# Patient Record
Sex: FEMALE | Race: BLACK OR AFRICAN AMERICAN | Employment: UNEMPLOYED | URBAN - METROPOLITAN AREA
[De-identification: names, ages, dates, MRNs, and addresses within clinical notes are randomized per-mention and may not be internally consistent; named-entity substitution may affect disease eponyms.]

---

## 2017-02-02 ENCOUNTER — HOSPITAL ENCOUNTER (EMERGENCY)
Age: 52
Discharge: HOME | End: 2017-02-02
Attending: EMERGENCY MEDICINE
Payer: SELF-PAY

## 2017-02-02 ENCOUNTER — APPOINTMENT (OUTPATIENT)
Dept: GENERAL RADIOLOGY | Age: 52
End: 2017-02-02
Attending: EMERGENCY MEDICINE
Payer: SELF-PAY

## 2017-02-02 VITALS
WEIGHT: 130 LBS | TEMPERATURE: 99.2 F | HEIGHT: 67 IN | HEART RATE: 108 BPM | OXYGEN SATURATION: 94 % | BODY MASS INDEX: 20.4 KG/M2 | SYSTOLIC BLOOD PRESSURE: 125 MMHG | RESPIRATION RATE: 16 BRPM | DIASTOLIC BLOOD PRESSURE: 82 MMHG

## 2017-02-02 DIAGNOSIS — J98.01 ACUTE BRONCHOSPASM: ICD-10-CM

## 2017-02-02 DIAGNOSIS — J18.9 PNEUMONIA OF LEFT LOWER LOBE DUE TO INFECTIOUS ORGANISM: ICD-10-CM

## 2017-02-02 DIAGNOSIS — R07.9 CHEST PAIN, UNSPECIFIED TYPE: Primary | ICD-10-CM

## 2017-02-02 LAB
ANION GAP BLD CALC-SCNC: 11 MMOL/L (ref 3–18)
ATRIAL RATE: 111 BPM
BASOPHILS # BLD AUTO: 0 K/UL (ref 0–0.06)
BASOPHILS # BLD: 0 % (ref 0–2)
BNP SERPL-MCNC: 310 PG/ML (ref 0–900)
BUN SERPL-MCNC: 10 MG/DL (ref 7–18)
BUN/CREAT SERPL: 12 (ref 12–20)
CALCIUM SERPL-MCNC: 8.9 MG/DL (ref 8.5–10.1)
CALCULATED P AXIS, ECG09: 78 DEGREES
CALCULATED R AXIS, ECG10: 60 DEGREES
CALCULATED T AXIS, ECG11: -20 DEGREES
CHLORIDE SERPL-SCNC: 93 MMOL/L (ref 100–108)
CK MB CFR SERPL CALC: 0.2 % (ref 0–4)
CK MB SERPL-MCNC: 0.6 NG/ML (ref 0.5–3.6)
CK SERPL-CCNC: 302 U/L (ref 26–192)
CO2 SERPL-SCNC: 33 MMOL/L (ref 21–32)
CREAT SERPL-MCNC: 0.85 MG/DL (ref 0.6–1.3)
D DIMER PPP FEU-MCNC: 0.35 UG/ML(FEU)
DIAGNOSIS, 93000: NORMAL
DIFFERENTIAL METHOD BLD: ABNORMAL
EOSINOPHIL # BLD: 0 K/UL (ref 0–0.4)
EOSINOPHIL NFR BLD: 0 % (ref 0–5)
ERYTHROCYTE [DISTWIDTH] IN BLOOD BY AUTOMATED COUNT: 13.1 % (ref 11.6–14.5)
FLUAV AG NPH QL IA: NEGATIVE
FLUBV AG NOSE QL IA: NEGATIVE
GLUCOSE SERPL-MCNC: 126 MG/DL (ref 74–99)
HCT VFR BLD AUTO: 36.8 % (ref 35–45)
HGB BLD-MCNC: 12 G/DL (ref 12–16)
LYMPHOCYTES # BLD AUTO: 4 % (ref 21–52)
LYMPHOCYTES # BLD: 0.4 K/UL (ref 0.9–3.6)
MCH RBC QN AUTO: 30.1 PG (ref 24–34)
MCHC RBC AUTO-ENTMCNC: 32.6 G/DL (ref 31–37)
MCV RBC AUTO: 92.2 FL (ref 74–97)
MONOCYTES # BLD: 0.7 K/UL (ref 0.05–1.2)
MONOCYTES NFR BLD AUTO: 6 % (ref 3–10)
NEUTS SEG # BLD: 10.5 K/UL (ref 1.8–8)
NEUTS SEG NFR BLD AUTO: 90 % (ref 40–73)
P-R INTERVAL, ECG05: 132 MS
PLATELET # BLD AUTO: 167 K/UL (ref 135–420)
PMV BLD AUTO: 9.9 FL (ref 9.2–11.8)
POTASSIUM SERPL-SCNC: 3.5 MMOL/L (ref 3.5–5.5)
Q-T INTERVAL, ECG07: 348 MS
QRS DURATION, ECG06: 68 MS
QTC CALCULATION (BEZET), ECG08: 473 MS
RBC # BLD AUTO: 3.99 M/UL (ref 4.2–5.3)
SODIUM SERPL-SCNC: 137 MMOL/L (ref 136–145)
TROPONIN I SERPL-MCNC: <0.02 NG/ML (ref 0–0.06)
VENTRICULAR RATE, ECG03: 111 BPM
WBC # BLD AUTO: 11.6 K/UL (ref 4.6–13.2)

## 2017-02-02 PROCEDURE — 96374 THER/PROPH/DIAG INJ IV PUSH: CPT

## 2017-02-02 PROCEDURE — 77030013140 HC MSK NEB VYRM -A

## 2017-02-02 PROCEDURE — 93005 ELECTROCARDIOGRAM TRACING: CPT

## 2017-02-02 PROCEDURE — 94640 AIRWAY INHALATION TREATMENT: CPT

## 2017-02-02 PROCEDURE — 74011250636 HC RX REV CODE- 250/636: Performed by: EMERGENCY MEDICINE

## 2017-02-02 PROCEDURE — 74011250637 HC RX REV CODE- 250/637: Performed by: EMERGENCY MEDICINE

## 2017-02-02 PROCEDURE — 87081 CULTURE SCREEN ONLY: CPT | Performed by: EMERGENCY MEDICINE

## 2017-02-02 PROCEDURE — 87804 INFLUENZA ASSAY W/OPTIC: CPT | Performed by: EMERGENCY MEDICINE

## 2017-02-02 PROCEDURE — 83880 ASSAY OF NATRIURETIC PEPTIDE: CPT | Performed by: EMERGENCY MEDICINE

## 2017-02-02 PROCEDURE — 71020 XR CHEST PA LAT: CPT

## 2017-02-02 PROCEDURE — 99285 EMERGENCY DEPT VISIT HI MDM: CPT

## 2017-02-02 PROCEDURE — 74011000250 HC RX REV CODE- 250: Performed by: EMERGENCY MEDICINE

## 2017-02-02 PROCEDURE — 85379 FIBRIN DEGRADATION QUANT: CPT | Performed by: EMERGENCY MEDICINE

## 2017-02-02 PROCEDURE — 82550 ASSAY OF CK (CPK): CPT | Performed by: EMERGENCY MEDICINE

## 2017-02-02 PROCEDURE — 96375 TX/PRO/DX INJ NEW DRUG ADDON: CPT

## 2017-02-02 PROCEDURE — 80048 BASIC METABOLIC PNL TOTAL CA: CPT | Performed by: EMERGENCY MEDICINE

## 2017-02-02 PROCEDURE — 85025 COMPLETE CBC W/AUTO DIFF WBC: CPT | Performed by: EMERGENCY MEDICINE

## 2017-02-02 PROCEDURE — 96361 HYDRATE IV INFUSION ADD-ON: CPT

## 2017-02-02 RX ORDER — SODIUM CHLORIDE 9 MG/ML
1000 INJECTION, SOLUTION INTRAVENOUS ONCE
Status: COMPLETED | OUTPATIENT
Start: 2017-02-02 | End: 2017-02-02

## 2017-02-02 RX ORDER — CODEINE PHOSPHATE AND GUAIFENESIN 10; 100 MG/5ML; MG/5ML
5-10 SOLUTION ORAL
Qty: 125 ML | Refills: 0 | Status: ON HOLD | OUTPATIENT
Start: 2017-02-02 | End: 2017-07-10

## 2017-02-02 RX ORDER — IBUPROFEN 600 MG/1
600 TABLET ORAL
Status: COMPLETED | OUTPATIENT
Start: 2017-02-02 | End: 2017-02-02

## 2017-02-02 RX ORDER — ALBUTEROL SULFATE 90 UG/1
2 AEROSOL, METERED RESPIRATORY (INHALATION)
Status: COMPLETED | OUTPATIENT
Start: 2017-02-02 | End: 2017-02-02

## 2017-02-02 RX ORDER — ALBUTEROL SULFATE 0.83 MG/ML
2.5 SOLUTION RESPIRATORY (INHALATION)
Status: COMPLETED | OUTPATIENT
Start: 2017-02-02 | End: 2017-02-02

## 2017-02-02 RX ORDER — CODEINE PHOSPHATE AND GUAIFENESIN 10; 100 MG/5ML; MG/5ML
10 SOLUTION ORAL
Status: COMPLETED | OUTPATIENT
Start: 2017-02-02 | End: 2017-02-02

## 2017-02-02 RX ORDER — AMOXICILLIN 250 MG/1
500 CAPSULE ORAL
Status: COMPLETED | OUTPATIENT
Start: 2017-02-02 | End: 2017-02-02

## 2017-02-02 RX ORDER — AMOXICILLIN 500 MG/1
500 TABLET, FILM COATED ORAL 3 TIMES DAILY
Qty: 21 TAB | Refills: 0 | Status: SHIPPED | OUTPATIENT
Start: 2017-02-02 | End: 2017-02-09

## 2017-02-02 RX ORDER — ACETAMINOPHEN 325 MG/1
650 TABLET ORAL
Status: COMPLETED | OUTPATIENT
Start: 2017-02-02 | End: 2017-02-02

## 2017-02-02 RX ORDER — IPRATROPIUM BROMIDE AND ALBUTEROL SULFATE 2.5; .5 MG/3ML; MG/3ML
3 SOLUTION RESPIRATORY (INHALATION)
Status: COMPLETED | OUTPATIENT
Start: 2017-02-02 | End: 2017-02-02

## 2017-02-02 RX ORDER — IBUPROFEN 600 MG/1
600 TABLET ORAL
Qty: 18 TAB | Refills: 0 | Status: SHIPPED | OUTPATIENT
Start: 2017-02-02 | End: 2019-10-04

## 2017-02-02 RX ORDER — ONDANSETRON 2 MG/ML
4 INJECTION INTRAMUSCULAR; INTRAVENOUS
Status: COMPLETED | OUTPATIENT
Start: 2017-02-02 | End: 2017-02-02

## 2017-02-02 RX ORDER — PREDNISONE 20 MG/1
60 TABLET ORAL DAILY
Qty: 12 TAB | Refills: 0 | Status: SHIPPED | OUTPATIENT
Start: 2017-02-02 | End: 2017-02-06

## 2017-02-02 RX ADMIN — ALBUTEROL SULFATE 2 PUFF: 90 AEROSOL, METERED RESPIRATORY (INHALATION) at 04:55

## 2017-02-02 RX ADMIN — IPRATROPIUM BROMIDE AND ALBUTEROL SULFATE 3 ML: .5; 3 SOLUTION RESPIRATORY (INHALATION) at 02:04

## 2017-02-02 RX ADMIN — ALBUTEROL SULFATE 2.5 MG: 2.5 SOLUTION RESPIRATORY (INHALATION) at 03:14

## 2017-02-02 RX ADMIN — GUAIFENESIN AND CODEINE PHOSPHATE 10 ML: 100; 10 SOLUTION ORAL at 04:07

## 2017-02-02 RX ADMIN — IBUPROFEN 600 MG: 600 TABLET ORAL at 02:03

## 2017-02-02 RX ADMIN — AMOXICILLIN 500 MG: 250 CAPSULE ORAL at 04:07

## 2017-02-02 RX ADMIN — ONDANSETRON HYDROCHLORIDE 4 MG: 2 SOLUTION INTRAMUSCULAR; INTRAVENOUS at 02:56

## 2017-02-02 RX ADMIN — SODIUM CHLORIDE 1000 ML: 900 INJECTION, SOLUTION INTRAVENOUS at 04:05

## 2017-02-02 RX ADMIN — ACETAMINOPHEN 650 MG: 325 TABLET, FILM COATED ORAL at 04:06

## 2017-02-02 RX ADMIN — METHYLPREDNISOLONE SODIUM SUCCINATE 125 MG: 125 INJECTION, POWDER, FOR SOLUTION INTRAMUSCULAR; INTRAVENOUS at 02:56

## 2017-02-02 NOTE — ED TRIAGE NOTES
Pt arrives alert and oriented c/o Sore throat, chills and body aches  starting approx 24 hours ago. Pt c/o productive cough with green flim.

## 2017-02-02 NOTE — ED PROVIDER NOTES
HPI Comments: 1:27 AM Rochelle Mendoza is a 49 y.o. female who presents to ED with a h/o asthma complaining of sore throat onset yesterday. She also complains complains of a productive cough with yellow sputum, fever,  generalized myalgias, chills. She c/o chest pain when coughing only, no chest dalia otherwise. She denies SOB or NVD. Pt admits to smoking cigarettes. Pt did not receive the flu shot this year. Pt denies taking any Tylenol or Motrin. No other concerns nor complaints at this time. PCP: None      The history is provided by the patient. History reviewed. No pertinent past medical history. History reviewed. No pertinent past surgical history. History reviewed. No pertinent family history. Social History     Social History    Marital status: SINGLE     Spouse name: N/A    Number of children: N/A    Years of education: N/A     Occupational History    Not on file. Social History Main Topics    Smoking status: Current Every Day Smoker     Packs/day: 0.50     Years: 20.00    Smokeless tobacco: Not on file    Alcohol use 1.2 oz/week     2 Cans of beer per week      Comment: pt states she drinks beer daily    Drug use: No    Sexual activity: No     Other Topics Concern    Not on file     Social History Narrative    No narrative on file         ALLERGIES: Review of patient's allergies indicates no known allergies. Review of Systems   Constitutional: Positive for chills and fever. Negative for fatigue and unexpected weight change. HENT: Positive for sore throat. Negative for congestion and rhinorrhea. Respiratory: Positive for cough (productive with yellow sputum). Negative for chest tightness and shortness of breath. Cardiovascular: Negative for leg swelling. Gastrointestinal: Negative for abdominal pain, nausea and vomiting. Genitourinary: Negative for dysuria. Musculoskeletal: Positive for myalgias (generalized). Negative for back pain.    Skin: Negative for rash.   Neurological: Negative for dizziness and weakness. Psychiatric/Behavioral: The patient is not nervous/anxious. All other systems reviewed and are negative. Vitals:    02/02/17 0330 02/02/17 0400 02/02/17 0442 02/02/17 0455   BP: 124/83 136/80 125/82    Pulse: (!) 111 (!) 113 (!) 108 (!) 108   Resp: 15 14 16    Temp:   99.2 °F (37.3 °C)    SpO2: 91% 90% 94%    Weight:       Height:                Physical Exam   Constitutional: She is oriented to person, place, and time. She appears well-developed and well-nourished. No distress. HENT:   Head: Normocephalic and atraumatic. Right Ear: External ear normal.   Left Ear: External ear normal.   Mouth/Throat: Oropharynx is clear and moist.   Nasal congestion    Eyes: Conjunctivae and EOM are normal. Pupils are equal, round, and reactive to light. Neck: Normal range of motion. Neck supple. Cardiovascular: Normal rate and normal heart sounds. No murmur heard. Pulmonary/Chest: Effort normal. She has wheezes (bilateral inspiratory wheeze). She has no rales. Abdominal: Soft. Bowel sounds are normal. There is no tenderness. There is no guarding. Musculoskeletal: Normal range of motion. She exhibits no edema or tenderness. Neurological: She is alert and oriented to person, place, and time. Skin: Skin is warm and dry. Nursing note and vitals reviewed. [facility]  ED Course       Procedures    Vitals:  No data found.         Medications ordered:   Medications   albuterol-ipratropium (DUO-NEB) 2.5 MG-0.5 MG/3 ML (3 mL Nebulization Given 2/2/17 0204)   ibuprofen (MOTRIN) tablet 600 mg (600 mg Oral Given 2/2/17 0203)   methylPREDNISolone (PF) (SOLU-MEDROL) injection 125 mg (125 mg IntraVENous Given 2/2/17 0256)   ondansetron (ZOFRAN) injection 4 mg (4 mg IntraVENous Given 2/2/17 0256)   albuterol (PROVENTIL VENTOLIN) nebulizer solution 2.5 mg (2.5 mg Nebulization Given 2/2/17 0314)   amoxicillin (AMOXIL) capsule 500 mg (500 mg Oral Given 2/2/17 3217)   guaiFENesin-codeine (ROBITUSSIN AC) 100-10 mg/5 mL solution 10 mL (10 mL Oral Given 2/2/17 4757)   0.9% sodium chloride infusion 1,000 mL (0 mL IntraVENous IV Completed 2/2/17 0176)   acetaminophen (TYLENOL) tablet 650 mg (650 mg Oral Given 2/2/17 0256)   albuterol (PROVENTIL HFA, VENTOLIN HFA, PROAIR HFA) inhaler 2 Puff (2 Puffs Inhalation Given 2/2/17 1715)         Lab findings:  No results found for this or any previous visit (from the past 12 hour(s)). X-Ray, CT or other radiology findings or impressions:  XR CHEST PA LAT   Final Result          Progress notes, Consult notes or additional Procedure notes:     3:41 AM pt c/o chest pain w cough only   t wave inv on ekg, no old ekg's. But cp w cough only, + pna seen. No chest pain otherwise. Sx's > one day and neg ck/trop. Nl sats. Afebrile, nl wbc, not c/w bacteremia. Nl sats when pleth good, rechecked at dc and 94%. Pt markedly improved after tx, wants dc trial and stable for dc trial.  Lungs ctab after tx. No emc, stable for dc and close f/u. Disposition:  Diagnosis:   1. Chest pain, unspecified type    2. Acute bronchospasm    3. Pneumonia of left lower lobe due to infectious organism        Disposition: home    Follow-up Information     Follow up With Details Comments [address] Schedule an appointment as soon as possible for a visit in 2 days  [facility] [address]  [address] [place]           Discharge Medication List as of 2/2/2017  4:33 AM      START taking these medications    Details   predniSONE (DELTASONE) 20 mg tablet Take 3 Tabs by mouth daily for 4 days. , Print, Disp-12 Tab, R-0      guaiFENesin-codeine (ROBITUSSIN AC) 100-10 mg/5 mL solution Take 5-10 mL by mouth every six (6) hours as needed for Cough.  Max Daily Amount: 40 mL., Print, Disp-125 mL, R-0      ibuprofen (MOTRIN) 600 mg tablet Take 1 Tab by mouth every six (6) hours as needed for Pain., Print, Disp18 Tab, R-0      amoxicillin 500 mg tab Take 500 mg by mouth three (3) times daily for 7 days. , Print, Disp-21 Tab, R-0           Scribe Attestation  Miriam Lara scribing for and in the presence of Ynes Villela MD (02/02/17/ 1:26 AM)    Physician Attestation  I personally performed the services described in this documentation, reviewed, and edited the documentation which was dictated to the scribe in my presence, and it accurately records my own words and actions.      Ynes Villela MD (02/02/17/ 1:26 AM)    Signed by: Ovi Victoria, 02/02/17, 1:26 AM

## 2017-02-02 NOTE — DISCHARGE INSTRUCTIONS
Return for pain, any shortness of breath, fever not resolving with motrin, vomiting, decreased fluid intake, weakness, numbness, dizziness, or any change or concerns. Stop smoking as we discussed. Pneumonia: Care Instructions  Your Care Instructions    Pneumonia is an infection of the lungs. Most cases are caused by infections from bacteria or viruses. Pneumonia may be mild or very severe. If it is caused by bacteria, you will be treated with antibiotics. It may take a few weeks to a few months to recover fully from pneumonia, depending on how sick you were and whether your overall health is good. Follow-up care is a key part of your treatment and safety. Be sure to make and go to all appointments, and call your doctor if you are having problems. Its also a good idea to know your test results and keep a list of the medicines you take. How can you care for yourself at home? · Take your antibiotics exactly as directed. Do not stop taking the medicine just because you are feeling better. You need to take the full course of antibiotics. · Take your medicines exactly as prescribed. Call your doctor if you think you are having a problem with your medicine. · Get plenty of rest and sleep. You may feel weak and tired for a while, but your energy level will improve with time. · To prevent dehydration, drink plenty of fluids, enough so that your urine is light yellow or clear like water. Choose water and other caffeine-free clear liquids until you feel better. If you have kidney, heart, or liver disease and have to limit fluids, talk with your doctor before you increase the amount of fluids you drink. · Take care of your cough so you can rest. A cough that brings up mucus from your lungs is common with pneumonia. It is one way your body gets rid of the infection. But if coughing keeps you from resting or causes severe fatigue and chest-wall pain, talk to your doctor.  He or she may suggest that you take a medicine to reduce the cough. · Use a vaporizer or humidifier to add moisture to your bedroom. Follow the directions for cleaning the machine. · Do not smoke or allow others to smoke around you. Smoke will make your cough last longer. If you need help quitting, talk to your doctor about stop-smoking programs and medicines. These can increase your chances of quitting for good. · Take an over-the-counter pain medicine, such as acetaminophen (Tylenol), ibuprofen (Advil, Motrin), or naproxen (Aleve). Read and follow all instructions on the label. · Do not take two or more pain medicines at the same time unless the doctor told you to. Many pain medicines have acetaminophen, which is Tylenol. Too much acetaminophen (Tylenol) can be harmful. · If you were given a spirometer to measure how well your lungs are working, use it as instructed. This can help your doctor tell how your recovery is going. · To prevent pneumonia in the future, talk to your doctor about getting a flu vaccine (once a year) and a pneumococcal vaccine (one time only for most people). When should you call for help? Call 911 anytime you think you may need emergency care. For example, call if:  · You have severe trouble breathing. Call your doctor now or seek immediate medical care if:  · You cough up dark brown or bloody mucus (sputum). · You have new or worse trouble breathing. · You are dizzy or lightheaded, or you feel like you may faint. Watch closely for changes in your health, and be sure to contact your doctor if:  · You have a new or higher fever. · You are coughing more deeply or more often. · You are not getting better after 2 days (48 hours). · You do not get better as expected. Where can you learn more? Go to http://bonnie-yasmeen.info/. Enter [id number] in the search box to learn more about \"Pneumonia: Care Instructions. \"  Current as of: May 23, 2016  Content Version: 11.1  © 0062-8018 [employer]. Care instructions adapted under license by [employer] (which disclaims liability or warranty for this information). If you have questions about a medical condition or this instruction, always ask your healthcare professional. [address] any warranty or liability for your use of this information. Chest Pain: Care Instructions  Your Care Instructions  There are many things that can cause chest pain. Some are not serious and will get better on their own in a few days. But some kinds of chest pain need more testing and treatment. Your doctor may have recommended a follow-up visit in the next 8 to 12 hours. If you are not getting better, you may need more tests or treatment. Even though your doctor has released you, you still need to watch for any problems. The doctor carefully checked you, but sometimes problems can develop later. If you have new symptoms or if your symptoms do not get better, get medical care right away. If you have worse or different chest pain or pressure that lasts more than 5 minutes or you passed out (lost consciousness), call 911 or seek other emergency help right away. A medical visit is only one step in your treatment. Even if you feel better, you still need to do what your doctor recommends, such as going to all suggested follow-up appointments and taking medicines exactly as directed. This will help you recover and help prevent future problems. How can you care for yourself at home? · Rest until you feel better. · Take your medicine exactly as prescribed. Call your doctor if you think you are having a problem with your medicine. · Do not drive after taking a prescription pain medicine. When should you call for help? Call 911 if:  · You passed out (lost consciousness). · You have severe difficulty breathing. · You have symptoms of a heart attack.  These may include:  ¨ Chest pain or pressure, or a strange feeling in your chest.  ¨ Sweating. ¨ Shortness of breath. ¨ Nausea or vomiting. ¨ Pain, pressure, or a strange feeling in your back, neck, jaw, or upper belly or in one or both shoulders or arms. ¨ Lightheadedness or sudden weakness. ¨ A fast or irregular heartbeat. After you call 911, the  may tell you to chew 1 adult-strength or 2 to 4 low-dose aspirin. Wait for an ambulance. Do not try to drive yourself. Call your doctor today if:  · You have any trouble breathing. · Your chest pain gets worse. · You are dizzy or lightheaded, or you feel like you may faint. · You are not getting better as expected. · You are having new or different chest pain. Where can you learn more? Go to http://bonnie-yasmeen.info/. Enter A120 in the search box to learn more about \"Chest Pain: Care Instructions. \"  Current as of: May 27, 2016  Content Version: 11.1  © 9873-5118 [employer]. Care instructions adapted under license by [employer] (which disclaims liability or warranty for this information). If you have questions about a medical condition or this instruction, always ask your healthcare professional. [place] [address] any warranty or liability for your use of this information. Wheezing or Bronchoconstriction: Care Instructions  Your Care Instructions  Wheezing is a whistling noise made during breathing. It occurs when the small airways, or bronchial tubes, that lead to your lungs swell or contract (spasm) and become narrow. This narrowing is called bronchoconstriction. When your airways constrict, it is hard for air to pass through and this makes it hard for you to breathe. Wheezing and bronchoconstriction can be caused by many problems, including:  · An infection such as the flu or a cold. · Allergies such as hay fever. · Diseases such as asthma or chronic obstructive pulmonary disease. · Smoking.   Treatment for your wheezing depends on what is causing the problem. Your wheezing may get better without treatment. But you may need to pay attention to things that cause your wheezing and avoid them. Or you may need medicine to help treat the wheezing and to reduce the swelling or to relieve spasms in your lungs. Follow-up care is a key part of your treatment and safety. Be sure to make and go to all appointments, and call your doctor if you are having problems. It is also a good idea to know your test results and keep a list of the medicines you take. How can you care for yourself at home? · Take your medicine exactly as prescribed. Call your doctor if you think you are having a problem with your medicine. You will get more details on the specific medicine your doctor prescribes. · If your doctor prescribed antibiotics, take them as directed. Do not stop taking them just because you feel better. You need to take the full course of antibiotics. · Breathe moist air from a humidifier, hot shower, or sink filled with hot water. This may help ease your symptoms and make it easier for you to breathe. · If you have congestion in your nose and throat, drinking plenty of fluids, especially hot fluids, may help relieve your symptoms. If you have kidney, heart, or liver disease and have to limit fluids, talk with your doctor before you increase the amount of fluids you drink. · If you have mucus in your airways, it may help to breathe deeply and cough. · Do not smoke or allow others to smoke around you. Smoking can make your wheezing worse. If you need help quitting, talk to your doctor about stop-smoking programs and medicines. These can increase your chances of quitting for good. · Avoid things that may cause your wheezing. These may include colds, smoke, air pollution, dust, pollen, pets, cockroaches, stress, and cold air. When should you call for help? Call 911 anytime you think you may need emergency care.  For example, call if:  · You have severe trouble breathing. · You passed out (lost consciousness). Call your doctor now or seek immediate medical care if:  · You cough up yellow, dark brown, or bloody mucus (sputum). · You have new or worse shortness of breath. · Your wheezing is not getting better or it gets worse after you start taking your medicine. Watch closely for changes in your health, and be sure to contact your doctor if:  · You do not get better as expected. Where can you learn more? Go to http://bonnie-yasmeen.info/. Enter 454 [id number] in the search box to learn more about \"Wheezing or Bronchoconstriction: Care Instructions. \"  Current as of: May 23, 2016  Content Version: 11.1  © 3645-9285 [employer]. Care instructions adapted under license by [employer] (which disclaims liability or warranty for this information). If you have questions about a medical condition or this instruction, always ask your healthcare professional. [address] any warranty or liability for your use of this information.

## 2017-02-03 LAB
B-HEM STREP THROAT QL CULT: NEGATIVE
BACTERIA SPEC CULT: NORMAL
SERVICE CMNT-IMP: NORMAL

## 2017-07-07 ENCOUNTER — HOSPITAL ENCOUNTER (INPATIENT)
Age: 52
LOS: 3 days | Discharge: HOME | DRG: 871 | End: 2017-07-10
Attending: EMERGENCY MEDICINE | Admitting: INTERNAL MEDICINE
Payer: SELF-PAY

## 2017-07-07 ENCOUNTER — APPOINTMENT (OUTPATIENT)
Dept: GENERAL RADIOLOGY | Age: 52
DRG: 871 | End: 2017-07-07
Attending: EMERGENCY MEDICINE
Payer: SELF-PAY

## 2017-07-07 DIAGNOSIS — J18.9 CAP (COMMUNITY ACQUIRED PNEUMONIA): Primary | ICD-10-CM

## 2017-07-07 LAB
ALBUMIN SERPL BCP-MCNC: 2.8 G/DL (ref 3.4–5)
ALBUMIN/GLOB SERPL: 0.5 {RATIO} (ref 0.8–1.7)
ALP SERPL-CCNC: 291 U/L (ref 45–117)
ALT SERPL-CCNC: 33 U/L (ref 13–56)
ANION GAP BLD CALC-SCNC: 8 MMOL/L (ref 3–18)
APPEARANCE UR: ABNORMAL
AST SERPL W P-5'-P-CCNC: 39 U/L (ref 15–37)
BACTERIA URNS QL MICRO: ABNORMAL /HPF
BASOPHILS # BLD AUTO: 0.1 K/UL (ref 0–0.06)
BASOPHILS # BLD: 1 % (ref 0–2)
BILIRUB SERPL-MCNC: 0.5 MG/DL (ref 0.2–1)
BILIRUB UR QL: ABNORMAL
BUN SERPL-MCNC: 12 MG/DL (ref 7–18)
BUN/CREAT SERPL: 10 (ref 12–20)
CALCIUM SERPL-MCNC: 9.1 MG/DL (ref 8.5–10.1)
CHLORIDE SERPL-SCNC: 90 MMOL/L (ref 100–108)
CO2 SERPL-SCNC: 33 MMOL/L (ref 21–32)
COLOR UR: ABNORMAL
CREAT SERPL-MCNC: 1.26 MG/DL (ref 0.6–1.3)
DIFFERENTIAL METHOD BLD: ABNORMAL
EOSINOPHIL # BLD: 0 K/UL (ref 0–0.4)
EOSINOPHIL NFR BLD: 0 % (ref 0–5)
EPITH CASTS URNS QL MICRO: ABNORMAL /LPF (ref 0–5)
ERYTHROCYTE [DISTWIDTH] IN BLOOD BY AUTOMATED COUNT: 12.9 % (ref 11.6–14.5)
GLOBULIN SER CALC-MCNC: 5.1 G/DL (ref 2–4)
GLUCOSE SERPL-MCNC: 85 MG/DL (ref 74–99)
GLUCOSE UR STRIP.AUTO-MCNC: NEGATIVE MG/DL
HCT VFR BLD AUTO: 31.2 % (ref 35–45)
HGB BLD-MCNC: 10 G/DL (ref 12–16)
HGB UR QL STRIP: NEGATIVE
KETONES UR QL STRIP.AUTO: ABNORMAL MG/DL
LACTATE BLD-SCNC: 0.8 MMOL/L (ref 0.4–2)
LEUKOCYTE ESTERASE UR QL STRIP.AUTO: ABNORMAL
LYMPHOCYTES # BLD AUTO: 20 % (ref 21–52)
LYMPHOCYTES # BLD: 2.8 K/UL (ref 0.9–3.6)
MCH RBC QN AUTO: 29.9 PG (ref 24–34)
MCHC RBC AUTO-ENTMCNC: 32.1 G/DL (ref 31–37)
MCV RBC AUTO: 93.1 FL (ref 74–97)
MONOCYTES # BLD: 1.7 K/UL (ref 0.05–1.2)
MONOCYTES NFR BLD AUTO: 12 % (ref 3–10)
NEUTS SEG # BLD: 9.2 K/UL (ref 1.8–8)
NEUTS SEG NFR BLD AUTO: 67 % (ref 40–73)
NITRITE UR QL STRIP.AUTO: NEGATIVE
PH UR STRIP: 5 [PH] (ref 5–8)
PLATELET # BLD AUTO: 332 K/UL (ref 135–420)
PLATELET COMMENTS,PCOM: ABNORMAL
PMV BLD AUTO: 9.6 FL (ref 9.2–11.8)
POTASSIUM SERPL-SCNC: 3.3 MMOL/L (ref 3.5–5.5)
PROT SERPL-MCNC: 7.9 G/DL (ref 6.4–8.2)
PROT UR STRIP-MCNC: ABNORMAL MG/DL
RBC # BLD AUTO: 3.35 M/UL (ref 4.2–5.3)
RBC #/AREA URNS HPF: ABNORMAL /HPF (ref 0–5)
RBC MORPH BLD: ABNORMAL
SODIUM SERPL-SCNC: 131 MMOL/L (ref 136–145)
SP GR UR REFRACTOMETRY: 1.02 (ref 1–1.03)
UROBILINOGEN UR QL STRIP.AUTO: 2 EU/DL (ref 0.2–1)
WBC # BLD AUTO: 13.8 K/UL (ref 4.6–13.2)
WBC URNS QL MICRO: ABNORMAL /HPF (ref 0–4)

## 2017-07-07 PROCEDURE — 36415 COLL VENOUS BLD VENIPUNCTURE: CPT | Performed by: EMERGENCY MEDICINE

## 2017-07-07 PROCEDURE — 65270000029 HC RM PRIVATE

## 2017-07-07 PROCEDURE — 83605 ASSAY OF LACTIC ACID: CPT | Performed by: EMERGENCY MEDICINE

## 2017-07-07 PROCEDURE — 71020 XR CHEST PA LAT: CPT

## 2017-07-07 PROCEDURE — 74011000258 HC RX REV CODE- 258: Performed by: EMERGENCY MEDICINE

## 2017-07-07 PROCEDURE — 83605 ASSAY OF LACTIC ACID: CPT

## 2017-07-07 PROCEDURE — 94640 AIRWAY INHALATION TREATMENT: CPT

## 2017-07-07 PROCEDURE — 87040 BLOOD CULTURE FOR BACTERIA: CPT | Performed by: EMERGENCY MEDICINE

## 2017-07-07 PROCEDURE — 74011000250 HC RX REV CODE- 250: Performed by: EMERGENCY MEDICINE

## 2017-07-07 PROCEDURE — 85025 COMPLETE CBC W/AUTO DIFF WBC: CPT | Performed by: EMERGENCY MEDICINE

## 2017-07-07 PROCEDURE — 81001 URINALYSIS AUTO W/SCOPE: CPT | Performed by: EMERGENCY MEDICINE

## 2017-07-07 PROCEDURE — 96365 THER/PROPH/DIAG IV INF INIT: CPT

## 2017-07-07 PROCEDURE — 93005 ELECTROCARDIOGRAM TRACING: CPT

## 2017-07-07 PROCEDURE — 80053 COMPREHEN METABOLIC PANEL: CPT | Performed by: EMERGENCY MEDICINE

## 2017-07-07 PROCEDURE — 77030029684 HC NEB SM VOL KT MONA -A

## 2017-07-07 PROCEDURE — 87070 CULTURE OTHR SPECIMN AEROBIC: CPT | Performed by: EMERGENCY MEDICINE

## 2017-07-07 PROCEDURE — 74011250636 HC RX REV CODE- 250/636: Performed by: EMERGENCY MEDICINE

## 2017-07-07 PROCEDURE — 87077 CULTURE AEROBIC IDENTIFY: CPT | Performed by: EMERGENCY MEDICINE

## 2017-07-07 PROCEDURE — 96375 TX/PRO/DX INJ NEW DRUG ADDON: CPT

## 2017-07-07 PROCEDURE — 99284 EMERGENCY DEPT VISIT MOD MDM: CPT

## 2017-07-07 PROCEDURE — 87186 SC STD MICRODIL/AGAR DIL: CPT | Performed by: EMERGENCY MEDICINE

## 2017-07-07 PROCEDURE — 74011250637 HC RX REV CODE- 250/637: Performed by: EMERGENCY MEDICINE

## 2017-07-07 RX ORDER — IPRATROPIUM BROMIDE AND ALBUTEROL SULFATE 2.5; .5 MG/3ML; MG/3ML
3 SOLUTION RESPIRATORY (INHALATION) ONCE
Status: COMPLETED | OUTPATIENT
Start: 2017-07-07 | End: 2017-07-07

## 2017-07-07 RX ORDER — ACETAMINOPHEN 325 MG/1
650 TABLET ORAL
Status: COMPLETED | OUTPATIENT
Start: 2017-07-07 | End: 2017-07-07

## 2017-07-07 RX ORDER — SODIUM CHLORIDE 0.9 % (FLUSH) 0.9 %
5-10 SYRINGE (ML) INJECTION AS NEEDED
Status: DISCONTINUED | OUTPATIENT
Start: 2017-07-07 | End: 2017-07-10 | Stop reason: HOSPADM

## 2017-07-07 RX ADMIN — METHYLPREDNISOLONE SODIUM SUCCINATE 125 MG: 125 INJECTION, POWDER, FOR SOLUTION INTRAMUSCULAR; INTRAVENOUS at 18:39

## 2017-07-07 RX ADMIN — SODIUM CHLORIDE 500 MG: 900 INJECTION, SOLUTION INTRAVENOUS at 19:50

## 2017-07-07 RX ADMIN — ACETAMINOPHEN 650 MG: 325 TABLET ORAL at 18:15

## 2017-07-07 RX ADMIN — IPRATROPIUM BROMIDE AND ALBUTEROL SULFATE 3 ML: .5; 3 SOLUTION RESPIRATORY (INHALATION) at 18:38

## 2017-07-07 RX ADMIN — SODIUM CHLORIDE 1743 ML: 900 INJECTION, SOLUTION INTRAVENOUS at 18:39

## 2017-07-07 RX ADMIN — CEFTRIAXONE SODIUM 2 G: 2 INJECTION, POWDER, FOR SOLUTION INTRAMUSCULAR; INTRAVENOUS at 18:39

## 2017-07-07 NOTE — IP AVS SNAPSHOT
[address] Patient: Ramon Melgar MRN: [id number] :1965 You are allergic to the following No active allergies Recent Documentation Height Weight Breastfeeding? BMI OB Status Smoking Status 1.626 m 57.2 kg No 21.65 kg/m2 Menopause Current Every Day Smoker Unresulted Labs Order Current Status CULTURE, BLOOD Preliminary result CULTURE, BLOOD Preliminary result CULTURE, BLOOD Preliminary result CULTURE, BLOOD Preliminary result CULTURE, RESPIRATORY/SPUTUM/BRONCH W GRAM STAIN Preliminary result Emergency Contacts Name Discharge Info Relation Home Work Mobile Tomasz Sandra  Parent [1] [phone] About your hospitalization You were admitted on:  2017 You last received care in the:  [facility] [address] You were discharged on:  July 10, 2017 Unit phone number:  [phone] Why you were hospitalized Your primary diagnosis was:  Not on File Your diagnoses also included:  Pneumonia, Cap (Community Acquired Pneumonia), Sirs (Systemic Inflammatory Response Syndrome) (Hcc) Providers Seen During Your Hospitalizations Provider Role Specialty Primary office phone Isabel Monsivais DO Attending Provider Emergency Medicine [phone] Christina Almazan MD Attending Provider Internal Medicine [phone] Your Primary Care Physician (PCP) Primary Care Physician Office Phone Office Fax NONE ** None ** ** None ** Follow-up Information Follow up With Details Comments Contact Info [facility] Quinten Tabares opens at 8:30AM [address] [facility]) [address] [phone] Current Discharge Medication List  
  
START taking these medications Dose & Instructions Dispensing Information Comments Morning Noon Evening Bedtime  
 albuterol 90 mcg/actuation inhaler Commonly known as:  PROAIR HFA Your last dose was: Your next dose is:    
   
   
 Dose:  1-2 Puff Take 1-2 Puffs by inhalation every four (4) hours as needed for Wheezing. Quantity:  2 Inhaler Refills:  2  
     
   
   
   
  
 azithromycin 250 mg tablet Commonly known as:  Keegan Napier Your last dose was: Your next dose is: Take as directed Quantity:  6 Tab Refills:  0  
     
   
   
   
  
 cyanocobalamin 500 mcg Tber Your last dose was: Your next dose is:    
   
   
 Dose:  500 mcg Take 500 mcg by mouth daily. Quantity:  30 Tab Refills:  2  
     
   
   
   
  
 * nicotine 14 mg/24 hr patch Commonly known as:  Annmarie Blanquita Your last dose was: Your next dose is:    
   
   
 Dose:  1 Patch 1 Patch by TransDERmal route every twenty-four (24) hours for 7 days. Week 1 Quantity:  7 Patch Refills:  0  
     
   
   
   
  
 * nicotine 7 mg/24 hr  
Commonly known as:  Annmarie Blanquita Your last dose was: Your next dose is:    
   
   
 Dose:  1 Patch 1 Patch by TransDERmal route every twenty-four (24) hours for 7 days. Week @ Quantity:  7 Patch Refills:  0  
     
   
   
   
  
 predniSONE 20 mg tablet Commonly known as:  Sumaya Alonso Your last dose was: Your next dose is:    
   
   
 Dose:  40 mg Take 2 Tabs by mouth daily (with breakfast) for 5 days. Quantity:  10 Tab Refills:  0  
     
   
   
   
  
 * Notice: This list has 2 medication(s) that are the same as other medications prescribed for you. Read the directions carefully, and ask your doctor or other care provider to review them with you. CONTINUE these medications which have CHANGED Dose & Instructions Dispensing Information Comments Morning Noon Evening Bedtime  
 guaiFENesin-codeine 100-10 mg/5 mL solution Commonly known as:  ROBITUSSIN AC What changed:   
- how much to take - when to take this Your last dose was: Your next dose is:    
   
   
 Dose:  5 mL Take 5 mL by mouth three (3) times daily as needed for Cough. Max Daily Amount: 15 mL. Quantity:  118 mL Refills:  0 CONTINUE these medications which have NOT CHANGED Dose & Instructions Dispensing Information Comments Morning Noon Evening Bedtime  
 ibuprofen 600 mg tablet Commonly known as:  MOTRIN Your last dose was: Your next dose is:    
   
   
 Dose:  600 mg Take 1 Tab by mouth every six (6) hours as needed for Pain. Quantity:  18 Tab Refills:  0 Where to Get Your Medications Information on where to get these meds will be given to you by the nurse or doctor. ! Ask your nurse or doctor about these medications  
  albuterol 90 mcg/actuation inhaler  
 azithromycin 250 mg tablet  
 cyanocobalamin 500 mcg Tber  
 guaiFENesin-codeine 100-10 mg/5 mL solution  
 nicotine 14 mg/24 hr patch  
 nicotine 7 mg/24 hr  
 predniSONE 20 mg tablet Discharge Instructions Pneumonia: Care Instructions Your Care Instructions Pneumonia is an infection of the lungs. Most cases are caused by infections from bacteria or viruses. Pneumonia may be mild or very severe. If it is caused by bacteria, you will be treated with antibiotics. It may take a few weeks to a few months to recover fully from pneumonia, depending on how sick you were and whether your overall health is good. Follow-up care is a key part of your treatment and safety. Be sure to make and go to all appointments, and call your doctor if you are having problems. Its also a good idea to know your test results and keep a list of the medicines you take. How can you care for yourself at home? · Take your antibiotics exactly as directed. Do not stop taking the medicine just because you are feeling better.  You need to take the full course of antibiotics. · Take your medicines exactly as prescribed. Call your doctor if you think you are having a problem with your medicine. · Get plenty of rest and sleep. You may feel weak and tired for a while, but your energy level will improve with time. · To prevent dehydration, drink plenty of fluids, enough so that your urine is light yellow or clear like water. Choose water and other caffeine-free clear liquids until you feel better. If you have kidney, heart, or liver disease and have to limit fluids, talk with your doctor before you increase the amount of fluids you drink. · Take care of your cough so you can rest. A cough that brings up mucus from your lungs is common with pneumonia. It is one way your body gets rid of the infection. But if coughing keeps you from resting or causes severe fatigue and chest-wall pain, talk to your doctor. He or she may suggest that you take a medicine to reduce the cough. · Use a vaporizer or humidifier to add moisture to your bedroom. Follow the directions for cleaning the machine. · Do not smoke or allow others to smoke around you. Smoke will make your cough last longer. If you need help quitting, talk to your doctor about stop-smoking programs and medicines. These can increase your chances of quitting for good. · Take an over-the-counter pain medicine, such as acetaminophen (Tylenol), ibuprofen (Advil, Motrin), or naproxen (Aleve). Read and follow all instructions on the label. · Do not take two or more pain medicines at the same time unless the doctor told you to. Many pain medicines have acetaminophen, which is Tylenol. Too much acetaminophen (Tylenol) can be harmful. · If you were given a spirometer to measure how well your lungs are working, use it as instructed. This can help your doctor tell how your recovery is going.  
· To prevent pneumonia in the future, talk to your doctor about getting a flu vaccine (once a year) and a pneumococcal vaccine (one time only for most people). When should you call for help? Call 911 anytime you think you may need emergency care. For example, call if: 
· You have severe trouble breathing. Call your doctor now or seek immediate medical care if: 
· You cough up dark brown or bloody mucus (sputum). · You have new or worse trouble breathing. · You are dizzy or lightheaded, or you feel like you may faint. Watch closely for changes in your health, and be sure to contact your doctor if: 
· You have a new or higher fever. · You are coughing more deeply or more often. · You are not getting better after 2 days (48 hours). · You do not get better as expected. Where can you learn more? Go to http://bonnie-yasmeen.info/. Enter [id number] in the search box to learn more about \"Pneumonia: Care Instructions. \" Current as of: March 25, 2017 Content Version: 11.3 © 2141-2655 [employer]. Care instructions adapted under license by [employer] (which disclaims liability or warranty for this information). If you have questions about a medical condition or this instruction, always ask your healthcare professional. [address] any warranty or liability for your use of this information. Patient armband removed and given to patient to take home. Patient was informed of the privacy risks if armband lost or stolen Surefire Social Activation Thank you for requesting access to Surefire Social. Please follow the instructions below to securely access and download your online medical record. Surefire Social allows you to send messages to your doctor, view your test results, renew your prescriptions, schedule appointments, and more. How Do I Sign Up? 1. In your internet browser, go to [email or web address] 
2. Click on the First Time User? Click Here link in the Sign In box. You will be redirect to the New Member Sign Up page. 3. Enter your CareKinesis Access Code exactly as it appears below. You will not need to use this code after youve completed the sign-up process. If you do not sign up before the expiration date, you must request a new code. CareKinesis Access Code: [email or web address] Expires: 10/8/2017 10:04 AM (This is the date your CareKinesis access code will ) 4. Enter the last four digits of your Social Security Number (xxxx) and Date of Birth (mm/dd/yyyy) as indicated and click Submit. You will be taken to the next sign-up page. 5. Create a CareKinesis ID. This will be your CareKinesis login ID and cannot be changed, so think of one that is secure and easy to remember. 6. Create a CareKinesis password. You can change your password at any time. 7. Enter your Password Reset Question and Answer. This can be used at a later time if you forget your password. 8. Enter your e-mail address. You will receive e-mail notification when new information is available in [address] 9. Click Sign Up. You can now view and download portions of your medical record. 10. Click the Download Summary menu link to download a portable copy of your medical information. Additional Information If you have questions, please visit the Frequently Asked Questions section of the CareKinesis website at [email or web address] Remember, CareKinesis is NOT to be used for urgent needs. For medical emergencies, dial 911. DISCHARGE SUMMARY from Nurse The following personal items are in your possession at time of discharge: 
 
Dental Appliances: None Visual Aid: None Home Medications: None Jewelry: None Clothing: Shirt, Undergarments, Footwear, Pants Other Valuables: None PATIENT INSTRUCTIONS: 
 
 
F-face looks uneven A-arms unable to move or move unevenly S-speech slurred or non-existent T-time-call 911 as soon as signs and symptoms begin-DO NOT go Back to bed or wait to see if you get better-TIME IS BRAIN. Warning Signs of HEART ATTACK Call 911 if you have these symptoms: 
? Chest discomfort. Most heart attacks involve discomfort in the center of the chest that lasts more than a few minutes, or that goes away and comes back. It can feel like uncomfortable pressure, squeezing, fullness, or pain. ? Discomfort in other areas of the upper body. Symptoms can include pain or discomfort in one or both arms, the back, neck, jaw, or stomach. ? Shortness of breath with or without chest discomfort. ? Other signs may include breaking out in a cold sweat, nausea, or lightheadedness. Don't wait more than five minutes to call [address]! Fast action can save your life. Calling 911 is almost always the fastest way to get lifesaving treatment. Emergency Medical Services staff can begin treatment when they arrive  up to an hour sooner than if someone gets to the hospital by car. The discharge information has been reviewed with the patient. The patient verbalized understanding. Discharge medications reviewed with the patient and appropriate educational materials and side effects teaching were provided. Discharge Instructions Attachments/References SMOKING CESSATION: HEALTH BENEFITS: GENERAL INFO (ENGLISH) Discharge Orders None PeopleAdmin Announcement We are excited to announce that we are making your provider's discharge notes available to you in PeopleAdmin. You will see these notes when they are completed and signed by the physician that discharged you from your recent hospital stay.   If you have any questions or concerns about any information you see in PeopleAdmin, please call the My Fashion Database Information Department where you were seen or reach out to your Primary Care Provider for more information about your plan of care. Introducing [facility]! [facility] introduces Dimmi patient portal. Now you can access parts of your medical record, email your doctor's office, and request medication refills online. 1. In your internet browser, go to [email or web address] 2. Click on the First Time User? Click Here link in the Sign In box. You will see the New Member Sign Up page. 3. Enter your Dimmi Access Code exactly as it appears below. You will not need to use this code after youve completed the sign-up process. If you do not sign up before the expiration date, you must request a new code. · Dimmi Access Code: [email or web address] Expires: 10/8/2017 10:04 AM 
 
4. Enter the last four digits of your Social Security Number (xxxx) and Date of Birth (mm/dd/yyyy) as indicated and click Submit. You will be taken to the next sign-up page. 5. Create a Dimmi ID. This will be your Dimmi login ID and cannot be changed, so think of one that is secure and easy to remember. 6. Create a Dimmi password. You can change your password at any time. 7. Enter your Password Reset Question and Answer. This can be used at a later time if you forget your password. 8. Enter your e-mail address. You will receive e-mail notification when new information is available in [address] 9. Click Sign Up. You can now view and download portions of your medical record. 10. Click the Download Summary menu link to download a portable copy of your medical information. If you have questions, please visit the Frequently Asked Questions section of the Dimmi website. Remember, Dimmi is NOT to be used for urgent needs. For medical emergencies, dial 911. Now available from your iPhone and Android! General Information Please provide this summary of care documentation to your next provider. Patient Signature:  ____________________________________________________________ Date:  ____________________________________________________________  
  
Gwendloyn Finger Provider Signature:  ____________________________________________________________ Date:  ____________________________________________________________ More Information Learning About Benefits From Quitting Smoking How does quitting smoking make you healthier? If you're thinking about quitting smoking, you may have a few reasons to be smoke-free. Your health may be one of them. · When you quit smoking, you lower your risks for cancer, lung disease, heart attack, stroke, blood vessel disease, and blindness from macular degeneration. · When you're smoke-free, you get sick less often, and you heal faster. You are less likely to get colds, flu, bronchitis, and pneumonia. · As a nonsmoker, you may find that your mood is better and you are less stressed. When and how will you feel healthier? Quitting has real health benefits that start from day 1 of being smoke-free. And the longer you stay smoke-free, the healthier you get and the better you feel. The first hours · After just 20 minutes, your blood pressure and heart rate go down. That means there's less stress on your heart and blood vessels. · Within 12 hours, the level of carbon monoxide in your blood drops back to normal. That makes room for more oxygen. With more oxygen in your body, you may notice that you have more energy than when you smoked. After 2 weeks · Your lungs start to work better. · Your risk of heart attack starts to drop. After 1 month · When your lungs are clear, you cough less and breathe deeper, so it's easier to be active. · Your sense of taste and smell return. That means you can enjoy food more than you have since you started smoking. Over the years · After 1 year, your risk of heart disease is half what it would be if you kept smoking. · After 5 years, your risk of stroke starts to shrink. Within a few years after that, it's about the same as if you'd never smoked. · After 10 years, your risk of dying from lung cancer is cut by about half. And your risk for many other types of cancer is lower too. How would quitting help others in your life? When you quit smoking, you improve the health of everyone who now breathes in your smoke. · Their heart, lung, and cancer risks drop, much like yours. · They are sick less. For babies and small children, living smoke-free means they're less likely to have ear infections, pneumonia, and bronchitis. · If you're a woman who is or will be pregnant someday, quitting smoking means a healthier . · Children who are close to you are less likely to become adult smokers. Where can you learn more? Go to http://bonnie-yasmeen.info/. Enter [id number] in the search box to learn more about \"Learning About Benefits From Quitting Smoking. \" Current as of: 2017 Content Version: 11.3 © 6086-7532 [employer]. Care instructions adapted under license by [employer] (which disclaims liability or warranty for this information). If you have questions about a medical condition or this instruction, always ask your healthcare professional. [place] [address] any warranty or liability for your use of this information.

## 2017-07-07 NOTE — ED NOTES
Bedside shift change report given to Calista Germain RN (oncoming nurse) by Andres Lassiter RN (offgoing nurse). Report included the following information SBAR.

## 2017-07-07 NOTE — ED PROVIDER NOTES
HPI Comments: 6:14 PM Gilda Haley is a 49 y.o. Female smoker who presents to the ED c/o a productive cough with a thick yellow sputum that started 4 days ago. She notes associated chest congestion and body aches. She is concerned that she may have pneumonia again. She denies abdominal pain, chest pain, and any further complaints. The history is provided by the patient. History reviewed. No pertinent past medical history. History reviewed. No pertinent surgical history. History reviewed. No pertinent family history. Social History     Social History    Marital status: SINGLE     Spouse name: N/A    Number of children: N/A    Years of education: N/A     Occupational History    Not on file. Social History Main Topics    Smoking status: Current Every Day Smoker     Packs/day: 0.50     Years: 20.00    Smokeless tobacco: Not on file    Alcohol use 1.2 oz/week     2 Cans of beer per week      Comment: pt states she drinks beer daily    Drug use: No    Sexual activity: No     Other Topics Concern    Not on file     Social History Narrative         ALLERGIES: Review of patient's allergies indicates no known allergies. Review of Systems   Constitutional: Negative for chills. HENT: Positive for congestion (chest congestion). Negative for sore throat. Respiratory: Positive for cough. Negative for shortness of breath. Cardiovascular: Negative for chest pain and leg swelling. Gastrointestinal: Negative for abdominal pain and nausea. Genitourinary: Negative for dysuria and hematuria. Musculoskeletal: Positive for myalgias. Negative for back pain. Skin: Negative for rash and wound. Neurological: Negative for syncope, light-headedness and headaches. Psychiatric/Behavioral: Negative for behavioral problems. The patient is not nervous/anxious. All other systems reviewed and are negative.       Vitals:    07/07/17 1753   BP: 111/76   Pulse: (!) 115   Resp: 22   Temp: (!) 103.2 °F (39.6 °C)   SpO2: 98%   Weight: 58.1 kg (128 lb)   Height: 5' 4\" (1.626 m)            Physical Exam   Constitutional: She appears well-developed and well-nourished. Non-toxic appearance. She does not have a sickly appearance. She does not appear ill. No distress. HENT:   Head: Normocephalic and atraumatic. Mouth/Throat: Oropharynx is clear and moist. No oropharyngeal exudate. Eyes: Conjunctivae and EOM are normal. Pupils are equal, round, and reactive to light. No scleral icterus. Neck: Normal range of motion. Neck supple. No hepatojugular reflux and no JVD present. No tracheal deviation present. No thyromegaly present. Cardiovascular: Regular rhythm, S1 normal, S2 normal, normal heart sounds, intact distal pulses and normal pulses. Tachycardia present. Exam reveals no gallop, no S3 and no S4. No murmur heard. Pulses:       Radial pulses are 2+ on the right side, and 2+ on the left side. Dorsalis pedis pulses are 2+ on the right side, and 2+ on the left side. Pulmonary/Chest: Accessory muscle usage present. Tachypnea noted. No respiratory distress. She has no decreased breath sounds. She has wheezes in the right middle field, the right lower field, the left middle field and the left lower field. She has no rhonchi. She has no rales. Abdominal: Soft. Normal appearance and bowel sounds are normal. She exhibits no distension and no mass. There is no hepatosplenomegaly. There is no tenderness. There is no rigidity, no rebound, no guarding, no CVA tenderness, no tenderness at McBurney's point and negative Gutierrez's sign. Musculoskeletal: Normal range of motion. Strength 4/5 throughout    Lymphadenopathy:        Head (right side): No submental, no submandibular, no preauricular and no occipital adenopathy present. Head (left side): No submental, no submandibular, no preauricular and no occipital adenopathy present. She has no cervical adenopathy.         Right: No supraclavicular adenopathy present. Left: No supraclavicular adenopathy present. Neurological: She is alert. She has normal strength and normal reflexes. She is not disoriented. No cranial nerve deficit or sensory deficit. Coordination and gait normal. GCS eye subscore is 4. GCS verbal subscore is 5. GCS motor subscore is 6. Grossly intact    Skin: Skin is warm, dry and intact. No rash noted. She is not diaphoretic. Psychiatric: She has a normal mood and affect. Her speech is normal and behavior is normal. Judgment and thought content normal. Cognition and memory are normal.   Nursing note and vitals reviewed. MDM  Number of Diagnoses or Management Options  CAP (community acquired pneumonia):   Diagnosis management comments: Sepsis   Pneumonia  Bronchitis   Neoplasm       ED Course       Procedures  Vitals:  Patient Vitals for the past 12 hrs:   Temp Pulse Resp BP SpO2   07/07/17 1753 (!) 103.2 °F (39.6 °C) (!) 115 22 111/76 98 %     Pulse ox reviewed and WNL    Labs, Radiology, EKG:  Labs Reviewed   METABOLIC PANEL, COMPREHENSIVE - Abnormal; Notable for the following:        Result Value    Sodium 131 (*)     Potassium 3.3 (*)     Chloride 90 (*)     CO2 33 (*)     BUN/Creatinine ratio 10 (*)     GFR est AA 54 (*)     GFR est non-AA 45 (*)     AST (SGOT) 39 (*)     Alk. phosphatase 291 (*)     Albumin 2.8 (*)     Globulin 5.1 (*)     A-G Ratio 0.5 (*)     All other components within normal limits   CBC WITH AUTOMATED DIFF - Abnormal; Notable for the following:     WBC 13.8 (*)     RBC 3.35 (*)     HGB 10.0 (*)     HCT 31.2 (*)     LYMPHOCYTES 20 (*)     MONOCYTES 12 (*)     ABS. NEUTROPHILS 9.2 (*)     ABS. MONOCYTES 1.7 (*)     ABS.  BASOPHILS 0.1 (*)     All other components within normal limits   CULTURE, BLOOD   CULTURE, BLOOD   CULTURE, RESPIRATORY/SPUTUM/BRONCH W GRAM STAIN   URINALYSIS W/ RFLX MICROSCOPIC        Labs essentially normal with the exception of WBC of 13.8, hemoglobin of 10.0 this is new, sodium of 131, potassium of 3.3. Normal renal function. Platelets normal.. Chest X-Ray showed right upper and middle lobe pneumonia. EKG showed sinus tachycardia with rate of 111 bpm. With no ST elevations or depression and non specific T wave changes. 6:18 PM 7/7/2017    Progress notes, Consult notes or additional Procedure notes:    6:57 PM PORT score of 70    Consult:  Discussed care with Dr. Kylee Sheehan, [facility]. Standard discussion; including history of patients chief complaint, available diagnostic results, and treatment course. He agrees on admission. 7:06 PM    7:11 PM I have reassessed the patient and discussed results and diagnosis. Pt will be admitted. Patient understands and verbalizes agreement with plan. Disposition:  Diagnosis:   1. CAP (community acquired pneumonia)        Disposition: Admit    SCRIBE ATTESTATION STATEMENT  Documented by: Ben Hunt for, and in the presence of,Maninder Herr DO   6:18 PM     Signed by: Ovi Perez, 07/07/17 6:18 PM    PROVIDER ATTESTATION STATEMENT  I personally performed the services described in the documentation, reviewed the documentation, as recorded by the scribe in my presence, and it accurately and completely records my words and actions.   [address]ner[address] DO

## 2017-07-07 NOTE — IP AVS SNAPSHOT
Current Discharge Medication List  
  
START taking these medications Dose & Instructions Dispensing Information Comments Morning Noon Evening Bedtime  
 albuterol 90 mcg/actuation inhaler Commonly known as:  PROAIR HFA Your last dose was: Your next dose is:    
   
   
 Dose:  1-2 Puff Take 1-2 Puffs by inhalation every four (4) hours as needed for Wheezing. Quantity:  2 Inhaler Refills:  2  
     
   
   
   
  
 azithromycin 250 mg tablet Commonly known as:  Emma Naida Your last dose was: Your next dose is: Take as directed Quantity:  6 Tab Refills:  0  
     
   
   
   
  
 cyanocobalamin 500 mcg Tber Your last dose was: Your next dose is:    
   
   
 Dose:  500 mcg Take 500 mcg by mouth daily. Quantity:  30 Tab Refills:  2  
     
   
   
   
  
 * nicotine 14 mg/24 hr patch Commonly known as:  Dearl Docker Your last dose was: Your next dose is:    
   
   
 Dose:  1 Patch 1 Patch by TransDERmal route every twenty-four (24) hours for 7 days. Week 1 Quantity:  7 Patch Refills:  0  
     
   
   
   
  
 * nicotine 7 mg/24 hr  
Commonly known as:  Dearl Docker Your last dose was: Your next dose is:    
   
   
 Dose:  1 Patch 1 Patch by TransDERmal route every twenty-four (24) hours for 7 days. Week @ Quantity:  7 Patch Refills:  0  
     
   
   
   
  
 predniSONE 20 mg tablet Commonly known as:  Dalbert Pierce Your last dose was: Your next dose is:    
   
   
 Dose:  40 mg Take 2 Tabs by mouth daily (with breakfast) for 5 days. Quantity:  10 Tab Refills:  0  
     
   
   
   
  
 * Notice: This list has 2 medication(s) that are the same as other medications prescribed for you. Read the directions carefully, and ask your doctor or other care provider to review them with you. CONTINUE these medications which have CHANGED Dose & Instructions Dispensing Information Comments Morning Noon Evening Bedtime  
 guaiFENesin-codeine 100-10 mg/5 mL solution Commonly known as:  ROBITUSSIN AC What changed:   
- how much to take - when to take this Your last dose was: Your next dose is:    
   
   
 Dose:  5 mL Take 5 mL by mouth three (3) times daily as needed for Cough. Max Daily Amount: 15 mL. Quantity:  118 mL Refills:  0 CONTINUE these medications which have NOT CHANGED Dose & Instructions Dispensing Information Comments Morning Noon Evening Bedtime  
 ibuprofen 600 mg tablet Commonly known as:  MOTRIN Your last dose was: Your next dose is:    
   
   
 Dose:  600 mg Take 1 Tab by mouth every six (6) hours as needed for Pain. Quantity:  18 Tab Refills:  0 Where to Get Your Medications Information on where to get these meds will be given to you by the nurse or doctor. ! Ask your nurse or doctor about these medications  
  albuterol 90 mcg/actuation inhaler  
 azithromycin 250 mg tablet  
 cyanocobalamin 500 mcg Tber  
 guaiFENesin-codeine 100-10 mg/5 mL solution  
 nicotine 14 mg/24 hr patch  
 nicotine 7 mg/24 hr  
 predniSONE 20 mg tablet

## 2017-07-07 NOTE — IP AVS SNAPSHOT
Summary of Care Report The Summary of Care report has been created to help improve care coordination. Users with access to [employer] or [employer] [address] (Web-based application) may access additional patient information including the Discharge Summary. If you are not currently a [address] user and need more information, please call the number listed below in the [place] [address] section and ask to be connected with Medical Records. Facility Information Name Address Phone [address] [facility] [address] [phone] Patient Information Patient Name Sex  Joellen Guillen ([id number]) Female 1965 Discharge Information Admitting Provider Service Area Unit Jean Claude Frank MD / [address] / [phone] Discharge Provider Discharge Date/Time Discharge Disposition Destination (none) 7/10/2017 (Pending) AHR (none) Patient Language Language ENGLISH [13] Hospital Problems as of 7/10/2017  Reviewed: 2017  1:46 AM by Jean Claude Frank MD  
  
  
  
 Class Noted - Resolved Last Modified POA Active Problems Pneumonia  2017 - Present 2017 by Isabel Monsiavis, DO Unknown Entered by Isabel Monsivais,   
  CAP (community acquired pneumonia)  2017 - Present 2017 by Isabel Monsivais, DO Unknown Entered by Isabel Monsivais, DO  
  SIRS (systemic inflammatory response syndrome) ([facility] [address]  2017 - Present 2017 by Jean Claude Frank MD Unknown Entered by Jean Claude Frank MD  
  
Non-Hospital Problems as of 7/10/2017  Reviewed: 2017  1:46 AM by Jean Claude Frank MD  
 None You are allergic to the following No active allergies Current Discharge Medication List  
  
START taking these medications Dose & Instructions Dispensing Information Comments  
 albuterol 90 mcg/actuation inhaler Commonly known as:  PROAIR HFA Dose:  1-2 Puff Take 1-2 Puffs by inhalation every four (4) hours as needed for Wheezing. Quantity:  2 Inhaler Refills:  2  
   
 azithromycin 250 mg tablet Commonly known as:  Joselyn Carlos Take as directed Quantity:  6 Tab Refills:  0  
   
 cyanocobalamin 500 mcg Tber Dose:  500 mcg Take 500 mcg by mouth daily. Quantity:  30 Tab Refills:  2  
   
 * nicotine 14 mg/24 hr patch Commonly known as:  Lady Boyers Dose:  1 Patch 1 Patch by TransDERmal route every twenty-four (24) hours for 7 days. Week 1 Quantity:  7 Patch Refills:  0  
   
 * nicotine 7 mg/24 hr  
Commonly known as:  Lady Boyers Dose:  1 Patch 1 Patch by TransDERmal route every twenty-four (24) hours for 7 days. Week @ Quantity:  7 Patch Refills:  0  
   
 predniSONE 20 mg tablet Commonly known as:  Alice Deonte Dose:  40 mg Take 2 Tabs by mouth daily (with breakfast) for 5 days. Quantity:  10 Tab Refills:  0  
   
 * Notice: This list has 2 medication(s) that are the same as other medications prescribed for you. Read the directions carefully, and ask your doctor or other care provider to review them with you. CONTINUE these medications which have CHANGED Dose & Instructions Dispensing Information Comments  
 guaiFENesin-codeine 100-10 mg/5 mL solution Commonly known as:  ROBITUSSIN AC What changed:   
- how much to take - when to take this Dose:  5 mL Take 5 mL by mouth three (3) times daily as needed for Cough. Max Daily Amount: 15 mL. Quantity:  118 mL Refills:  0 CONTINUE these medications which have NOT CHANGED Dose & Instructions Dispensing Information Comments  
 ibuprofen 600 mg tablet Commonly known as:  MOTRIN Dose:  600 mg Take 1 Tab by mouth every six (6) hours as needed for Pain. Quantity:  18 Tab Refills:  0 Follow-up Information Follow up With Details Comments Contact Info [facility] Taras Charlton opens at 8:30AM [address] ([facility]) [address] [phone] Discharge Instructions Pneumonia: Care Instructions Your Care Instructions Pneumonia is an infection of the lungs. Most cases are caused by infections from bacteria or viruses. Pneumonia may be mild or very severe. If it is caused by bacteria, you will be treated with antibiotics. It may take a few weeks to a few months to recover fully from pneumonia, depending on how sick you were and whether your overall health is good. Follow-up care is a key part of your treatment and safety. Be sure to make and go to all appointments, and call your doctor if you are having problems. Its also a good idea to know your test results and keep a list of the medicines you take. How can you care for yourself at home? · Take your antibiotics exactly as directed. Do not stop taking the medicine just because you are feeling better. You need to take the full course of antibiotics. · Take your medicines exactly as prescribed. Call your doctor if you think you are having a problem with your medicine. · Get plenty of rest and sleep. You may feel weak and tired for a while, but your energy level will improve with time. · To prevent dehydration, drink plenty of fluids, enough so that your urine is light yellow or clear like water. Choose water and other caffeine-free clear liquids until you feel better. If you have kidney, heart, or liver disease and have to limit fluids, talk with your doctor before you increase the amount of fluids you drink. · Take care of your cough so you can rest. A cough that brings up mucus from your lungs is common with pneumonia. It is one way your body gets rid of the infection.  But if coughing keeps you from resting or causes severe fatigue and chest-wall pain, talk to your doctor. He or she may suggest that you take a medicine to reduce the cough. · Use a vaporizer or humidifier to add moisture to your bedroom. Follow the directions for cleaning the machine. · Do not smoke or allow others to smoke around you. Smoke will make your cough last longer. If you need help quitting, talk to your doctor about stop-smoking programs and medicines. These can increase your chances of quitting for good. · Take an over-the-counter pain medicine, such as acetaminophen (Tylenol), ibuprofen (Advil, Motrin), or naproxen (Aleve). Read and follow all instructions on the label. · Do not take two or more pain medicines at the same time unless the doctor told you to. Many pain medicines have acetaminophen, which is Tylenol. Too much acetaminophen (Tylenol) can be harmful. · If you were given a spirometer to measure how well your lungs are working, use it as instructed. This can help your doctor tell how your recovery is going. · To prevent pneumonia in the future, talk to your doctor about getting a flu vaccine (once a year) and a pneumococcal vaccine (one time only for most people). When should you call for help? Call 911 anytime you think you may need emergency care. For example, call if: 
· You have severe trouble breathing. Call your doctor now or seek immediate medical care if: 
· You cough up dark brown or bloody mucus (sputum). · You have new or worse trouble breathing. · You are dizzy or lightheaded, or you feel like you may faint. Watch closely for changes in your health, and be sure to contact your doctor if: 
· You have a new or higher fever. · You are coughing more deeply or more often. · You are not getting better after 2 days (48 hours). · You do not get better as expected. Where can you learn more? Go to http://bonnie-yasmeen.info/. Enter [id number] in the search box to learn more about \"Pneumonia: Care Instructions. \" 
 Current as of: 2017 Content Version: 11.3 © 0014-6008 [employer]. Care instructions adapted under license by [employer] (which disclaims liability or warranty for this information). If you have questions about a medical condition or this instruction, always ask your healthcare professional. [address] any warranty or liability for your use of this information. Patient armband removed and given to patient to take home. Patient was informed of the privacy risks if armband lost or stolen MyChart Activation Thank you for requesting access to [employer]. Please follow the instructions below to securely access and download your online medical record. [employer] allows you to send messages to your doctor, view your test results, renew your prescriptions, schedule appointments, and more. How Do I Sign Up? 1. In your internet browser, go to [email or web address] 
2. Click on the First Time User? Click Here link in the Sign In box. You will be redirect to the New Member Sign Up page. 3. Enter your [employer] Access Code exactly as it appears below. You will not need to use this code after youve completed the sign-up process. If you do not sign up before the expiration date, you must request a new code. [employer] Access Code: [email or web address] Expires: 10/8/2017 10:04 AM (This is the date your [employer] access code will ) 4. Enter the last four digits of your Social Security Number (xxxx) and Date of Birth (mm/dd/yyyy) as indicated and click Submit. You will be taken to the next sign-up page. 5. Create a [employer] ID. This will be your [employer] login ID and cannot be changed, so think of one that is secure and easy to remember. 6. Create a [employer] password. You can change your password at any time. 7. Enter your Password Reset Question and Answer. This can be used at a later time if you forget your password. 8. Enter your e-mail address. You will receive e-mail notification when new information is available in [address] 9. Click Sign Up. You can now view and download portions of your medical record. 10. Click the Download Summary menu link to download a portable copy of your medical information. Additional Information If you have questions, please visit the Frequently Asked Questions section of the [email or web address] website at [email or web address] Remember, [employer] is NOT to be used for urgent needs. For medical emergencies, dial 911. DISCHARGE SUMMARY from Nurse The following personal items are in your possession at time of discharge: 
 
Dental Appliances: None Visual Aid: None Home Medications: None Jewelry: None Clothing: Shirt, Undergarments, Footwear, Pants Other Valuables: None PATIENT INSTRUCTIONS: 
 
 
F-face looks uneven A-arms unable to move or move unevenly S-speech slurred or non-existent T-time-call 911 as soon as signs and symptoms begin-DO NOT go Back to bed or wait to see if you get better-TIME IS BRAIN. Warning Signs of HEART ATTACK Call 911 if you have these symptoms: 
? Chest discomfort. Most heart attacks involve discomfort in the center of the chest that lasts more than a few minutes, or that goes away and comes back. It can feel like uncomfortable pressure, squeezing, fullness, or pain. ? Discomfort in other areas of the upper body. Symptoms can include pain or discomfort in one or both arms, the back, neck, jaw, or stomach. ? Shortness of breath with or without chest discomfort. ? Other signs may include breaking out in a cold sweat, nausea, or lightheadedness. Don't wait more than five minutes to call [address]! Fast action can save your life. Calling 911 is almost always the fastest way to get lifesaving treatment. Emergency Medical Services staff can begin treatment when they arrive  up to an hour sooner than if someone gets to the hospital by car. The discharge information has been reviewed with the patient. The patient verbalized understanding. Discharge medications reviewed with the patient and appropriate educational materials and side effects teaching were provided. Chart Review Routing History No Routing History on File

## 2017-07-08 PROBLEM — R65.10 SIRS (SYSTEMIC INFLAMMATORY RESPONSE SYNDROME) (HCC): Status: ACTIVE | Noted: 2017-07-08

## 2017-07-08 LAB
ATRIAL RATE: 111 BPM
CALCULATED P AXIS, ECG09: 66 DEGREES
CALCULATED R AXIS, ECG10: 66 DEGREES
CALCULATED T AXIS, ECG11: 42 DEGREES
DIAGNOSIS, 93000: NORMAL
LACTATE SERPL-SCNC: 0.5 MMOL/L (ref 0.4–2)
LACTATE SERPL-SCNC: 1.3 MMOL/L (ref 0.4–2)
P-R INTERVAL, ECG05: 126 MS
Q-T INTERVAL, ECG07: 296 MS
QRS DURATION, ECG06: 70 MS
QTC CALCULATION (BEZET), ECG08: 402 MS
VENTRICULAR RATE, ECG03: 111 BPM

## 2017-07-08 PROCEDURE — 74011250636 HC RX REV CODE- 250/636: Performed by: INTERNAL MEDICINE

## 2017-07-08 PROCEDURE — 83605 ASSAY OF LACTIC ACID: CPT | Performed by: INTERNAL MEDICINE

## 2017-07-08 PROCEDURE — 74011250637 HC RX REV CODE- 250/637: Performed by: HOSPITALIST

## 2017-07-08 PROCEDURE — 36415 COLL VENOUS BLD VENIPUNCTURE: CPT | Performed by: INTERNAL MEDICINE

## 2017-07-08 PROCEDURE — 65270000029 HC RM PRIVATE

## 2017-07-08 PROCEDURE — 74011250637 HC RX REV CODE- 250/637: Performed by: INTERNAL MEDICINE

## 2017-07-08 PROCEDURE — 74011000258 HC RX REV CODE- 258: Performed by: EMERGENCY MEDICINE

## 2017-07-08 PROCEDURE — 94640 AIRWAY INHALATION TREATMENT: CPT

## 2017-07-08 PROCEDURE — HZ91ZZZ PHARMACOTHERAPY FOR SUBSTANCE ABUSE TREATMENT, METHADONE MAINTENANCE: ICD-10-PCS | Performed by: INTERNAL MEDICINE

## 2017-07-08 PROCEDURE — 74011000250 HC RX REV CODE- 250: Performed by: INTERNAL MEDICINE

## 2017-07-08 PROCEDURE — 74011250636 HC RX REV CODE- 250/636: Performed by: HOSPITALIST

## 2017-07-08 PROCEDURE — 74011250636 HC RX REV CODE- 250/636: Performed by: EMERGENCY MEDICINE

## 2017-07-08 PROCEDURE — 87040 BLOOD CULTURE FOR BACTERIA: CPT | Performed by: INTERNAL MEDICINE

## 2017-07-08 RX ORDER — SODIUM CHLORIDE 0.9 % (FLUSH) 0.9 %
5-10 SYRINGE (ML) INJECTION AS NEEDED
Status: DISCONTINUED | OUTPATIENT
Start: 2017-07-08 | End: 2017-07-10 | Stop reason: HOSPADM

## 2017-07-08 RX ORDER — IPRATROPIUM BROMIDE AND ALBUTEROL SULFATE 2.5; .5 MG/3ML; MG/3ML
3 SOLUTION RESPIRATORY (INHALATION)
Status: DISCONTINUED | OUTPATIENT
Start: 2017-07-08 | End: 2017-07-10 | Stop reason: HOSPADM

## 2017-07-08 RX ORDER — ONDANSETRON 2 MG/ML
4 INJECTION INTRAMUSCULAR; INTRAVENOUS
Status: DISCONTINUED | OUTPATIENT
Start: 2017-07-08 | End: 2017-07-10 | Stop reason: HOSPADM

## 2017-07-08 RX ORDER — SODIUM CHLORIDE 0.9 % (FLUSH) 0.9 %
5-10 SYRINGE (ML) INJECTION EVERY 8 HOURS
Status: DISCONTINUED | OUTPATIENT
Start: 2017-07-08 | End: 2017-07-10 | Stop reason: HOSPADM

## 2017-07-08 RX ORDER — OXYCODONE AND ACETAMINOPHEN 5; 325 MG/1; MG/1
1 TABLET ORAL ONCE
Status: COMPLETED | OUTPATIENT
Start: 2017-07-08 | End: 2017-07-08

## 2017-07-08 RX ORDER — IBUPROFEN 200 MG
1 TABLET ORAL EVERY 24 HOURS
Status: DISCONTINUED | OUTPATIENT
Start: 2017-07-08 | End: 2017-07-10 | Stop reason: HOSPADM

## 2017-07-08 RX ORDER — POTASSIUM CHLORIDE 20 MEQ/1
40 TABLET, EXTENDED RELEASE ORAL EVERY 4 HOURS
Status: COMPLETED | OUTPATIENT
Start: 2017-07-08 | End: 2017-07-08

## 2017-07-08 RX ORDER — METHADONE HYDROCHLORIDE 10 MG/1
10 TABLET ORAL
Status: DISCONTINUED | OUTPATIENT
Start: 2017-07-08 | End: 2017-07-10 | Stop reason: HOSPADM

## 2017-07-08 RX ORDER — ALBUTEROL SULFATE 0.83 MG/ML
2.5 SOLUTION RESPIRATORY (INHALATION)
Status: DISCONTINUED | OUTPATIENT
Start: 2017-07-08 | End: 2017-07-08

## 2017-07-08 RX ORDER — FAMOTIDINE 20 MG/1
20 TABLET, FILM COATED ORAL 2 TIMES DAILY
Status: DISCONTINUED | OUTPATIENT
Start: 2017-07-08 | End: 2017-07-10 | Stop reason: HOSPADM

## 2017-07-08 RX ORDER — HEPARIN SODIUM 5000 [USP'U]/ML
5000 INJECTION, SOLUTION INTRAVENOUS; SUBCUTANEOUS EVERY 8 HOURS
Status: DISCONTINUED | OUTPATIENT
Start: 2017-07-08 | End: 2017-07-08 | Stop reason: SDUPTHER

## 2017-07-08 RX ORDER — OXYCODONE AND ACETAMINOPHEN 5; 325 MG/1; MG/1
1 TABLET ORAL
Status: DISCONTINUED | OUTPATIENT
Start: 2017-07-08 | End: 2017-07-10 | Stop reason: HOSPADM

## 2017-07-08 RX ORDER — PREDNISONE 20 MG/1
40 TABLET ORAL
Status: DISCONTINUED | OUTPATIENT
Start: 2017-07-09 | End: 2017-07-10 | Stop reason: HOSPADM

## 2017-07-08 RX ORDER — ACETAMINOPHEN 500 MG
500 TABLET ORAL
Status: DISCONTINUED | OUTPATIENT
Start: 2017-07-08 | End: 2017-07-10 | Stop reason: HOSPADM

## 2017-07-08 RX ORDER — HEPARIN SODIUM 5000 [USP'U]/ML
5000 INJECTION, SOLUTION INTRAVENOUS; SUBCUTANEOUS EVERY 8 HOURS
Status: DISCONTINUED | OUTPATIENT
Start: 2017-07-08 | End: 2017-07-10 | Stop reason: HOSPADM

## 2017-07-08 RX ADMIN — Medication 10 ML: at 16:50

## 2017-07-08 RX ADMIN — Medication 10 ML: at 06:22

## 2017-07-08 RX ADMIN — METHYLPREDNISOLONE SODIUM SUCCINATE 40 MG: 40 INJECTION, POWDER, FOR SOLUTION INTRAMUSCULAR; INTRAVENOUS at 02:26

## 2017-07-08 RX ADMIN — METHADONE HYDROCHLORIDE 10 MG: 10 TABLET ORAL at 22:53

## 2017-07-08 RX ADMIN — POTASSIUM CHLORIDE 40 MEQ: 20 TABLET, EXTENDED RELEASE ORAL at 02:26

## 2017-07-08 RX ADMIN — OXYCODONE AND ACETAMINOPHEN 1 TABLET: 5; 325 TABLET ORAL at 02:26

## 2017-07-08 RX ADMIN — FAMOTIDINE 20 MG: 20 TABLET ORAL at 19:37

## 2017-07-08 RX ADMIN — ALBUTEROL SULFATE 2.5 MG: 2.5 SOLUTION RESPIRATORY (INHALATION) at 13:38

## 2017-07-08 RX ADMIN — CEFTRIAXONE SODIUM 2 G: 2 INJECTION, POWDER, FOR SOLUTION INTRAMUSCULAR; INTRAVENOUS at 20:00

## 2017-07-08 RX ADMIN — METHYLPREDNISOLONE SODIUM SUCCINATE 40 MG: 40 INJECTION, POWDER, FOR SOLUTION INTRAMUSCULAR; INTRAVENOUS at 12:00

## 2017-07-08 RX ADMIN — METHYLPREDNISOLONE SODIUM SUCCINATE 40 MG: 40 INJECTION, POWDER, FOR SOLUTION INTRAMUSCULAR; INTRAVENOUS at 18:00

## 2017-07-08 RX ADMIN — SODIUM CHLORIDE 500 MG: 900 INJECTION, SOLUTION INTRAVENOUS at 21:25

## 2017-07-08 RX ADMIN — OXYCODONE AND ACETAMINOPHEN 1 TABLET: 5; 325 TABLET ORAL at 21:37

## 2017-07-08 RX ADMIN — METHADONE HYDROCHLORIDE 10 MG: 10 TABLET ORAL at 16:43

## 2017-07-08 RX ADMIN — METHADONE HYDROCHLORIDE 10 MG: 10 TABLET ORAL at 11:00

## 2017-07-08 RX ADMIN — POTASSIUM CHLORIDE 40 MEQ: 20 TABLET, EXTENDED RELEASE ORAL at 06:17

## 2017-07-08 RX ADMIN — Medication 10 ML: at 21:26

## 2017-07-08 RX ADMIN — OXYCODONE AND ACETAMINOPHEN 1 TABLET: 5; 325 TABLET ORAL at 16:43

## 2017-07-08 RX ADMIN — ALBUTEROL SULFATE 2.5 MG: 2.5 SOLUTION RESPIRATORY (INHALATION) at 09:55

## 2017-07-08 RX ADMIN — METHYLPREDNISOLONE SODIUM SUCCINATE 40 MG: 40 INJECTION, POWDER, FOR SOLUTION INTRAMUSCULAR; INTRAVENOUS at 06:17

## 2017-07-08 RX ADMIN — Medication 10 ML: at 14:00

## 2017-07-08 NOTE — ROUTINE PROCESS
TRANSFER - OUT REPORT:    Verbal report given to Aspen Maria RN on Kevin Peña  being transferred to 4N for routine progression of care       Report consisted of patients Situation, Background, Assessment and   Recommendations(SBAR). Information from the following report(s) SBAR, ED Summary and MAR was reviewed with the receiving nurse. Lines:       Opportunity for questions and clarification was provided.       Patient transported with:   O2 @ 2 liters  Patient-specific medications from Pharmacy

## 2017-07-08 NOTE — PROGRESS NOTES
Hospitalist Progress Note    Patient: Nirmal Husain MRN: [id number]  CSN: [id number]    YOB: 1965  Age: 49 y.o. Sex: female    DOA: 7/7/2017 LOS:  LOS: 1 day          No records in care everywhere. She denies recent healthcare exposure. Denies 2nd hand smoke exposure. Does not have a pulmonologist in the community. Lives alone. Able to cough and expectorate copious yellow / gray sputum. Wheezing and dyspnea improved. Assessment/Plan     1. Bronchitis - Rocephin /Azithromax. Prevnar on d/c  Sputum cx pending. Blood cx (7/7) ngtd. RT eval    2. COPD with acute exacerbation - Solumedrol, change to prednisone. 3. Tobacco abuse - Nicotine patch. Smoking cessation education. 4. Anemia macrocytic  5. 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 Full code. CM consulted as she will need indigent meds at discharge. Additional Notes:      Case discussed with:  [x]Patient  []Family  [x]Nursing  []Case Management  DVT Prophylaxis:  []Lovenox  [x]Hep SQ  []SCDs  []Coumadin   []On Heparin gtt    Vital signs/Intake and Output:  Visit Vitals    /90 (BP 1 Location: Right arm, BP Patient Position: At rest)    Pulse 77    Temp 96.8 °F (36 °C)    Resp 18    Ht 5' 4\" (1.626 m)    Wt 58.1 kg (128 lb)    SpO2 100%    Breastfeeding No    BMI 21.97 kg/m2     Current Shift:     Last three shifts:       Thin. Appears acute on chronically ill  Ncat. Perrl. Poor dentition. RRR  cta b.l  Soft nt nd nabs  No edema.  dp 2+ b.l  No focal deficit  No rash    Medications Reviewed      Labs: Results:       Chemistry Recent Labs      07/07/17   1808   GLU  85   NA  131*   K  3.3*   CL  90*   CO2  33*   BUN  12   CREA  1.26   CA  9.1   AGAP  8   BUCR  10*   AP  291*   TP  7.9   ALB  2.8*   GLOB  5.1*   AGRAT  0.5*      CBC w/Diff Recent Labs      07/07/17   1808   WBC  13.8*   RBC  3.35*   HGB  10.0*   HCT  31.2*   PLT  332   GRANS  67   LYMPH  20*   EOS  0      Cardiac Enzymes No results for input(s): CPK, CKND1, MARGARITA in the last 72 hours. No lab exists for component: CKRMB, TROIP   Coagulation No results for input(s): PTP, INR, APTT in the last 72 hours. No lab exists for component: INREXT    Lipid Panel No results found for: CHOL, CHOLPOCT, CHOLX, CHLST, CHOLV, 865257, HDL, LDL, LDLC, DLDLP, 039312, VLDLC, VLDL, TGLX, TRIGL, TRIGP, TGLPOCT, CHHD, CHHDX   BNP No results for input(s): BNPP in the last 72 hours. Liver Enzymes Recent Labs      07/07/17   1808   TP  7.9   ALB  2.8*   AP  291*   SGOT  39*      Thyroid Studies No results found for: T4, T3U, TSH, TSHEXT     Procedures/imaging: see electronic medical records for all procedures/Xrays and details which were not copied into this note but were reviewed prior to creation of Plan.

## 2017-07-08 NOTE — ED NOTES
O2 at 2l/nc placed on patient for comfort, with increased sats and comfort. Patient resting quietly with eyes shut, respirations even and unlabored.

## 2017-07-08 NOTE — ROUTINE PROCESS
Bedside and Verbal shift change report given to Rema Person RN (oncoming nurse) by Lenard Beck RN (offgoing nurse). Report included the following information SBAR, Kardex, MAR and Recent Results. SITUATION:    Code Status: Full Code   Reason for Admission: Pneumonia   CAP (community acquired pneumonia)   Pneumonia   SIRS (systemic inflammatory response syndrome) ([facility] [address]   Pneumonia    [facility] day: 1   Problem List:       Hospital Problems  Date Reviewed: 7/8/2017          Codes Class Noted POA    SIRS (systemic inflammatory response syndrome) ([facility] [address] ICD-10-CM: R65.10  ICD-9-CM: 995.90  7/8/2017 Unknown        Pneumonia ICD-10-CM: J18.9  ICD-9-CM: 299  7/7/2017 Unknown        CAP (community acquired pneumonia) ICD-10-CM: J18.9  ICD-9-CM: 486  7/7/2017 Unknown              BACKGROUND:    Past Medical History: History reviewed. No pertinent past medical history.       Patient taking anticoagulants no, refused heaprin    ASSESSMENT:    Changes in Assessment Throughout Shift: none     Patient has Central Line: no Reasons if yes: n/a   Patient has Barron Cath: no Reasons if yes: n/a      Last Vitals:     Vitals:    07/07/17 2219 07/08/17 0045 07/08/17 0102 07/08/17 0406   BP: 117/72  109/69 125/90   Pulse: 88 75  67   Resp: 14 16  18   Temp: 98.6 °F (37 °C) 97.6 °F (36.4 °C)  98 °F (36.7 °C)   TempSrc:  Oral  Oral   SpO2: 96% 98%  98%   Weight:       Height:            IV and DRAINS (will only show if present)   Peripheral IV 07/07/17 Left Antecubital-Site Assessment: Clean, dry, & intact     WOUND (if present)   Wound Type:  none   Dressing present Dressing Present : No   Wound Concerns/Notes:  none     PAIN    Pain Assessment    Pain Intensity 1: 0 (07/08/17 0340)    Pain Location 1: Generalized    Pain Intervention(s) 1: Medication (see MAR)    Patient Stated Pain Goal: 0  o Interventions for Pain:  none  o Intervention effective: percocet 5-325 mg po  o Time of last intervention: 0226  o Reassessment Completed: yes      Last 3 Weights:  Last 3 Recorded Weights in this Encounter    07/07/17 1753   Weight: 58.1 kg (128 lb)     Weight change:      INTAKE/OUPUT    Current Shift:      Last three shifts:       LAB RESULTS     Recent Labs      07/07/17   1808   WBC  13.8*   HGB  10.0*   HCT  31.2*   PLT  332        Recent Labs      07/07/17   1808   NA  131*   K  3.3*   GLU  85   BUN  12   CREA  1.26   CA  9.1       RECOMMENDATIONS AND DISCHARGE PLANNING     1. Pending tests/procedures/ Plan of Care or Other Needs: blood culture, antibiotics     2. Discharge plan for patient and Needs/Barriers: home    3. Estimated Discharge Date: 7/12/17 Posted on Whiteboard in [facility]: yes      4. The patient's care plan was reviewed with the oncoming nurse. \"HEALS\" SAFETY CHECK      Fall Risk    Total Score: 1    Safety Measures: Safety Measures: Bed/Chair-Wheels locked, Bed in low position, Call light within reach    A safety check occurred in the patient's room between off going nurse and oncoming nurse listed above. The safety check included the below items  Area Items   H  High Alert Medications - Verify all high alert medication drips (heparin, PCA, etc.)   E  Equipment - Suction is set up for ALL patients (with belia)  - Red plugs utilized for all equipment (IV pumps, etc.)  - WOWs wiped down at end of shift.  - Room stocked with oxygen, suction, and other unit-specific supplies   A  Alarms - Bed alarm is set for fall risk patients  - Ensure chair alarm is in place and activated if patient is up in a chair   L  Lines - Check IV for any infiltration  - Barron bag is empty if patient has a Barron   - Tubing and IV bags are labeled   S  Safety   - Room is clean, patient is clean, and equipment is clean. - Hallways are clear from equipment besides carts.    - Fall bracelet on for fall risk patients  - Ensure room is clear and free of clutter  - Suction is set up for ALL patients (with belia)  - Hallways are clear from equipment besides carts.    - Isolation precautions followed, supplies available outside room, sign posted     Sophia Rodriguez RN

## 2017-07-08 NOTE — PROGRESS NOTES
Respiratory Care Assessment for Bronchial hygiene or Lung Expansion Therapy  Patient  Ishan Thomas     49 y.o.   female     7/8/2017  9:59 AM  Patient Active Problem List   Diagnosis Code    Pneumonia J18.9    CAP (community acquired pneumonia) J18.9    SIRS [facility]systemic inflammatory response syndrome) ([facility] R65.10       ABG:  Date:7/8/2017  No results found for: PH, PHI, PCO2, PCO2I, PO2, PO2I, HCO3, HCO3I, FIO2, FIO2I    Chest X-ray:  Date:7/8/2017    Results from Hospital Encounter encounter on 07/07/17   XR CHEST PA LAT   Narrative Chest 2 views    HISTORY: Cough and congestion, fever, 3-4 days duration    FINDINGS: Frontal and lateral view of the chest compared with 2/2/2017 and  11/14/2011    There is a right lateral upper lobe infiltrate. Remainder the lungs are without  acute consolidation. No effusion or pneumothorax. Degenerative change in the  spine. Impression IMPRESSION:    Right upper lobe infiltrate. Finding most suggestive of pneumonia in the setting  of fever and cough. A chest radiograph after treatment is recommended to ensure  resolution, and help exclude less likely possibility of underlying lung lesion.         Lab Test:  Date:7/8/2017  WBC:   Lab Results   Component Value Date/Time    WBC 13.8 07/07/2017 06:08 PM   HGB: Lab Results   Component Value Date/Time    HGB 10.0 07/07/2017 06:08 PM    PLTS: Lab Results   Component Value Date/Time    PLATELET 678 75/26/0492 06:08 PM       SaO2%/flow:   SpO2 Readings from Last 1 Encounters:   07/08/17 94%       Vital Signs:   Patient Vitals for the past 8 hrs:   Temp Pulse Resp BP SpO2   07/08/17 0957 - - - - 94 %   07/08/17 0830 96.8 °F (36 °C) 77 18 121/90 100 %   07/08/17 0406 98 °F (36.7 °C) 67 18 125/90 98 %         RA/O2 flow/device: Room Air        First Inital Assesment:     [x]Yes []No   Reevaluation/Reassessment:    []Yes []No     CHART REVIEW   Points 0 X 1 X 2 X 3 X 4 X Points   Pulmonary History Smoking History (1) none  Recent Smoking History <1 PPD  Recent Smoking History >1 PPD  Pulmonary Disease or Impairment  Severe Pulmonary Disease  1   Surgical History No Surgery  General Surgery  Lower Abdominal  Thoracic or Upper Abdominal  Thoracic & Pulmonary Disease  0   CXR Clear or not indicated  Chronic changes or CXR Pending  Infiltrate, atelectasis or pleural effusions  Infiltrates in more than 1lobe  Infiltrates +atelectasis  +/or pleural effusions  2     PATIENT ASSESSMENT    0 X 1 X 2 X 3 X 4 X Points   Respiratory Pattern Regular pattern RR 12-20  Increased RR 21-27   Mild Dyspnea at rest, irregular pattern RR 28-32  Moderate Dyspnea at rest, Use of accessory muscles, RR 33-36  Severe Dyspnea, Use of accessory muscles RR >36  0   Mental Status Alert Oriented cooperative  Confused, Follows commands  Lethargic, Does not follow commands  Obtunded  Unresponsive  0   Breath Sounds Clear  Decreased Unilaterally  Decreased Bilaterally  Mild Scattered wheezing or Crackles in bases  Severe Wheezing or rhonchi  3   Cough Strong dry NPC  Strong Productive  Weak NPC  Weak productive or weak with rhonchi  No cough or may require suctioning  1   Level of Activity Ambulatory  Ambulatory with assistance  Temporarily Non-ambulatory  Non-Ambulatory, able to position self  Unable to position self, confined to bed  0   Total Points/Score:   7     Specific Intervention Chart  Bronchial Hygiene/Secretion Clearance:    []EZPAP []Rotation bed with vibration    []CPT with percussor []CPT via vest   []Oscillastiang positive pressure expiratory device      Lung Expansion:    []Incentive Spirometer w/RT visits []Incentive Spirometer w/nursing    []EZPAP      *Suctioning:    []Nasal Tracheal []Tracheal     *suctioning will be ordered and done PRN with an associated frequency such as QID/PRN based on score       Other:    Care Plan   Level # Score Modality Frequency Comment   Level 1 >17      Level 2 14-17      Level 3 10-13      Level 4 1-9 Deep Breathing/Coughing       BRONCHIAL HYGIENE SCORING AND FREQUENCY GUIDELINES   Frequency Indications/Findings Level #   Q4 ATC Copious secretions, SOB, unable to sleep 1   QID & PRN at night Moderate amounts of secretions 2   TID or Q6 wa Small amounts of secretions and poor cough: recent history of secretions 3   BID or Q8 wa Unable to deep breathe and cough effectively 4        Comments:  Pt has a strong productive cough. Continue Respiratory treatments and encouraged deep breathing and coughing.     Respiratory Therapist: Palma Snell

## 2017-07-08 NOTE — ROUTINE PROCESS
TRANSFER - IN REPORT:    Verbal report received from Jing Britton RN (name) on Nirmala Myers  being received from [facility] ED (unit) for routine progression of care      Report consisted of patients Situation, Background, Assessment and   Recommendations(SBAR). Information from the following report(s) SBAR, Kardex and ED Summary was reviewed with the receiving nurse. Opportunity for questions and clarification was provided. Assessment completed upon patients arrival to unit and care assumed.

## 2017-07-08 NOTE — H&P
History & Physical    Patient: Shirley rFye MRN: [id number]  CSN: [id number]    YOB: 1965  Age: 49 y.o. Sex: female      DOA: 7/7/2017    Chief Complaint:   Chief Complaint   Patient presents with    Cough    Chest Congestion    Fever          HPI:     Shirley Frye is a 49 y.o.  female who presents with  Cough body ache and fever progressive over 4 days xray;  pneumoniadenies sick contact transferred from [address] [place] [address] Rd suggest infiltrate can not exclude neoplasm ect. 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 No pertinent past medical history. History reviewed. No pertinent surgical history. History reviewed. No pertinent family history. Social History     Social History    Marital status: SINGLE     Spouse name: N/A    Number of children: N/A    Years of education: N/A     Social History Main Topics    Smoking status: Current Every Day Smoker     Packs/day: 0.50     Years: 20.00    Smokeless tobacco: None    Alcohol use 1.2 oz/week     2 Cans of beer per week      Comment: pt states she drinks beer daily    Drug use: No    Sexual activity: No     Other Topics Concern    None     Social History Narrative       Prior to Admission medications    Medication Sig Start Date End Date Taking? Authorizing Provider   guaiFENesin-codeine (ROBITUSSIN AC) 100-10 mg/5 mL solution Take 5-10 mL by mouth every six (6) hours as needed for Cough. Max Daily Amount: 40 mL. 2/2/17   Em Manzanares MD   ibuprofen (MOTRIN) 600 mg tablet Take 1 Tab by mouth every six (6) hours as needed for Pain. 2/2/17   Em Manzanares MD       No Known Allergies      Review of Systems  GENERAL: Patient alert, awake and oriented times 3, able to communicate full sentences and not in distress.    HEENT: No change in vision, no earache, tinnitus, sore throat or sinus congestion. NECK: No pain or stiffness. PULMONARY:positve  shortness of breath, positve  cough and  wheeze. Cardiovascular: no pnd or orthopnea, no CP  GASTROINTESTINAL: No abdominal pain, nausea, vomiting or diarrhea, melena or bright red blood per rectum. GENITOURINARY: No urinary frequency, urgency, hesitancy or dysuria. MUSCULOSKELETAL: No joint or muscle pain, no back pain, no recent trauma. DERMATOLOGIC: No rash, no itching, no lesions. ENDOCRINE: No polyuria, polydipsia, no heat or cold intolerance. No recent change in weight. HEMATOLOGICAL: No anemia or easy bruising or bleeding. NEUROLOGIC: No headache, seizures, numbness, tingling or weakness. Physical Exam:     Physical Exam:  Visit Vitals    /69 (BP 1 Location: Right arm, BP Patient Position: At rest)    Pulse 75    Temp 97.6 °F (36.4 °C) (Oral)    Resp 16    Ht 5' 4\" (1.626 m)    Wt 58.1 kg (128 lb)    SpO2 98%    Breastfeeding No    BMI 21.97 kg/m2    O2 Flow Rate (L/min): 2 l/min O2 Device: Nasal cannula    Temp (24hrs), Av.9 °F (37.7 °C), Min:97.6 °F (36.4 °C), Max:103.2 °F (39.6 °C)             General:  Alert, cooperative, no distress, appears stated age. Head: Normocephalic, without obvious abnormality, atraumatic. Eyes:  Conjunctivae/corneas clear. PERRL, EOMs intact. Nose: Nares normal. No drainage or sinus tenderness. Neck: Supple, symmetrical, trachea midline, no adenopathy, thyroid: no enlargement, no carotid bruit and no JVD. Lungs:   Clear to auscultation bilaterally. dimiished breath sounds base ant chest occasional wheeze    Heart:  Regular rate and rhythm, S1, S2 normal.     Abdomen: Soft, non-tender. Bowel sounds normal.    Extremities: Extremities normal, atraumatic, no cyanosis or edema. Pulses: 2+ and symmetric all extremities.    Skin:  No rashes or lesions   Neurologic: AAOx3, No focal motor or sensory deficit. Labs Reviewed:    BMP:   Lab Results   Component Value Date/Time     (L) 07/07/2017 06:08 PM    K 3.3 (L) 07/07/2017 06:08 PM    CL 90 (L) 07/07/2017 06:08 PM    CO2 33 (H) 07/07/2017 06:08 PM    AGAP 8 07/07/2017 06:08 PM    GLU 85 07/07/2017 06:08 PM    BUN 12 07/07/2017 06:08 PM    CREA 1.26 07/07/2017 06:08 PM    GFRAA 54 (L) 07/07/2017 06:08 PM    GFRNA 45 (L) 07/07/2017 06:08 PM     CXR and EKG    Procedures/imaging: see electronic medical records for all procedures/Xrays and details which were not copied into this note but were reviewed prior to creation of Plan      Assessment/Plan     Active Problems:    Pneumonia (7/7/2017)      CAP (community acquired pneumonia) (7/7/2017)  Prevnar on d/c  Rocephin /Azithromax  RT eval     COPD  Solumedrol   Tobacco d/o  Nicotine patch     Chronic Methadone hx of Heroin    Renew     ? Neoplasm  Should do repeat xray vs. CT of lung   DVT/GI Prophylaxis: Hep SQ    Discussed with patient no family at  bedside about hospital admission and my plan care, she understood and agree with my plan care.     Nelly Shrestha MD  7/8/2017 1:43 AM

## 2017-07-09 LAB
ALBUMIN SERPL BCP-MCNC: 2.1 G/DL (ref 3.4–5)
ALBUMIN/GLOB SERPL: 0.5 {RATIO} (ref 0.8–1.7)
ALP SERPL-CCNC: 206 U/L (ref 45–117)
ALT SERPL-CCNC: 33 U/L (ref 13–56)
ANION GAP BLD CALC-SCNC: 6 MMOL/L (ref 3–18)
AST SERPL W P-5'-P-CCNC: 38 U/L (ref 15–37)
BASOPHILS # BLD AUTO: 0 K/UL (ref 0–0.06)
BASOPHILS # BLD: 0 % (ref 0–3)
BILIRUB DIRECT SERPL-MCNC: <0.1 MG/DL (ref 0–0.2)
BILIRUB SERPL-MCNC: <0.1 MG/DL (ref 0.2–1)
BUN SERPL-MCNC: 14 MG/DL (ref 7–18)
BUN/CREAT SERPL: 28 (ref 12–20)
CALCIUM SERPL-MCNC: 9.2 MG/DL (ref 8.5–10.1)
CHLORIDE SERPL-SCNC: 101 MMOL/L (ref 100–108)
CO2 SERPL-SCNC: 29 MMOL/L (ref 21–32)
CREAT SERPL-MCNC: 0.5 MG/DL (ref 0.6–1.3)
DIFFERENTIAL METHOD BLD: ABNORMAL
EOSINOPHIL # BLD: 0 K/UL (ref 0–0.4)
EOSINOPHIL NFR BLD: 0 % (ref 0–5)
ERYTHROCYTE [DISTWIDTH] IN BLOOD BY AUTOMATED COUNT: 14.3 % (ref 11.6–14.5)
GLOBULIN SER CALC-MCNC: 4.2 G/DL (ref 2–4)
GLUCOSE BLD STRIP.AUTO-MCNC: 101 MG/DL (ref 70–110)
GLUCOSE BLD STRIP.AUTO-MCNC: 132 MG/DL (ref 70–110)
GLUCOSE SERPL-MCNC: 141 MG/DL (ref 74–99)
HCT VFR BLD AUTO: 31 % (ref 35–45)
HGB BLD-MCNC: 8.9 G/DL (ref 12–16)
LYMPHOCYTES # BLD AUTO: 5 % (ref 20–51)
LYMPHOCYTES # BLD: 0.6 K/UL (ref 0.8–3.5)
MAGNESIUM SERPL-MCNC: 2.2 MG/DL (ref 1.6–2.6)
MCH RBC QN AUTO: 31 PG (ref 24–34)
MCHC RBC AUTO-ENTMCNC: 28.7 G/DL (ref 31–37)
MCV RBC AUTO: 108 FL (ref 74–97)
MONOCYTES # BLD: 0.4 K/UL (ref 0–1)
MONOCYTES NFR BLD AUTO: 3 % (ref 2–9)
NEUTS BAND NFR BLD MANUAL: 10 % (ref 0–5)
NEUTS SEG # BLD: 11.3 K/UL (ref 1.8–8)
NEUTS SEG NFR BLD AUTO: 82 % (ref 42–75)
PLATELET # BLD AUTO: 340 K/UL (ref 135–420)
PLATELET COMMENTS,PCOM: ABNORMAL
PMV BLD AUTO: 12 FL (ref 9.2–11.8)
POTASSIUM SERPL-SCNC: 4.3 MMOL/L (ref 3.5–5.5)
PROT SERPL-MCNC: 6.3 G/DL (ref 6.4–8.2)
RBC # BLD AUTO: 2.87 M/UL (ref 4.2–5.3)
RBC MORPH BLD: ABNORMAL
RBC MORPH BLD: ABNORMAL
SODIUM SERPL-SCNC: 136 MMOL/L (ref 136–145)
WBC # BLD AUTO: 12.3 K/UL (ref 4.6–13.2)

## 2017-07-09 PROCEDURE — 74011250637 HC RX REV CODE- 250/637: Performed by: HOSPITALIST

## 2017-07-09 PROCEDURE — 80076 HEPATIC FUNCTION PANEL: CPT | Performed by: INTERNAL MEDICINE

## 2017-07-09 PROCEDURE — 83735 ASSAY OF MAGNESIUM: CPT | Performed by: INTERNAL MEDICINE

## 2017-07-09 PROCEDURE — 74011250636 HC RX REV CODE- 250/636: Performed by: EMERGENCY MEDICINE

## 2017-07-09 PROCEDURE — 74011636637 HC RX REV CODE- 636/637: Performed by: HOSPITALIST

## 2017-07-09 PROCEDURE — 74011250637 HC RX REV CODE- 250/637: Performed by: INTERNAL MEDICINE

## 2017-07-09 PROCEDURE — 65270000029 HC RM PRIVATE

## 2017-07-09 PROCEDURE — 82962 GLUCOSE BLOOD TEST: CPT

## 2017-07-09 PROCEDURE — 36415 COLL VENOUS BLD VENIPUNCTURE: CPT | Performed by: INTERNAL MEDICINE

## 2017-07-09 PROCEDURE — 85025 COMPLETE CBC W/AUTO DIFF WBC: CPT | Performed by: INTERNAL MEDICINE

## 2017-07-09 PROCEDURE — 80048 BASIC METABOLIC PNL TOTAL CA: CPT | Performed by: INTERNAL MEDICINE

## 2017-07-09 PROCEDURE — 74011000258 HC RX REV CODE- 258: Performed by: EMERGENCY MEDICINE

## 2017-07-09 RX ORDER — INSULIN LISPRO 100 [IU]/ML
INJECTION, SOLUTION INTRAVENOUS; SUBCUTANEOUS
Status: DISCONTINUED | OUTPATIENT
Start: 2017-07-09 | End: 2017-07-10 | Stop reason: HOSPADM

## 2017-07-09 RX ORDER — MAGNESIUM SULFATE 100 %
16 CRYSTALS MISCELLANEOUS AS NEEDED
Status: DISCONTINUED | OUTPATIENT
Start: 2017-07-09 | End: 2017-07-10 | Stop reason: HOSPADM

## 2017-07-09 RX ORDER — DEXTROSE 50 % IN WATER (D50W) INTRAVENOUS SYRINGE
25-50 AS NEEDED
Status: DISCONTINUED | OUTPATIENT
Start: 2017-07-09 | End: 2017-07-10 | Stop reason: HOSPADM

## 2017-07-09 RX ADMIN — OXYCODONE AND ACETAMINOPHEN 1 TABLET: 5; 325 TABLET ORAL at 10:20

## 2017-07-09 RX ADMIN — METHADONE HYDROCHLORIDE 10 MG: 10 TABLET ORAL at 10:20

## 2017-07-09 RX ADMIN — Medication 10 ML: at 05:05

## 2017-07-09 RX ADMIN — METHADONE HYDROCHLORIDE 10 MG: 10 TABLET ORAL at 15:52

## 2017-07-09 RX ADMIN — OXYCODONE AND ACETAMINOPHEN 1 TABLET: 5; 325 TABLET ORAL at 20:36

## 2017-07-09 RX ADMIN — Medication 10 ML: at 15:07

## 2017-07-09 RX ADMIN — OXYCODONE AND ACETAMINOPHEN 1 TABLET: 5; 325 TABLET ORAL at 15:45

## 2017-07-09 RX ADMIN — METHADONE HYDROCHLORIDE 10 MG: 10 TABLET ORAL at 21:20

## 2017-07-09 RX ADMIN — Medication 10 ML: at 21:23

## 2017-07-09 RX ADMIN — FAMOTIDINE 20 MG: 20 TABLET ORAL at 18:35

## 2017-07-09 RX ADMIN — CEFTRIAXONE SODIUM 2 G: 2 INJECTION, POWDER, FOR SOLUTION INTRAMUSCULAR; INTRAVENOUS at 20:24

## 2017-07-09 RX ADMIN — PREDNISONE 40 MG: 20 TABLET ORAL at 10:21

## 2017-07-09 RX ADMIN — Medication 10 ML: at 15:08

## 2017-07-09 RX ADMIN — FAMOTIDINE 20 MG: 20 TABLET ORAL at 10:21

## 2017-07-09 RX ADMIN — SODIUM CHLORIDE 500 MG: 900 INJECTION, SOLUTION INTRAVENOUS at 21:21

## 2017-07-09 NOTE — PROGRESS NOTES
Hospitalist Progress Note    Patient: Faviola Valdes MRN: [id number]  CSN: [id number]    YOB: 1965  Age: 49 y.o. Sex: female    DOA: 7/7/2017 LOS:  LOS: 2 days          new bandemia 10% this am.     Per RT notes, she has strong productive cough,  deep breathing and coughing encouraged. She states sputum color is lightening. She denies wheezing. She has an appointment tomorrow am but agrees to stay overnight. Assessment/Plan     1. Bronchitis - Rocephin /Azithromax. Prevnar on d/c  Sputum cx pending. Blood cx (7/7) ngtd. RT eval    2. COPD with acute exacerbation - Solumedrol, change to prednisone. 3. Tobacco abuse - Nicotine patch. Smoking cessation education. 4. Anemia macrocytic - check b12 / folate  5. 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 uti - cx ordered. 7. Sepsis poa (fever, tachycardia, leukocytosis) - source m/l lungs, urine. 8. Steroid induced hyperglycemia - ADA diet, ssi. 9. Full code. CM consulted as she will need indigent meds at discharge, and pcp. Has children who live locally. Additional Notes:      Case discussed with:  [x]Patient  []Family  [x]Nursing  []Case Management  DVT Prophylaxis:  []Lovenox  [x]Hep SQ  []SCDs  []Coumadin   []On Heparin gtt    Vital signs/Intake and Output:  Visit Vitals    /86 (BP 1 Location: Right arm, BP Patient Position: At rest)    Pulse 80    Temp 96.9 °F (36.1 °C)    Resp 18    Ht 5' 4\" (1.626 m)    Wt 58.1 kg (128 lb)    SpO2 100%    Breastfeeding No    BMI 21.97 kg/m2     Current Shift:     Last three shifts:       Awake alert and oriented x 4. Ncat. Perrl. Poor dentition. RRR  cta b.l  Soft nt nd nabs  No edema.  dp 2+ b.l  No focal deficit  No rash    Medications Reviewed      Labs: Results:       Chemistry Recent Labs      07/09/17   0236  07/07/17   1808   GLU  141*  85   NA  136  131*   K  4.3  3.3*   CL  101  90*   CO2  29  33*   BUN  14  12   CREA  0.50*  1.26   CA  9.2  9.1   AGAP 6  8   BUCR  28*  10*   AP  206*  291*   TP  6.3*  7.9   ALB  2.1*  2.8*   GLOB  4.2*  5.1*   AGRAT  0.5*  0.5*      CBC w/Diff Recent Labs      07/09/17   0236  07/07/17   1808   WBC  12.3  13.8*   RBC  2.87*  3.35*   HGB  8.9*  10.0*   HCT  31.0*  31.2*   PLT  340  332   GRANS  82*  67   LYMPH  5*  20*   EOS  0  0      Cardiac Enzymes No results for input(s): CPK, CKND1, MARGARITA in the last 72 hours. No lab exists for component: CKRMB, TROIP   Coagulation No results for input(s): PTP, INR, APTT in the last 72 hours. No lab exists for component: INREXT, INREXT    Lipid Panel No results found for: CHOL, CHOLPOCT, CHOLX, CHLST, CHOLV, 141516, HDL, LDL, LDLC, DLDLP, 609157, VLDLC, VLDL, TGLX, TRIGL, TRIGP, TGLPOCT, CHHD, CHHDX   BNP No results for input(s): BNPP in the last 72 hours. Liver Enzymes Recent Labs      07/09/17   0236   TP  6.3*   ALB  2.1*   AP  206*   SGOT  38*      Thyroid Studies No results found for: T4, T3U, TSH, TSHEXT, TSHEXT     Procedures/imaging: see electronic medical records for all procedures/Xrays and details which were not copied into this note but were reviewed prior to creation of Plan.

## 2017-07-09 NOTE — ROUTINE PROCESS
Bedside and Verbal shift change report given to Rema Person RN (oncoming nurse) by Shahab Andrews (offgoing nurse). Report included the following information SBAR, Kardex, MAR and Recent Results. SITUATION:    Code Status: Full Code  Reason for Admission: Pneumonia  CAP (community acquired pneumonia)  Pneumonia  SIRS (systemic inflammatory response syndrome) ([facility] [address]   Pneumonia    [facility] day: 2   Problem List:       Hospital Problems  Date Reviewed: 7/8/2017          Codes Class Noted POA    SIRS (systemic inflammatory response syndrome) ([facility] [address] ICD-10-CM: R65.10  ICD-9-CM: 995.90  7/8/2017 Unknown        Pneumonia ICD-10-CM: J18.9  ICD-9-CM: 011  7/7/2017 Unknown        CAP (community acquired pneumonia) ICD-10-CM: J18.9  ICD-9-CM: 486  7/7/2017 Unknown              BACKGROUND:    Past Medical History: History reviewed. No pertinent past medical history.       Patient taking anticoagulants no, refused heaprin    ASSESSMENT:    Changes in Assessment Throughout Shift: none     Patient has Central Line: no Reasons if yes: n/a   Patient has Barron Cath: no Reasons if yes: n/a      Last Vitals:     Vitals:    07/08/17 1339 07/08/17 1608 07/08/17 2137 07/09/17 0426   BP:  125/80 108/75 131/88   Pulse:  73 72 71   Resp:  18 18 19   Temp:  97.4 °F (36.3 °C) 96.3 °F (35.7 °C) 96.7 °F (35.9 °C)   TempSrc:       SpO2: 96% 100% 97% 98%   Weight:       Height:            IV and DRAINS (will only show if present)   Peripheral IV 07/07/17 Left Antecubital-Site Assessment: Clean, dry, & intact     WOUND (if present)   Wound Type:  none   Dressing present Dressing Present : No   Wound Concerns/Notes:  none     PAIN    Pain Assessment    Pain Intensity 1: 0 (07/08/17 2100)    Pain Location 1: Generalized    Pain Intervention(s) 1: Medication (see MAR)    Patient Stated Pain Goal: 0  o Interventions for Pain:  none  o Intervention effective: percocet 5-325 mg po  o Time of last intervention: 0226  o Reassessment Completed: yes      Last 3 Weights:  Last 3 Recorded Weights in this Encounter    07/07/17 1753   Weight: 58.1 kg (128 lb)     Weight change:      INTAKE/OUPUT    Current Shift:      Last three shifts:       LAB RESULTS     Recent Labs      07/09/17   0236  07/07/17   1808   WBC  12.3  13.8*   HGB  8.9*  10.0*   HCT  31.0*  31.2*   PLT  340  332        Recent Labs      07/09/17   0236  07/07/17   1808   NA  136  131*   K  4.3  3.3*   GLU  141*  85   BUN  14  12   CREA  0.50*  1.26   CA  9.2  9.1   MG  2.2   --        RECOMMENDATIONS AND DISCHARGE PLANNING     1. Pending tests/procedures/ Plan of Care or Other Needs: blood culture, antibiotics     2. Discharge plan for patient and Needs/Barriers: home    3. Estimated Discharge Date: 7/12/17 Posted on Whiteboard in [facility]: yes      4. The patient's care plan was reviewed with the oncoming nurse. \"HEALS\" SAFETY CHECK      Fall Risk    Total Score: 1    Safety Measures: Safety Measures: Bed/Chair alarm on, Bed/Chair-Wheels locked, Bed in low position, Call light within reach, Fall prevention (comment)    A safety check occurred in the patient's room between off going nurse and oncoming nurse listed above. The safety check included the below items  Area Items   H  High Alert Medications - Verify all high alert medication drips (heparin, PCA, etc.)   E  Equipment - Suction is set up for ALL patients (with yanker)  - Red plugs utilized for all equipment (IV pumps, etc.)  - WOWs wiped down at end of shift.  - Room stocked with oxygen, suction, and other unit-specific supplies   A  Alarms - Bed alarm is set for fall risk patients  - Ensure chair alarm is in place and activated if patient is up in a chair   L  Lines - Check IV for any infiltration  - Barron bag is empty if patient has a Abrron   - Tubing and IV bags are labeled   S  Safety   - Room is clean, patient is clean, and equipment is clean. - Hallways are clear from equipment besides carts. - Fall bracelet on for fall risk patients  - Ensure room is clear and free of clutter  - Suction is set up for ALL patients (with belia)  - Hallways are clear from equipment besides carts.    - Isolation precautions followed, supplies available outside room, sign posted     Mariel Tipton

## 2017-07-09 NOTE — PROGRESS NOTES
conducted an initial consultation and Spiritual Assessment for Annamaria Khan, who is a 49 y.o.,female. Patients Primary Language is: [place]. According to the patients EMR Gnosticist Affiliation is: No Orthodoxy. The reason the Patient came to the hospital is:   Patient Active Problem List    Diagnosis Date Noted    SIRS (systemic inflammatory response syndrome) ([facility] [address] 07/08/2017    Pneumonia 07/07/2017    CAP (community acquired pneumonia) 07/07/2017        The  provided the following Interventions:  Initiated a relationship of care and support. Listened empathically. Provided chaplaincy education. Provided information about Spiritual Care Services. Offered  assurance of continued prayers on patient's behalf. Chart reviewed. The following outcomes where achieved:  Patient expressed gratitude for 's visit. Assessment:  Patient does not have any Zoroastrianism/cultural needs that will affect patients preferences in health care. There are no spiritual or Zoroastrianism issues which require intervention at this time. Plan:  Chaplains will continue to follow and will provide pastoral care on an as needed/requested basis.  recommends bedside caregivers page  on duty if patient shows signs of acute spiritual or emotional distress.         [address] [facility].   [phone]

## 2017-07-10 ENCOUNTER — HOME HEALTH ADMISSION (OUTPATIENT)
Dept: HOME HEALTH SERVICES | Facility: HOME HEALTH | Age: 52
End: 2017-07-10

## 2017-07-10 VITALS
HEART RATE: 83 BPM | TEMPERATURE: 98.6 F | DIASTOLIC BLOOD PRESSURE: 89 MMHG | OXYGEN SATURATION: 98 % | BODY MASS INDEX: 21.53 KG/M2 | SYSTOLIC BLOOD PRESSURE: 137 MMHG | WEIGHT: 126.1 LBS | RESPIRATION RATE: 18 BRPM | HEIGHT: 64 IN

## 2017-07-10 LAB
ANION GAP BLD CALC-SCNC: 5 MMOL/L (ref 3–18)
BASOPHILS # BLD AUTO: 0 K/UL (ref 0–0.06)
BASOPHILS # BLD: 0 % (ref 0–3)
BUN SERPL-MCNC: 18 MG/DL (ref 7–18)
BUN/CREAT SERPL: 31 (ref 12–20)
CALCIUM SERPL-MCNC: 9.2 MG/DL (ref 8.5–10.1)
CHLORIDE SERPL-SCNC: 101 MMOL/L (ref 100–108)
CO2 SERPL-SCNC: 31 MMOL/L (ref 21–32)
CREAT SERPL-MCNC: 0.59 MG/DL (ref 0.6–1.3)
DIFFERENTIAL METHOD BLD: ABNORMAL
EOSINOPHIL # BLD: 0 K/UL (ref 0–0.4)
EOSINOPHIL NFR BLD: 0 % (ref 0–5)
ERYTHROCYTE [DISTWIDTH] IN BLOOD BY AUTOMATED COUNT: 12.9 % (ref 11.6–14.5)
FOLATE SERPL-MCNC: 12.2 NG/ML (ref 3.1–17.5)
GLUCOSE BLD STRIP.AUTO-MCNC: 67 MG/DL (ref 70–110)
GLUCOSE SERPL-MCNC: 90 MG/DL (ref 74–99)
HCT VFR BLD AUTO: 29 % (ref 35–45)
HGB BLD-MCNC: 9.4 G/DL (ref 12–16)
LYMPHOCYTES # BLD AUTO: 18 % (ref 20–51)
LYMPHOCYTES # BLD: 2 K/UL (ref 0.8–3.5)
MCH RBC QN AUTO: 30.5 PG (ref 24–34)
MCHC RBC AUTO-ENTMCNC: 32.4 G/DL (ref 31–37)
MCV RBC AUTO: 94.2 FL (ref 74–97)
MONOCYTES # BLD: 0.6 K/UL (ref 0–1)
MONOCYTES NFR BLD AUTO: 5 % (ref 2–9)
NEUTS SEG # BLD: 8.6 K/UL (ref 1.8–8)
NEUTS SEG NFR BLD AUTO: 77 % (ref 42–75)
PLATELET # BLD AUTO: 524 K/UL (ref 135–420)
PLATELET COMMENTS,PCOM: ABNORMAL
PMV BLD AUTO: 9.7 FL (ref 9.2–11.8)
POTASSIUM SERPL-SCNC: 3.9 MMOL/L (ref 3.5–5.5)
RBC # BLD AUTO: 3.08 M/UL (ref 4.2–5.3)
RBC MORPH BLD: ABNORMAL
SODIUM SERPL-SCNC: 137 MMOL/L (ref 136–145)
VIT B12 SERPL-MCNC: 301 PG/ML (ref 211–911)
WBC # BLD AUTO: 11.2 K/UL (ref 4.6–13.2)

## 2017-07-10 PROCEDURE — 85025 COMPLETE CBC W/AUTO DIFF WBC: CPT | Performed by: HOSPITALIST

## 2017-07-10 PROCEDURE — 36415 COLL VENOUS BLD VENIPUNCTURE: CPT | Performed by: HOSPITALIST

## 2017-07-10 PROCEDURE — 74011250637 HC RX REV CODE- 250/637: Performed by: INTERNAL MEDICINE

## 2017-07-10 PROCEDURE — 74011636637 HC RX REV CODE- 636/637: Performed by: HOSPITALIST

## 2017-07-10 PROCEDURE — 82607 VITAMIN B-12: CPT | Performed by: HOSPITALIST

## 2017-07-10 PROCEDURE — 74011250637 HC RX REV CODE- 250/637: Performed by: HOSPITALIST

## 2017-07-10 PROCEDURE — 80048 BASIC METABOLIC PNL TOTAL CA: CPT | Performed by: HOSPITALIST

## 2017-07-10 PROCEDURE — 82962 GLUCOSE BLOOD TEST: CPT

## 2017-07-10 RX ORDER — ALBUTEROL SULFATE 90 UG/1
1-2 AEROSOL, METERED RESPIRATORY (INHALATION)
Qty: 2 INHALER | Refills: 2 | Status: SHIPPED | OUTPATIENT
Start: 2017-07-10 | End: 2020-01-25

## 2017-07-10 RX ORDER — IBUPROFEN 200 MG
1 TABLET ORAL EVERY 24 HOURS
Qty: 7 PATCH | Refills: 0 | Status: SHIPPED | OUTPATIENT
Start: 2017-07-10 | End: 2017-07-17

## 2017-07-10 RX ORDER — NICOTINE 7MG/24HR
1 PATCH, TRANSDERMAL 24 HOURS TRANSDERMAL EVERY 24 HOURS
Qty: 7 PATCH | Refills: 0 | Status: SHIPPED | OUTPATIENT
Start: 2017-07-10 | End: 2017-07-17

## 2017-07-10 RX ORDER — AZITHROMYCIN 250 MG/1
TABLET, FILM COATED ORAL
Qty: 6 TAB | Refills: 0 | Status: SHIPPED | OUTPATIENT
Start: 2017-07-10 | End: 2017-07-15

## 2017-07-10 RX ORDER — PREDNISONE 20 MG/1
40 TABLET ORAL
Qty: 10 TAB | Refills: 0 | Status: SHIPPED | OUTPATIENT
Start: 2017-07-10 | End: 2017-07-15

## 2017-07-10 RX ORDER — CODEINE PHOSPHATE AND GUAIFENESIN 10; 100 MG/5ML; MG/5ML
5 SOLUTION ORAL
Qty: 118 ML | Refills: 0 | Status: SHIPPED | OUTPATIENT
Start: 2017-07-10 | End: 2019-10-04

## 2017-07-10 RX ADMIN — Medication 16 G: at 10:42

## 2017-07-10 RX ADMIN — Medication 10 ML: at 05:10

## 2017-07-10 RX ADMIN — METHADONE HYDROCHLORIDE 10 MG: 10 TABLET ORAL at 10:43

## 2017-07-10 RX ADMIN — PREDNISONE 40 MG: 20 TABLET ORAL at 10:43

## 2017-07-10 RX ADMIN — FAMOTIDINE 20 MG: 20 TABLET ORAL at 10:43

## 2017-07-10 NOTE — PROGRESS NOTES
Care Management Interventions  PCP Verified by CM: Yes (pt chose [facility])  Palliative Care Consult (Criteria: CHF and RRAT>21): No  Reason for No Palliative Care Consult: Other (see comment) (NO ORDER)  Mode of Transport at Discharge: Other (see comment) (FAMILY WILL TRANSPORT)  Transition of Care Consult (CM Consult): Discharge Planning  Current Support Network: Relative's Home, Family Lives Nearby  Confirm Follow Up Transport: Self  Plan discussed with Pt/Family/Caregiver: Yes (PT NOT COOPERATIVE, WITH CARE, PT DID AGREE TO [facility] DUE TO [address])  Discharge Location  Discharge Placement: Home with family assistance  Pt's indigent medication faxed to the pharmacy per consult and assistance with discharge. Pt will be [facility] for follow-up. REFERRAL MADE TO HOME HEALTH LIAISON AND REFERRAL [address]  TO HOME. PT PROVIDED BROCHURE. THIS WRITER SPOKE WITH LI IN THE LIAISON OFFICE.

## 2017-07-10 NOTE — HOME CARE
Received [facility] referral, Discharge noted for today, pt's phone number on face sheet not in service, called pt's son Huong Wagner) ,he states pt will be staying with him at [phone]; unable to speak with pt,pt had already discharged; [address] will follow for [facility] for PNA. PASCUAL SANCHEZ.

## 2017-07-10 NOTE — DISCHARGE INSTRUCTIONS
Pneumonia: Care Instructions  Your Care Instructions    Pneumonia is an infection of the lungs. Most cases are caused by infections from bacteria or viruses. Pneumonia may be mild or very severe. If it is caused by bacteria, you will be treated with antibiotics. It may take a few weeks to a few months to recover fully from pneumonia, depending on how sick you were and whether your overall health is good. Follow-up care is a key part of your treatment and safety. Be sure to make and go to all appointments, and call your doctor if you are having problems. Its also a good idea to know your test results and keep a list of the medicines you take. How can you care for yourself at home? · Take your antibiotics exactly as directed. Do not stop taking the medicine just because you are feeling better. You need to take the full course of antibiotics. · Take your medicines exactly as prescribed. Call your doctor if you think you are having a problem with your medicine. · Get plenty of rest and sleep. You may feel weak and tired for a while, but your energy level will improve with time. · To prevent dehydration, drink plenty of fluids, enough so that your urine is light yellow or clear like water. Choose water and other caffeine-free clear liquids until you feel better. If you have kidney, heart, or liver disease and have to limit fluids, talk with your doctor before you increase the amount of fluids you drink. · Take care of your cough so you can rest. A cough that brings up mucus from your lungs is common with pneumonia. It is one way your body gets rid of the infection. But if coughing keeps you from resting or causes severe fatigue and chest-wall pain, talk to your doctor. He or she may suggest that you take a medicine to reduce the cough. · Use a vaporizer or humidifier to add moisture to your bedroom. Follow the directions for cleaning the machine. · Do not smoke or allow others to smoke around you.  Smoke will make your cough last longer. If you need help quitting, talk to your doctor about stop-smoking programs and medicines. These can increase your chances of quitting for good. · Take an over-the-counter pain medicine, such as acetaminophen (Tylenol), ibuprofen (Advil, Motrin), or naproxen (Aleve). Read and follow all instructions on the label. · Do not take two or more pain medicines at the same time unless the doctor told you to. Many pain medicines have acetaminophen, which is Tylenol. Too much acetaminophen (Tylenol) can be harmful. · If you were given a spirometer to measure how well your lungs are working, use it as instructed. This can help your doctor tell how your recovery is going. · To prevent pneumonia in the future, talk to your doctor about getting a flu vaccine (once a year) and a pneumococcal vaccine (one time only for most people). When should you call for help? Call 911 anytime you think you may need emergency care. For example, call if:  · You have severe trouble breathing. Call your doctor now or seek immediate medical care if:  · You cough up dark brown or bloody mucus (sputum). · You have new or worse trouble breathing. · You are dizzy or lightheaded, or you feel like you may faint. Watch closely for changes in your health, and be sure to contact your doctor if:  · You have a new or higher fever. · You are coughing more deeply or more often. · You are not getting better after 2 days (48 hours). · You do not get better as expected. Where can you learn more? Go to http://bonnie-yasmeen.info/. Enter [id number] in the search box to learn more about \"Pneumonia: Care Instructions. \"  Current as of: March 25, 2017  Content Version: 11.3  © 7534-5810 [employer]. Care instructions adapted under license by [employer] (which disclaims liability or warranty for this information).  If you have questions about a medical condition or this instruction, always ask your healthcare professional. [place] [address] any warranty or liability for your use of this information. Patient armband removed and given to patient to take home. Patient was informed of the privacy risks if armband lost or stolen    MyChart Activation    Thank you for requesting access to [employer]. Please follow the instructions below to securely access and download your online medical record. Fundrise allows you to send messages to your doctor, view your test results, renew your prescriptions, schedule appointments, and more. How Do I Sign Up? 1. In your internet browser, go to [email or web address]  2. Click on the First Time User? Click Here link in the Sign In box. You will be redirect to the New Member Sign Up page. 3. Enter your Fundrise Access Code exactly as it appears below. You will not need to use this code after youve completed the sign-up process. If you do not sign up before the expiration date, you must request a new code. Fundrise Access Code: [email or web address]  Expires: 10/8/2017 10:04 AM (This is the date your Fundrise access code will )    4. Enter the last four digits of your Social Security Number (xxxx) and Date of Birth (mm/dd/yyyy) as indicated and click Submit. You will be taken to the next sign-up page. 5. Create a Fundrise ID. This will be your Fundrise login ID and cannot be changed, so think of one that is secure and easy to remember. 6. Create a Fundrise password. You can change your password at any time. 7. Enter your Password Reset Question and Answer. This can be used at a later time if you forget your password. 8. Enter your e-mail address. You will receive e-mail notification when new information is available in [address] 9. Click Sign Up. You can now view and download portions of your medical record. 10. Click the Download Summary menu link to download a portable copy of your medical information.     Additional Information    If you have questions, please visit the Frequently Asked Questions section of the [employer] website at [email or web address] Remember, MyChart is NOT to be used for urgent needs. For medical emergencies, dial 911. DISCHARGE SUMMARY from Nurse    The following personal items are in your possession at time of discharge:    Dental Appliances: None  Visual Aid: None     Home Medications: None  Jewelry: None  Clothing: Shirt, Undergarments, Footwear, Pants  Other Valuables: None             PATIENT INSTRUCTIONS:    After general anesthesia or intravenous sedation, for 24 hours or while taking prescription Narcotics:  · Limit your activities  · Do not drive and operate hazardous machinery  · Do not make important personal or business decisions  · Do  not drink alcoholic beverages  · If you have not urinated within 8 hours after discharge, please contact your surgeon on call. Report the following to your surgeon:  · Excessive pain, swelling, redness or odor of or around the surgical area  · Temperature over 100.5  · Nausea and vomiting lasting longer than 4 hours or if unable to take medications  · Any signs of decreased circulation or nerve impairment to extremity: change in color, persistent  numbness, tingling, coldness or increase pain  · Any questions        What to do at Home:  Recommended activity: Activity as tolerated    If you experience any of the following symptoms Nausea, vomiting, diarrhea, fever greater than 100.5, shortness of breath, increased pain, please follow up with PCP. *  Please give a list of your current medications to your Primary Care Provider. *  Please update this list whenever your medications are discontinued, doses are      changed, or new medications (including over-the-counter products) are added. *  Please carry medication information at all times in case of emergency situations.           These are general instructions for a healthy lifestyle:    No smoking/ No tobacco products/ Avoid exposure to second hand smoke    Surgeon General's Warning:  Quitting smoking now greatly reduces serious risk to your health. Obesity, smoking, and sedentary lifestyle greatly increases your risk for illness    A healthy diet, regular physical exercise & weight monitoring are important for maintaining a healthy lifestyle    You may be retaining fluid if you have a history of heart failure or if you experience any of the following symptoms:  Weight gain of 3 pounds or more overnight or 5 pounds in a week, increased swelling in our hands or feet or shortness of breath while lying flat in bed. Please call your doctor as soon as you notice any of these symptoms; do not wait until your next office visit. Recognize signs and symptoms of STROKE:    F-face looks uneven    A-arms unable to move or move unevenly    S-speech slurred or non-existent    T-time-call 911 as soon as signs and symptoms begin-DO NOT go       Back to bed or wait to see if you get better-TIME IS BRAIN. Warning Signs of HEART ATTACK     Call 911 if you have these symptoms:   Chest discomfort. Most heart attacks involve discomfort in the center of the chest that lasts more than a few minutes, or that goes away and comes back. It can feel like uncomfortable pressure, squeezing, fullness, or pain.  Discomfort in other areas of the upper body. Symptoms can include pain or discomfort in one or both arms, the back, neck, jaw, or stomach.  Shortness of breath with or without chest discomfort.  Other signs may include breaking out in a cold sweat, nausea, or lightheadedness. Don't wait more than five minutes to call 911 - MINUTES MATTER! Fast action can save your life. Calling 911 is almost always the fastest way to get lifesaving treatment. Emergency Medical Services staff can begin treatment when they arrive -- up to an hour sooner than if someone gets to the hospital by car.        The discharge information has been reviewed with the patient. The patient verbalized understanding. Discharge medications reviewed with the patient and appropriate educational materials and side effects teaching were provided.

## 2017-07-10 NOTE — ROUTINE PROCESS
Bedside and Verbal shift change report given to Sky Ross RN (oncoming nurse) by Jorge A Rowley (offgoing nurse). Report included the following information SBAR, Kardex, MAR and Recent Results. SITUATION:    Code Status: Full Code  Reason for Admission: Pneumonia  CAP (community acquired pneumonia)  Pneumonia  SIRS (systemic inflammatory response syndrome) ([facility] [address]   Pneumonia    [facility] day: 3   Problem List:       Hospital Problems  Date Reviewed: 7/8/2017          Codes Class Noted POA    SIRS (systemic inflammatory response syndrome) ([facility]) ICD-10-CM: R65.10  ICD-9-CM: 995.90  7/8/2017 Unknown        Pneumonia ICD-10-CM: J18.9  ICD-9-CM: 470  7/7/2017 Unknown        CAP (community acquired pneumonia) ICD-10-CM: J18.9  ICD-9-CM: 486  7/7/2017 Unknown              BACKGROUND:    Past Medical History: History reviewed. No pertinent past medical history.       Patient taking anticoagulants no, refused heaprin    ASSESSMENT:    Changes in Assessment Throughout Shift: none     Patient has Central Line: no Reasons if yes: n/a   Patient has Barron Cath: no Reasons if yes: n/a      Last Vitals:     Vitals:    07/09/17 1935 07/09/17 2101 07/10/17 0045 07/10/17 0345   BP:  133/88 126/77 (!) 146/97   Pulse:  64 64 65   Resp:  20 16 20   Temp:  98 °F (36.7 °C) 97.6 °F (36.4 °C) 97.3 °F (36.3 °C)   TempSrc:       SpO2:  98% 98% 99%   Weight: 57.2 kg (126 lb 1.6 oz)      Height:            IV and DRAINS (will only show if present)   Peripheral IV 07/07/17 Left Antecubital-Site Assessment: Clean, dry, & intact     WOUND (if present)   Wound Type:  none   Dressing present Dressing Present : No   Wound Concerns/Notes:  none     PAIN    Pain Assessment    Pain Intensity 1: 0 (07/10/17 0400)    Pain Location 1: Back    Pain Intervention(s) 1: Medication (see MAR)    Patient Stated Pain Goal: 0  o Interventions for Pain:  none  o Intervention effective: percocet 5-325 mg po  o Time of last intervention: 0226  o Reassessment Completed: yes      Last 3 Weights:  Last 3 Recorded Weights in this Encounter    07/07/17 1753 07/09/17 1935   Weight: 58.1 kg (128 lb) 57.2 kg (126 lb 1.6 oz)     Weight change:      INTAKE/OUPUT    Current Shift:      Last three shifts:       LAB RESULTS     Recent Labs      07/10/17   0305  07/09/17   0236  07/07/17   1808   WBC  11.2  12.3  13.8*   HGB  9.4*  8.9*  10.0*   HCT  29.0*  31.0*  31.2*   PLT  524*  340  332        Recent Labs      07/10/17   0305  07/09/17   0236  07/07/17   1808   NA  137  136  131*   K  3.9  4.3  3.3*   GLU  90  141*  85   BUN  18  14  12   CREA  0.59*  0.50*  1.26   CA  9.2  9.2  9.1   MG   --   2.2   --        RECOMMENDATIONS AND DISCHARGE PLANNING     1. Pending tests/procedures/ Plan of Care or Other Needs: blood culture, antibiotics     2. Discharge plan for patient and Needs/Barriers: home    3. Estimated Discharge Date: 7/12/17 Posted on Whiteboard in [facility]: yes      4. The patient's care plan was reviewed with the oncoming nurse. \"HEALS\" SAFETY CHECK      Fall Risk    Total Score: 1    Safety Measures: Safety Measures: Bed/Chair alarm on, Bed/Chair-Wheels locked, Bed in low position, Call light within reach, Fall prevention (comment)    A safety check occurred in the patient's room between off going nurse and oncoming nurse listed above.     The safety check included the below items  Area Items   H  High Alert Medications - Verify all high alert medication drips (heparin, PCA, etc.)   E  Equipment - Suction is set up for ALL patients (with yanker)  - Red plugs utilized for all equipment (IV pumps, etc.)  - WOWs wiped down at end of shift.  - Room stocked with oxygen, suction, and other unit-specific supplies   A  Alarms - Bed alarm is set for fall risk patients  - Ensure chair alarm is in place and activated if patient is up in a chair   L  Lines - Check IV for any infiltration  - Barron bag is empty if patient has a Barron   - Tubing and IV bags are labeled   S  Safety   - Room is clean, patient is clean, and equipment is clean. - Hallways are clear from equipment besides carts. - Fall bracelet on for fall risk patients  - Ensure room is clear and free of clutter  - Suction is set up for ALL patients (with pedro pabloker)  - Hallways are clear from equipment besides carts.    - Isolation precautions followed, supplies available outside room, sign posted     Mariel Kolb

## 2017-07-10 NOTE — DISCHARGE SUMMARY
Discharge Summary    Patient: Ishan Thomas MRN: [id number]  CSN: [id number]    YOB: 1965  Age: 49 y.o. Sex: female    DOA: 7/7/2017 LOS:  LOS: 3 days   Discharge Date:      Admission Diagnoses: Pneumonia  CAP (community acquired pneumonia)  Pneumonia  SIRS (systemic inflammatory response syndrome) ([facility]  Pneumonia    Discharge Diagnoses:    Problem List as of 7/10/2017  Date Reviewed: 7/8/2017          Codes Class Noted - Resolved    SIRS (systemic inflammatory response syndrome) ([facility] ICD-10-CM: R65.10  ICD-9-CM: 995.90  7/8/2017 - Present        Pneumonia ICD-10-CM: J18.9  ICD-9-CM: 486  7/7/2017 - Present        CAP (community acquired pneumonia) ICD-10-CM: J18.9  ICD-9-CM: 486  7/7/2017 - Present            Reason for Admission  49 y.o.  female with past heroin use, continues to smoke tobacco who presented to the ED with cough, body aches and fever progressive over 4 days. She reported recent sick contact. Xray revealed infiltrate and she was transferred from [facility] ER for further management. Xray suggested infiltrate, could not exclude neoplasm. She used inhaler and nebulizer at home. She continues to smoke but recently has cut back. ROS notable for weight loss and decreased appetitive. Patient heroin free for about one year on chronic methadone use. Discharge Condition: good    PHYSICAL EXAM at discharge  Visit Vitals    BP (!) 146/97 (BP 1 Location: Right arm, BP Patient Position: At rest;Supine)    Pulse 65    Temp 97.3 °F (36.3 °C)    Resp 20    Ht 5' 4\" (1.626 m)    Wt 57.2 kg (126 lb 1.6 oz)    SpO2 99%    Breastfeeding No    BMI 21.65 kg/m2     Awake alert and oriented x 4. Ncat. Perrl. Poor dentition. RRR  cta b.l  Soft nt nd nabs  No edema. dp 2+ b.l  No focal deficit  No rash    Hospital Course:   1. Bronchitis - Rocephin /Azithromax. Prevnar on d/c  Sputum cx noted below Blood cx (7/7) ngtd.  RT worked with her.    Ramón Ortega. COPD with acute exacerbation - resolving s/p Solumedrol and BD, changed to prednisone. 3. Tobacco abuse - Nicotine patch. Smoking cessation education. 4. Low nl B12 - discharge on supplements. 5. 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 uti - she received rocephin in house. 7. Sepsis poa (fever, tachycardia, leukocytosis) resolving source m/l lungs, urine. 8. Steroid induced hyperglycemia - ADA diet, ssi. 9. dvt prophylaxis was ordered as heparin subcut tid, which she declined. 10. Full code. Has children who live locally. Indigent meds provided. Discharge to home. Patient agrees; all questions answered to the best of my ability.      Consults: none    Significant Diagnostic Studies: labs:   Recent Results (from the past 24 hour(s))   GLUCOSE, POC    Collection Time: 07/09/17  4:31 PM   Result Value Ref Range    Glucose (POC) 101 70 - 110 mg/dL   GLUCOSE, POC    Collection Time: 07/09/17 10:16 PM   Result Value Ref Range    Glucose (POC) 132 (H) 70 - 110 mg/dL   CBC WITH AUTOMATED DIFF    Collection Time: 07/10/17  3:05 AM   Result Value Ref Range    WBC 11.2 4.6 - 13.2 K/uL    RBC 3.08 (L) 4.20 - 5.30 M/uL    HGB 9.4 (L) 12.0 - 16.0 g/dL    HCT 29.0 (L) 35.0 - 45.0 %    MCV 94.2 74.0 - 97.0 FL    MCH 30.5 24.0 - 34.0 PG    MCHC 32.4 31.0 - 37.0 g/dL    RDW 12.9 11.6 - 14.5 %    PLATELET 946 (H) 448 - 420 K/uL    MPV 9.7 9.2 - 11.8 FL    NEUTROPHILS 77 (H) 42 - 75 %    LYMPHOCYTES 18 (L) 20 - 51 %    MONOCYTES 5 2 - 9 %    EOSINOPHILS 0 0 - 5 %    BASOPHILS 0 0 - 3 %    ABS. NEUTROPHILS 8.6 (H) 1.8 - 8.0 K/UL    ABS. LYMPHOCYTES 2.0 0.8 - 3.5 K/UL    ABS. MONOCYTES 0.6 0 - 1.0 K/UL    ABS. EOSINOPHILS 0.0 0.0 - 0.4 K/UL    ABS.  BASOPHILS 0.0 0.0 - 0.06 K/UL    DF MANUAL      PLATELET COMMENTS INCREASED PLATELETS      RBC COMMENTS NORMOCYTIC, NORMOCHROMIC     METABOLIC PANEL, BASIC    Collection Time: 07/10/17  3:05 AM   Result Value Ref Range    Sodium 137 136 - 145 mmol/L    Potassium 3.9 3.5 - 5.5 mmol/L Chloride 101 100 - 108 mmol/L    CO2 31 21 - 32 mmol/L    Anion gap 5 3.0 - 18 mmol/L    Glucose 90 74 - 99 mg/dL    BUN 18 7.0 - 18 MG/DL    Creatinine 0.59 (L) 0.6 - 1.3 MG/DL    BUN/Creatinine ratio 31 (H) 12 - 20      GFR est AA >60 >60 ml/min/1.73m2    GFR est non-AA >60 >60 ml/min/1.73m2    Calcium 9.2 8.5 - 10.1 MG/DL   VITAMIN B12 & FOLATE    Collection Time: 07/10/17  3:05 AM   Result Value Ref Range    Vitamin B12 301 211 - 911 pg/mL    Folate 12.2 3.10 - 17.50 ng/mL   GLUCOSE, POC    Collection Time: 07/10/17  8:25 AM   Result Value Ref Range    Glucose (POC) 67 (L) 70 - 110 mg/dL     All Micro Results     Procedure Component Value Units Date/Time    RESPIRATORY CULTURE [[id number]]  (Abnormal) Collected:  07/07/17 1854    Order Status:  Completed Specimen:  Sputum from Sputum Updated:  07/10/17 0830     Special Requests: NO SPECIAL REQUESTS        GRAM STAIN >25 WBC/lpf         <10 EPI/lpf         MUCUS PRESENT               FEW  GRAM POSITIVE COCCI  IN PAIRS  IN GROUPS        RARE  GRAM POSITIVE RODS         RARE  GRAM NEGATIVE RODS        Culture result: RARE  STAPHYLOCOCCUS AUREUS   (A)       RARE  GRAM NEGATIVE RODS   (A)       MODERATE  NORMAL RESPIRATORY SAMANTHA       CULTURE, BLOOD [[id number]] Collected:  07/07/17 1824    Order Status:  Completed Specimen:  Blood from Blood Updated:  07/10/17 0748     Special Requests: NO SPECIAL REQUESTS        Culture result: NO GROWTH 3 DAYS       CULTURE, BLOOD [[id number]] Collected:  07/07/17 1836    Order Status:  Completed Specimen:  Blood from Blood Updated:  07/10/17 0748     Special Requests: NO SPECIAL REQUESTS        Culture result: NO GROWTH 3 DAYS       CULTURE, BLOOD [[id number]] Collected:  07/08/17 0525    Order Status:  Completed Specimen:  Blood from Blood Updated:  07/10/17 0748     Special Requests: NO SPECIAL REQUESTS        Culture result: NO GROWTH 2 DAYS       CULTURE, BLOOD [[id number]] Collected:  07/08/17 0530    Order Status:  Completed Specimen:  Blood from Blood Updated:  07/10/17 0748     Special Requests: NO SPECIAL REQUESTS        Culture result: NO GROWTH 2 DAYS       CULTURE, URINE [[id number]]     Order Status:  Sent Specimen:  Urine from Clean catch         IMAGING  XR Results (most recent):    Results from Hospital Encounter encounter on 07/07/17   XR CHEST PA LAT   Narrative Chest 2 views    HISTORY: Cough and congestion, fever, 3-4 days duration    FINDINGS: Frontal and lateral view of the chest compared with 2/2/2017 and  11/14/2011    There is a right lateral upper lobe infiltrate. Remainder the lungs are without  acute consolidation. No effusion or pneumothorax. Degenerative change in the  spine. Impression IMPRESSION:    Right upper lobe infiltrate. Finding most suggestive of pneumonia in the setting  of fever and cough. A chest radiograph after treatment is recommended to ensure  resolution, and help exclude less likely possibility of underlying lung lesion. EKG Results     Procedure 720 Value Units Date/Time    EKG, 12 LEAD, INITIAL [[id number]] Collected:  07/07/17 1829    Order Status:  Completed Updated:  07/08/17 1738     Ventricular Rate 111 BPM      Atrial Rate 111 BPM      P-R Interval 126 ms      QRS Duration 70 ms      Q-T Interval 296 ms      QTC Calculation (Bezet) 402 ms      Calculated P Axis 66 degrees      Calculated R Axis 66 degrees      Calculated T Axis 42 degrees      Diagnosis --     Sinus tachycardia  Low voltage QRS  Nonspecific T wave abnormality  Abnormal ECG  When compared with ECG of 02-FEB-2017 02:54,  Nonspecific T wave abnormality has replaced inverted T waves in Anterior   leads  Confirmed by Abigail Robertson MD, ----- ([id number]) on 7/8/2017 5:37:55 PM          Discharge Medications:     Current Discharge Medication List      START taking these medications    Details   nicotine (NICODERM CQ) 14 mg/24 hr patch 1 Patch by TransDERmal route every twenty-four (24) hours for 7 days.  Week 1  Qty: 7 Patch, Refills: 0      predniSONE (DELTASONE) 20 mg tablet Take 2 Tabs by mouth daily (with breakfast) for 5 days. Qty: 10 Tab, Refills: 0      nicotine (NICODERM CQ) 7 mg/24 hr 1 Patch by TransDERmal route every twenty-four (24) hours for 7 days. Week @  Qty: 7 Patch, Refills: 0      azithromycin (ZITHROMAX Z-TING) 250 mg tablet Take as directed  Qty: 6 Tab, Refills: 0      albuterol (PROAIR HFA) 90 mcg/actuation inhaler Take 1-2 Puffs by inhalation every four (4) hours as needed for Wheezing. Qty: 2 Inhaler, Refills: 2      cyanocobalamin 500 mcg TbER Take 500 mcg by mouth daily. Qty: 30 Tab, Refills: 2         CONTINUE these medications which have CHANGED    Details   guaiFENesin-codeine (ROBITUSSIN AC) 100-10 mg/5 mL solution Take 5 mL by mouth three (3) times daily as needed for Cough. Max Daily Amount: 15 mL. Qty: 118 mL, Refills: 0         CONTINUE these medications which have NOT CHANGED    Details   ibuprofen (MOTRIN) 600 mg tablet Take 1 Tab by mouth every six (6) hours as needed for Pain. Qty: 18 Tab, Refills: 0             Activity: as tolerated    Diet: cardiac    Wound Care: none needed. Follow-up: care a jaime 7 - 10 days.     Minutes spent on discharge: greater than 30

## 2017-07-11 LAB
BACTERIA SPEC CULT: ABNORMAL
GRAM STN SPEC: ABNORMAL
SERVICE CMNT-IMP: ABNORMAL

## 2017-07-13 LAB
BACTERIA SPEC CULT: NORMAL
BACTERIA SPEC CULT: NORMAL
SERVICE CMNT-IMP: NORMAL
SERVICE CMNT-IMP: NORMAL

## 2017-07-14 ENCOUNTER — HOME CARE VISIT (OUTPATIENT)
Dept: HOME HEALTH SERVICES | Facility: HOME HEALTH | Age: 52
End: 2017-07-14

## 2017-08-16 ENCOUNTER — HOME CARE VISIT (OUTPATIENT)
Dept: HOME HEALTH SERVICES | Facility: HOME HEALTH | Age: 52
End: 2017-08-16

## 2019-01-09 ENCOUNTER — HOSPITAL ENCOUNTER (OUTPATIENT)
Dept: MAMMOGRAPHY | Age: 54
Discharge: HOME | End: 2019-01-09
Attending: NURSE PRACTITIONER
Payer: MEDICAID

## 2019-01-09 ENCOUNTER — HOSPITAL ENCOUNTER (OUTPATIENT)
Dept: ULTRASOUND IMAGING | Age: 54
Discharge: HOME | End: 2019-01-09
Attending: NURSE PRACTITIONER
Payer: MEDICAID

## 2019-01-09 DIAGNOSIS — R63.4 WEIGHT LOSS: ICD-10-CM

## 2019-01-09 DIAGNOSIS — Z12.31 VISIT FOR SCREENING MAMMOGRAM: ICD-10-CM

## 2019-01-09 DIAGNOSIS — K75.9 INFLAMMATORY DISEASE OF LIVER: ICD-10-CM

## 2019-01-09 PROCEDURE — 76700 US EXAM ABDOM COMPLETE: CPT

## 2019-01-09 PROCEDURE — 77067 SCR MAMMO BI INCL CAD: CPT

## 2019-02-04 ENCOUNTER — OFFICE VISIT (OUTPATIENT)
Dept: OBGYN CLINIC | Age: 54
End: 2019-02-04

## 2019-02-04 VITALS
TEMPERATURE: 99 F | WEIGHT: 121.4 LBS | SYSTOLIC BLOOD PRESSURE: 106 MMHG | BODY MASS INDEX: 20.73 KG/M2 | RESPIRATION RATE: 18 BRPM | OXYGEN SATURATION: 97 % | HEART RATE: 91 BPM | DIASTOLIC BLOOD PRESSURE: 69 MMHG | HEIGHT: 64 IN

## 2019-02-04 DIAGNOSIS — N72 HIGH RISK HUMAN PAPILLOMA VIRUS (HPV) INFECTION OF CERVIX: Primary | ICD-10-CM

## 2019-02-04 DIAGNOSIS — N90.7 VULVAR CYST: ICD-10-CM

## 2019-02-04 DIAGNOSIS — B97.7 HIGH RISK HUMAN PAPILLOMA VIRUS (HPV) INFECTION OF CERVIX: Primary | ICD-10-CM

## 2019-02-04 RX ORDER — CYPROHEPTADINE HYDROCHLORIDE 4 MG/1
TABLET ORAL 2 TIMES DAILY
COMMUNITY
End: 2019-10-04

## 2019-02-04 NOTE — PATIENT INSTRUCTIONS
Human Papillomavirus (HPV): Care Instructions  Your Care Instructions  The human papillomavirus (HPV) is a very common virus. There are many types of HPV. Some types cause the common skin wart. Other types cause genital warts, which can be spread by sexual contact. Some types can increase the risk for cervical and anal cancer. Having one type of HPV does not lead to having another type. Many women who have HPV may not know that they are infected until it is found with a Pap test. Your doctor uses this test to look for abnormal cells on your cervix. If you have had an abnormal Pap test, your doctor may recommend that you have an HPV test.  Like a Pap test, an HPV test is done on a sample of cells collected from the cervix. If the test finds that you have the types of HPV that might lead to cancer, your doctor may suggest more tests. This does not mean that you will develop cancer; it means that you may have an increased risk. Abnormal cell changes caused by HPV often go away on their own. If the changes do not go away, they can be treated. But because HPV can stay inside the body, the abnormal cervical cells sometimes come back. This is why it is important to follow up with your doctor and have regular Pap tests. Follow-up care is a key part of your treatment and safety. Be sure to make and go to all appointments, and call your doctor if you are having problems. It's also a good idea to know your test results and keep a list of the medicines you take. How can you care for yourself at home? · If you are going to have a Pap or HPV test, do not douche or use tampons or vaginal creams in the 24 hours before the test.  · Do not smoke. Smoking increases the risk for cervical problems and abnormal Pap tests. If you need help quitting, talk to your doctor about stop-smoking programs and medicines. These can increase your chances of quitting for good. · Use latex condoms every time you have sex.  Use them from the beginning to the end of sexual contact. · Be sure to tell your sexual partner or partners that you have HPV. Even if you do not have symptoms, you can still pass HPV to others. · Having one sex partner (who does not have STIs and does not have sex with anyone else) is a good way to avoid STIs. When should you call for help? Watch closely for changes in your health, and be sure to contact your doctor if:    · You have vaginal pain during or after sex.     · You have vaginal bleeding when you are not in your menstrual period. Where can you learn more? Go to http://bonnie-yasmeen.info/. Enter F690 in the search box to learn more about \"Human Papillomavirus (HPV): Care Instructions. \"  Current as of: September 11, 2018  Content Version: 11.9  © 5774-4877 [employer]. Care instructions adapted under license by [employer] (which disclaims liability or warranty for this information). If you have questions about a medical condition or this instruction, always ask your healthcare professional. [address] any warranty or liability for your use of this information. Colposcopy: What to Expect at [address] may feel some soreness in your vagina for a day or two if you had a biopsy. Some vaginal bleeding or discharge is normal for up to a week after a biopsy. The discharge may be dark-colored if a solution was put on your cervix. You can use a sanitary pad for the bleeding. It may take a week or two for you to get the test results. This care sheet gives you a general idea about how long it will take for you to recover. But each person recovers at a different pace. Follow the steps below to feel better as quickly as possible. How can you care for yourself at home?   Activity    · You can return to work and most daily activities right after the test.   Exercise    · Do not exercise for 1 day after the test.   Medicines    · Your doctor will tell you if and when you can restart your medicines. He or she will also give you instructions about taking any new medicines.     · If you take blood thinners, such as warfarin (Coumadin), clopidogrel (Plavix), or aspirin, be sure to talk to your doctor. He or she will tell you if and when to start taking those medicines again. Make sure that you understand exactly what your doctor wants you to do.     · Take an over-the-counter pain medicine, such as acetaminophen (Tylenol), ibuprofen (Advil, Motrin), or naproxen (Aleve). Be safe with medicines. Read and follow all instructions on the label. Do not take two or more pain medicines at the same time unless the doctor told you to. Many pain medicines have acetaminophen, which is Tylenol. Too much acetaminophen (Tylenol) can be harmful. Other instructions    · Use a pad if you have some bleeding.     · Do not douche, have sexual intercourse, or use tampons for 1 week if you had a biopsy. This will allow time for your cervix to heal.     · You can take a bath or shower anytime after the test.   Follow-up care is a key part of your treatment and safety. Be sure to make and go to all appointments, and call your doctor if you are having problems. It's also a good idea to know your test results and keep a list of the medicines you take. When should you call for help? Call your doctor now or seek immediate medical care if:    · You have severe vaginal bleeding. This means that you are soaking through your usual pads or tampons each hour for 2 or more hours.     · You have pain that does not get better after you take pain medicine.     · You have signs of infection, such as:  ? Increased pain. ? Bad-smelling vaginal discharge. ? A fever.    Watch closely for any changes in your health, and be sure to contact your doctor if:    · You have questions or concerns. Where can you learn more? Go to http://bonnie-yasmeen.info/.   Enter M523 in the search box to learn more about \"Colposcopy: What to Expect at Home. \"  Current as of: March 27, 2018  Content Version: 11.9  © 3646-6482 [employer]. Care instructions adapted under license by [employer] (which disclaims liability or warranty for this information). If you have questions about a medical condition or this instruction, always ask your healthcare professional. [address] any warranty or liability for your use of this information. Abnormal Pap Test: Care Instructions  Your Care Instructions    A Pap test (also called a Pap smear) is used to look for early changes that may become cancer of the cervix. Your Pap test was abnormal. That may mean that some cells in your cervix have changed. The cell changes are most often caused by human papillomavirus (HPV) infection. An abnormal Pap test does not mean that the abnormal cells will lead to cancer. Changes in cervical cells may go away on their own or may progress slowly. Your doctor may want to repeat the Pap test or have you take other tests to see if you have cell changes. It is very important that you have regular Pap tests after you've had an abnormal Pap test.  Follow-up care is a key part of your treatment and safety. Be sure to make and go to all appointments, and call your doctor if you are having problems. It's also a good idea to know your test results and keep a list of the medicines you take. How can you care for yourself at home? · Do not smoke. Smoking may increase your risk for cervical cell changes. If you need help quitting, talk to your doctor about stop-smoking programs and medicines. These can increase your chances of quitting for good. · Be sure to get follow-up Pap tests or other follow-up tests as recommended by your doctor. When should you call for help? Watch closely for changes in your health, and be sure to contact your doctor if you have any problems.   Where can you learn more? Go to http://bonnie-yasmeen.info/. Enter P925 in the search box to learn more about \"Abnormal Pap Test: Care Instructions. \"  Current as of: March 27, 2018  Content Version: 11.9  © 5563-4760 [employer]. Care instructions adapted under license by [employer] (which disclaims liability or warranty for this information). If you have questions about a medical condition or this instruction, always ask your healthcare professional. [address] any warranty or liability for your use of this information. Colposcopy: Before Your Procedure  What is a colposcopy? Colposcopy lets a doctor look at your vulva, vagina, and cervix. If the doctor sees a possible problem, he or she can take a small sample of tissue. Then another doctor studies the tissue under a microscope. This is called a biopsy. Most women have this procedure after they have abnormal results from a Pap test.  During the test, your doctor puts a lubricated tool into your vagina. This is called a speculum. It gently spreads apart the sides of your vagina. This allows your doctor to see inside your vagina and the cervix. The doctor also uses a magnifying device to help him or her see better. This device does not go inside your vagina. The doctor may put vinegar or iodine on your cervix. This can help the doctor to see any areas that are not normal. Sometimes the doctor also takes photos or videos. When the speculum goes in, it can feel a little uncomfortable. If the doctor does a biopsy, you may feel a pinch and have some cramping. Follow-up care is a key part of your treatment and safety. Be sure to make and go to all appointments, and call your doctor if you are having problems. It's also a good idea to know your test results and keep a list of the medicines you take. What happens before the procedure? Preparing for the procedure  · Tell your doctor if:  ?  You are having your menstrual period. This test usually is not done during your period. This is because blood makes it harder to see your cervix. ? You are or might be pregnant. A blood or urine test may be done to see if you are pregnant. Colposcopy is safe during pregnancy. The chance of miscarriage is very small. But you may have some bleeding from a biopsy. ? You take blood thinners, such as warfarin (Coumadin), clopidogrel (Plavix), or aspirin. · Do not douche, use tampons, have sexual intercourse, or use vaginal medicines for 24 hours before the test.  · Understand exactly what procedure is planned, along with the risks, benefits, and other options. · Tell your doctors ALL the medicines, vitamins, supplements, or herbal remedies you take. Some of these can increase the risk of bleeding. · Your doctor will tell you which medicines to take or stop before your procedure. You may need to stop taking certain medicines a week or more before the procedure. So talk to your doctor as soon as you can. · If you have an advance directive, let your doctor know. It may include a living will and a durable power of  for health care. Bring a copy to the hospital. If you don't have one, you may want to prepare one. It lets your doctor and loved ones know your health care wishes. Doctors advise that everyone prepare these papers before any type of surgery or procedure. Procedures can be stressful. This information will help you understand what you can expect. And it will help you safely prepare for your procedure. What happens on the day of the procedure?    · You may want to take a pain reliever 30 to 60 minutes before the test. This can help reduce any cramping pain from a biopsy. Ibuprofen (Advil or Motrin) is a good choice.     · Take a bath or shower before you come in for your procedure.  Do not apply lotions, perfumes, deodorants, or nail polish.    At the doctor's office   · Bring a picture ID.     · The procedure will take about 15 to 30 minutes. Going home  · You will be given more specific instructions about recovering from your procedure. When should you call your doctor? · You have questions or concerns.     · You don't understand how to prepare for your procedure.     · You become ill before the procedure (such as fever, flu, or a cold).     · You need to reschedule or have changed your mind about having the procedure. Where can you learn more? Go to http://bonnie-yasmeen.info/. Enter D085 in the search box to learn more about \"Colposcopy: Before Your Procedure. \"  Current as of: March 27, 2018  Content Version: 11.9  © 8192-1646 [employer]. Care instructions adapted under license by [employer] (which disclaims liability or warranty for this information). If you have questions about a medical condition or this instruction, always ask your healthcare professional. [address] any warranty or liability for your use of this information.

## 2019-02-04 NOTE — PROGRESS NOTES
Name: Ramy Mendieta MRN: [id number]    YOB: 1965  Age: 47 y.o. Sex: female        Chief Complaint   Patient presents with    Abnormal Pap Smear     HPV positive at [facility]       HPI  53P6 postmenopausal referred by [facility] for NILM pap/ +HRHPV (HPV 16&18 pap). She has no complaints at this time. OB History      Para Term  AB Living    6 6 6     4    SAB TAB Ectopic Molar Multiple Live Births              6        Obstetric Comments    Menopause @43yrs  H/o STI: none  Denies h/o abnormal pap smear           PGyn    Social History     Substance and Sexual Activity   Sexual Activity No         Past Medical History:   Diagnosis Date    Asthma        History reviewed. No pertinent surgical history. No Known Allergies    Current Outpatient Medications on File Prior to Visit   Medication Sig Dispense Refill    cyproheptadine (PERIACTIN) 4 mg tablet Take  by mouth two (2) times a day.  albuterol (PROAIR HFA) 90 mcg/actuation inhaler Take 1-2 Puffs by inhalation every four (4) hours as needed for Wheezing. 2 Inhaler 2    guaiFENesin-codeine (ROBITUSSIN AC) 100-10 mg/5 mL solution Take 5 mL by mouth three (3) times daily as needed for Cough. Max Daily Amount: 15 mL. 118 mL 0    cyanocobalamin 500 mcg TbER Take 500 mcg by mouth daily. 30 Tab 2    ibuprofen (MOTRIN) 600 mg tablet Take 1 Tab by mouth every six (6) hours as needed for Pain. 18 Tab 0     No current facility-administered medications on file prior to visit. Review of Systems   Constitutional: Negative. Eyes: Negative. Respiratory: Negative. Cardiovascular: Negative. Gastrointestinal: Negative. Genitourinary: Negative. Musculoskeletal: Negative. Neurological: Negative. Endo/Heme/Allergies: Negative. Psychiatric/Behavioral: Negative.             Visit Vitals  /69 (BP 1 Location: Right arm, BP Patient Position: Sitting)   Pulse 91   Temp 99 °F (37.2 °C) (Oral)   Resp 18   Ht 5' 4\" (1.626 m)   Wt 121 lb 6.4 oz (55.1 kg)   SpO2 97%   BMI 20.84 kg/m²       GENERAL:  Well developed, well nourished, in no distress  PELVIC EXAM:  LABIA MAJORA: no masses 2-3cm tender fluid-filled cyts or mass in right labia. LABIA MINORA: no masses, tenderness or lesions  CLITORIS: no masses, tenderness or lesions  URETHRA: normal appearing, no masses or tenderness  BLADDER: no fullness or tenderness  VAGINA: pink appearing vagina with physiologic discharge, no lesions   PERINEUM: no masses, tenderness or lesions  CERVIX: No CMT or lesions  UTERUS: small, mobile, nontender  ADNEXA: nontender and no masses      No results found for this or any previous visit (from the past 24 hour(s)). 1. High risk human papilloma virus (HPV) infection of cervix  Discussed her pap results, HPV, and her cancer risk. Discussed need for colposcopy for further evaluation. Discussed procedure itself and what it entails. Discussed R/B/A of the procedure. Answered all of her questions. Pt to take OTC meds 1hr prior to appointment. Will do colpo next visit. Will get pathology results from the [facility]. 2. Vulvar cyst  Vulvar cyst vs.abscess. Does appear to be a bartholin cyst. Per PCP note patient was referred to general surgery for excision and was given abx for it. Which patient finished. Pt states she has had an abscess in the same place many years ago. She never follow up after it was drained. Refused I&D today will try and do next visit during her colpo exam. Answered all of her questions.          F/U 2-3 weeks

## 2019-02-28 ENCOUNTER — TELEPHONE (OUTPATIENT)
Dept: OBGYN CLINIC | Age: 54
End: 2019-02-28

## 2019-02-28 NOTE — TELEPHONE ENCOUNTER
Patient calling in reference to her appointment on Monday please give patient call back she states that it is an emergency,

## 2019-03-01 NOTE — TELEPHONE ENCOUNTER
Return call made to the pt using two identifiers,name and . Noted her request for a return call in reference to her procedure on Monday. Pt stated that she would like to do the Colpo and the IUD removal in the hospital because it is going to hurt and cause her a lot of pain. I verbalized understanding and the pt would like to speak with the provide in reference to th procedure. I verbalized udnerstanding. forwarded to the provider.

## 2019-03-01 NOTE — TELEPHONE ENCOUNTER
Call made to the pt using two identifiers, name and . Pt made aware that Dr. Daniel Velez will discuss the procedures with her at her up coming apt. Pt made aware that the providers usually do the Colpo and IUD removal in the office and not the OR.the pt is asked to take motrin prior to coming in because the procedure could cause cramping. Pt verbalized understanding and stated that she is very nervous. Pt again advised to come in and talk with the provider as scheduled and she verbalized understanding.

## 2019-07-01 ENCOUNTER — NURSE NAVIGATOR (OUTPATIENT)
Dept: OTHER | Age: 54
End: 2019-07-01

## 2019-07-01 NOTE — NURSE NAVIGATOR
Referring Provider: Ellie Kearns MD      Lung Cancer Risk Profile:   Age: 47  Gender: Female  Height: 66\"  Weight: 112#    Smoking History:  Smoking Status: current use  # years smokin  # years quit: 0  Packs/day: 0.75  Pack years: 22.5    Patient discussed smoking cessation with PCP: Yes, per patient report    Patient participated in shared decision making process with PCP: Unknown    Patient is currently experiencing symptoms: No, per patient report    If yes what symptoms:     Co-Morbidities:      Cancer History:      Additional Risk Factors:          Pt does not meet screening criteria because she is 47, established criteria is ages 50-69. Pt notified that she does not meet criteria. Ms. Justo Lima was advised of the self-pay rate of $199. If her provider denies authorization she will decide if she wants to self-pay. Transferred call to scheduling,  made aware that Ms. Justo Lima does not meet screening criteria and may be self-pay. YOVANY GoffN, RN, [address] [address] [facility] Nurse Navigator

## 2019-08-06 ENCOUNTER — HOSPITAL ENCOUNTER (OUTPATIENT)
Dept: MRI IMAGING | Age: 54
Discharge: HOME | End: 2019-08-06
Attending: INTERNAL MEDICINE

## 2019-08-06 DIAGNOSIS — K80.50 BILE DUCT CALCULUS: ICD-10-CM

## 2019-08-06 DIAGNOSIS — K59.00 CONSTIPATION: ICD-10-CM

## 2019-08-06 DIAGNOSIS — R10.11 RUQ PAIN: ICD-10-CM

## 2019-08-06 DIAGNOSIS — Z12.11 COLON CANCER SCREENING: ICD-10-CM

## 2019-08-06 DIAGNOSIS — F17.200 SMOKER: ICD-10-CM

## 2019-10-04 ENCOUNTER — APPOINTMENT (OUTPATIENT)
Dept: CT IMAGING | Age: 54
End: 2019-10-04
Attending: PHYSICIAN ASSISTANT
Payer: MEDICAID

## 2019-10-04 ENCOUNTER — APPOINTMENT (OUTPATIENT)
Age: 54
End: 2019-10-04
Attending: PHYSICIAN ASSISTANT
Payer: MEDICAID

## 2019-10-04 ENCOUNTER — HOSPITAL ENCOUNTER (EMERGENCY)
Age: 54
Discharge: HOME | End: 2019-10-05
Attending: EMERGENCY MEDICINE
Payer: MEDICAID

## 2019-10-04 DIAGNOSIS — N30.00 ACUTE CYSTITIS WITHOUT HEMATURIA: Primary | ICD-10-CM

## 2019-10-04 DIAGNOSIS — F10.930 ALCOHOL WITHDRAWAL SYNDROME WITHOUT COMPLICATION (HCC): ICD-10-CM

## 2019-10-04 DIAGNOSIS — R42 VERTIGO: ICD-10-CM

## 2019-10-04 LAB
ALBUMIN SERPL-MCNC: 3.6 G/DL (ref 3.4–5)
ALBUMIN/GLOB SERPL: 0.8 {RATIO} (ref 0.8–1.7)
ALP SERPL-CCNC: 400 U/L (ref 45–117)
ALT SERPL-CCNC: 66 U/L (ref 13–56)
AMPHET UR QL SCN: NEGATIVE
ANION GAP SERPL CALC-SCNC: 8 MMOL/L (ref 3–18)
APPEARANCE UR: ABNORMAL
AST SERPL-CCNC: 130 U/L (ref 10–38)
BACTERIA URNS QL MICRO: ABNORMAL /HPF
BARBITURATES UR QL SCN: NEGATIVE
BASOPHILS # BLD: 0 K/UL (ref 0–0.1)
BASOPHILS NFR BLD: 0 % (ref 0–2)
BENZODIAZ UR QL: NEGATIVE
BILIRUB SERPL-MCNC: 1 MG/DL (ref 0.2–1)
BILIRUB UR QL: ABNORMAL
BUN SERPL-MCNC: 10 MG/DL (ref 7–18)
BUN/CREAT SERPL: 15 (ref 12–20)
CALCIUM SERPL-MCNC: 9.7 MG/DL (ref 8.5–10.1)
CANNABINOIDS UR QL SCN: NEGATIVE
CHLORIDE SERPL-SCNC: 102 MMOL/L (ref 100–111)
CO2 SERPL-SCNC: 30 MMOL/L (ref 21–32)
COCAINE UR QL SCN: NEGATIVE
COLOR UR: ABNORMAL
CREAT SERPL-MCNC: 0.68 MG/DL (ref 0.6–1.3)
DIFFERENTIAL METHOD BLD: ABNORMAL
EOSINOPHIL # BLD: 0 K/UL (ref 0–0.4)
EOSINOPHIL NFR BLD: 0 % (ref 0–5)
EPITH CASTS URNS QL MICRO: ABNORMAL /LPF (ref 0–5)
ERYTHROCYTE [DISTWIDTH] IN BLOOD BY AUTOMATED COUNT: 14.6 % (ref 11.6–14.5)
ETHANOL SERPL-MCNC: 4 MG/DL (ref 0–3)
GLOBULIN SER CALC-MCNC: 4.5 G/DL (ref 2–4)
GLUCOSE SERPL-MCNC: 117 MG/DL (ref 74–99)
GLUCOSE UR STRIP.AUTO-MCNC: NEGATIVE MG/DL
HCT VFR BLD AUTO: 36.1 % (ref 35–45)
HDSCOM,HDSCOM: ABNORMAL
HGB BLD-MCNC: 11.8 G/DL (ref 12–16)
HGB UR QL STRIP: NEGATIVE
KETONES UR QL STRIP.AUTO: ABNORMAL MG/DL
LEUKOCYTE ESTERASE UR QL STRIP.AUTO: ABNORMAL
LIPASE SERPL-CCNC: 74 U/L (ref 73–393)
LYMPHOCYTES # BLD: 1.2 K/UL (ref 0.9–3.6)
LYMPHOCYTES NFR BLD: 11 % (ref 21–52)
MCH RBC QN AUTO: 32.1 PG (ref 24–34)
MCHC RBC AUTO-ENTMCNC: 32.7 G/DL (ref 31–37)
MCV RBC AUTO: 98.1 FL (ref 74–97)
METHADONE UR QL: POSITIVE
MONOCYTES # BLD: 0.8 K/UL (ref 0.05–1.2)
MONOCYTES NFR BLD: 7 % (ref 3–10)
NEUTS SEG # BLD: 9 K/UL (ref 1.8–8)
NEUTS SEG NFR BLD: 82 % (ref 40–73)
NITRITE UR QL STRIP.AUTO: POSITIVE
OPIATES UR QL: NEGATIVE
PCP UR QL: NEGATIVE
PH UR STRIP: 5.5 [PH] (ref 5–8)
PLATELET # BLD AUTO: 81 K/UL (ref 135–420)
PLATELET COMMENTS,PCOM: ABNORMAL
PMV BLD AUTO: 11.1 FL (ref 9.2–11.8)
POTASSIUM SERPL-SCNC: 3.9 MMOL/L (ref 3.5–5.5)
PROT SERPL-MCNC: 8.1 G/DL (ref 6.4–8.2)
PROT UR STRIP-MCNC: ABNORMAL MG/DL
RBC # BLD AUTO: 3.68 M/UL (ref 4.2–5.3)
RBC #/AREA URNS HPF: ABNORMAL /HPF (ref 0–5)
RBC MORPH BLD: ABNORMAL
SODIUM SERPL-SCNC: 140 MMOL/L (ref 136–145)
SP GR UR REFRACTOMETRY: 1.03 (ref 1–1.03)
UROBILINOGEN UR QL STRIP.AUTO: 1 EU/DL (ref 0.2–1)
WBC # BLD AUTO: 11 K/UL (ref 4.6–13.2)
WBC URNS QL MICRO: ABNORMAL /HPF (ref 0–4)

## 2019-10-04 PROCEDURE — 80053 COMPREHEN METABOLIC PANEL: CPT

## 2019-10-04 PROCEDURE — 96365 THER/PROPH/DIAG IV INF INIT: CPT

## 2019-10-04 PROCEDURE — 81001 URINALYSIS AUTO W/SCOPE: CPT

## 2019-10-04 PROCEDURE — 70496 CT ANGIOGRAPHY HEAD: CPT

## 2019-10-04 PROCEDURE — 85025 COMPLETE CBC W/AUTO DIFF WBC: CPT

## 2019-10-04 PROCEDURE — 83690 ASSAY OF LIPASE: CPT

## 2019-10-04 PROCEDURE — 70450 CT HEAD/BRAIN W/O DYE: CPT

## 2019-10-04 PROCEDURE — 74011000250 HC RX REV CODE- 250: Performed by: PHYSICIAN ASSISTANT

## 2019-10-04 PROCEDURE — 99285 EMERGENCY DEPT VISIT HI MDM: CPT

## 2019-10-04 PROCEDURE — 96366 THER/PROPH/DIAG IV INF ADDON: CPT

## 2019-10-04 PROCEDURE — 80307 DRUG TEST PRSMV CHEM ANLYZR: CPT

## 2019-10-04 PROCEDURE — 74011250636 HC RX REV CODE- 250/636: Performed by: PHYSICIAN ASSISTANT

## 2019-10-04 PROCEDURE — 96375 TX/PRO/DX INJ NEW DRUG ADDON: CPT

## 2019-10-04 PROCEDURE — 96361 HYDRATE IV INFUSION ADD-ON: CPT

## 2019-10-04 PROCEDURE — 74011636320 HC RX REV CODE- 636/320: Performed by: EMERGENCY MEDICINE

## 2019-10-04 RX ORDER — LORAZEPAM 2 MG/ML
1 INJECTION INTRAMUSCULAR
Status: COMPLETED | OUTPATIENT
Start: 2019-10-04 | End: 2019-10-04

## 2019-10-04 RX ORDER — METHADONE HYDROCHLORIDE 10 MG/ML
80 CONCENTRATE ORAL DAILY
COMMUNITY
End: 2020-05-27

## 2019-10-04 RX ORDER — ONDANSETRON 2 MG/ML
4 INJECTION INTRAMUSCULAR; INTRAVENOUS
Status: COMPLETED | OUTPATIENT
Start: 2019-10-04 | End: 2019-10-04

## 2019-10-04 RX ORDER — CEPHALEXIN 500 MG/1
500 CAPSULE ORAL 4 TIMES DAILY
Qty: 20 CAP | Refills: 0 | Status: SHIPPED | OUTPATIENT
Start: 2019-10-04 | End: 2019-10-04

## 2019-10-04 RX ORDER — ONDANSETRON 4 MG/1
4 TABLET, ORALLY DISINTEGRATING ORAL
Qty: 12 TAB | Refills: 0 | Status: SHIPPED | OUTPATIENT
Start: 2019-10-04 | End: 2020-05-27

## 2019-10-04 RX ORDER — CEPHALEXIN 500 MG/1
500 CAPSULE ORAL 4 TIMES DAILY
Qty: 20 CAP | Refills: 0 | Status: SHIPPED | OUTPATIENT
Start: 2019-10-04 | End: 2019-10-09

## 2019-10-04 RX ADMIN — SODIUM CHLORIDE 1000 ML: 900 INJECTION, SOLUTION INTRAVENOUS at 16:53

## 2019-10-04 RX ADMIN — IOPAMIDOL 80 ML: 755 INJECTION, SOLUTION INTRAVENOUS at 22:44

## 2019-10-04 RX ADMIN — ONDANSETRON 4 MG: 2 INJECTION INTRAMUSCULAR; INTRAVENOUS at 16:57

## 2019-10-04 RX ADMIN — FOLIC ACID: 5 INJECTION, SOLUTION INTRAMUSCULAR; INTRAVENOUS; SUBCUTANEOUS at 22:32

## 2019-10-04 RX ADMIN — LORAZEPAM 1 MG: 2 INJECTION INTRAMUSCULAR; INTRAVENOUS at 22:32

## 2019-10-04 NOTE — ED TRIAGE NOTES
Patient states that she typically drinks alcohol daily. Advises last alcohol intake was on Wednesday. She states that she is a recovering Heroin user and has not used heroin in last 3 years.

## 2019-10-04 NOTE — ED PROVIDER NOTES
EMERGENCY DEPARTMENT HISTORY AND PHYSICAL EXAM    4:26 PM      Date: 10/4/2019  Patient Name: Dominick Dale    History of Presenting Illness     Chief Complaint   Patient presents with    Vomiting    Headache    Chills    Abdominal Pain     History Provided By: Patient    Additional History (Context): Dominick Dale is a 49 y.o. female with alcohol and IVDU who presents with complaints of abdominal pain, vomiting, chills, headache x3 days. She states that she has been unable to keep any food or fluids down, she states that her vomit has been yellow in color. She states there are no alleviating factors. She denies any hematemesis. She denies any urinary complaints. She states that she used to consume alcohol daily, however has recently stopped prior to these symptoms starting. The patient also reports a history of IV drug use. PCP: None    Current Outpatient Medications   Medication Sig Dispense Refill    methadone (DOLOPHINE) 10 mg/mL solution Take 80 mg by mouth daily.  ondansetron (ZOFRAN ODT) 4 mg disintegrating tablet Take 1 Tab by mouth every eight (8) hours as needed for Nausea. 12 Tab 0    cephALEXin (KEFLEX) 500 mg capsule Take 1 Cap by mouth four (4) times daily for 5 days. 20 Cap 0    albuterol (PROAIR HFA) 90 mcg/actuation inhaler Take 1-2 Puffs by inhalation every four (4) hours as needed for Wheezing. 2 Inhaler 2       Past History     Past Medical History:  Past Medical History:   Diagnosis Date    Asthma        Past Surgical History:  History reviewed. No pertinent surgical history. Family History:  Family History   Problem Relation Age of Onset    No Known Problems Mother     No Known Problems Father     No Known Problems Maternal Grandmother     No Known Problems Maternal Grandfather     No Known Problems Paternal Grandmother     No Known Problems Paternal Grandfather     Cancer Sister         uterine?  Cancer Daughter         uterine?        Social History:  Social History     Tobacco Use    Smoking status: Current Every Day Smoker     Packs/day: 0.50     Years: 20.00     Pack years: 10.00    Smokeless tobacco: Never Used   Substance Use Topics    Alcohol use: Yes     Alcohol/week: 2.0 standard drinks     Types: 2 Cans of beer per week     Comment: daily use of alcohol.  Drug use: Not Currently       Allergies:  No Known Allergies      Review of Systems       Review of Systems   Constitutional: Positive for chills. Negative for diaphoresis, fatigue and fever. HENT: Positive for sore throat. Negative for congestion, rhinorrhea, sinus pressure and sinus pain. Respiratory: Negative for cough, chest tightness, shortness of breath and wheezing. Cardiovascular: Negative for chest pain. Gastrointestinal: Positive for abdominal pain, nausea and vomiting. Negative for blood in stool, constipation and diarrhea. Genitourinary: Negative for dysuria, frequency, hematuria and urgency. Musculoskeletal: Positive for back pain. Negative for arthralgias, myalgias, neck pain and neck stiffness. Neurological: Positive for headaches. Negative for dizziness, syncope, weakness, light-headedness and numbness. All other systems reviewed and are negative. Physical Exam     Visit Vitals  /89   Pulse 82   Temp 98.5 °F (36.9 °C)   Resp 18   Ht 5' 7\" (1.702 m)   Wt 53.1 kg (117 lb)   SpO2 98%   BMI 18.32 kg/m²         Physical Exam   Constitutional: She is oriented to person, place, and time. Elderly, chronically ill-appearing female. HENT:   Head: Normocephalic and atraumatic. Mouth/Throat: Oropharynx is clear and moist. No oropharyngeal exudate. Poor dentition. Eyes: EOM are normal.   Neck: Neck supple. Cardiovascular: Normal rate and regular rhythm. Exam reveals no gallop and no friction rub. No murmur heard. Pulmonary/Chest: Effort normal and breath sounds normal. She has no wheezes. She has no rales. Abdominal: Soft.  Bowel sounds are normal. She exhibits no distension. There is generalized tenderness. There is no rigidity and no guarding. Neurological: She is alert and oriented to person, place, and time. She has normal strength. No cranial nerve deficit or sensory deficit. Skin: Skin is warm and dry. Diagnostic Study Results     Labs -  Recent Results (from the past 12 hour(s))   CBC WITH AUTOMATED DIFF    Collection Time: 10/04/19  4:51 PM   Result Value Ref Range    WBC 11.0 4.6 - 13.2 K/uL    RBC 3.68 (L) 4.20 - 5.30 M/uL    HGB 11.8 (L) 12.0 - 16.0 g/dL    HCT 36.1 35.0 - 45.0 %    MCV 98.1 (H) 74.0 - 97.0 FL    MCH 32.1 24.0 - 34.0 PG    MCHC 32.7 31.0 - 37.0 g/dL    RDW 14.6 (H) 11.6 - 14.5 %    PLATELET 81 (L) 941 - 420 K/uL    MPV 11.1 9.2 - 11.8 FL    NEUTROPHILS 82 (H) 40 - 73 %    LYMPHOCYTES 11 (L) 21 - 52 %    MONOCYTES 7 3 - 10 %    EOSINOPHILS 0 0 - 5 %    BASOPHILS 0 0 - 2 %    ABS. NEUTROPHILS 9.0 (H) 1.8 - 8.0 K/UL    ABS. LYMPHOCYTES 1.2 0.9 - 3.6 K/UL    ABS. MONOCYTES 0.8 0.05 - 1.2 K/UL    ABS. EOSINOPHILS 0.0 0.0 - 0.4 K/UL    ABS. BASOPHILS 0.0 0.0 - 0.1 K/UL    DF SMEAR SCANNED      PLATELET COMMENTS DECREASED PLATELETS      RBC COMMENTS NORMOCYTIC, NORMOCHROMIC     METABOLIC PANEL, COMPREHENSIVE    Collection Time: 10/04/19  4:51 PM   Result Value Ref Range    Sodium 140 136 - 145 mmol/L    Potassium 3.9 3.5 - 5.5 mmol/L    Chloride 102 100 - 111 mmol/L    CO2 30 21 - 32 mmol/L    Anion gap 8 3.0 - 18 mmol/L    Glucose 117 (H) 74 - 99 mg/dL    BUN 10 7.0 - 18 MG/DL    Creatinine 0.68 0.6 - 1.3 MG/DL    BUN/Creatinine ratio 15 12 - 20      GFR est AA >60 >60 ml/min/1.73m2    GFR est non-AA >60 >60 ml/min/1.73m2    Calcium 9.7 8.5 - 10.1 MG/DL    Bilirubin, total 1.0 0.2 - 1.0 MG/DL    ALT (SGPT) 66 (H) 13 - 56 U/L    AST (SGOT) 130 (H) 10 - 38 U/L    Alk.  phosphatase 400 (H) 45 - 117 U/L    Protein, total 8.1 6.4 - 8.2 g/dL    Albumin 3.6 3.4 - 5.0 g/dL    Globulin 4.5 (H) 2.0 - 4.0 g/dL    A-G Ratio 0.8 0.8 - 1.7 LIPASE    Collection Time: 10/04/19  4:51 PM   Result Value Ref Range    Lipase 74 73 - 393 U/L   ETHYL ALCOHOL    Collection Time: 10/04/19  4:51 PM   Result Value Ref Range    ALCOHOL(ETHYL),SERUM 4 (H) 0 - 3 MG/DL   URINALYSIS W/ RFLX MICROSCOPIC    Collection Time: 10/04/19  5:38 PM   Result Value Ref Range    Color ORANGE      Appearance CLOUDY      Specific gravity 1.029 1.003 - 1.030      pH (UA) 5.5 5.0 - 8.0      Protein TRACE (A) NEG mg/dL    Glucose NEGATIVE  NEG mg/dL    Ketone TRACE (A) NEG mg/dL    Bilirubin SMALL (A) NEG      Blood NEGATIVE  NEG      Urobilinogen 1.0 0.2 - 1.0 EU/dL    Nitrites POSITIVE (A) NEG      Leukocyte Esterase TRACE (A) NEG     DRUG SCREEN, URINE    Collection Time: 10/04/19  5:38 PM   Result Value Ref Range    BENZODIAZEPINES NEGATIVE  NEG      BARBITURATES NEGATIVE  NEG      THC (TH-CANNABINOL) NEGATIVE  NEG      OPIATES NEGATIVE  NEG      PCP(PHENCYCLIDINE) NEGATIVE  NEG      COCAINE NEGATIVE  NEG      AMPHETAMINES NEGATIVE  NEG      METHADONE POSITIVE (A) NEG      HDSCOM (NOTE)    URINE MICROSCOPIC ONLY    Collection Time: 10/04/19  5:38 PM   Result Value Ref Range    WBC 4 to 10 0 - 4 /hpf    RBC 0 to 3 0 - 5 /hpf    Epithelial cells 3+ 0 - 5 /lpf    Bacteria 1+ (A) NEG /hpf       Radiologic Studies -   CT HEAD WO CONT    (Results Pending)         Medical Decision Making   I am the first provider for this patient. I reviewed the vital signs, available nursing notes, past medical history, past surgical history, family history and social history. Vital Signs-Reviewed the patient's vital signs. ED Course: Progress Notes, Reevaluation, and Consults:  4:32 PM met with patient, reviewed history, performed physical exam.  Given patient's chief complaint of abdominal pain and vomiting and history of alcohol intake, will check CBC, CMP, lipase. We will also check alcohol, UDS, and urinalysis. Will give patient 1 L normal saline bolus.     6:03 PM Discussed case with attending Dr Garrel Boas. Will treat UTI with outpatient abx. Advised patient of her elevated liver enzymes and necessity for outpatient follow up with PCP.     6:21 PM Revisited patient, she is still complaining of dizziness after 1L fluid bolus. Discussed case again with attending Dr Garrel Boas. Will obtain MRI to rule out any CVA.     7:04 PM In process of completing MRI screening form, patient informed staff she was extremely claustrophobic even with medication. Discussed this with attending Dr Garrel Boas, will get CT head without contrast.     8:47 PM While awaiting CT head report, patient was visualized ambulating with RN. Significant ataxia noted during ambulation of approximately 10 feet. 9:28 PM Awaiting CT head report. Spoke with Dr Farhat Rendon, hospitalist, regarding possible admission. If CT head is negative for hemorrhage, will obtain CTA head and neck. Provider Notes (Medical Decision Making):     Diagnosis     Clinical Impression:   No diagnosis found. Disposition:     Follow-up Information     Follow up With Specialties Details Why [id number] [facility] In 3 days For follow up regarding ER visit. [address] [facility]  [address] [facility] EMERGENCY DEPT Emergency Medicine  Immediately if symptoms worsen. [address] [id number]  [phone]           Patient's Medications   Start Taking    CEPHALEXIN (KEFLEX) 500 MG CAPSULE    Take 1 Cap by mouth four (4) times daily for 5 days. ONDANSETRON (ZOFRAN ODT) 4 MG DISINTEGRATING TABLET    Take 1 Tab by mouth every eight (8) hours as needed for Nausea. Continue Taking    ALBUTEROL (PROAIR HFA) 90 MCG/ACTUATION INHALER    Take 1-2 Puffs by inhalation every four (4) hours as needed for Wheezing. METHADONE (DOLOPHINE) 10 MG/ML SOLUTION    Take 80 mg by mouth daily.    These Medications have changed    No medications on file   Stop Taking CYANOCOBALAMIN 500 MCG TBER    Take 500 mcg by mouth daily. CYPROHEPTADINE (PERIACTIN) 4 MG TABLET    Take  by mouth two (2) times a day. GUAIFENESIN-CODEINE (ROBITUSSIN AC) 100-10 MG/5 ML SOLUTION    Take 5 mL by mouth three (3) times daily as needed for Cough. Max Daily Amount: 15 mL. IBUPROFEN (MOTRIN) 600 MG TABLET    Take 1 Tab by mouth every six (6) hours as needed for Pain. Itzel Rahman PA-C   6:14 PM    Dictation disclaimer:  Please note that this dictation was completed with [email or web address] the [employer] voice recognition software. Quite often unanticipated grammatical, syntax, homophones, and other interpretive errors are inadvertently transcribed by the computer software. Please disregard these errors. Please excuse any errors that have escaped final proofreading.

## 2019-10-04 NOTE — ED TRIAGE NOTES
Patient c/o vomiting, chills, and abdominal pain x 3 days. C/o frontal headache. States having chills and sweats.

## 2019-10-05 VITALS
DIASTOLIC BLOOD PRESSURE: 85 MMHG | HEIGHT: 67 IN | SYSTOLIC BLOOD PRESSURE: 141 MMHG | HEART RATE: 81 BPM | OXYGEN SATURATION: 95 % | TEMPERATURE: 98.1 F | WEIGHT: 117 LBS | BODY MASS INDEX: 18.36 KG/M2 | RESPIRATION RATE: 15 BRPM

## 2019-10-05 RX ORDER — CHLORDIAZEPOXIDE HYDROCHLORIDE 5 MG/1
5 CAPSULE, GELATIN COATED ORAL
Qty: 3 CAP | Refills: 0 | Status: SHIPPED | OUTPATIENT
Start: 2019-10-05 | End: 2020-05-27

## 2019-10-05 RX ORDER — LORAZEPAM 0.5 MG/1
0.5 TABLET ORAL
Qty: 3 TAB | Refills: 0 | OUTPATIENT
Start: 2019-10-05 | End: 2019-10-05

## 2019-10-05 NOTE — DISCHARGE INSTRUCTIONS
Patient Education        Learning About Alcohol Use Disorder  What is alcohol use disorder? Alcohol use disorder means that a person drinks alcohol even though it causes harm to themselves or others. It can range from mild to severe. The more signs of this disorder you have, the more severe it may be. Moderate to severe alcohol use disorder is sometimes called addiction. People who have it may find it hard to control their use of alcohol. People who have this disorder may argue with others about how much they're drinking. Their job may be affected because of drinking. They may drink when it's dangerous or illegal, such as when they drive. They also may have a strong need, or craving, to drink. They may feel like they must drink just to get by. Their drinking may increase their risk of getting hurt or being in a car crash. Over time, drinking too much alcohol may cause health problems. These may include high blood pressure, liver problems, or problems with digestion. What are the signs? Maybe you've wondered about your alcohol habits, or how to tell if your drinking is becoming a problem. Here are some of the signs of alcohol use disorder. You may have it if you have two or more of the following signs:  · You drink larger amounts of alcohol than you ever meant to. Or you've been drinking for a longer time than you ever meant to. · You can't cut down or control your use. Or you constantly wish you could cut down. · You spend a lot of time getting or drinking alcohol or recovering from its effects. · You have strong cravings for alcohol. · You can no longer do your main jobs at work, at school, or at home. · You keep drinking alcohol, even though your use hurts your relationships. · You have stopped doing important activities because of your alcohol use. · You drink alcohol in situations where doing so is dangerous. · You keep drinking alcohol even though you know it's causing health problems.   · You need more and more alcohol to get the same effect, or you get less effect from the same amount over time. This is called tolerance. · You have uncomfortable symptoms when you stop drinking alcohol or use less. This is called withdrawal.  Alcohol use disorder can range from mild to severe. The more signs you have, the more severe the disorder may be. Moderate to severe alcohol use disorder is sometimes called addiction. You might not realize that your drinking is a problem. You might not drink large amounts when you drink. Or you might go for days or weeks between drinking episodes. But even if you don't drink very often, your drinking could still be harmful and put you at risk. How is alcohol use disorder treated? Getting help is up to you. But you don't have to do it alone. There are many people and kinds of treatments that can help. Treatment for alcohol use disorder can include:  · Group therapy, one or more types of counseling, and alcohol education. · Medicines that help to:  ? Reduce withdrawal symptoms and help you safely stop drinking. ? Reduce cravings for alcohol. · Support groups. These groups include Alcoholics Anonymous and [employer] (Self-Management and Recovery Training). Some people are able to stop or cut back on drinking with help from a counselor. People who have moderate to severe alcohol use disorder may need medical treatment. They may need to stay in a hospital or treatment center. You may have a treatment team to help you. This team may include a psychologist or psychiatrist, counselors, doctors, social workers, nurses, and a . A  helps plan and manage your treatment. Follow-up care is a key part of your treatment and safety. Be sure to make and go to all appointments, and call your doctor if you are having problems. It's also a good idea to know your test results and keep a list of the medicines you take. Where can you learn more?   Go to http://bonnie-yasmeen.info/. Enter [id number] [id number] in the search box to learn more about \"Learning About Alcohol Use Disorder. \"  Current as of: February 5, 2019  Content Version: 12.2  © 3307-9210 [employer]. Care instructions adapted under license by [employer] (which disclaims liability or warranty for this information). If you have questions about a medical condition or this instruction, always ask your healthcare professional. [address] any warranty or liability for your use of this information. Learning About Alcohol Withdrawal  What is alcohol withdrawal?    If you drink alcohol regularly (more than a few drinks on most days) and then suddenly stop or cut down, you may go through some physical and emotional problems while the alcohol clears out of your system. This is called withdrawal. Clearing the alcohol from your body is called detoxification, or detox. What are the symptoms? Symptoms of alcohol withdrawal may start as soon as 4 to 12 hours after you stop drinking. Or they may not start until several days after the last drink. Mild symptoms include:  · Nausea. · Sweating. · Shakiness. · Diarrhea. · Intense worry. · Disturbed sleep. · Headache. More severe symptoms include:  · Vomiting or belly pain. · Being confused, upset, and irritable. · Changed sensations. You might feel things on your body that aren't really there. Or you may see or hear things that aren't there. · Trembling. · Being short of breath or having pain in your chest.  · Having seizures. Symptoms may peak within a few days. Mild symptoms can last for a few weeks. If your symptoms are severe, you'll need to see a doctor. What is the treatment for alcohol withdrawal?  Most people may be able to cut down or stop drinking with only mild withdrawal. They can stay safe by simply resting, drinking lots of fluids, and eating healthy foods.   But people who drink large amounts of alcohol or are at risk for severe withdrawal symptoms should not try to detox at home unless they work closely with a doctor to manage it. A person can die of severe alcohol withdrawal.  Before you stop drinking, talk to your doctor about how you plan to stop. Be completely honest about how much you've been drinking. Your doctor will figure out if you need to detox in a medical center. You may get medicine to treat the symptoms whether you are at home or in a medical center. Medicine that treats seizures can also help. Your doctor will explain what types of medicine might help you. You may start with a high dose and then take smaller amounts over several days. There's also medicine that can help you avoid alcohol while you recover. How can you manage your withdrawal and recovery? Here are a few tips that can help you to not start drinking again. · Make sure there's no alcohol in the house. This includes drinks as well as liquid medicines, rubbing alcohol, and certain flavorings like vanilla extract. · Try not to hang out with people you used to drink with. · Don't go it alone. Spend time with people who support the changes you are making in your life. This includes asking for advice and help from people who have stopped drinking. You might also try mutual support groups such as Alcoholics Anonymous. · Drink lots of fluids. · Eat snacks such as fruit, cheese and crackers, and pretzels. High-carbohydrate foods may help reduce the craving for alcohol. What happens after withdrawal?  It can be hard to stop drinking. But after you clear the alcohol from your system, you can start the next, healthier part of your life. After detox, you will focus on staying alcohol-free. You can learn skills that you can use to stay abstinent (or sober) as you recover. Finding new ways to deal with life's challenges, without drinking, takes time and effort. Recovery is a long-term process.  It's not something you can achieve in a few weeks. Most people get some type of therapy, such as group counseling. You also may need medicine to help you stay sober. Treatment doesn't focus on alcohol use alone. It may address other parts of your life, like your relationships, work, medical problems, and home life. Treatment, support, patience, and commitment will help you make the changes you need to live a cisneros life without alcohol. You may find, over time, that the process gets easier, life becomes more joyous, and your connections to others becomes more rewarding. Where can you find help? Behavioral Health Treatment Services . This service from the [facility] and [employer]  can help you find local alcohol treatment services. Search online at [employer]. [employer] (LGTmedical)a.gov or call 0-669-798-CBXZ ([phone]), or [employer] 9-127.507.3507. Where can you learn more? Go to http://bonnieEverySignalyasmeen.info/. Enter A011 in the search box to learn more about \"Learning About Alcohol Withdrawal.\"  Current as of: February 5, 2019  Content Version: 12.2  © 2046-2194 [employer]. Care instructions adapted under license by [employer] (which disclaims liability or warranty for this information). If you have questions about a medical condition or this instruction, always ask your healthcare professional. [place] [address] any warranty or liability for your use of this information. Patient Education        Urinary Tract Infection in Women: Care Instructions  Your Care Instructions    A urinary tract infection, or UTI, is a general term for an infection anywhere between the kidneys and the urethra (where urine comes out). Most UTIs are bladder infections. They often cause pain or burning when you urinate. UTIs are caused by bacteria and can be cured with antibiotics. Be sure to complete your treatment so that the infection goes away.   Follow-up care is a key part of your treatment and safety. Be sure to make and go to all appointments, and call your doctor if you are having problems. It's also a good idea to know your test results and keep a list of the medicines you take. How can you care for yourself at home? · Take your antibiotics as directed. Do not stop taking them just because you feel better. You need to take the full course of antibiotics. · Drink extra water and other fluids for the next day or two. This may help wash out the bacteria that are causing the infection. (If you have kidney, heart, or liver disease and have to limit fluids, talk with your doctor before you increase your fluid intake.)  · Avoid drinks that are carbonated or have caffeine. They can irritate the bladder. · Urinate often. Try to empty your bladder each time. · To relieve pain, take a hot bath or lay a heating pad set on low over your lower belly or genital area. Never go to sleep with a heating pad in place. To prevent UTIs  · Drink plenty of water each day. This helps you urinate often, which clears bacteria from your system. (If you have kidney, heart, or liver disease and have to limit fluids, talk with your doctor before you increase your fluid intake.)  · Urinate when you need to. · Urinate right after you have sex. · Change sanitary pads often. · Avoid douches, bubble baths, feminine hygiene sprays, and other feminine hygiene products that have deodorants. · After going to the bathroom, wipe from front to back. When should you call for help? Call your doctor now or seek immediate medical care if:    · Symptoms such as fever, chills, nausea, or vomiting get worse or appear for the first time.     · You have new pain in your back just below your rib cage.  This is called flank pain.     · There is new blood or pus in your urine.     · You have any problems with your antibiotic medicine.    Watch closely for changes in your health, and be sure to contact your doctor if:    · You are not getting better after taking an antibiotic for 2 days.     · Your symptoms go away but then come back. Where can you learn more? Go to http://bonnie-yasmeen.info/. Enter H004 in the search box to learn more about \"Urinary Tract Infection in Women: Care Instructions. \"  Current as of: December 19, 2018  Content Version: 12.2  © 4442-0057 [employer] ([employer]). Care instructions adapted under license by [employer] (which disclaims liability or warranty for this information). If you have questions about a medical condition or this instruction, always ask your healthcare professional. [place] [address] any warranty or liability for your use of this information.

## 2019-10-05 NOTE — ED NOTES
I have reviewed discharge instructions with the patient and caregiver. The patient and caregiver verbalized understanding. Patient armband removed and shredded  Current Discharge Medication List      START taking these medications    Details   chlordiazePOXIDE (LIBRIUM) 5 mg capsule Take 1 Cap by mouth three (3) times daily as needed for Anxiety. Max Daily Amount: 15 mg.  Qty: 3 Cap, Refills: 0    Associated Diagnoses: Acute cystitis without hematuria      ondansetron (ZOFRAN ODT) 4 mg disintegrating tablet Take 1 Tab by mouth every eight (8) hours as needed for Nausea. Qty: 12 Tab, Refills: 0      cephALEXin (KEFLEX) 500 mg capsule Take 1 Cap by mouth four (4) times daily for 5 days.   Qty: 20 Cap, Refills: 0

## 2019-10-05 NOTE — ED NOTES
Pt able to ambulate without assistance, feeling much better after the MVT IV bag. MD Merle Husain made aware.

## 2019-10-05 NOTE — ED NOTES
EMERGENCY DEPARTMENT HISTORY AND PHYSICAL EXAM    4:26 PM      Date: 10/4/2019  Patient Name: Tatiana Raya    History of Presenting Illness     Chief Complaint   Patient presents with    Vomiting    Headache    Chills    Abdominal Pain     History Provided By: Patient    Additional History (Context): Tatiana Raya is a 49 y.o. female with alcohol and IVDU who presents with complaints of abdominal pain, vomiting, chills, headache x3 days. She states that she has been unable to keep any food or fluids down, she states that her vomit has been yellow in color. She states there are no alleviating factors. She denies any hematemesis. She denies any urinary complaints. She states that she used to consume alcohol daily, however has recently stopped prior to these symptoms starting. The patient also reports a history of IV drug use. PCP: None    Current Outpatient Medications   Medication Sig Dispense Refill    methadone (DOLOPHINE) 10 mg/mL solution Take 80 mg by mouth daily.  ondansetron (ZOFRAN ODT) 4 mg disintegrating tablet Take 1 Tab by mouth every eight (8) hours as needed for Nausea. 12 Tab 0    cephALEXin (KEFLEX) 500 mg capsule Take 1 Cap by mouth four (4) times daily for 5 days. 20 Cap 0    albuterol (PROAIR HFA) 90 mcg/actuation inhaler Take 1-2 Puffs by inhalation every four (4) hours as needed for Wheezing. 2 Inhaler 2       Past History     Past Medical History:  Past Medical History:   Diagnosis Date    Asthma        Past Surgical History:  History reviewed. No pertinent surgical history. Family History:  Family History   Problem Relation Age of Onset    No Known Problems Mother     No Known Problems Father     No Known Problems Maternal Grandmother     No Known Problems Maternal Grandfather     No Known Problems Paternal Grandmother     No Known Problems Paternal Grandfather     Cancer Sister         uterine?  Cancer Daughter         uterine?        Social History:  Social History     Tobacco Use    Smoking status: Current Every Day Smoker     Packs/day: 0.50     Years: 20.00     Pack years: 10.00    Smokeless tobacco: Never Used   Substance Use Topics    Alcohol use: Yes     Alcohol/week: 2.0 standard drinks     Types: 2 Cans of beer per week     Comment: daily use of alcohol.  Drug use: Not Currently       Allergies:  No Known Allergies      Review of Systems       Review of Systems   Constitutional: Positive for chills. Negative for diaphoresis, fatigue and fever. HENT: Positive for sore throat. Negative for congestion, rhinorrhea, sinus pressure and sinus pain. Respiratory: Negative for cough, chest tightness, shortness of breath and wheezing. Cardiovascular: Negative for chest pain. Gastrointestinal: Positive for abdominal pain, nausea and vomiting. Negative for blood in stool, constipation and diarrhea. Genitourinary: Negative for dysuria, frequency, hematuria and urgency. Musculoskeletal: Positive for back pain. Negative for arthralgias, myalgias, neck pain and neck stiffness. Neurological: Positive for headaches. Negative for dizziness, syncope, weakness, light-headedness and numbness. All other systems reviewed and are negative. Physical Exam     Visit Vitals  /89   Pulse 82   Temp 98.5 °F (36.9 °C)   Resp 18   Ht 5' 7\" (1.702 m)   Wt 53.1 kg (117 lb)   SpO2 98%   BMI 18.32 kg/m²         Physical Exam   Constitutional: She is oriented to person, place, and time. Elderly, chronically ill-appearing female. HENT:   Head: Normocephalic and atraumatic. Mouth/Throat: Oropharynx is clear and moist. No oropharyngeal exudate. Poor dentition. Eyes: EOM are normal.   Neck: Neck supple. Cardiovascular: Normal rate and regular rhythm. Exam reveals no gallop and no friction rub. No murmur heard. Pulmonary/Chest: Effort normal and breath sounds normal. She has no wheezes. She has no rales. Abdominal: Soft.  Bowel sounds are normal. She exhibits no distension. There is generalized tenderness. There is no rigidity and no guarding. Neurological: She is alert and oriented to person, place, and time. She has normal strength. No cranial nerve deficit or sensory deficit. Skin: Skin is warm and dry. Diagnostic Study Results     Labs -  Recent Results (from the past 12 hour(s))   CBC WITH AUTOMATED DIFF    Collection Time: 10/04/19  4:51 PM   Result Value Ref Range    WBC 11.0 4.6 - 13.2 K/uL    RBC 3.68 (L) 4.20 - 5.30 M/uL    HGB 11.8 (L) 12.0 - 16.0 g/dL    HCT 36.1 35.0 - 45.0 %    MCV 98.1 (H) 74.0 - 97.0 FL    MCH 32.1 24.0 - 34.0 PG    MCHC 32.7 31.0 - 37.0 g/dL    RDW 14.6 (H) 11.6 - 14.5 %    PLATELET 81 (L) 931 - 420 K/uL    MPV 11.1 9.2 - 11.8 FL    NEUTROPHILS 82 (H) 40 - 73 %    LYMPHOCYTES 11 (L) 21 - 52 %    MONOCYTES 7 3 - 10 %    EOSINOPHILS 0 0 - 5 %    BASOPHILS 0 0 - 2 %    ABS. NEUTROPHILS 9.0 (H) 1.8 - 8.0 K/UL    ABS. LYMPHOCYTES 1.2 0.9 - 3.6 K/UL    ABS. MONOCYTES 0.8 0.05 - 1.2 K/UL    ABS. EOSINOPHILS 0.0 0.0 - 0.4 K/UL    ABS. BASOPHILS 0.0 0.0 - 0.1 K/UL    DF SMEAR SCANNED      PLATELET COMMENTS DECREASED PLATELETS      RBC COMMENTS NORMOCYTIC, NORMOCHROMIC     METABOLIC PANEL, COMPREHENSIVE    Collection Time: 10/04/19  4:51 PM   Result Value Ref Range    Sodium 140 136 - 145 mmol/L    Potassium 3.9 3.5 - 5.5 mmol/L    Chloride 102 100 - 111 mmol/L    CO2 30 21 - 32 mmol/L    Anion gap 8 3.0 - 18 mmol/L    Glucose 117 (H) 74 - 99 mg/dL    BUN 10 7.0 - 18 MG/DL    Creatinine 0.68 0.6 - 1.3 MG/DL    BUN/Creatinine ratio 15 12 - 20      GFR est AA >60 >60 ml/min/1.73m2    GFR est non-AA >60 >60 ml/min/1.73m2    Calcium 9.7 8.5 - 10.1 MG/DL    Bilirubin, total 1.0 0.2 - 1.0 MG/DL    ALT (SGPT) 66 (H) 13 - 56 U/L    AST (SGOT) 130 (H) 10 - 38 U/L    Alk.  phosphatase 400 (H) 45 - 117 U/L    Protein, total 8.1 6.4 - 8.2 g/dL    Albumin 3.6 3.4 - 5.0 g/dL    Globulin 4.5 (H) 2.0 - 4.0 g/dL    A-G Ratio 0.8 0.8 - 1.7 LIPASE    Collection Time: 10/04/19  4:51 PM   Result Value Ref Range    Lipase 74 73 - 393 U/L   ETHYL ALCOHOL    Collection Time: 10/04/19  4:51 PM   Result Value Ref Range    ALCOHOL(ETHYL),SERUM 4 (H) 0 - 3 MG/DL   URINALYSIS W/ RFLX MICROSCOPIC    Collection Time: 10/04/19  5:38 PM   Result Value Ref Range    Color ORANGE      Appearance CLOUDY      Specific gravity 1.029 1.003 - 1.030      pH (UA) 5.5 5.0 - 8.0      Protein TRACE (A) NEG mg/dL    Glucose NEGATIVE  NEG mg/dL    Ketone TRACE (A) NEG mg/dL    Bilirubin SMALL (A) NEG      Blood NEGATIVE  NEG      Urobilinogen 1.0 0.2 - 1.0 EU/dL    Nitrites POSITIVE (A) NEG      Leukocyte Esterase TRACE (A) NEG     DRUG SCREEN, URINE    Collection Time: 10/04/19  5:38 PM   Result Value Ref Range    BENZODIAZEPINES NEGATIVE  NEG      BARBITURATES NEGATIVE  NEG      THC (TH-CANNABINOL) NEGATIVE  NEG      OPIATES NEGATIVE  NEG      PCP(PHENCYCLIDINE) NEGATIVE  NEG      COCAINE NEGATIVE  NEG      AMPHETAMINES NEGATIVE  NEG      METHADONE POSITIVE (A) NEG      HDSCOM (NOTE)    URINE MICROSCOPIC ONLY    Collection Time: 10/04/19  5:38 PM   Result Value Ref Range    WBC 4 to 10 0 - 4 /hpf    RBC 0 to 3 0 - 5 /hpf    Epithelial cells 3+ 0 - 5 /lpf    Bacteria 1+ (A) NEG /hpf       Radiologic Studies -   CT HEAD WO CONT    (Results Pending)         Medical Decision Making   I am the first provider for this patient. I reviewed the vital signs, available nursing notes, past medical history, past surgical history, family history and social history. Vital Signs-Reviewed the patient's vital signs. ED Course: Progress Notes, Reevaluation, and Consults:  4:32 PM met with patient, reviewed history, performed physical exam.  Given patient's chief complaint of abdominal pain and vomiting and history of alcohol intake, will check CBC, CMP, lipase. We will also check alcohol, UDS, and urinalysis. Will give patient 1 L normal saline bolus.     6:03 PM Discussed case with attending Dr Dayday Burgess. Will treat UTI with outpatient abx. Advised patient of her elevated liver enzymes and necessity for outpatient follow up with PCP.     6:21 PM Revisited patient, she is still complaining of dizziness after 1L fluid bolus. Discussed case again with attending Dr Dayday Burgess. Will obtain MRI to rule out any CVA.     7:04 PM In process of completing MRI screening form, patient informed staff she was extremely claustrophobic even with medication. Discussed this with attending Dr Dayday Burgess, will get CT head without contrast.     8:47 PM While awaiting CT head report, patient was visualized ambulating with RN. Significant ataxia noted during ambulation of approximately 10 feet. 9:28 PM Awaiting CT head report. Spoke with Dr Jesus Araiza, hospitalist, regarding possible admission. If CT head is negative for hemorrhage, will obtain CTA head and neck. 9:49 PM CT head read by radiology with no acute intracranial findings. 10:18 PM 10:19 PM : Pt care transferred to Dr. Naeem Crespo  ,ED provider. History of patient complaint(s), available diagnostic reports and current treatment plan has been discussed thoroughly. Bedside rounding on patient occured : yes . Intended disposition of patient : TBD  Pending diagnostics reports and/or labs (please list):   CTA head and neck. Patient turned over to Dr Naeem Crespo on 10/4/19 at 10:18PM. Disposition pending further workup.      Nicole Oviedo PA-C

## 2020-01-25 ENCOUNTER — APPOINTMENT (OUTPATIENT)
Dept: GENERAL RADIOLOGY | Age: 55
End: 2020-01-25
Attending: EMERGENCY MEDICINE
Payer: MEDICAID

## 2020-01-25 ENCOUNTER — HOSPITAL ENCOUNTER (EMERGENCY)
Age: 55
Discharge: HOME | End: 2020-01-25
Attending: EMERGENCY MEDICINE
Payer: MEDICAID

## 2020-01-25 VITALS
RESPIRATION RATE: 24 BRPM | WEIGHT: 127 LBS | OXYGEN SATURATION: 98 % | HEIGHT: 67 IN | TEMPERATURE: 98.8 F | BODY MASS INDEX: 19.93 KG/M2 | DIASTOLIC BLOOD PRESSURE: 93 MMHG | HEART RATE: 119 BPM | SYSTOLIC BLOOD PRESSURE: 146 MMHG

## 2020-01-25 DIAGNOSIS — J18.9 COMMUNITY ACQUIRED PNEUMONIA OF RIGHT MIDDLE LOBE OF LUNG: Primary | ICD-10-CM

## 2020-01-25 LAB
ANION GAP SERPL CALC-SCNC: 8 MMOL/L (ref 3–18)
BASOPHILS # BLD: 0 K/UL (ref 0–0.06)
BASOPHILS NFR BLD: 0 % (ref 0–3)
BUN SERPL-MCNC: 7 MG/DL (ref 7–18)
BUN/CREAT SERPL: 13 (ref 12–20)
CALCIUM SERPL-MCNC: 9.3 MG/DL (ref 8.5–10.1)
CHLORIDE SERPL-SCNC: 100 MMOL/L (ref 100–111)
CO2 SERPL-SCNC: 27 MMOL/L (ref 21–32)
CREAT SERPL-MCNC: 0.56 MG/DL (ref 0.6–1.3)
DIFFERENTIAL METHOD BLD: ABNORMAL
EOSINOPHIL # BLD: 0 K/UL (ref 0–0.4)
EOSINOPHIL NFR BLD: 0 % (ref 0–5)
ERYTHROCYTE [DISTWIDTH] IN BLOOD BY AUTOMATED COUNT: 13.8 % (ref 11.6–14.5)
GLUCOSE SERPL-MCNC: 93 MG/DL (ref 74–99)
HCT VFR BLD AUTO: 33.3 % (ref 35–45)
HGB BLD-MCNC: 10.8 G/DL (ref 12–16)
LACTATE BLD-SCNC: 1.67 MMOL/L (ref 0.4–2)
LYMPHOCYTES # BLD: 1.4 K/UL (ref 0.8–3.5)
LYMPHOCYTES NFR BLD: 6 % (ref 20–51)
MCH RBC QN AUTO: 31.4 PG (ref 24–34)
MCHC RBC AUTO-ENTMCNC: 32.4 G/DL (ref 31–37)
MCV RBC AUTO: 96.8 FL (ref 74–97)
MONOCYTES # BLD: 0.7 K/UL (ref 0–1)
MONOCYTES NFR BLD: 3 % (ref 2–9)
NEUTS BAND NFR BLD MANUAL: 23 % (ref 0–5)
NEUTS SEG # BLD: 21.5 K/UL (ref 1.8–8)
NEUTS SEG NFR BLD: 68 % (ref 42–75)
PLATELET # BLD AUTO: 201 K/UL (ref 135–420)
PLATELET COMMENTS,PCOM: ABNORMAL
PMV BLD AUTO: 10 FL (ref 9.2–11.8)
POTASSIUM SERPL-SCNC: 4 MMOL/L (ref 3.5–5.5)
RBC # BLD AUTO: 3.44 M/UL (ref 4.2–5.3)
RBC MORPH BLD: ABNORMAL
SODIUM SERPL-SCNC: 135 MMOL/L (ref 136–145)
WBC # BLD AUTO: 23.6 K/UL (ref 4.6–13.2)
WBC MORPH BLD: ABNORMAL

## 2020-01-25 PROCEDURE — 71046 X-RAY EXAM CHEST 2 VIEWS: CPT

## 2020-01-25 PROCEDURE — 99283 EMERGENCY DEPT VISIT LOW MDM: CPT

## 2020-01-25 PROCEDURE — 74011250637 HC RX REV CODE- 250/637: Performed by: EMERGENCY MEDICINE

## 2020-01-25 PROCEDURE — 96365 THER/PROPH/DIAG IV INF INIT: CPT

## 2020-01-25 PROCEDURE — 96361 HYDRATE IV INFUSION ADD-ON: CPT

## 2020-01-25 PROCEDURE — 83605 ASSAY OF LACTIC ACID: CPT

## 2020-01-25 PROCEDURE — 80048 BASIC METABOLIC PNL TOTAL CA: CPT

## 2020-01-25 PROCEDURE — 74011250636 HC RX REV CODE- 250/636: Performed by: EMERGENCY MEDICINE

## 2020-01-25 PROCEDURE — 99284 EMERGENCY DEPT VISIT MOD MDM: CPT

## 2020-01-25 PROCEDURE — 85025 COMPLETE CBC W/AUTO DIFF WBC: CPT

## 2020-01-25 PROCEDURE — 74011250637 HC RX REV CODE- 250/637: Performed by: PHYSICIAN ASSISTANT

## 2020-01-25 PROCEDURE — 96366 THER/PROPH/DIAG IV INF ADDON: CPT

## 2020-01-25 RX ORDER — LEVOFLOXACIN 5 MG/ML
500 INJECTION, SOLUTION INTRAVENOUS
Status: DISCONTINUED | OUTPATIENT
Start: 2020-01-25 | End: 2020-01-25

## 2020-01-25 RX ORDER — LEVOFLOXACIN 500 MG/1
500 TABLET, FILM COATED ORAL DAILY
Qty: 10 TAB | Refills: 0 | Status: SHIPPED | OUTPATIENT
Start: 2020-01-25 | End: 2020-05-27

## 2020-01-25 RX ORDER — ALBUTEROL SULFATE 90 UG/1
2 AEROSOL, METERED RESPIRATORY (INHALATION)
Qty: 1 INHALER | Refills: 1 | Status: SHIPPED | OUTPATIENT
Start: 2020-01-25 | End: 2020-05-27

## 2020-01-25 RX ORDER — ACETAMINOPHEN 325 MG/1
975 TABLET ORAL
Status: COMPLETED | OUTPATIENT
Start: 2020-01-25 | End: 2020-01-25

## 2020-01-25 RX ORDER — ACETAMINOPHEN 500 MG
500 TABLET ORAL
Status: DISCONTINUED | OUTPATIENT
Start: 2020-01-25 | End: 2020-01-25

## 2020-01-25 RX ORDER — ACETAMINOPHEN 500 MG
1000 TABLET ORAL
Status: COMPLETED | OUTPATIENT
Start: 2020-01-25 | End: 2020-01-25

## 2020-01-25 RX ORDER — LEVOFLOXACIN 5 MG/ML
750 INJECTION, SOLUTION INTRAVENOUS ONCE
Status: COMPLETED | OUTPATIENT
Start: 2020-01-25 | End: 2020-01-25

## 2020-01-25 RX ADMIN — SODIUM CHLORIDE 1000 ML: 900 INJECTION, SOLUTION INTRAVENOUS at 13:56

## 2020-01-25 RX ADMIN — LEVOFLOXACIN 750 MG: 5 INJECTION, SOLUTION INTRAVENOUS at 15:00

## 2020-01-25 RX ADMIN — ACETAMINOPHEN 1000 MG: 500 TABLET ORAL at 12:37

## 2020-01-25 RX ADMIN — SODIUM CHLORIDE 1000 ML: 900 INJECTION, SOLUTION INTRAVENOUS at 15:00

## 2020-01-25 RX ADMIN — ACETAMINOPHEN 975 MG: 325 TABLET ORAL at 16:52

## 2020-01-25 NOTE — ED NOTES
Current Discharge Medication List      START taking these medications    Details   levoFLOXacin (LEVAQUIN) 500 mg tablet Take 1 Tab by mouth daily. Take as directed for treatment of pneumonia  Qty: 10 Tab, Refills: 0      albuterol (PROVENTIL HFA, VENTOLIN HFA, PROAIR HFA) 90 mcg/actuation inhaler Take 2 Puffs by inhalation every four (4) hours as needed for Wheezing. Qty: 1 Inhaler, Refills: 1           Patient armband removed and shredded. Prescriptions given and reviewed with patient.

## 2020-01-25 NOTE — ED PROVIDER NOTES
HPI patient complains of 7 to 10-day history of chest congestion and cough. She does cough is gotten worse over last couple days and she complains of some sharp stabbing pains in the right side of her chest anytime she takes a deep breath or coughs. She says her cough is also not become productive. Patient is here today with a fever but states that she was unaware she had a fever. Patient denies any nausea vomiting. She has not been taking any medications for this. No further complaints given at this time. Past Medical History:   Diagnosis Date    Alcohol abuse     Asthma        History reviewed. No pertinent surgical history. Family History:   Problem Relation Age of Onset    No Known Problems Mother     No Known Problems Father     No Known Problems Maternal Grandmother     No Known Problems Maternal Grandfather     No Known Problems Paternal Grandmother     No Known Problems Paternal Grandfather     Cancer Sister         uterine?  Cancer Daughter         uterine? Social History     Socioeconomic History    Marital status: SINGLE     Spouse name: Not on file    Number of children: Not on file    Years of education: Not on file    Highest education level: Not on file   Occupational History    Not on file   Social Needs    Financial resource strain: Not on file    Food insecurity:     Worry: Not on file     Inability: Not on file    Transportation needs:     Medical: Not on file     Non-medical: Not on file   Tobacco Use    Smoking status: Current Every Day Smoker     Packs/day: 0.50     Years: 20.00     Pack years: 10.00    Smokeless tobacco: Never Used   Substance and Sexual Activity    Alcohol use: Yes     Alcohol/week: 2.0 standard drinks     Types: 2 Cans of beer per week     Comment: daily use of alcohol.       Drug use: Not Currently    Sexual activity: Never   Lifestyle    Physical activity:     Days per week: Not on file     Minutes per session: Not on file    Stress: Not on file   Relationships    Social connections:     Talks on phone: Not on file     Gets together: Not on file     Attends Voodoo service: Not on file     Active member of club or organization: Not on file     Attends meetings of clubs or organizations: Not on file     Relationship status: Not on file    Intimate partner violence:     Fear of current or ex partner: Not on file     Emotionally abused: Not on file     Physically abused: Not on file     Forced sexual activity: Not on file   Other Topics Concern    Not on file   Social History Narrative    Not on file         ALLERGIES: Patient has no known allergies. Review of Systems   Constitutional: Positive for fatigue and fever. HENT: Negative. Eyes: Negative. Respiratory: Positive for cough and shortness of breath. Cardiovascular: Negative. Gastrointestinal: Negative. Genitourinary: Negative. Musculoskeletal: Negative. Neurological: Negative. Psychiatric/Behavioral: Negative. Vitals:    01/25/20 1220   BP: (!) 146/93   Pulse: (!) 119   Resp: 24   Temp: (!) 103 °F (39.4 °C)   SpO2: 98%   Weight: 57.6 kg (127 lb)   Height: 5' 7\" (1.702 m)            Physical Exam  Vitals signs and nursing note reviewed. Constitutional:       Appearance: She is well-developed. HENT:      Head: Normocephalic and atraumatic. Eyes:      Conjunctiva/sclera: Conjunctivae normal.      Pupils: Pupils are equal, round, and reactive to light. Neck:      Musculoskeletal: Normal range of motion and neck supple. Cardiovascular:      Rate and Rhythm: Tachycardia present. Pulmonary:      Effort: Pulmonary effort is normal.      Breath sounds: Normal breath sounds. Abdominal:      Palpations: Abdomen is soft. Musculoskeletal: Normal range of motion. Skin:     General: Skin is warm and dry. Neurological:      Mental Status: She is alert and oriented to person, place, and time.           MDM       Procedures      Patient has a right middle lobe community-acquired pneumonia. I reviewed this as well as her lab results with both the patient and her daughter. The patient does not want to be admitted to the hospital at this time. Therefore, our current treatment plan is IV hydration fever medications and IV antibiotics here in the emergency department after this, she will be discharged home with a prescription for antibiotics and instructions for close observation over the next 24 hours. If the patient does not continue to feel better or symptoms change or worsen then she agrees to return to emergency room immediately for further evaluation. The patient has received 2 L of IV fluids p.o. antipyretics and IV antibiotics. She states she is feeling \"much better\" and wishes to go home. Patient's daughter is here and will take her home and observe her over the next 24 hours. They both agree to return to the emergency room if symptoms begin to worsen or if she is not continuing to improve over the next 24 hours.   Marguerite Delatorre MD 4:57 PM

## 2020-01-25 NOTE — DISCHARGE INSTRUCTIONS
Patient Education        Pneumonia: Care Instructions  Your Care Instructions    Pneumonia is an infection of the lungs. Most cases are caused by infections from bacteria or viruses. Pneumonia may be mild or very severe. If it is caused by bacteria, you will be treated with antibiotics. It may take a few weeks to a few months to recover fully from pneumonia, depending on how sick you were and whether your overall health is good. Follow-up care is a key part of your treatment and safety. Be sure to make and go to all appointments, and call your doctor if you are having problems. It's also a good idea to know your test results and keep a list of the medicines you take. How can you care for yourself at home? · Take your antibiotics exactly as directed. Do not stop taking the medicine just because you are feeling better. You need to take the full course of antibiotics. · Take your medicines exactly as prescribed. Call your doctor if you think you are having a problem with your medicine. · Get plenty of rest and sleep. You may feel weak and tired for a while, but your energy level will improve with time. · To prevent dehydration, drink plenty of fluids, enough so that your urine is light yellow or clear like water. Choose water and other caffeine-free clear liquids until you feel better. If you have kidney, heart, or liver disease and have to limit fluids, talk with your doctor before you increase the amount of fluids you drink. · Take care of your cough so you can rest. A cough that brings up mucus from your lungs is common with pneumonia. It is one way your body gets rid of the infection. But if coughing keeps you from resting or causes severe fatigue and chest-wall pain, talk to your doctor. He or she may suggest that you take a medicine to reduce the cough. · Use a vaporizer or humidifier to add moisture to your bedroom. Follow the directions for cleaning the machine.   · Do not smoke or allow others to smoke around you. Smoke will make your cough last longer. If you need help quitting, talk to your doctor about stop-smoking programs and medicines. These can increase your chances of quitting for good. · Take an over-the-counter pain medicine, such as acetaminophen (Tylenol), ibuprofen (Advil, Motrin), or naproxen (Aleve). Read and follow all instructions on the label. · Do not take two or more pain medicines at the same time unless the doctor told you to. Many pain medicines have acetaminophen, which is Tylenol. Too much acetaminophen (Tylenol) can be harmful. · If you were given a spirometer to measure how well your lungs are working, use it as instructed. This can help your doctor tell how your recovery is going. · To prevent pneumonia in the future, talk to your doctor about getting a flu vaccine (once a year) and a pneumococcal vaccine (one time only for most people). When should you call for help? Call 911 anytime you think you may need emergency care. For example, call if:    · You have severe trouble breathing.    Call your doctor now or seek immediate medical care if:    · You cough up dark brown or bloody mucus (sputum).     · You have new or worse trouble breathing.     · You are dizzy or lightheaded, or you feel like you may faint.    Watch closely for changes in your health, and be sure to contact your doctor if:    · You have a new or higher fever.     · You are coughing more deeply or more often.     · You are not getting better after 2 days (48 hours).     · You do not get better as expected. Where can you learn more? Go to http://bonnie-yasmeen.info/. Enter [id number] in the search box to learn more about \"Pneumonia: Care Instructions. \"  Current as of: June 9, 2019  Content Version: 12.2  © 0652-1352 [employer]. Care instructions adapted under license by [employer] (which disclaims liability or warranty for this information).  If you have questions about a medical condition or this instruction, always ask your healthcare professional. [place] [address] any warranty or liability for your use of this information.

## 2020-01-25 NOTE — ED TRIAGE NOTES
Pt states she has been sick for a week. C/o body aches, fever, cough. Hx ETOH abuse. Last drank yesterday. Has not had anything for fever.  Hyperventilation on arrival

## 2020-05-19 ENCOUNTER — HOSPITAL ENCOUNTER (OUTPATIENT)
Dept: ULTRASOUND IMAGING | Age: 55
Discharge: HOME | End: 2020-05-19
Attending: PHYSICIAN ASSISTANT
Payer: MEDICAID

## 2020-05-19 DIAGNOSIS — R60.0 EDEMA, LOWER EXTREMITY: ICD-10-CM

## 2020-05-19 DIAGNOSIS — Z78.9 HEAVY DRINKER: ICD-10-CM

## 2020-05-19 DIAGNOSIS — D75.89 MACROCYTOSIS: ICD-10-CM

## 2020-05-19 DIAGNOSIS — R79.89 LIVER FUNCTION TEST ABNORMALITY: ICD-10-CM

## 2020-05-19 DIAGNOSIS — D64.9 ANEMIA: ICD-10-CM

## 2020-05-19 PROCEDURE — 76705 ECHO EXAM OF ABDOMEN: CPT

## 2020-05-27 ENCOUNTER — HOSPITAL ENCOUNTER (EMERGENCY)
Age: 55
Discharge: HOME | End: 2020-05-27
Attending: EMERGENCY MEDICINE
Payer: MEDICAID

## 2020-05-27 VITALS
BODY MASS INDEX: 19.46 KG/M2 | HEART RATE: 98 BPM | RESPIRATION RATE: 18 BRPM | TEMPERATURE: 98.9 F | WEIGHT: 124 LBS | HEIGHT: 67 IN | DIASTOLIC BLOOD PRESSURE: 61 MMHG | OXYGEN SATURATION: 98 % | SYSTOLIC BLOOD PRESSURE: 103 MMHG

## 2020-05-27 DIAGNOSIS — R60.0 BILATERAL LOWER EXTREMITY EDEMA: Primary | ICD-10-CM

## 2020-05-27 DIAGNOSIS — E87.1 HYPONATREMIA: ICD-10-CM

## 2020-05-27 LAB
ALBUMIN SERPL-MCNC: 3.9 G/DL (ref 3.4–5)
ALBUMIN/GLOB SERPL: 0.8 {RATIO} (ref 0.8–1.7)
ALP SERPL-CCNC: 307 U/L (ref 45–117)
ALT SERPL-CCNC: 55 U/L (ref 13–56)
ANION GAP SERPL CALC-SCNC: 8 MMOL/L (ref 3–18)
APPEARANCE UR: CLEAR
AST SERPL-CCNC: 145 U/L (ref 10–38)
BASOPHILS # BLD: 0.1 K/UL (ref 0–0.1)
BASOPHILS NFR BLD: 1 % (ref 0–2)
BILIRUB SERPL-MCNC: 0.8 MG/DL (ref 0.2–1)
BILIRUB UR QL: NEGATIVE
BNP SERPL-MCNC: 43 PG/ML (ref 0–900)
BUN SERPL-MCNC: 15 MG/DL (ref 7–18)
BUN/CREAT SERPL: 17 (ref 12–20)
CALCIUM SERPL-MCNC: 9.1 MG/DL (ref 8.5–10.1)
CHLORIDE SERPL-SCNC: 94 MMOL/L (ref 100–111)
CO2 SERPL-SCNC: 28 MMOL/L (ref 21–32)
COLOR UR: YELLOW
CREAT SERPL-MCNC: 0.89 MG/DL (ref 0.6–1.3)
D DIMER PPP FEU-MCNC: <0.27 UG/ML(FEU)
DIFFERENTIAL METHOD BLD: ABNORMAL
EOSINOPHIL # BLD: 0 K/UL (ref 0–0.4)
EOSINOPHIL NFR BLD: 0 % (ref 0–5)
ERYTHROCYTE [DISTWIDTH] IN BLOOD BY AUTOMATED COUNT: 12.3 % (ref 11.6–14.5)
GLOBULIN SER CALC-MCNC: 4.7 G/DL (ref 2–4)
GLUCOSE SERPL-MCNC: 84 MG/DL (ref 74–99)
GLUCOSE UR STRIP.AUTO-MCNC: NEGATIVE MG/DL
HCT VFR BLD AUTO: 36.5 % (ref 35–45)
HGB BLD-MCNC: 11.9 G/DL (ref 12–16)
HGB UR QL STRIP: NEGATIVE
KETONES UR QL STRIP.AUTO: NEGATIVE MG/DL
LEUKOCYTE ESTERASE UR QL STRIP.AUTO: NEGATIVE
LYMPHOCYTES # BLD: 4.2 K/UL (ref 0.9–3.6)
LYMPHOCYTES NFR BLD: 39 % (ref 21–52)
MCH RBC QN AUTO: 32.8 PG (ref 24–34)
MCHC RBC AUTO-ENTMCNC: 32.6 G/DL (ref 31–37)
MCV RBC AUTO: 100.6 FL (ref 74–97)
MONOCYTES # BLD: 0.7 K/UL (ref 0.05–1.2)
MONOCYTES NFR BLD: 7 % (ref 3–10)
NEUTS SEG # BLD: 5.8 K/UL (ref 1.8–8)
NEUTS SEG NFR BLD: 53 % (ref 40–73)
NITRITE UR QL STRIP.AUTO: NEGATIVE
PH UR STRIP: 5.5 [PH] (ref 5–8)
PLATELET # BLD AUTO: 163 K/UL (ref 135–420)
PMV BLD AUTO: 9.9 FL (ref 9.2–11.8)
POTASSIUM SERPL-SCNC: 4.1 MMOL/L (ref 3.5–5.5)
PROT SERPL-MCNC: 8.6 G/DL (ref 6.4–8.2)
PROT UR STRIP-MCNC: NEGATIVE MG/DL
RBC # BLD AUTO: 3.63 M/UL (ref 4.2–5.3)
SODIUM SERPL-SCNC: 130 MMOL/L (ref 136–145)
SP GR UR REFRACTOMETRY: 1.01 (ref 1–1.03)
UROBILINOGEN UR QL STRIP.AUTO: 1 EU/DL (ref 0.2–1)
WBC # BLD AUTO: 10.8 K/UL (ref 4.6–13.2)

## 2020-05-27 PROCEDURE — 85379 FIBRIN DEGRADATION QUANT: CPT

## 2020-05-27 PROCEDURE — 81003 URINALYSIS AUTO W/O SCOPE: CPT

## 2020-05-27 PROCEDURE — 74011250636 HC RX REV CODE- 250/636: Performed by: PHYSICIAN ASSISTANT

## 2020-05-27 PROCEDURE — 83880 ASSAY OF NATRIURETIC PEPTIDE: CPT

## 2020-05-27 PROCEDURE — 85025 COMPLETE CBC W/AUTO DIFF WBC: CPT

## 2020-05-27 PROCEDURE — 80053 COMPREHEN METABOLIC PANEL: CPT

## 2020-05-27 PROCEDURE — 99283 EMERGENCY DEPT VISIT LOW MDM: CPT

## 2020-05-27 PROCEDURE — 96360 HYDRATION IV INFUSION INIT: CPT

## 2020-05-27 RX ORDER — CEPHALEXIN 500 MG/1
500 CAPSULE ORAL 4 TIMES DAILY
Qty: 40 CAP | Refills: 0 | Status: SHIPPED | OUTPATIENT
Start: 2020-05-27 | End: 2020-06-06

## 2020-05-27 RX ORDER — BUMETANIDE 1 MG/1
1 TABLET ORAL 2 TIMES DAILY
COMMUNITY
End: 2021-11-16 | Stop reason: SDUPTHER

## 2020-05-27 RX ORDER — CYPROHEPTADINE HYDROCHLORIDE 4 MG/1
4 TABLET ORAL 2 TIMES DAILY
COMMUNITY
End: 2021-11-16 | Stop reason: SDUPTHER

## 2020-05-27 RX ORDER — LANOLIN ALCOHOL/MO/W.PET/CERES
65 CREAM (GRAM) TOPICAL
Status: ON HOLD | COMMUNITY
End: 2022-06-01

## 2020-05-27 RX ORDER — OMEPRAZOLE 20 MG/1
20 CAPSULE, DELAYED RELEASE ORAL DAILY
Status: ON HOLD | COMMUNITY
End: 2022-06-01

## 2020-05-27 RX ORDER — SPIRONOLACTONE 25 MG/1
25 TABLET ORAL DAILY
COMMUNITY
End: 2021-11-05

## 2020-05-27 RX ADMIN — SODIUM CHLORIDE 1000 ML: 900 INJECTION, SOLUTION INTRAVENOUS at 18:43

## 2020-05-27 NOTE — DISCHARGE INSTRUCTIONS
Patient Education        Leg and Ankle Edema: Care Instructions  Your Care Instructions  Swelling in the legs, ankles, and feet is called edema. It is common after you sit or stand for a while. Long plane flights or car rides often cause swelling in the legs and feet. You may also have swelling if you have to stand for long periods of time at your job. Problems with the veins in the legs (varicose veins) and changes in hormones can also cause swelling. Sometimes the swelling in the ankles and feet is caused by a more serious problem, such as heart failure, infection, blood clots, or liver or kidney disease. Follow-up care is a key part of your treatment and safety. Be sure to make and go to all appointments, and call your doctor if you are having problems. It's also a good idea to know your test results and keep a list of the medicines you take. How can you care for yourself at home? · If your doctor gave you medicine, take it as prescribed. Call your doctor if you think you are having a problem with your medicine. · Whenever you are resting, raise your legs up. Try to keep the swollen area higher than the level of your heart. · Take breaks from standing or sitting in one position. ? Walk around to increase the blood flow in your lower legs. ? Move your feet and ankles often while you stand, or tighten and relax your leg muscles. · Wear support stockings. Put them on in the morning, before swelling gets worse. · Eat a balanced diet. Lose weight if you need to. · Limit the amount of salt (sodium) in your diet. Salt holds fluid in the body and may increase swelling. When should you call for help? [id number] anytime you think you may need emergency care. For example, call if:  · You have symptoms of a blood clot in your lung (called a pulmonary embolism). These may include:  ? Sudden chest pain. ? Trouble breathing. ? Coughing up blood.   Call your doctor now or seek immediate medical care if:  · You have signs of a blood clot, such as:  ? Pain in your calf, back of the knee, thigh, or groin. ? Redness and swelling in your leg or groin. · You have symptoms of infection, such as:  ? Increased pain, swelling, warmth, or redness. ? Red streaks or pus. ? A fever. Watch closely for changes in your health, and be sure to contact your doctor if:  · Your swelling is getting worse. · You have new or worsening pain in your legs. · You do not get better as expected. Where can you learn more? Go to http://bonnie-yasmeen.info/  Enter F581 in the search box to learn more about \"Leg and Ankle Edema: Care Instructions. \"  Current as of: June 26, 2019               Content Version: 12.5  © 9592-8242 [employer]. Care instructions adapted under license by [employer] (which disclaims liability or warranty for this information). If you have questions about a medical condition or this instruction, always ask your healthcare professional. [place] [address] any warranty or liability for your use of this information.

## 2020-05-27 NOTE — ED TRIAGE NOTES
Patient presents with c/o bilateral foot and leg swelling for period greater than 1+ weeks. She states that her doctors have been doing tests but are unable to tell her what is wrong with her.

## 2020-05-27 NOTE — ED PROVIDER NOTES
[address] EMERGENCY DEPT      Date: 5/27/2020  Patient Name: Sam Brasher    History of Presenting Illness     Chief Complaint   Patient presents with    Peripheral Edema    Foot Swelling    Leg Swelling       49 y.o. female with a past medical history of heavy alcohol abuse, anemia, and liver function test abnormality presents the ED complaining of bilateral lower leg swelling onset about 1 week ago. Patient states that she was seen by her doctor on 5/22, she was prescribed spironolactone as well as Bumex. Patient states she is taking this medication with mild improvement of her symptoms. Says that her kids were worried today about her swelling. She states she is compliant with the medication. She denies any numbness, weakness, shortness of breath, injury, fever, chills, other symptoms. Patient denies any other associated signs or symptoms. Patient denies any other complaints. Nursing notes regarding the HPI and triage nursing notes were reviewed. Prior medical records were reviewed. Current Outpatient Medications   Medication Sig Dispense Refill    ferrous sulfate (Iron) 325 mg (65 mg iron) tablet Take 65 mg by mouth Daily (before breakfast).  spironolactone (ALDACTONE) 25 mg tablet Take 25 mg by mouth daily.  omeprazole (PRILOSEC) 20 mg capsule Take 20 mg by mouth daily.  cyproheptadine (PERIACTIN) 4 mg tablet Take 4 mg by mouth two (2) times a day.  bumetanide (BUMEX) 1 mg tablet Take 1 mg by mouth two (2) times a day.  cephALEXin (Keflex) 500 mg capsule Take 1 Cap by mouth four (4) times daily for 10 days.  40 Cap 0       Past History     Past Medical History:  Past Medical History:   Diagnosis Date    Alcohol abuse     Alcohol withdrawal syndrome (HCC)     Anemia     Asthma     Bile duct calculus     CAP (community acquired pneumonia)     Constipation     GERD (gastroesophageal reflux disease)     Iron deficiency     Liver function test abnormality     Macrocytosis     Vertigo        Past Surgical History:  History reviewed. No pertinent surgical history. Family History:  Family History   Problem Relation Age of Onset    No Known Problems Mother     No Known Problems Father     No Known Problems Maternal Grandmother     No Known Problems Maternal Grandfather     No Known Problems Paternal Grandmother     No Known Problems Paternal Grandfather     Cancer Sister         uterine?  Cancer Daughter         uterine? Social History:  Social History     Tobacco Use    Smoking status: Current Every Day Smoker     Packs/day: 0.50     Years: 20.00     Pack years: 10.00    Smokeless tobacco: Never Used   Substance Use Topics    Alcohol use: Yes     Alcohol/week: 2.0 standard drinks     Types: 2 Cans of beer per week     Comment: daily use of alcohol.  Drug use: Not Currently       Allergies:  No Known Allergies    Patient's primary care provider (as noted in EPIC): Nery Villareal MD    Review of Systems   Constitutional:  Denies malaise, fever, chills. Cardiac:  Denies chest pain or palpitations. Respiratory:  Denies cough, shortness of breath. GI/ABD:  Denies injury, pain, distention, nausea, vomiting, diarrhea. Extremity/MS: + bilateral lower extremity swelling. Neuro:  Denies neurologic symptoms/deficits/paresthesias. Skin: Denies injury, rash, itching or skin changes. All other systems negative as reviewed. Visit Vitals  /61   Pulse 98   Temp 98.9 °F (37.2 °C)   Resp 18   Ht 5' 7\" (1.702 m)   Wt 56.2 kg (124 lb)   SpO2 98%   BMI 19.42 kg/m²       PHYSICAL EXAM:    CONSTITUTIONAL:  Alert, in no apparent distress;  well developed;  well nourished. HEAD:  Normocephalic, atraumatic. EYES:  EOMI. Non-icteric sclera. Normal conjunctiva. ENTM:  Nose:  no rhinorrhea.   Throat:  no erythema or exudate, mucous membranes moist.  NECK:  Supple  RESPIRATORY:  Chest clear, equal breath sounds, good air movement. Without wheezes, rhonchi or rales. CARDIOVASCULAR:  Regular rate and rhythm. No murmurs, rubs, or gallops. LOWER EXT: 3+ pitting edema to pretibial region bilaterally. Distal pulses intact. NEURO:  Moves all four extremities, and grossly normal motor exam.  SKIN:  No rashes;  Normal for age. PSYCH:  Alert and normal affect. DIFFERENTIAL DIAGNOSES/ MEDICAL DECISION MAKING:  Lower leg swelling etiologies include deep venous thrombosis, acute arterial occlusion, dependent edema, phlebitis, cellulitis, swelling from venous insufficiency, chronic lower extremity edema as part of congestive heart failure, nephrotic syndrome, arthritis, other etiologies versus a combination of the above. Recent Results (from the past 12 hour(s))   CBC WITH AUTOMATED DIFF    Collection Time: 05/27/20  6:05 PM   Result Value Ref Range    WBC 10.8 4.6 - 13.2 K/uL    RBC 3.63 (L) 4.20 - 5.30 M/uL    HGB 11.9 (L) 12.0 - 16.0 g/dL    HCT 36.5 35.0 - 45.0 %    .6 (H) 74.0 - 97.0 FL    MCH 32.8 24.0 - 34.0 PG    MCHC 32.6 31.0 - 37.0 g/dL    RDW 12.3 11.6 - 14.5 %    PLATELET 145 190 - 964 K/uL    MPV 9.9 9.2 - 11.8 FL    NEUTROPHILS 53 40 - 73 %    LYMPHOCYTES 39 21 - 52 %    MONOCYTES 7 3 - 10 %    EOSINOPHILS 0 0 - 5 %    BASOPHILS 1 0 - 2 %    ABS. NEUTROPHILS 5.8 1.8 - 8.0 K/UL    ABS. LYMPHOCYTES 4.2 (H) 0.9 - 3.6 K/UL    ABS. MONOCYTES 0.7 0.05 - 1.2 K/UL    ABS. EOSINOPHILS 0.0 0.0 - 0.4 K/UL    ABS.  BASOPHILS 0.1 0.0 - 0.1 K/UL    DF AUTOMATED     METABOLIC PANEL, COMPREHENSIVE    Collection Time: 05/27/20  6:05 PM   Result Value Ref Range    Sodium 130 (L) 136 - 145 mmol/L    Potassium 4.1 3.5 - 5.5 mmol/L    Chloride 94 (L) 100 - 111 mmol/L    CO2 28 21 - 32 mmol/L    Anion gap 8 3.0 - 18 mmol/L    Glucose 84 74 - 99 mg/dL    BUN 15 7.0 - 18 MG/DL    Creatinine 0.89 0.6 - 1.3 MG/DL    BUN/Creatinine ratio 17 12 - 20      GFR est AA >60 >60 ml/min/1.73m2    GFR est non-AA >60 >60 ml/min/1.73m2    Calcium 9.1 8.5 - 10.1 MG/DL    Bilirubin, total 0.8 0.2 - 1.0 MG/DL    ALT (SGPT) 55 13 - 56 U/L    AST (SGOT) 145 (H) 10 - 38 U/L    Alk. phosphatase 307 (H) 45 - 117 U/L    Protein, total 8.6 (H) 6.4 - 8.2 g/dL    Albumin 3.9 3.4 - 5.0 g/dL    Globulin 4.7 (H) 2.0 - 4.0 g/dL    A-G Ratio 0.8 0.8 - 1.7     NT-PRO BNP    Collection Time: 05/27/20  6:05 PM   Result Value Ref Range    NT pro-BNP 43 0 - 900 PG/ML   D DIMER    Collection Time: 05/27/20  6:05 PM   Result Value Ref Range    D DIMER <0.27 <0.46 ug/ml(FEU)   URINALYSIS W/ RFLX MICROSCOPIC    Collection Time: 05/27/20  6:05 PM   Result Value Ref Range    Color YELLOW      Appearance CLEAR      Specific gravity 1.007 1.005 - 1.030      pH (UA) 5.5 5.0 - 8.0      Protein Negative NEG mg/dL    Glucose Negative NEG mg/dL    Ketone Negative NEG mg/dL    Bilirubin Negative NEG      Blood Negative NEG      Urobilinogen 1.0 0.2 - 1.0 EU/dL    Nitrites Negative NEG      Leukocyte Esterase Negative NEG        IMPRESSION AND MEDICAL DECISION MAKING:  Based upon the patients presentation with noted HPI and PE, along with the work up done in the emergency department, I believe that the patient is having bilateral leg swelling likely from hepatic dysfunction. Patient is currently on Bumex and spironolactone. She just started these medications 4 days ago, will have her continue with this. D-dimer within normal limits, doubt DVT. Patient does have some slight erythema and edema noted to her lower extremities, will cover with Keflex to cover for any possible cellulitis. Patient does have a sodium of 130 and Cl of 94, given IV normal saline here to improve this. Patient will follow-up with her doctor in the next  few days, return for any worsening. Dr. Uzma Crisostomo has seen and evaluated the patient as well and agrees with the assessment and plan. Diagnosis:   1.  Bilateral lower extremity edema      Disposition: Discharge    Follow-up Information     Follow up With Specialties Details Why Contact Info    Papo Newton MD Family Practice In 3 days  [address] [facility] [address]  [phone] [address] [facility] EMERGENCY DEPT Emergency Medicine  If symptoms worsen [place] [id number] [phone]          Patient's Medications   Start Taking    CEPHALEXIN (KEFLEX) 500 MG CAPSULE    Take 1 Cap by mouth four (4) times daily for 10 days. Continue Taking    BUMETANIDE (BUMEX) 1 MG TABLET    Take 1 mg by mouth two (2) times a day. CYPROHEPTADINE (PERIACTIN) 4 MG TABLET    Take 4 mg by mouth two (2) times a day. FERROUS SULFATE (IRON) 325 MG (65 MG IRON) TABLET    Take 65 mg by mouth Daily (before breakfast). OMEPRAZOLE (PRILOSEC) 20 MG CAPSULE    Take 20 mg by mouth daily. SPIRONOLACTONE (ALDACTONE) 25 MG TABLET    Take 25 mg by mouth daily. These Medications have changed    No medications on file   Stop Taking    ALBUTEROL (PROVENTIL HFA, VENTOLIN HFA, PROAIR HFA) 90 MCG/ACTUATION INHALER    Take 2 Puffs by inhalation every four (4) hours as needed for Wheezing. CHLORDIAZEPOXIDE (LIBRIUM) 5 MG CAPSULE    Take 1 Cap by mouth three (3) times daily as needed for Anxiety. Max Daily Amount: 15 mg. LEVOFLOXACIN (LEVAQUIN) 500 MG TABLET    Take 1 Tab by mouth daily. Take as directed for treatment of pneumonia    METHADONE (DOLOPHINE) 10 MG/ML SOLUTION    Take 80 mg by mouth daily. ONDANSETRON (ZOFRAN ODT) 4 MG DISINTEGRATING TABLET    Take 1 Tab by mouth every eight (8) hours as needed for Nausea.      RISSA Guzmán

## 2020-05-27 NOTE — ED NOTES
Verbal shift change report given to Edin Frederick RN (oncoming nurse) by Keena Osborn RN (offgoing nurse). Report included the following information SBAR, ED Summary, Procedure Summary, MAR and Recent Results.

## 2020-08-10 ENCOUNTER — TELEPHONE (OUTPATIENT)
Dept: ONCOLOGY | Age: 55
End: 2020-08-10

## 2020-08-10 NOTE — TELEPHONE ENCOUNTER
Called pt to try & schedule appt, pt stated she is out of town & wont be back until the beginning of Sept, pt [facility].  Referral scanned into system

## 2020-11-21 ENCOUNTER — APPOINTMENT (OUTPATIENT)
Dept: CT IMAGING | Age: 55
End: 2020-11-21
Attending: STUDENT IN AN ORGANIZED HEALTH CARE EDUCATION/TRAINING PROGRAM
Payer: MEDICAID

## 2020-11-21 ENCOUNTER — HOSPITAL ENCOUNTER (EMERGENCY)
Age: 55
Discharge: HOME | End: 2020-11-21
Attending: EMERGENCY MEDICINE
Payer: MEDICAID

## 2020-11-21 VITALS
WEIGHT: 117 LBS | RESPIRATION RATE: 14 BRPM | SYSTOLIC BLOOD PRESSURE: 117 MMHG | TEMPERATURE: 97.9 F | HEART RATE: 95 BPM | DIASTOLIC BLOOD PRESSURE: 77 MMHG | BODY MASS INDEX: 18.36 KG/M2 | OXYGEN SATURATION: 97 % | HEIGHT: 67 IN

## 2020-11-21 DIAGNOSIS — N30.01 ACUTE CYSTITIS WITH HEMATURIA: Primary | ICD-10-CM

## 2020-11-21 LAB
ALBUMIN SERPL-MCNC: 2.9 G/DL (ref 3.4–5)
ALBUMIN/GLOB SERPL: 0.6 {RATIO} (ref 0.8–1.7)
ALP SERPL-CCNC: 412 U/L (ref 45–117)
ALT SERPL-CCNC: 22 U/L (ref 13–56)
ANION GAP SERPL CALC-SCNC: 7 MMOL/L (ref 3–18)
APPEARANCE UR: ABNORMAL
APTT PPP: 39.8 SEC (ref 23–36.4)
AST SERPL-CCNC: 47 U/L (ref 10–38)
BACTERIA URNS QL MICRO: ABNORMAL /HPF
BASOPHILS # BLD: 0 K/UL (ref 0–0.1)
BASOPHILS NFR BLD: 0 % (ref 0–2)
BILIRUB DIRECT SERPL-MCNC: 0.5 MG/DL (ref 0–0.2)
BILIRUB SERPL-MCNC: 0.9 MG/DL (ref 0.2–1)
BILIRUB UR QL: NEGATIVE
BUN SERPL-MCNC: 28 MG/DL (ref 7–18)
BUN/CREAT SERPL: 12 (ref 12–20)
CALCIUM SERPL-MCNC: 9.2 MG/DL (ref 8.5–10.1)
CHLORIDE SERPL-SCNC: 100 MMOL/L (ref 100–111)
CO2 SERPL-SCNC: 30 MMOL/L (ref 21–32)
COLOR UR: ABNORMAL
CREAT SERPL-MCNC: 2.36 MG/DL (ref 0.6–1.3)
DIFFERENTIAL METHOD BLD: ABNORMAL
EOSINOPHIL # BLD: 0 K/UL (ref 0–0.4)
EOSINOPHIL NFR BLD: 0 % (ref 0–5)
EPITH CASTS URNS QL MICRO: ABNORMAL /LPF (ref 0–5)
ERYTHROCYTE [DISTWIDTH] IN BLOOD BY AUTOMATED COUNT: 14.6 % (ref 11.6–14.5)
GLOBULIN SER CALC-MCNC: 5.1 G/DL (ref 2–4)
GLUCOSE SERPL-MCNC: 98 MG/DL (ref 74–99)
GLUCOSE UR STRIP.AUTO-MCNC: NEGATIVE MG/DL
HCT VFR BLD AUTO: 32.7 % (ref 35–45)
HGB BLD-MCNC: 10.7 G/DL (ref 12–16)
HGB UR QL STRIP: ABNORMAL
INR PPP: 1.1 (ref 0.8–1.2)
KETONES UR QL STRIP.AUTO: NEGATIVE MG/DL
LEUKOCYTE ESTERASE UR QL STRIP.AUTO: ABNORMAL
LIPASE SERPL-CCNC: 74 U/L (ref 73–393)
LYMPHOCYTES # BLD: 2.1 K/UL (ref 0.9–3.6)
LYMPHOCYTES NFR BLD: 22 % (ref 21–52)
MCH RBC QN AUTO: 32 PG (ref 24–34)
MCHC RBC AUTO-ENTMCNC: 32.7 G/DL (ref 31–37)
MCV RBC AUTO: 97.9 FL (ref 74–97)
MONOCYTES # BLD: 1.1 K/UL (ref 0.05–1.2)
MONOCYTES NFR BLD: 12 % (ref 3–10)
NEUTS SEG # BLD: 6.3 K/UL (ref 1.8–8)
NEUTS SEG NFR BLD: 66 % (ref 40–73)
NITRITE UR QL STRIP.AUTO: POSITIVE
PH UR STRIP: 5 [PH] (ref 5–8)
PLATELET # BLD AUTO: 266 K/UL (ref 135–420)
PMV BLD AUTO: 10.2 FL (ref 9.2–11.8)
POTASSIUM SERPL-SCNC: 4.1 MMOL/L (ref 3.5–5.5)
PROT SERPL-MCNC: 8 G/DL (ref 6.4–8.2)
PROT UR STRIP-MCNC: 100 MG/DL
PROTHROMBIN TIME: 14.2 SEC (ref 11.5–15.2)
RBC # BLD AUTO: 3.34 M/UL (ref 4.2–5.3)
RBC #/AREA URNS HPF: ABNORMAL /HPF (ref 0–5)
SODIUM SERPL-SCNC: 137 MMOL/L (ref 136–145)
SP GR UR REFRACTOMETRY: 1.01 (ref 1–1.03)
UROBILINOGEN UR QL STRIP.AUTO: 1 EU/DL (ref 0.2–1)
WBC # BLD AUTO: 9.6 K/UL (ref 4.6–13.2)
WBC URNS QL MICRO: ABNORMAL /HPF (ref 0–4)

## 2020-11-21 PROCEDURE — 93005 ELECTROCARDIOGRAM TRACING: CPT

## 2020-11-21 PROCEDURE — 96361 HYDRATE IV INFUSION ADD-ON: CPT

## 2020-11-21 PROCEDURE — 80076 HEPATIC FUNCTION PANEL: CPT

## 2020-11-21 PROCEDURE — 83930 ASSAY OF BLOOD OSMOLALITY: CPT

## 2020-11-21 PROCEDURE — 74011000258 HC RX REV CODE- 258: Performed by: STUDENT IN AN ORGANIZED HEALTH CARE EDUCATION/TRAINING PROGRAM

## 2020-11-21 PROCEDURE — 85730 THROMBOPLASTIN TIME PARTIAL: CPT

## 2020-11-21 PROCEDURE — 99285 EMERGENCY DEPT VISIT HI MDM: CPT

## 2020-11-21 PROCEDURE — 80048 BASIC METABOLIC PNL TOTAL CA: CPT

## 2020-11-21 PROCEDURE — 96374 THER/PROPH/DIAG INJ IV PUSH: CPT

## 2020-11-21 PROCEDURE — 83690 ASSAY OF LIPASE: CPT

## 2020-11-21 PROCEDURE — 74011250636 HC RX REV CODE- 250/636: Performed by: STUDENT IN AN ORGANIZED HEALTH CARE EDUCATION/TRAINING PROGRAM

## 2020-11-21 PROCEDURE — 85025 COMPLETE CBC W/AUTO DIFF WBC: CPT

## 2020-11-21 PROCEDURE — 85610 PROTHROMBIN TIME: CPT

## 2020-11-21 PROCEDURE — 81001 URINALYSIS AUTO W/SCOPE: CPT

## 2020-11-21 PROCEDURE — 74176 CT ABD & PELVIS W/O CONTRAST: CPT

## 2020-11-21 PROCEDURE — 74011000250 HC RX REV CODE- 250: Performed by: STUDENT IN AN ORGANIZED HEALTH CARE EDUCATION/TRAINING PROGRAM

## 2020-11-21 PROCEDURE — 96360 HYDRATION IV INFUSION INIT: CPT

## 2020-11-21 PROCEDURE — 96375 TX/PRO/DX INJ NEW DRUG ADDON: CPT

## 2020-11-21 RX ORDER — ONDANSETRON 2 MG/ML
4 INJECTION INTRAMUSCULAR; INTRAVENOUS
Status: COMPLETED | OUTPATIENT
Start: 2020-11-21 | End: 2020-11-21

## 2020-11-21 RX ORDER — ONDANSETRON 4 MG/1
4 TABLET, ORALLY DISINTEGRATING ORAL
Qty: 15 TAB | Refills: 0 | Status: SHIPPED | OUTPATIENT
Start: 2020-11-21 | End: 2020-11-26

## 2020-11-21 RX ORDER — DOXYCYCLINE HYCLATE 100 MG
100 TABLET ORAL 2 TIMES DAILY
Qty: 14 TAB | Refills: 0 | Status: SHIPPED | OUTPATIENT
Start: 2020-11-21 | End: 2020-11-28

## 2020-11-21 RX ORDER — SODIUM CHLORIDE 9 MG/ML
1000 INJECTION, SOLUTION INTRAVENOUS CONTINUOUS
Status: DISCONTINUED | OUTPATIENT
Start: 2020-11-21 | End: 2020-11-22 | Stop reason: HOSPADM

## 2020-11-21 RX ADMIN — THIAMINE HYDROCHLORIDE 100 MG: 100 INJECTION, SOLUTION INTRAMUSCULAR; INTRAVENOUS at 16:33

## 2020-11-21 RX ADMIN — SODIUM CHLORIDE 1000 ML: 900 INJECTION, SOLUTION INTRAVENOUS at 14:25

## 2020-11-21 RX ADMIN — ONDANSETRON 4 MG: 2 INJECTION INTRAMUSCULAR; INTRAVENOUS at 20:57

## 2020-11-21 RX ADMIN — CEFTRIAXONE SODIUM 1 G: 1 INJECTION, POWDER, FOR SOLUTION INTRAMUSCULAR; INTRAVENOUS at 19:41

## 2020-11-21 NOTE — ED NOTES
Pt presents to the ED via EMS c/o constant RLQ abdominal pain that radiates to the right lower back beginning 3 days ago. Pt denies n/v/d, fevers, dysuria or blood in stool and urine.

## 2020-11-21 NOTE — ED PROVIDER NOTES
EMERGENCY DEPARTMENT HISTORY AND PHYSICAL EXAM    1:56 PM      Date: 11/21/2020  Patient Name: Ciaran Tejada    History of Presenting Illness     No chief complaint on file. History Provided By: Patient  Location/Duration/Severity/Modifying factors   HPI     52-year-old female with history of alcohol abuse, EtOH withdrawals, biliary stones, and reported asthma presenting with 1 weeks worth of nausea, vomiting, inability to keep down food (has been able to tolerate liquid), and infrequent bowel movements. Has been followed by an outside provider. Recent lab work suggested elevated alk phos, ZIGGY, and anion gap. She was told to report to the ED for further work-up. She endorses right upper quadrant and right lower quadrant abdominal pain. Pain seems to be constant, but has been worsened with oral intake. She reports having lost 8 pounds over the past week due to poor intake. She reports bowel movements but with much less volume. Reports malodorous urine, dysuria, and gross hematuria. She denies fevers, new headaches, chest pain, shortness of breath, palpitations, wheezing, dysphagia, odynophagia, rashes. PCP: Shimon Youssef MD    Current Facility-Administered Medications   Medication Dose Route Frequency Provider Last Rate Last Dose    0.9% sodium chloride infusion 1,000 mL  1,000 mL IntraVENous CONTINUOUS Dee Eisenberg MD   Stopped at 11/21/20 1924     Current Outpatient Medications   Medication Sig Dispense Refill    doxycycline (VIBRA-TABS) 100 mg tablet Take 1 Tab by mouth two (2) times a day for 7 days. 14 Tab 0    ondansetron (Zofran ODT) 4 mg disintegrating tablet 1 Tab by SubLINGual route every eight (8) hours as needed for Nausea or Vomiting for up to 5 days. 15 Tab 0    ferrous sulfate (Iron) 325 mg (65 mg iron) tablet Take 65 mg by mouth Daily (before breakfast).  spironolactone (ALDACTONE) 25 mg tablet Take 25 mg by mouth daily.       omeprazole (PRILOSEC) 20 mg capsule Take 20 mg by mouth daily.  cyproheptadine (PERIACTIN) 4 mg tablet Take 4 mg by mouth two (2) times a day.  bumetanide (BUMEX) 1 mg tablet Take 1 mg by mouth two (2) times a day. Past History     Past Medical History:  Past Medical History:   Diagnosis Date    Alcohol abuse     Alcohol withdrawal syndrome (HCC)     Anemia     Asthma     Bile duct calculus     CAP (community acquired pneumonia)     Constipation     GERD (gastroesophageal reflux disease)     Iron deficiency     Liver function test abnormality     Macrocytosis     Vertigo        Past Surgical History:  No past surgical history on file. Family History:  Family History   Problem Relation Age of Onset    No Known Problems Mother     No Known Problems Father     No Known Problems Maternal Grandmother     No Known Problems Maternal Grandfather     No Known Problems Paternal Grandmother     No Known Problems Paternal Grandfather     Cancer Sister         uterine?  Cancer Daughter         uterine? Social History:  Social History     Tobacco Use    Smoking status: Current Every Day Smoker     Packs/day: 0.50     Years: 20.00     Pack years: 10.00    Smokeless tobacco: Never Used   Substance Use Topics    Alcohol use: Yes     Alcohol/week: 2.0 standard drinks     Types: 2 Cans of beer per week     Comment: daily use of alcohol.  Drug use: Not Currently       Allergies:  No Known Allergies      Review of Systems       Review of Systems   Constitutional: Negative for chills and diaphoresis. HENT: Negative for rhinorrhea, sinus pressure and sore throat. Eyes:        Reports that her vision seems more blurry today   Respiratory: Negative for cough, chest tightness, shortness of breath and wheezing. Cardiovascular: Negative for chest pain, palpitations and leg swelling. Gastrointestinal: Positive for abdominal pain, constipation, nausea and vomiting. Negative for blood in stool and diarrhea. Endocrine: Negative for polyuria. Genitourinary: Positive for dysuria, flank pain and hematuria. Negative for pelvic pain and vaginal bleeding. Skin: Negative. Neurological: Negative for light-headedness and headaches. Physical Exam     Visit Vitals  /77   Pulse 95   Temp 97.9 °F (36.6 °C)   Resp 14   Ht 5' 7\" (1.702 m)   Wt 53.1 kg (117 lb)   SpO2 97%   BMI 18.32 kg/m²         Physical Exam  Vitals signs and nursing note reviewed. Constitutional:       General: She is not in acute distress. Appearance: She is ill-appearing. She is not toxic-appearing. Comments: Ambulates while slightly bending over secondary to abdominal pain   Eyes:      General: No scleral icterus. Extraocular Movements: Extraocular movements intact. Pupils: Pupils are equal, round, and reactive to light. Cardiovascular:      Rate and Rhythm: Regular rhythm. Tachycardia present. Pulses: Normal pulses. Heart sounds: Normal heart sounds. Pulmonary:      Effort: Pulmonary effort is normal.      Breath sounds: Normal breath sounds. Abdominal:      General: Abdomen is flat. Bowel sounds are normal.      Palpations: There is hepatomegaly. There is no splenomegaly or pulsatile mass. Tenderness: There is abdominal tenderness in the right upper quadrant, right lower quadrant and epigastric area. There is right CVA tenderness and left CVA tenderness. Positive signs include Gutierrez's sign. Hernia: No hernia is present. Musculoskeletal:         General: No swelling. Skin:     General: Skin is warm and dry. Neurological:      Mental Status: She is alert.            Diagnostic Study Results     Labs -  Recent Results (from the past 12 hour(s))   HEPATIC FUNCTION PANEL    Collection Time: 11/21/20  1:49 PM   Result Value Ref Range    Protein, total 8.0 6.4 - 8.2 g/dL    Albumin 2.9 (L) 3.4 - 5.0 g/dL    Globulin 5.1 (H) 2.0 - 4.0 g/dL    A-G Ratio 0.6 (L) 0.8 - 1.7      Bilirubin, total 0.9 0.2 - 1.0 MG/DL    Bilirubin, direct 0.5 (H) 0.0 - 0.2 MG/DL    Alk. phosphatase 412 (H) 45 - 117 U/L    AST (SGOT) 47 (H) 10 - 38 U/L    ALT (SGPT) 22 13 - 56 U/L   METABOLIC PANEL, BASIC    Collection Time: 11/21/20  1:49 PM   Result Value Ref Range    Sodium 137 136 - 145 mmol/L    Potassium 4.1 3.5 - 5.5 mmol/L    Chloride 100 100 - 111 mmol/L    CO2 30 21 - 32 mmol/L    Anion gap 7 3.0 - 18 mmol/L    Glucose 98 74 - 99 mg/dL    BUN 28 (H) 7.0 - 18 MG/DL    Creatinine 2.36 (H) 0.6 - 1.3 MG/DL    BUN/Creatinine ratio 12 12 - 20      GFR est AA 26 (L) >60 ml/min/1.73m2    GFR est non-AA 21 (L) >60 ml/min/1.73m2    Calcium 9.2 8.5 - 10.1 MG/DL   CBC WITH AUTOMATED DIFF    Collection Time: 11/21/20  1:49 PM   Result Value Ref Range    WBC 9.6 4.6 - 13.2 K/uL    RBC 3.34 (L) 4.20 - 5.30 M/uL    HGB 10.7 (L) 12.0 - 16.0 g/dL    HCT 32.7 (L) 35.0 - 45.0 %    MCV 97.9 (H) 74.0 - 97.0 FL    MCH 32.0 24.0 - 34.0 PG    MCHC 32.7 31.0 - 37.0 g/dL    RDW 14.6 (H) 11.6 - 14.5 %    PLATELET 246 957 - 591 K/uL    MPV 10.2 9.2 - 11.8 FL    NEUTROPHILS 66 40 - 73 %    LYMPHOCYTES 22 21 - 52 %    MONOCYTES 12 (H) 3 - 10 %    EOSINOPHILS 0 0 - 5 %    BASOPHILS 0 0 - 2 %    ABS. NEUTROPHILS 6.3 1.8 - 8.0 K/UL    ABS. LYMPHOCYTES 2.1 0.9 - 3.6 K/UL    ABS. MONOCYTES 1.1 0.05 - 1.2 K/UL    ABS. EOSINOPHILS 0.0 0.0 - 0.4 K/UL    ABS.  BASOPHILS 0.0 0.0 - 0.1 K/UL    DF AUTOMATED     LIPASE    Collection Time: 11/21/20  1:49 PM   Result Value Ref Range    Lipase 74 73 - 393 U/L   PROTHROMBIN TIME + INR    Collection Time: 11/21/20  1:49 PM   Result Value Ref Range    Prothrombin time 14.2 11.5 - 15.2 sec    INR 1.1 0.8 - 1.2     PTT    Collection Time: 11/21/20  1:49 PM   Result Value Ref Range    aPTT 39.8 (H) 23.0 - 36.4 SEC   EKG, 12 LEAD, INITIAL    Collection Time: 11/21/20  2:39 PM   Result Value Ref Range    Ventricular Rate 97 BPM    Atrial Rate 97 BPM    P-R Interval 132 ms    QRS Duration 74 ms    Q-T Interval 438 ms    QTC Calculation (Bezet) 556 ms    Calculated P Axis 75 degrees    Calculated R Axis 57 degrees    Calculated T Axis 69 degrees    Diagnosis       Normal sinus rhythm  Nonspecific T wave abnormality  Prolonged QT  Abnormal ECG  When compared with ECG of 07-JUL-2017 18:29,  No significant change was found     URINALYSIS W/ RFLX MICROSCOPIC    Collection Time: 11/21/20  5:00 PM   Result Value Ref Range    Color RONALD      Appearance CLOUDY      Specific gravity 1.011 1.003 - 1.030      pH (UA) 5.0 5.0 - 8.0      Protein 100 (A) NEG mg/dL    Glucose Negative NEG mg/dL    Ketone Negative NEG mg/dL    Bilirubin Negative NEG      Blood LARGE (A) NEG      Urobilinogen 1.0 0.2 - 1.0 EU/dL    Nitrites Positive (A) NEG      Leukocyte Esterase LARGE (A) NEG     URINE MICROSCOPIC ONLY    Collection Time: 11/21/20  5:00 PM   Result Value Ref Range    WBC TOO NUMEROUS TO COUNT 0 - 4 /hpf    RBC 50 to 100 0 - 5 /hpf    Epithelial cells 4+ 0 - 5 /lpf    Bacteria 4+ (A) NEG /hpf       Radiologic Studies -   CT ABD PELV WO CONT   Final Result   IMPRESSION:       1. No noncontrast CT findings to explain right upper quadrant pain.   -The common bile duct does not appear distended and there are no calcified   gallstones. 2. Atrophic pancreas. Some hazy infiltration of the mesentery and dorsal to the   mid body of the pancreas is nonspecific. Early pancreatitis is possible,   clinical correlation is needed. 3. 1.5 cm low-attenuation lesion in the upper pole of the right kidney. Grossly   corresponds with a well-defined right renal cysts seen and described on   ultrasound 5/2020. Most likely benign cyst, but could be confirmed with renal   ultrasound.   -Kidneys appear otherwise normal. No hydronephrosis or stone disease. Medical Decision Making   I am the first provider for this patient.     I reviewed the vital signs, available nursing notes, past medical history, past surgical history, family history and social history. Vital Signs-Reviewed the patient's vital signs. EKG: EKG reviewed by myself and Dr. Gurinder Abel with no signs of ischemic changes. Records Reviewed: Nursing Notes and Old Medical Records (Time of Review: 1:56 PM)    ED Course: Progress Notes, Reevaluation, and Consults:         Provider Notes (Medical Decision Making):   MDM     78-year-old female with week history of right upper quadrant and right lower quadrant abdominal pain. Differential from review of systems and physical exam include ureterolithiasis, cholecystitis, choledocholithiasis, hepatitis, pancreatitis, viral gastroenteritis, urinary tract infection, pyelonephritis, appendicitis. Tachycardic but normotensive and afebrile. Labs demonstrate ZIGGY. Given patient's poor oral intake over the past week suspect element of dehydration. Will move forward with IV fluid hydration 1 L along with thiamine given her history of ethanol abuse. Labs also demonstrate elevated alk phos. Bilirubin is within normal limits. LFTs are essentially within normal limits. Lipase unremarkable. No leukocytosis. Awaiting urinalysis and CT abdomen pelvis. On reassessment patient continues to maintain good blood pressure 144/90, nontachycardic, good oxygen saturation on room air, breathing comfortably. Appears uncomfortable in bed but otherwise no acute worsening pain. CT scan largely unremarkable. No clear etiology on imaging. Urinalysis shows large amounts of nitrite, leukocyte esterase, white blood cells and red blood cells. Given physical exam findings of CVA tenderness, I suspect potential pyelonephritis. Plan is to provide 1 g ceftriaxone while in the ED and discharge with an antibiotic regimen and outpatient pharmacy. Will also provide Zofran to go home with as patient has had poor oral intake secondary to nausea. Patient reassessed before discharge. Vital signs are within normal limits. No worsening abdominal pain. Patient provided return precautions and instruction for follow-up with her primary care provider. Instructed to complete her antibiotic regimen. Patient understanding and agreeable to current plan. Procedures    Critical Care Time: ---      Diagnosis     Clinical Impression:   1. Acute cystitis with hematuria        Disposition: discharged    Follow-up Information     Follow up With Specialties Details Why Contact Info    [facility] EMERGENCY DEPT Emergency Medicine Go to If symptoms worsen Kam Unger MD Family Medicine In 1 week follow-up for ED visit and symptom resolution [address] [id number]  [phone]             Discharge Medication List as of 11/21/2020  8:47 PM      START taking these medications    Details   doxycycline (VIBRA-TABS) 100 mg tablet Take 1 Tab by mouth two (2) times a day for 7 days. , Normal, Disp-14 Tab,R-0      ondansetron (Zofran ODT) 4 mg disintegrating tablet 1 Tab by SubLINGual route every eight (8) hours as needed for Nausea or Vomiting for up to 5 days. , Normal, Disp-15 Tab,R-0         CONTINUE these medications which have NOT CHANGED    Details   ferrous sulfate (Iron) 325 mg (65 mg iron) tablet Take 65 mg by mouth Daily (before breakfast). , Historical Med      spironolactone (ALDACTONE) 25 mg tablet Take 25 mg by mouth daily. , Historical Med      omeprazole (PRILOSEC) 20 mg capsule Take 20 mg by mouth daily. , Historical Med      cyproheptadine (PERIACTIN) 4 mg tablet Take 4 mg by mouth two (2) times a day., Historical Med      bumetanide (BUMEX) 1 mg tablet Take 1 mg by mouth two (2) times a day., Historical Med           Disclaimer: Sections of this note are dictated using utilizing voice recognition software. Minor typographical errors may be present. If questions arise, please do not hesitate to contact me or call our department. I personally saw and examined the patient.   I have reviewed and agree with the resident's findings, including all diagnostic interpretations, and plans as written. I was present during the key portions of separately billed procedures.     [address], DO

## 2020-11-22 LAB
ATRIAL RATE: 97 BPM
CALCULATED P AXIS, ECG09: 75 DEGREES
CALCULATED R AXIS, ECG10: 57 DEGREES
CALCULATED T AXIS, ECG11: 69 DEGREES
DIAGNOSIS, 93000: NORMAL
OSMOLALITY SERPL: 298 MOSM/KG H2O (ref 275–295)
P-R INTERVAL, ECG05: 132 MS
Q-T INTERVAL, ECG07: 438 MS
QRS DURATION, ECG06: 74 MS
QTC CALCULATION (BEZET), ECG08: 556 MS
VENTRICULAR RATE, ECG03: 97 BPM

## 2020-11-22 NOTE — DISCHARGE INSTRUCTIONS
Patient Education     Return to the ED for further evaluation if you develop nausea, vomiting, fevers, night sweats, worsening abdominal pain. To the ED if your symptoms continue to worsen despite your treatment regimen. Urinary Tract Infection in Women: Care Instructions  Your Care Instructions     A urinary tract infection, or UTI, is a general term for an infection anywhere between the kidneys and the urethra (where urine comes out). Most UTIs are bladder infections. They often cause pain or burning when you urinate. UTIs are caused by bacteria and can be cured with antibiotics. Be sure to complete your treatment so that the infection goes away. Follow-up care is a key part of your treatment and safety. Be sure to make and go to all appointments, and call your doctor if you are having problems. It's also a good idea to know your test results and keep a list of the medicines you take. How can you care for yourself at home? · Take your antibiotics as directed. Do not stop taking them just because you feel better. You need to take the full course of antibiotics. · Drink extra water and other fluids for the next day or two. This may help wash out the bacteria that are causing the infection. (If you have kidney, heart, or liver disease and have to limit fluids, talk with your doctor before you increase your fluid intake.)  · Avoid drinks that are carbonated or have caffeine. They can irritate the bladder. · Urinate often. Try to empty your bladder each time. · To relieve pain, take a hot bath or lay a heating pad set on low over your lower belly or genital area. Never go to sleep with a heating pad in place. To prevent UTIs  · Drink plenty of water each day. This helps you urinate often, which clears bacteria from your system. (If you have kidney, heart, or liver disease and have to limit fluids, talk with your doctor before you increase your fluid intake.)  · Urinate when you need to.   · Urinate right after you have sex. · Change sanitary pads often. · Avoid douches, bubble baths, feminine hygiene sprays, and other feminine hygiene products that have deodorants. · After going to the bathroom, wipe from front to back. When should you call for help? Call your doctor now or seek immediate medical care if:    · Symptoms such as fever, chills, nausea, or vomiting get worse or appear for the first time.     · You have new pain in your back just below your rib cage. This is called flank pain.     · There is new blood or pus in your urine.     · You have any problems with your antibiotic medicine. Watch closely for changes in your health, and be sure to contact your doctor if:    · You are not getting better after taking an antibiotic for 2 days.     · Your symptoms go away but then come back. Where can you learn more? Go to http://www.gray.com/  Enter Q570 in the search box to learn more about \"Urinary Tract Infection in Women: Care Instructions. \"  Current as of: June 29, 2020               Content Version: 12.6  © 6496-6443 [employer]. Care instructions adapted under license by [employer] (which disclaims liability or warranty for this information). If you have questions about a medical condition or this instruction, always ask your healthcare professional. [address] any warranty or liability for your use of this information.

## 2020-11-22 NOTE — ED NOTES
I have reviewed discharge instructions with the patient. The patient verbalized understanding. Patient armband removed and given to patient to take home. Patient was informed of the privacy risks if armband lost or stolen. Pt left in stable ambulatory condition.

## 2020-11-22 NOTE — ED NOTES
Bedside report received from Cherry Neri [facility]. Pt is A&O x4 she states mild pain of 4/10. Pt is resting comfortably in bed with call bell in reach. Pt states no new pain. Additional comfort measures provided.

## 2020-11-30 ENCOUNTER — HOSPITAL ENCOUNTER (EMERGENCY)
Age: 55
Discharge: HOME | End: 2020-11-30
Attending: EMERGENCY MEDICINE
Payer: MEDICAID

## 2020-11-30 VITALS
RESPIRATION RATE: 16 BRPM | OXYGEN SATURATION: 94 % | TEMPERATURE: 97.3 F | SYSTOLIC BLOOD PRESSURE: 114 MMHG | BODY MASS INDEX: 16.64 KG/M2 | DIASTOLIC BLOOD PRESSURE: 69 MMHG | HEART RATE: 53 BPM | HEIGHT: 67 IN | WEIGHT: 106 LBS

## 2020-11-30 DIAGNOSIS — N17.9 AKI (ACUTE KIDNEY INJURY) (HCC): Primary | ICD-10-CM

## 2020-11-30 DIAGNOSIS — E83.42 HYPOMAGNESEMIA: ICD-10-CM

## 2020-11-30 LAB
ALBUMIN SERPL-MCNC: 2.6 G/DL (ref 3.4–5)
ALBUMIN/GLOB SERPL: 0.6 {RATIO} (ref 0.8–1.7)
ALP SERPL-CCNC: 251 U/L (ref 45–117)
ALT SERPL-CCNC: 20 U/L (ref 13–56)
ANION GAP SERPL CALC-SCNC: 5 MMOL/L (ref 3–18)
APPEARANCE UR: CLEAR
AST SERPL-CCNC: 43 U/L (ref 10–38)
ATRIAL RATE: 65 BPM
BASOPHILS # BLD: 0 K/UL (ref 0–0.1)
BASOPHILS NFR BLD: 1 % (ref 0–2)
BILIRUB SERPL-MCNC: 0.4 MG/DL (ref 0.2–1)
BILIRUB UR QL: NEGATIVE
BUN SERPL-MCNC: 39 MG/DL (ref 7–18)
BUN/CREAT SERPL: 23 (ref 12–20)
CALCIUM SERPL-MCNC: 9 MG/DL (ref 8.5–10.1)
CALCULATED P AXIS, ECG09: 71 DEGREES
CALCULATED R AXIS, ECG10: 65 DEGREES
CALCULATED T AXIS, ECG11: 65 DEGREES
CHLORIDE SERPL-SCNC: 98 MMOL/L (ref 100–111)
CK MB CFR SERPL CALC: 3 % (ref 0–4)
CK MB SERPL-MCNC: 3 NG/ML (ref 5–25)
CK SERPL-CCNC: 100 U/L (ref 26–192)
CO2 SERPL-SCNC: 29 MMOL/L (ref 21–32)
COLOR UR: YELLOW
CREAT SERPL-MCNC: 1.72 MG/DL (ref 0.6–1.3)
DIAGNOSIS, 93000: NORMAL
DIFFERENTIAL METHOD BLD: ABNORMAL
EOSINOPHIL # BLD: 0.1 K/UL (ref 0–0.4)
EOSINOPHIL NFR BLD: 1 % (ref 0–5)
ERYTHROCYTE [DISTWIDTH] IN BLOOD BY AUTOMATED COUNT: 14 % (ref 11.6–14.5)
GLOBULIN SER CALC-MCNC: 4.4 G/DL (ref 2–4)
GLUCOSE SERPL-MCNC: 94 MG/DL (ref 74–99)
GLUCOSE UR STRIP.AUTO-MCNC: NEGATIVE MG/DL
HCT VFR BLD AUTO: 28.9 % (ref 35–45)
HGB BLD-MCNC: 9.8 G/DL (ref 12–16)
HGB UR QL STRIP: NEGATIVE
KETONES UR QL STRIP.AUTO: NEGATIVE MG/DL
LEUKOCYTE ESTERASE UR QL STRIP.AUTO: NEGATIVE
LYMPHOCYTES # BLD: 1.8 K/UL (ref 0.9–3.6)
LYMPHOCYTES NFR BLD: 29 % (ref 21–52)
MAGNESIUM SERPL-MCNC: 1.8 MG/DL (ref 1.6–2.6)
MCH RBC QN AUTO: 31.8 PG (ref 24–34)
MCHC RBC AUTO-ENTMCNC: 33.9 G/DL (ref 31–37)
MCV RBC AUTO: 93.8 FL (ref 74–97)
MONOCYTES # BLD: 0.6 K/UL (ref 0.05–1.2)
MONOCYTES NFR BLD: 10 % (ref 3–10)
NEUTS SEG # BLD: 3.8 K/UL (ref 1.8–8)
NEUTS SEG NFR BLD: 59 % (ref 40–73)
NITRITE UR QL STRIP.AUTO: NEGATIVE
P-R INTERVAL, ECG05: 174 MS
PH UR STRIP: 5.5 [PH] (ref 5–8)
PLATELET # BLD AUTO: 203 K/UL (ref 135–420)
PMV BLD AUTO: 9.7 FL (ref 9.2–11.8)
POTASSIUM SERPL-SCNC: 3.8 MMOL/L (ref 3.5–5.5)
PROT SERPL-MCNC: 7 G/DL (ref 6.4–8.2)
PROT UR STRIP-MCNC: NEGATIVE MG/DL
Q-T INTERVAL, ECG07: 516 MS
QRS DURATION, ECG06: 72 MS
QTC CALCULATION (BEZET), ECG08: 536 MS
RBC # BLD AUTO: 3.08 M/UL (ref 4.2–5.3)
SODIUM SERPL-SCNC: 132 MMOL/L (ref 136–145)
SP GR UR REFRACTOMETRY: 1.01 (ref 1–1.03)
TROPONIN I SERPL-MCNC: <0.02 NG/ML (ref 0–0.04)
TSH SERPL DL<=0.05 MIU/L-ACNC: 2.78 UIU/ML (ref 0.36–3.74)
UROBILINOGEN UR QL STRIP.AUTO: 1 EU/DL (ref 0.2–1)
VENTRICULAR RATE, ECG03: 65 BPM
WBC # BLD AUTO: 6.3 K/UL (ref 4.6–13.2)

## 2020-11-30 PROCEDURE — 85025 COMPLETE CBC W/AUTO DIFF WBC: CPT

## 2020-11-30 PROCEDURE — 74011250636 HC RX REV CODE- 250/636: Performed by: EMERGENCY MEDICINE

## 2020-11-30 PROCEDURE — 99285 EMERGENCY DEPT VISIT HI MDM: CPT

## 2020-11-30 PROCEDURE — 96365 THER/PROPH/DIAG IV INF INIT: CPT

## 2020-11-30 PROCEDURE — 84443 ASSAY THYROID STIM HORMONE: CPT

## 2020-11-30 PROCEDURE — 81003 URINALYSIS AUTO W/O SCOPE: CPT

## 2020-11-30 PROCEDURE — 93005 ELECTROCARDIOGRAM TRACING: CPT

## 2020-11-30 PROCEDURE — 82550 ASSAY OF CK (CPK): CPT

## 2020-11-30 PROCEDURE — 80053 COMPREHEN METABOLIC PANEL: CPT

## 2020-11-30 PROCEDURE — 96366 THER/PROPH/DIAG IV INF ADDON: CPT

## 2020-11-30 PROCEDURE — 83735 ASSAY OF MAGNESIUM: CPT

## 2020-11-30 PROCEDURE — 96361 HYDRATE IV INFUSION ADD-ON: CPT

## 2020-11-30 RX ORDER — MAGNESIUM SULFATE HEPTAHYDRATE 40 MG/ML
2 INJECTION, SOLUTION INTRAVENOUS ONCE
Status: COMPLETED | OUTPATIENT
Start: 2020-11-30 | End: 2020-11-30

## 2020-11-30 RX ORDER — LANOLIN ALCOHOL/MO/W.PET/CERES
400 CREAM (GRAM) TOPICAL DAILY
Qty: 30 TAB | Refills: 0 | Status: SHIPPED | OUTPATIENT
Start: 2020-11-30 | End: 2020-12-30

## 2020-11-30 RX ADMIN — SODIUM CHLORIDE 1000 ML: 900 INJECTION, SOLUTION INTRAVENOUS at 16:00

## 2020-11-30 RX ADMIN — MAGNESIUM SULFATE HEPTAHYDRATE 2 G: 40 INJECTION, SOLUTION INTRAVENOUS at 18:05

## 2020-11-30 NOTE — ED PROVIDER NOTES
EMERGENCY DEPARTMENT HISTORY AND PHYSICAL EXAM  This was created with voice recognition software and transcription errors may be present. 3:31 PM  Date: 11/30/2020  Patient Name: Jef Wolfe    History of Presenting Illness     Chief Complaint:    History Provided By:     HPI: Jef Wolfe is a 48 y.o. female past medical history of alcohol abuse alcohol withdrawal anemia asthma pain pneumonia constipation reflux iron deficiency who presents for abnormal renal function. Patient had labs drawn by her PCP and was sent in the emergency department for further evaluation. She also complains of low back pain she states the low back pain is going on for months with no real aggravating alleviating factors no other associated symptoms    PCP: Tim Be MD      Past History     Past Medical History:  Past Medical History:   Diagnosis Date    Alcohol abuse     Alcohol withdrawal syndrome ([address]     Anemia     Asthma     Bile duct calculus     CAP (community acquired pneumonia)     Constipation     GERD (gastroesophageal reflux disease)     Iron deficiency     Liver function test abnormality     Macrocytosis     Vertigo        Past Surgical History:  No past surgical history on file. Family History:  Family History   Problem Relation Age of Onset    No Known Problems Mother     No Known Problems Father     No Known Problems Maternal Grandmother     No Known Problems Maternal Grandfather     No Known Problems Paternal Grandmother     No Known Problems Paternal Grandfather     Cancer Sister         uterine?  Cancer Daughter         uterine? Social History:  Social History     Tobacco Use    Smoking status: Current Every Day Smoker     Packs/day: 0.50     Years: 20.00     Pack years: 10.00    Smokeless tobacco: Never Used   Substance Use Topics    Alcohol use: Yes     Alcohol/week: 2.0 standard drinks     Types: 2 Cans of beer per week     Comment: daily use of alcohol.       Drug use: Not Currently       Allergies:  No Known Allergies    Review of Systems     Review of Systems   All other systems reviewed and are negative. 10 point review of systems otherwise negative unless noted in HPI. Physical Exam       Physical Exam  Constitutional:       Appearance: She is well-developed. HENT:      Head: Normocephalic and atraumatic. Eyes:      Pupils: Pupils are equal, round, and reactive to light. Neck:      Musculoskeletal: Normal range of motion and neck supple. Cardiovascular:      Rate and Rhythm: Normal rate and regular rhythm. Heart sounds: Normal heart sounds. No murmur. No friction rub. Pulmonary:      Effort: Pulmonary effort is normal. No respiratory distress. Breath sounds: Normal breath sounds. No wheezing. Abdominal:      General: There is no distension. Palpations: Abdomen is soft. Tenderness: There is no abdominal tenderness. There is no guarding or rebound. Musculoskeletal: Normal range of motion. Skin:     General: Skin is warm and dry. Neurological:      Mental Status: She is alert and oriented to person, place, and time. Psychiatric:         Behavior: Behavior normal.         Thought Content: Thought content normal.         Diagnostic Study Results     Vital Signs   Visit Vitals  BP (!) 96/57 (BP 1 Location: Left arm, BP Patient Position: At rest)   Pulse (!) 53   Temp 97.3 °F (36.3 °C)   Resp 16   Ht 5' 7\" (1.702 m)   Wt 48.1 kg (106 lb)   SpO2 98%   BMI 16.60 kg/m²      EKG: KG shows normal sinus at 65 normal axis normal intervals there is no ST elevation or depression and no hypertrophy QTC of 536 prior was in the mid 500s on the 21st normal in 2017  Labs: CBC is unremarkable mild hyponatremia at 132 ZIGGY at 1.72 which is improving from 2.3 mild anemia 9.8 today was 10.7  Imaging:     CT from 11/21     IMPRESSION:      1.  No noncontrast CT findings to explain right upper quadrant pain.  -The common bile duct does not appear distended and there are no calcified  gallstones.     2. Atrophic pancreas. Some hazy infiltration of the mesentery and dorsal to the  mid body of the pancreas is nonspecific. Early pancreatitis is possible,  clinical correlation is needed.     3. 1.5 cm low-attenuation lesion in the upper pole of the right kidney. Grossly  corresponds with a well-defined right renal cysts seen and described on  ultrasound 5/2020. Most likely benign cyst, but could be confirmed with renal  ultrasound.  -Kidneys appear otherwise normal. No hydronephrosis or stone disease. Medical Decision Making     ED Course: Progress Notes, Reevaluation, and Consults:      Provider Notes (Medical Decision Making): Patient was recently seen for UTI with acute kidney injury creatinine went up to 2.3 from basically normal.  She returns today and is improving today +1.7. Fatemeh case with Dr. Rafi Thomas from [facility] She was concerned about the patient's kidney function and dehydration we will give a liter of fluids here she states her back pain is unchanged from prior she did have a CT for this earlier she is noted to be mildly hypotensive reportedly he did drink alcohol this morning. Will check CK troponin EKG if normal likely discharge her back pain is unchanged she denies any history of IV drugs I think very low suspicion of epidural abscess       Diagnosis     Clinical Impression: No diagnosis found. Disposition:    Patient's Medications   Start Taking    No medications on file   Continue Taking    BUMETANIDE (BUMEX) 1 MG TABLET    Take 1 mg by mouth two (2) times a day. CYPROHEPTADINE (PERIACTIN) 4 MG TABLET    Take 4 mg by mouth two (2) times a day. FERROUS SULFATE (IRON) 325 MG (65 MG IRON) TABLET    Take 65 mg by mouth Daily (before breakfast). OMEPRAZOLE (PRILOSEC) 20 MG CAPSULE    Take 20 mg by mouth daily. SPIRONOLACTONE (ALDACTONE) 25 MG TABLET    Take 25 mg by mouth daily.    These Medications have changed No medications on file   Stop Taking    No medications on file

## 2020-11-30 NOTE — ED TRIAGE NOTES
Patient arrived through triage with c/o abnormal lab values from PCP. Patient states there is something wrong with her kidneys and she needs them checked. Patient also c/o lower back pain.

## 2020-12-01 NOTE — ED NOTES
5 PM Assumed care by Dr. Dulce Maria Phelan pending hydration, magnesium and repeat EKG. patient was  Hypomagnesemic with a QTC of more than 500. Magnesium repleted EKG repeated  Repeat EKG , heart rate 66 normal sinus rhythm, no ST changes patient will be discharged with a prescription of magnesium, advised to follow-up with your PMD for repeat kidney function test and electrolytes.

## 2020-12-01 NOTE — ED NOTES
Bedside and verbal shift report given by Marli Maier RN (off going nurse) to Flako Shannon RN (oncoming nurse). Report included the following information SBAR and ED Summary.     Patient is in bed resting, with eyes closed and no signs of distress noted

## 2020-12-02 LAB
ATRIAL RATE: 66 BPM
CALCULATED P AXIS, ECG09: 65 DEGREES
CALCULATED R AXIS, ECG10: 51 DEGREES
CALCULATED T AXIS, ECG11: 58 DEGREES
DIAGNOSIS, 93000: NORMAL
P-R INTERVAL, ECG05: 162 MS
Q-T INTERVAL, ECG07: 404 MS
QRS DURATION, ECG06: 74 MS
QTC CALCULATION (BEZET), ECG08: 423 MS
VENTRICULAR RATE, ECG03: 66 BPM

## 2021-01-20 ENCOUNTER — TRANSCRIBE ORDER (OUTPATIENT)
Dept: SCHEDULING | Age: 56
End: 2021-01-20

## 2021-01-20 DIAGNOSIS — R93.2 ABNORMAL FINDINGS ON IMAGING OF BILIARY TRACT: ICD-10-CM

## 2021-01-20 DIAGNOSIS — Z78.9 HEAVY DRINKER: ICD-10-CM

## 2021-01-20 DIAGNOSIS — R63.0 LOSS OF APPETITE: ICD-10-CM

## 2021-01-20 DIAGNOSIS — R68.81 EARLY SATIETY: ICD-10-CM

## 2021-01-20 DIAGNOSIS — D64.9 ANEMIA: Primary | ICD-10-CM

## 2021-01-20 DIAGNOSIS — R94.5 NONSPECIFIC ABNORMAL RESULTS OF LIVER FUNCTION STUDY: ICD-10-CM

## 2021-01-20 DIAGNOSIS — R63.4 LOSS OF WEIGHT: ICD-10-CM

## 2021-01-20 DIAGNOSIS — F17.200 SMOKER: ICD-10-CM

## 2021-02-15 ENCOUNTER — TRANSCRIBE ORDER (OUTPATIENT)
Dept: SCHEDULING | Age: 56
End: 2021-02-15

## 2021-02-15 DIAGNOSIS — Z12.31 VISIT FOR SCREENING MAMMOGRAM: Primary | ICD-10-CM

## 2021-02-19 ENCOUNTER — HOSPITAL ENCOUNTER (OUTPATIENT)
Dept: LAB | Age: 56
Discharge: HOME | End: 2021-02-19
Payer: MEDICAID

## 2021-02-19 PROCEDURE — U0003 INFECTIOUS AGENT DETECTION BY NUCLEIC ACID (DNA OR RNA); SEVERE ACUTE RESPIRATORY SYNDROME CORONAVIRUS 2 (SARS-COV-2) (CORONAVIRUS DISEASE [COVID-19]), AMPLIFIED PROBE TECHNIQUE, MAKING USE OF HIGH THROUGHPUT TECHNOLOGIES AS DESCRIBED BY CMS-2020-01-R: HCPCS

## 2021-02-19 RX ORDER — FLUTICASONE PROPIONATE AND SALMETEROL 250; 50 UG/1; UG/1
POWDER RESPIRATORY (INHALATION)
COMMUNITY
Start: 2020-11-23

## 2021-02-19 RX ORDER — LIDOCAINE 50 MG/G
PATCH TOPICAL
COMMUNITY
Start: 2020-12-01 | End: 2021-05-30

## 2021-02-19 RX ORDER — TIZANIDINE 4 MG/1
TABLET ORAL
COMMUNITY
Start: 2020-11-23 | End: 2021-11-05

## 2021-02-20 LAB — SARS-COV-2, COV2NT: NOT DETECTED

## 2021-02-24 ENCOUNTER — ANESTHESIA EVENT (OUTPATIENT)
Dept: ENDOSCOPY | Age: 56
End: 2021-02-24
Payer: MEDICAID

## 2021-02-25 ENCOUNTER — ANESTHESIA (OUTPATIENT)
Dept: ENDOSCOPY | Age: 56
End: 2021-02-25
Payer: MEDICAID

## 2021-02-25 ENCOUNTER — HOSPITAL ENCOUNTER (OUTPATIENT)
Age: 56
Setting detail: OUTPATIENT SURGERY
Discharge: HOME | End: 2021-02-25
Attending: INTERNAL MEDICINE | Admitting: INTERNAL MEDICINE
Payer: MEDICAID

## 2021-02-25 VITALS
RESPIRATION RATE: 16 BRPM | DIASTOLIC BLOOD PRESSURE: 92 MMHG | HEIGHT: 67 IN | WEIGHT: 97.38 LBS | SYSTOLIC BLOOD PRESSURE: 135 MMHG | TEMPERATURE: 97.6 F | HEART RATE: 82 BPM | BODY MASS INDEX: 15.28 KG/M2 | OXYGEN SATURATION: 99 %

## 2021-02-25 LAB
AMPHET UR QL SCN: NEGATIVE
BARBITURATES UR QL SCN: NEGATIVE
BENZODIAZ UR QL: NEGATIVE
CANNABINOIDS UR QL SCN: NEGATIVE
COCAINE UR QL SCN: NEGATIVE
HDSCOM,HDSCOM: ABNORMAL
METHADONE UR QL: POSITIVE
OPIATES UR QL: NEGATIVE
PCP UR QL: NEGATIVE

## 2021-02-25 PROCEDURE — 77030013992 HC SNR POLYP ENDOSC BSC -B: Performed by: INTERNAL MEDICINE

## 2021-02-25 PROCEDURE — 2709999900 HC NON-CHARGEABLE SUPPLY: Performed by: INTERNAL MEDICINE

## 2021-02-25 PROCEDURE — 77030019988 HC FCPS ENDOSC DISP BSC -B: Performed by: INTERNAL MEDICINE

## 2021-02-25 PROCEDURE — 00813 ANES UPR LWR GI NDSC PX: CPT | Performed by: NURSE ANESTHETIST, CERTIFIED REGISTERED

## 2021-02-25 PROCEDURE — 74011000250 HC RX REV CODE- 250: Performed by: NURSE ANESTHETIST, CERTIFIED REGISTERED

## 2021-02-25 PROCEDURE — 76040000007: Performed by: INTERNAL MEDICINE

## 2021-02-25 PROCEDURE — 74011250636 HC RX REV CODE- 250/636: Performed by: NURSE ANESTHETIST, CERTIFIED REGISTERED

## 2021-02-25 PROCEDURE — 77030008565 HC TBNG SUC IRR ERBE -B: Performed by: INTERNAL MEDICINE

## 2021-02-25 PROCEDURE — 00813 ANES UPR LWR GI NDSC PX: CPT | Performed by: ANESTHESIOLOGY

## 2021-02-25 PROCEDURE — 88342 IMHCHEM/IMCYTCHM 1ST ANTB: CPT

## 2021-02-25 PROCEDURE — 77030021593 HC FCPS BIOP ENDOSC BSC -A: Performed by: INTERNAL MEDICINE

## 2021-02-25 PROCEDURE — 76060000032 HC ANESTHESIA 0.5 TO 1 HR: Performed by: INTERNAL MEDICINE

## 2021-02-25 PROCEDURE — 80307 DRUG TEST PRSMV CHEM ANLYZR: CPT

## 2021-02-25 PROCEDURE — 88305 TISSUE EXAM BY PATHOLOGIST: CPT

## 2021-02-25 RX ORDER — PROPOFOL 10 MG/ML
INJECTION, EMULSION INTRAVENOUS AS NEEDED
Status: DISCONTINUED | OUTPATIENT
Start: 2021-02-25 | End: 2021-02-25 | Stop reason: HOSPADM

## 2021-02-25 RX ORDER — SODIUM CHLORIDE 0.9 % (FLUSH) 0.9 %
5-40 SYRINGE (ML) INJECTION AS NEEDED
Status: DISCONTINUED | OUTPATIENT
Start: 2021-02-25 | End: 2021-02-25 | Stop reason: HOSPADM

## 2021-02-25 RX ORDER — SODIUM CHLORIDE 0.9 % (FLUSH) 0.9 %
5-40 SYRINGE (ML) INJECTION EVERY 8 HOURS
Status: DISCONTINUED | OUTPATIENT
Start: 2021-02-25 | End: 2021-02-25 | Stop reason: HOSPADM

## 2021-02-25 RX ORDER — LIDOCAINE HYDROCHLORIDE 20 MG/ML
INJECTION, SOLUTION EPIDURAL; INFILTRATION; INTRACAUDAL; PERINEURAL AS NEEDED
Status: DISCONTINUED | OUTPATIENT
Start: 2021-02-25 | End: 2021-02-25 | Stop reason: HOSPADM

## 2021-02-25 RX ORDER — FAMOTIDINE 20 MG/1
20 TABLET, FILM COATED ORAL ONCE
Status: DISCONTINUED | OUTPATIENT
Start: 2021-02-25 | End: 2021-02-25

## 2021-02-25 RX ORDER — SODIUM CHLORIDE, SODIUM LACTATE, POTASSIUM CHLORIDE, CALCIUM CHLORIDE 600; 310; 30; 20 MG/100ML; MG/100ML; MG/100ML; MG/100ML
50 INJECTION, SOLUTION INTRAVENOUS CONTINUOUS
Status: DISCONTINUED | OUTPATIENT
Start: 2021-02-26 | End: 2021-02-25 | Stop reason: HOSPADM

## 2021-02-25 RX ADMIN — LIDOCAINE HYDROCHLORIDE 40 MG: 20 INJECTION, SOLUTION EPIDURAL; INFILTRATION; INTRACAUDAL; PERINEURAL at 13:10

## 2021-02-25 RX ADMIN — PROPOFOL 20 MG: 10 INJECTION, EMULSION INTRAVENOUS at 13:14

## 2021-02-25 RX ADMIN — SODIUM CHLORIDE, SODIUM LACTATE, POTASSIUM CHLORIDE, AND CALCIUM CHLORIDE 50 ML/HR: 600; 310; 30; 20 INJECTION, SOLUTION INTRAVENOUS at 10:04

## 2021-02-25 RX ADMIN — PROPOFOL 20 MG: 10 INJECTION, EMULSION INTRAVENOUS at 13:25

## 2021-02-25 RX ADMIN — PROPOFOL 20 MG: 10 INJECTION, EMULSION INTRAVENOUS at 13:31

## 2021-02-25 RX ADMIN — PROPOFOL 20 MG: 10 INJECTION, EMULSION INTRAVENOUS at 13:19

## 2021-02-25 RX ADMIN — PROPOFOL 20 MG: 10 INJECTION, EMULSION INTRAVENOUS at 13:16

## 2021-02-25 RX ADMIN — PROPOFOL 20 MG: 10 INJECTION, EMULSION INTRAVENOUS at 13:33

## 2021-02-25 RX ADMIN — PROPOFOL 20 MG: 10 INJECTION, EMULSION INTRAVENOUS at 13:27

## 2021-02-25 RX ADMIN — PROPOFOL 20 MG: 10 INJECTION, EMULSION INTRAVENOUS at 13:13

## 2021-02-25 RX ADMIN — PROPOFOL 20 MG: 10 INJECTION, EMULSION INTRAVENOUS at 13:23

## 2021-02-25 RX ADMIN — PROPOFOL 10 MG: 10 INJECTION, EMULSION INTRAVENOUS at 13:36

## 2021-02-25 RX ADMIN — PROPOFOL 20 MG: 10 INJECTION, EMULSION INTRAVENOUS at 13:29

## 2021-02-25 RX ADMIN — PROPOFOL 30 MG: 10 INJECTION, EMULSION INTRAVENOUS at 13:12

## 2021-02-25 RX ADMIN — PROPOFOL 20 MG: 10 INJECTION, EMULSION INTRAVENOUS at 13:18

## 2021-02-25 RX ADMIN — PROPOFOL 50 MG: 10 INJECTION, EMULSION INTRAVENOUS at 13:10

## 2021-02-25 RX ADMIN — PROPOFOL 20 MG: 10 INJECTION, EMULSION INTRAVENOUS at 13:35

## 2021-02-25 RX ADMIN — PROPOFOL 50 MG: 10 INJECTION, EMULSION INTRAVENOUS at 13:11

## 2021-02-25 RX ADMIN — PROPOFOL 20 MG: 10 INJECTION, EMULSION INTRAVENOUS at 13:21

## 2021-02-25 NOTE — ANESTHESIA PREPROCEDURE EVALUATION
Relevant Problems   No relevant active problems       Anesthetic History   No history of anesthetic complications            Review of Systems / Medical History  Patient summary reviewed and pertinent labs reviewed    Pulmonary            Asthma : well controlled       Neuro/Psych   Within defined limits           Cardiovascular                  Exercise tolerance: >4 METS     GI/Hepatic/Renal     GERD: poorly controlled      Liver disease     Endo/Other        Anemia     Other Findings              Physical Exam    Airway  Mallampati: III  TM Distance: 4 - 6 cm  Neck ROM: decreased range of motion   Mouth opening: Diminished (comment)     Cardiovascular    Rhythm: regular  Rate: normal         Dental    Dentition: Poor dentition     Pulmonary  Breath sounds clear to auscultation               Abdominal  GI exam deferred       Other Findings            Anesthetic Plan    ASA: 3  Anesthesia type: MAC            Anesthetic plan and risks discussed with: Patient

## 2021-02-25 NOTE — ANESTHESIA POSTPROCEDURE EVALUATION
Procedure(s):  UPPER ENDOSCOPY with bxs  COLONOSCOPY with polypectomies. MAC    Anesthesia Post Evaluation      Multimodal analgesia: multimodal analgesia used between 6 hours prior to anesthesia start to PACU discharge  Patient location during evaluation: bedside  Patient participation: complete - patient participated  Level of consciousness: awake  Pain management: adequate  Airway patency: patent  Anesthetic complications: no  Cardiovascular status: stable  Respiratory status: acceptable  Hydration status: acceptable  Post anesthesia nausea and vomiting:  controlled      INITIAL Post-op Vital signs:   Vitals Value Taken Time   /81 02/25/21 1412   Temp     Pulse 74 02/25/21 1414   Resp 13 02/25/21 1414   SpO2 98 % 02/25/21 1414   Vitals shown include unvalidated device data.

## 2021-02-25 NOTE — H&P
[email or web address]  [phone]      History and Physical    Patient: Emili De Dios MRN: [id number]  SSN: [id number]    YOB: 1965  Age: 48 y.o. Sex: female      Subjective:      Emili De Dios is a 48 y.o. female who presents with progressive anemia, weight loss, early satiety and loss of appetite. Recently positive stool FIT test.     Past Medical History:   Diagnosis Date    Alcohol abuse     Alcohol withdrawal syndrome (HCC)     Anemia     Asthma     Bile duct calculus     CAP (community acquired pneumonia)     Constipation     GERD (gastroesophageal reflux disease)     Iron deficiency     Liver function test abnormality     Macrocytosis     Vertigo      History reviewed. No pertinent surgical history. Family History   Problem Relation Age of Onset    No Known Problems Mother     No Known Problems Father     No Known Problems Maternal Grandmother     No Known Problems Maternal Grandfather     No Known Problems Paternal Grandmother     No Known Problems Paternal Grandfather     Cancer Sister         uterine?  Cancer Daughter         uterine? Social History     Tobacco Use    Smoking status: Current Every Day Smoker     Packs/day: 0.50     Years: 20.00     Pack years: 10.00    Smokeless tobacco: Never Used   Substance Use Topics    Alcohol use: Yes     Alcohol/week: 2.0 standard drinks     Types: 2 Cans of beer per week     Comment: daily use of alcohol. Prior to Admission medications    Medication Sig Start Date End Date Taking?  Authorizing Provider   lidocaine (Lidoderm) 5 % 1 patch remove after 12 hours 12/1/20 5/30/21 Yes Provider, Historical   tiZANidine (ZANAFLEX) 4 mg tablet TAKE 1 TABLET BY MOUTH UP TO TWICE DAILY AS NEEDED FOR SEVERE SPASMS 11/23/20  Yes Provider, Historical   fluticasone propion-salmeteroL (ADVAIR/WIXELA) 250-50 mcg/dose diskus inhaler INHALE 1 PUFF BY MOUTH TWICE DAILY 11/23/20  Yes Provider, Historical   ferrous sulfate (Iron) 325 mg (65 mg iron) tablet Take 65 mg by mouth Daily (before breakfast). Kim Cortes MD   spironolactone (ALDACTONE) 25 mg tablet Take 25 mg by mouth daily. Kim Cortes MD   omeprazole (PRILOSEC) 20 mg capsule Take 20 mg by mouth daily. Kim Cortes MD   cyproheptadine (PERIACTIN) 4 mg tablet Take 4 mg by mouth two (2) times a day. Kim Cortes MD   bumetanide (BUMEX) 1 mg tablet Take 1 mg by mouth two (2) times a day. Kim Cortes MD        No Known Allergies    Review of Systems:  A comprehensive review of systems was negative except for that written in the History of Present Illness. Objective:     Vitals:    02/19/21 1445   Weight: 48.1 kg (106 lb)   Height: 5' 7\" (1.702 m)        Physical Exam:  GENERAL: alert, cooperative, no distress, appears stated age  LUNG: clear to auscultation bilaterally  HEART: regular rate and rhythm, S1, S2 normal, no murmur, click, rub or gallop  ABDOMEN: soft, non-tender. Bowel sounds normal. No masses,  no organomegaly  NEUROLOGIC: alert & oriented x 3    Assessment:     1. Anemia  2. Early satiety  3. Weight loss  4. Positive FIT test    Plan:     1. EGD/colonoscopy    Signed By: Gomez Plata MD     February 25, 2021      Gomez Plata MD  [facility] [address] [phone]  [email or web address]

## 2021-02-25 NOTE — PROCEDURES
[email or web address]  [phone]        Brief Procedure Note    Branden Estrada  1965  [id number]    Date of Procedure: 2/25/2021    Preoperative diagnosis: Anemia:  285.9 - D64.9  Heavy drinker:  305.00 - F10.10  Imaging of biliary tract abnormal:  793.3 - R93.2  Liver function tests abnormal:  794.8 - R94.5  Smoker:  305.1 - F17.200  Unintentional weight loss:  783.21 - R63.4  Occult blood in stools:  792.1 - F19.5  Early satiety:  780.94 - R68.81  Loss of appetite:  783.0 - R63.0    Postoperative diagnosis: gastritis- biopsied; diverticulosis, cecal avm (non-bleeing), retcal polyps x 2, internal hemorrhoids    Description of Findings: same    Sedation/Anesthesia: Monitored Anesthesia Care; See Anesthesia Note    Procedure: Procedure(s):  UPPER ENDOSCOPY with bxs  COLONOSCOPY with polypectomies    :  Dr. Cristiana Stinson MD    Assistant(s): Endoscopy Technician-1: Fermín Mahoney  Endoscopy RN-1: Abena Bardales RN    EBL:None    Specimens:   ID Type Source Tests Collected by Time Destination   1 : Antrum & Body bxs Preservative Gastric  Babs Lemos MD 2/25/2021 1313 Pathology   2 : rectal polyps Preservative Rectum  Babs Lemos MD 2/25/2021 1333 Pathology       Findings: See printed and scanned procedure note    Complications: None    Tissue Implant Device: None    Dr. Cristiana Stinson MD  2/25/2021  1:44 PM    Cristiana Stinson MD  [facility] [address] - [phone]  [facility] - [phone]  [email or web address]

## 2021-02-25 NOTE — DISCHARGE INSTRUCTIONS
Patient Education        Upper GI Endoscopy: What to Expect at [address] had an upper GI endoscopy. Your doctor used a thin, lighted tube that bends to look at the inside of your esophagus, your stomach, and the first part of the small intestine, called the duodenum. After you have an endoscopy, you will stay at the hospital or clinic for 1 to 2 hours. This will allow the medicine to wear off. You will be able to go home after your doctor or nurse checks to make sure that you're not having any problems. You may have to stay overnight if you had treatment during the test. You may have a sore throat for a day or two after the test.  This care sheet gives you a general idea about what to expect after the test.  How can you care for yourself at home? Activity   · Rest as much as you need to after you go home. · You should be able to go back to your usual activities the day after the test.  Diet   · Follow your doctor's directions for eating after the test.  · Drink plenty of fluids (unless your doctor has told you not to). Medications   · If you have a sore throat the day after the test, use an over-the-counter spray to numb your throat. Follow-up care is a key part of your treatment and safety. Be sure to make and go to all appointments, and call your doctor if you are having problems. It's also a good idea to know your test results and keep a list of the medicines you take. When should you call for help? Call 911 anytime you think you may need emergency care. For example, call if:    · You passed out (lost consciousness).     · You have trouble breathing.     · You pass maroon or bloody stools.    Call your doctor now or seek immediate medical care if:    · You have pain that does not get better after your take pain medicine.     · You have new or worse belly pain.     · You have blood in your stools.     · You are sick to your stomach and cannot keep fluids down.     · You have a fever.     · You cannot pass stools or gas. Watch closely for changes in your health, and be sure to contact your doctor if:    · Your throat still hurts after a day or two.     · You do not get better as expected. Where can you learn more? Go to http://www.[employer].com/  Enter J454 in the search box to learn more about \"Upper GI Endoscopy: What to Expect at Home. \"  Current as of: April 15, 2020               Content Version: 12.6  © 2006-2020 [employer]. Care instructions adapted under license by [employer] (which disclaims liability or warranty for this information). If you have questions about a medical condition or this instruction, always ask your healthcare professional. [address] any warranty or liability for your use of this information. Patient Education        Gastritis: Care Instructions  Your Care Instructions     Gastritis is a sore and upset stomach. It happens when something irritates the stomach lining. Many things can cause it. These include an infection such as the flu or something you ate or drank. Medicines or a sore on the lining of the stomach (ulcer) also can cause it. Your belly may bloat and ache. You may belch, vomit, and feel sick to your stomach. You should be able to relieve the problem by taking medicine. And it may help to change your diet. If gastritis lasts, your doctor may prescribe medicine. Follow-up care is a key part of your treatment and safety. Be sure to make and go to all appointments, and call your doctor if you are having problems. It's also a good idea to know your test results and keep a list of the medicines you take. How can you care for yourself at home? · If your doctor prescribed antibiotics, take them as directed. Do not stop taking them just because you feel better. You need to take the full course of antibiotics. · Be safe with medicines.  If your doctor prescribed medicine to decrease stomach acid, take it as directed. Call your doctor if you think you are having a problem with your medicine. · Do not take any other medicine, including over-the-counter pain relievers, without talking to your doctor first.  · If your doctor recommends over-the-counter medicine to reduce stomach acid, such as Pepcid AC (famotidine), Prilosec (omeprazole), or Tagamet HB (cimetidine) follow the directions on the label. · Drink plenty of fluids (enough so that your urine is light yellow or clear like water) to prevent dehydration. Choose water and other caffeine-free clear liquids. If you have kidney, heart, or liver disease and have to limit fluids, talk with your doctor before you increase the amount of fluids you drink. · Limit how much alcohol you drink. · Avoid coffee, tea, cola drinks, chocolate, and other foods with caffeine. They increase stomach acid. When should you call for help? Call 911 anytime you think you may need emergency care. For example, call if:    · You vomit blood or what looks like coffee grounds.     · You pass maroon or very bloody stools. Call your doctor now or seek immediate medical care if:    · You start breathing fast and have not produced urine in the last 8 hours.     · You cannot keep fluids down. Watch closely for changes in your health, and be sure to contact your doctor if:    · You do not get better as expected. Where can you learn more? Go to http://www.[employer].com/  Enter Z536 in the search box to learn more about \"Gastritis: Care Instructions. \"  Current as of: April 15, 2020               Content Version: 12.6  © 7527-4511 [employer]. Care instructions adapted under license by "[employer] (which disclaims liability or warranty for this information).  If you have questions about a medical condition or this instruction, always ask your healthcare professional. Reymundo Pruitt disclaims any warranty or liability for your use of this information. Patient Education        Colonoscopy: What to Expect at [address] [place] [address]  After a colonoscopy, you'll stay at the clinic for 1 to 2 hours until the medicines wear off. Then you can go home. But you'll need to arrange for a ride. Your doctor will tell you when you can eat and do your other usual activities. Your doctor will talk to you about when you'll need your next colonoscopy. Your doctor can help you decide how often you need to be checked. This will depend on the results of your test and your risk for colorectal cancer. After the test, you may be bloated or have gas pains. You may need to pass gas. If a biopsy was done or a polyp was removed, you may have streaks of blood in your stool (feces) for a few days. Problems such as heavy rectal bleeding may not occur until several weeks after the test. This isn't common. But it can happen after polyps are removed. This care sheet gives you a general idea about how long it will take for you to recover. But each person recovers at a different pace. Follow the steps below to get better as quickly as possible. How can you care for yourself at home? Activity    · Rest when you feel tired.     · You can do your normal activities when it feels okay to do so. Diet    · Follow your doctor's directions for eating.     · Unless your doctor has told you not to, drink plenty of fluids. This helps to replace the fluids that were lost during the colon prep.     · Do not drink alcohol. Medicines    · Your doctor will tell you if and when you can restart your medicines. He or she will also give you instructions about taking any new medicines.     · If you take aspirin or some other blood thinner, ask your doctor if and when to start taking it again.  Make sure that you understand exactly what your doctor wants you to do.     · If polyps were removed or a biopsy was done during the test, your doctor may tell you not to take aspirin or other anti-inflammatory medicines for a few days. These include ibuprofen (Advil, Motrin) and naproxen (Aleve). Other instructions    · For your safety, do not drive or operate machinery until the medicine wears off and you can think clearly. Your doctor may tell you not to drive or operate machinery until the day after your test.     · Do not sign legal documents or make major decisions until the medicine wears off and you can think clearly. The anesthesia can make it hard for you to fully understand what you are agreeing to. Follow-up care is a key part of your treatment and safety. Be sure to make and go to all appointments, and call your doctor if you are having problems. It's also a good idea to know your test results and keep a list of the medicines you take. When should you call for help? Call 911 anytime you think you may need emergency care. For example, call if:    · You passed out (lost consciousness).     · You pass maroon or bloody stools.     · You have trouble breathing. Call your doctor now or seek immediate medical care if:    · You have pain that does not get better after you take pain medicine.     · You are sick to your stomach or cannot drink fluids.     · You have new or worse belly pain.     · You have blood in your stools.     · You have a fever.     · You cannot pass stools or gas. Watch closely for changes in your health, and be sure to contact your doctor if you have any problems. Where can you learn more? Go to http://www.gray.com/  Enter E264 in the search box to learn more about \"Colonoscopy: What to Expect at Home. \"  Current as of: April 29, 2020               Content Version: 12.6  © 8023-9427 [employer]. Care instructions adapted under license by [employer] (which disclaims liability or warranty for this information).  If you have questions about a medical condition or this instruction, always ask your healthcare professional. [place] [address] any warranty or liability for your use of this information. Patient Education        Colon Polyps: Care Instructions  Your Care Instructions     Colon polyps are growths in the colon or the rectum. The cause of most colon polyps is not known, and most people who get them do not have any problems. But a certain kind can turn into cancer. For this reason, regular testing for colon polyps is important for people as they get older. It is also important for anyone who has an increased risk for colon cancer. Polyps are usually found through routine colon cancer screening tests. Although most colon polyps are not cancerous, they are usually removed and then tested for cancer. Screening for colon cancer saves lives because the cancer can usually be cured if it is caught early. If you have a polyp that is the type that can turn into cancer, you may need more tests to examine your entire colon. The doctor will remove any other polyps that he or she finds, and you will be tested more often. Follow-up care is a key part of your treatment and safety. Be sure to make and go to all appointments, and call your doctor if you are having problems. It's also a good idea to know your test results and keep a list of the medicines you take. How can you care for yourself at home? Regular exams to look for colon polyps are the best way to prevent polyps from turning into colon cancer. These can include stool tests, sigmoidoscopy, colonoscopy, and CT colonography. Talk with your doctor about a testing schedule that is right for you. To prevent polyps  There is no home treatment that can prevent colon polyps. But these steps may help lower your risk for cancer. · Stay active. Being active can help you get to and stay at a healthy weight. Try to exercise on most days of the week. Walking is a good choice. · Eat well.  Choose a variety of vegetables, fruits, legumes (such as peas and beans), fish, poultry, and whole grains. · Do not smoke. If you need help quitting, talk to your doctor about stop-smoking programs and medicines. These can increase your chances of quitting for good. · If you drink alcohol, limit how much you drink. Limit alcohol to 2 drinks a day for men and 1 drink a day for women. When should you call for help? Call your doctor now or seek immediate medical care if:    · You have severe belly pain.     · Your stools are maroon or very bloody. Watch closely for changes in your health, and be sure to contact your doctor if:    · You have a fever.     · You have nausea or vomiting.     · You have a change in bowel habits (new constipation or diarrhea).     · Your symptoms get worse or are not improving as expected. Where can you learn more? Go to http://www.turcios.com/  Enter C571 in the search box to learn more about \"Colon Polyps: Care Instructions. \"  Current as of: April 29, 2020               Content Version: 12.6  © 8945-8911 [employer]. Care instructions adapted under license by [employer] (which disclaims liability or warranty for this information). If you have questions about a medical condition or this instruction, always ask your healthcare professional. [address] any warranty or liability for your use of this information. Patient Education        Learning About Diverticulosis and Diverticulitis  What are diverticulosis and diverticulitis? In diverticulosis and diverticulitis, pouches called diverticula form in the wall of the large intestine, or colon. · In diverticulosis, the pouches do not cause any pain or other symptoms. · In diverticulitis, the pouches get inflamed or infected and cause symptoms. Doctors aren't sure what causes these pouches in the colon. But they think that a low-fiber diet may play a role.  Without fiber to add bulk to the stool, the colon has to work harder than normal to push the stool forward. The pressure from this may cause pouches to form in weak spots along the colon. Some people with diverticulosis get diverticulitis. But experts don't know why this happens. What are the symptoms? · In diverticulosis, most people don't have symptoms. But pouches sometimes bleed. · In diverticulitis, symptoms may last from a few hours to a week or more. They include:  ? Belly pain. This is usually in the lower left side. It is sometimes worse when you move. This is the most common symptom. ? Fever and chills. ? Bloating and gas. ? Diarrhea or constipation. ? Nausea and sometimes vomiting.  ? Not feeling like eating. How can you prevent diverticulitis? You may be able to lower your chance of getting diverticulitis. You can do this by taking steps to prevent constipation. · Eat fruits, vegetables, beans, and whole grains every day. These foods are high in fiber. · Drink plenty of fluids. (Drink enough so that your urine is light yellow or clear like water.) If you have kidney, heart, or liver disease and have to limit fluids, talk with your doctor before you increase the amount of fluids you drink. · Get at least 30 minutes of exercise on most days of the week. Walking is a good choice. You also may want to do other activities, such as running, swimming, cycling, or playing tennis or team sports. · Take a fiber supplement, such as Citrucel or Metamucil, every day if needed. Read and follow all instructions on the label. · Schedule time each day for a bowel movement. Having a daily routine may help. Take your time and do not strain when having a bowel movement. Some people avoid nuts, seeds, berries, and popcorn. They believe that these foods might get trapped in the diverticula and cause pain. But there is no proof that these foods cause diverticulitis or make it worse. How are these problems treated?   · The best way to treat diverticulosis is to avoid constipation. · Treatment for diverticulitis includes antibiotics. It often includes a change in your diet. You may need only liquids at first. Your doctor may suggest pain medicines for pain or belly cramps. In some cases, surgery may be needed. Follow-up care is a key part of your treatment and safety. Be sure to make and go to all appointments, and call your doctor if you are having problems. It's also a good idea to know your test results and keep a list of the medicines you take. Where can you learn more? Go to http://www.gray.com/  Enter J678 in the search box to learn more about \"Learning About Diverticulosis and Diverticulitis. \"  Current as of: April 15, 2020               Content Version: 12.6  © 3652-1697 [employer]. Care instructions adapted under license by [employer] (which disclaims liability or warranty for this information). If you have questions about a medical condition or this instruction, always ask your healthcare professional. [place] [address] any warranty or liability for your use of this information. Patient Education        Hemorrhoids: Care Instructions  Your Care Instructions     Hemorrhoids are enlarged veins that develop in the anal canal. Bleeding during bowel movements, itching, swelling, and rectal pain are the most common symptoms. They can be uncomfortable at times, but hemorrhoids rarely are a serious problem. You can treat most hemorrhoids with simple changes to your diet and bowel habits. These changes include eating more fiber and not straining to pass stools. Most hemorrhoids do not need surgery or other treatment unless they are very large and painful or bleed a lot. Follow-up care is a key part of your treatment and safety. Be sure to make and go to all appointments, and call your doctor if you are having problems.  It's also a good idea to know your test results and keep a list of the medicines you take. How can you care for yourself at home? · Sit in a few inches of warm water (sitz bath) 3 times a day and after bowel movements. The warm water helps with pain and itching. · Put ice on your anal area several times a day for 10 minutes at a time. Put a thin cloth between the ice and your skin. Follow this by placing a warm, wet towel on the area for another 10 to 20 minutes. · Take pain medicines exactly as directed. ? If the doctor gave you a prescription medicine for pain, take it as prescribed. ? If you are not taking a prescription pain medicine, ask your doctor if you can take an over-the-counter medicine. · Keep the anal area clean, but be gentle. Use water and a fragrance-free soap, such as [place], or use baby wipes or medicated pads, such as Tucks. · Wear cotton underwear and loose clothing to decrease moisture in the anal area. · Eat more fiber. Include foods such as whole-grain breads and cereals, raw vegetables, raw and dried fruits, and beans. · Drink plenty of fluids, enough so that your urine is light yellow or clear like water. If you have kidney, heart, or liver disease and have to limit fluids, talk with your doctor before you increase the amount of fluids you drink. · Use a stool softener that contains bran or psyllium. You can save money by buying bran or psyllium (available in bulk at most health food stores) and sprinkling it on foods or stirring it into fruit juice. Or you can use a product such as Metamucil or Hydrocil. · Practice healthy bowel habits. ? Go to the bathroom as soon as you have the urge. ? Avoid straining to pass stools. Relax and give yourself time to let things happen naturally. ? Do not hold your breath while passing stools. ? Do not read while sitting on the toilet. Get off the toilet as soon as you have finished. · Take your medicines exactly as prescribed. Call your doctor if you think you are having a problem with your medicine.   When should you call for help? Call 911 anytime you think you may need emergency care. For example, call if:    · You pass maroon or very bloody stools. Call your doctor now or seek immediate medical care if:    · You have increased pain.     · You have increased bleeding. Watch closely for changes in your health, and be sure to contact your doctor if:    · Your symptoms have not improved after 3 or 4 days. Where can you learn more? Go to http://www.turcios.com/  Enter F228 in the search box to learn more about \"Hemorrhoids: Care Instructions. \"  Current as of: April 15, 2020               Content Version: 12.6  © 0111-1020 [employer]. Care instructions adapted under license by [employer] (which disclaims liability or warranty for this information). If you have questions about a medical condition or this instruction, always ask your healthcare professional. [address] any warranty or liability for your use of this information.

## 2021-05-11 ENCOUNTER — HOSPITAL ENCOUNTER (OUTPATIENT)
Dept: MAMMOGRAPHY | Age: 56
Discharge: HOME | End: 2021-05-11
Attending: FAMILY MEDICINE
Payer: MEDICAID

## 2021-05-11 DIAGNOSIS — Z12.31 VISIT FOR SCREENING MAMMOGRAM: ICD-10-CM

## 2021-05-11 PROCEDURE — 77063 BREAST TOMOSYNTHESIS BI: CPT

## 2021-05-11 PROCEDURE — 77067 SCR MAMMO BI INCL CAD: CPT

## 2021-11-02 ENCOUNTER — APPOINTMENT (OUTPATIENT)
Dept: GENERAL RADIOLOGY | Age: 56
DRG: 280 | End: 2021-11-02
Attending: STUDENT IN AN ORGANIZED HEALTH CARE EDUCATION/TRAINING PROGRAM
Payer: MEDICAID

## 2021-11-02 ENCOUNTER — APPOINTMENT (OUTPATIENT)
Dept: ULTRASOUND IMAGING | Age: 56
DRG: 280 | End: 2021-11-02
Attending: STUDENT IN AN ORGANIZED HEALTH CARE EDUCATION/TRAINING PROGRAM
Payer: MEDICAID

## 2021-11-02 ENCOUNTER — APPOINTMENT (OUTPATIENT)
Dept: MRI IMAGING | Age: 56
DRG: 280 | End: 2021-11-02
Attending: STUDENT IN AN ORGANIZED HEALTH CARE EDUCATION/TRAINING PROGRAM
Payer: MEDICAID

## 2021-11-02 ENCOUNTER — HOSPITAL ENCOUNTER (INPATIENT)
Age: 56
LOS: 3 days | Discharge: HOME | DRG: 280 | End: 2021-11-05
Attending: STUDENT IN AN ORGANIZED HEALTH CARE EDUCATION/TRAINING PROGRAM | Admitting: HOSPITALIST
Payer: MEDICAID

## 2021-11-02 DIAGNOSIS — E87.5 ACUTE HYPERKALEMIA: ICD-10-CM

## 2021-11-02 DIAGNOSIS — R79.89 ELEVATED LFTS: ICD-10-CM

## 2021-11-02 DIAGNOSIS — F10.929 ALCOHOLIC INTOXICATION WITH COMPLICATION (HCC): ICD-10-CM

## 2021-11-02 DIAGNOSIS — R17 JAUNDICE: Primary | ICD-10-CM

## 2021-11-02 DIAGNOSIS — K81.0 ACUTE CHOLECYSTITIS: ICD-10-CM

## 2021-11-02 DIAGNOSIS — F10.10 ALCOHOL ABUSE: ICD-10-CM

## 2021-11-02 PROBLEM — E87.1 HYPONATREMIA: Status: ACTIVE | Noted: 2021-11-02

## 2021-11-02 LAB
ALBUMIN SERPL-MCNC: 2.4 G/DL (ref 3.4–5)
ALBUMIN/GLOB SERPL: 0.4 {RATIO} (ref 0.8–1.7)
ALP SERPL-CCNC: 428 U/L (ref 45–117)
ALT SERPL-CCNC: 96 U/L (ref 13–56)
ANION GAP SERPL CALC-SCNC: 4 MMOL/L (ref 3–18)
APAP SERPL-MCNC: <2 UG/ML (ref 10–30)
APPEARANCE UR: ABNORMAL
AST SERPL-CCNC: 305 U/L (ref 10–38)
ATRIAL RATE: 82 BPM
BASOPHILS # BLD: 0 K/UL (ref 0–0.1)
BASOPHILS NFR BLD: 0 % (ref 0–2)
BILIRUB DIRECT SERPL-MCNC: 6.3 MG/DL (ref 0–0.2)
BILIRUB SERPL-MCNC: 13 MG/DL (ref 0.2–1)
BILIRUB UR QL: ABNORMAL
BUN SERPL-MCNC: 6 MG/DL (ref 7–18)
BUN/CREAT SERPL: 6 (ref 12–20)
CALCIUM SERPL-MCNC: 9 MG/DL (ref 8.5–10.1)
CALCULATED P AXIS, ECG09: 68 DEGREES
CALCULATED R AXIS, ECG10: 55 DEGREES
CALCULATED T AXIS, ECG11: 51 DEGREES
CHLORIDE SERPL-SCNC: 95 MMOL/L (ref 100–111)
CO2 SERPL-SCNC: 27 MMOL/L (ref 21–32)
COLOR UR: ABNORMAL
CREAT SERPL-MCNC: 1.09 MG/DL (ref 0.6–1.3)
DIAGNOSIS, 93000: NORMAL
DIFFERENTIAL METHOD BLD: ABNORMAL
EOSINOPHIL # BLD: 0 K/UL (ref 0–0.4)
EOSINOPHIL NFR BLD: 0 % (ref 0–5)
ERYTHROCYTE [DISTWIDTH] IN BLOOD BY AUTOMATED COUNT: 18.8 % (ref 11.6–14.5)
ETHANOL SERPL-MCNC: 247 MG/DL (ref 0–3)
GLOBULIN SER CALC-MCNC: 5.5 G/DL (ref 2–4)
GLUCOSE SERPL-MCNC: 101 MG/DL (ref 74–99)
GLUCOSE UR STRIP.AUTO-MCNC: NEGATIVE MG/DL
HCT VFR BLD AUTO: 28.7 % (ref 35–45)
HGB BLD-MCNC: 9.8 G/DL (ref 12–16)
HGB UR QL STRIP: NEGATIVE
INR PPP: 1.3 (ref 0.8–1.2)
KETONES UR QL STRIP.AUTO: NEGATIVE MG/DL
LACTATE BLD-SCNC: 2.21 MMOL/L (ref 0.4–2)
LACTATE BLD-SCNC: 3.12 MMOL/L (ref 0.4–2)
LEUKOCYTE ESTERASE UR QL STRIP.AUTO: NEGATIVE
LIPASE SERPL-CCNC: 142 U/L (ref 73–393)
LYMPHOCYTES # BLD: 1.7 K/UL (ref 0.9–3.6)
LYMPHOCYTES NFR BLD: 15 % (ref 21–52)
MCH RBC QN AUTO: 33.4 PG (ref 24–34)
MCHC RBC AUTO-ENTMCNC: 34.1 G/DL (ref 31–37)
MCV RBC AUTO: 98 FL (ref 78–100)
MONOCYTES # BLD: 1.4 K/UL (ref 0.05–1.2)
MONOCYTES NFR BLD: 13 % (ref 3–10)
NEUTS SEG # BLD: 7.7 K/UL (ref 1.8–8)
NEUTS SEG NFR BLD: 70 % (ref 40–73)
NITRITE UR QL STRIP.AUTO: NEGATIVE
P-R INTERVAL, ECG05: 156 MS
PH UR STRIP: 6.5 [PH] (ref 5–8)
PLATELET # BLD AUTO: 96 K/UL (ref 135–420)
PMV BLD AUTO: 12.2 FL (ref 9.2–11.8)
POTASSIUM SERPL-SCNC: 6.2 MMOL/L (ref 3.5–5.5)
PROT SERPL-MCNC: 7.9 G/DL (ref 6.4–8.2)
PROT UR STRIP-MCNC: NEGATIVE MG/DL
PROTHROMBIN TIME: 16.1 SEC (ref 11.5–15.2)
Q-T INTERVAL, ECG07: 366 MS
QRS DURATION, ECG06: 76 MS
QTC CALCULATION (BEZET), ECG08: 427 MS
RBC # BLD AUTO: 2.93 M/UL (ref 4.2–5.3)
SALICYLATES SERPL-MCNC: <1.7 MG/DL (ref 2.8–20)
SODIUM SERPL-SCNC: 126 MMOL/L (ref 136–145)
SP GR UR REFRACTOMETRY: 1.01 (ref 1–1.03)
UROBILINOGEN UR QL STRIP.AUTO: 2 EU/DL (ref 0.2–1)
VENTRICULAR RATE, ECG03: 82 BPM
WBC # BLD AUTO: 10.9 K/UL (ref 4.6–13.2)

## 2021-11-02 PROCEDURE — 80048 BASIC METABOLIC PNL TOTAL CA: CPT

## 2021-11-02 PROCEDURE — 80143 DRUG ASSAY ACETAMINOPHEN: CPT

## 2021-11-02 PROCEDURE — 99284 EMERGENCY DEPT VISIT MOD MDM: CPT

## 2021-11-02 PROCEDURE — 80179 DRUG ASSAY SALICYLATE: CPT

## 2021-11-02 PROCEDURE — 83605 ASSAY OF LACTIC ACID: CPT

## 2021-11-02 PROCEDURE — 74183 MRI ABD W/O CNTR FLWD CNTR: CPT

## 2021-11-02 PROCEDURE — APPSS45 APP SPLIT SHARED TIME 31-45 MINUTES: Performed by: PHYSICIAN ASSISTANT

## 2021-11-02 PROCEDURE — 81003 URINALYSIS AUTO W/O SCOPE: CPT

## 2021-11-02 PROCEDURE — 76705 ECHO EXAM OF ABDOMEN: CPT

## 2021-11-02 PROCEDURE — 74011250636 HC RX REV CODE- 250/636: Performed by: PHYSICIAN ASSISTANT

## 2021-11-02 PROCEDURE — 65270000029 HC RM PRIVATE

## 2021-11-02 PROCEDURE — 80076 HEPATIC FUNCTION PANEL: CPT

## 2021-11-02 PROCEDURE — 71046 X-RAY EXAM CHEST 2 VIEWS: CPT

## 2021-11-02 PROCEDURE — 93005 ELECTROCARDIOGRAM TRACING: CPT

## 2021-11-02 PROCEDURE — 74011250636 HC RX REV CODE- 250/636: Performed by: INTERNAL MEDICINE

## 2021-11-02 PROCEDURE — 82077 ASSAY SPEC XCP UR&BREATH IA: CPT

## 2021-11-02 PROCEDURE — 83690 ASSAY OF LIPASE: CPT

## 2021-11-02 PROCEDURE — 85025 COMPLETE CBC W/AUTO DIFF WBC: CPT

## 2021-11-02 PROCEDURE — A9577 INJ MULTIHANCE: HCPCS | Performed by: INTERNAL MEDICINE

## 2021-11-02 PROCEDURE — 99222 1ST HOSP IP/OBS MODERATE 55: CPT | Performed by: INTERNAL MEDICINE

## 2021-11-02 PROCEDURE — 85610 PROTHROMBIN TIME: CPT

## 2021-11-02 PROCEDURE — 74011250636 HC RX REV CODE- 250/636: Performed by: STUDENT IN AN ORGANIZED HEALTH CARE EDUCATION/TRAINING PROGRAM

## 2021-11-02 RX ORDER — SODIUM CHLORIDE 9 MG/ML
75 INJECTION, SOLUTION INTRAVENOUS CONTINUOUS
Status: DISCONTINUED | OUTPATIENT
Start: 2021-11-03 | End: 2021-11-02

## 2021-11-02 RX ORDER — BUMETANIDE 1 MG/1
1 TABLET ORAL 2 TIMES DAILY
Status: DISCONTINUED | OUTPATIENT
Start: 2021-11-02 | End: 2021-11-05 | Stop reason: HOSPADM

## 2021-11-02 RX ORDER — SODIUM CHLORIDE 9 MG/ML
75 INJECTION, SOLUTION INTRAVENOUS CONTINUOUS
Status: DISCONTINUED | OUTPATIENT
Start: 2021-11-02 | End: 2021-11-04

## 2021-11-02 RX ORDER — IPRATROPIUM BROMIDE AND ALBUTEROL SULFATE 2.5; .5 MG/3ML; MG/3ML
3 SOLUTION RESPIRATORY (INHALATION)
Status: DISCONTINUED | OUTPATIENT
Start: 2021-11-02 | End: 2021-11-05 | Stop reason: HOSPADM

## 2021-11-02 RX ORDER — LORAZEPAM 1 MG/1
1 TABLET ORAL
Status: DISCONTINUED | OUTPATIENT
Start: 2021-11-02 | End: 2021-11-05 | Stop reason: HOSPADM

## 2021-11-02 RX ORDER — POLYETHYLENE GLYCOL 3350 17 G/17G
17 POWDER, FOR SOLUTION ORAL DAILY PRN
Status: DISCONTINUED | OUTPATIENT
Start: 2021-11-02 | End: 2021-11-05 | Stop reason: HOSPADM

## 2021-11-02 RX ORDER — HEPARIN SODIUM 5000 [USP'U]/ML
5000 INJECTION, SOLUTION INTRAVENOUS; SUBCUTANEOUS EVERY 8 HOURS
Status: DISCONTINUED | OUTPATIENT
Start: 2021-11-02 | End: 2021-11-05 | Stop reason: HOSPADM

## 2021-11-02 RX ORDER — LORAZEPAM 2 MG/ML
2 INJECTION INTRAMUSCULAR
Status: DISCONTINUED | OUTPATIENT
Start: 2021-11-02 | End: 2021-11-05 | Stop reason: HOSPADM

## 2021-11-02 RX ORDER — LORAZEPAM 1 MG/1
2 TABLET ORAL
Status: DISCONTINUED | OUTPATIENT
Start: 2021-11-02 | End: 2021-11-05 | Stop reason: HOSPADM

## 2021-11-02 RX ORDER — ONDANSETRON 4 MG/1
4 TABLET, ORALLY DISINTEGRATING ORAL
Status: DISCONTINUED | OUTPATIENT
Start: 2021-11-02 | End: 2021-11-05 | Stop reason: HOSPADM

## 2021-11-02 RX ORDER — SODIUM CHLORIDE 0.9 % (FLUSH) 0.9 %
5-40 SYRINGE (ML) INJECTION EVERY 8 HOURS
Status: DISCONTINUED | OUTPATIENT
Start: 2021-11-02 | End: 2021-11-05 | Stop reason: SDUPTHER

## 2021-11-02 RX ORDER — SODIUM CHLORIDE 0.9 % (FLUSH) 0.9 %
5-40 SYRINGE (ML) INJECTION AS NEEDED
Status: DISCONTINUED | OUTPATIENT
Start: 2021-11-02 | End: 2021-11-05 | Stop reason: HOSPADM

## 2021-11-02 RX ORDER — LORAZEPAM 2 MG/ML
1 INJECTION INTRAMUSCULAR
Status: DISCONTINUED | OUTPATIENT
Start: 2021-11-02 | End: 2021-11-05 | Stop reason: HOSPADM

## 2021-11-02 RX ORDER — ARFORMOTEROL TARTRATE 15 UG/2ML
15 SOLUTION RESPIRATORY (INHALATION)
Status: DISCONTINUED | OUTPATIENT
Start: 2021-11-02 | End: 2021-11-05 | Stop reason: HOSPADM

## 2021-11-02 RX ORDER — SODIUM CHLORIDE 0.9 % (FLUSH) 0.9 %
5-40 SYRINGE (ML) INJECTION EVERY 8 HOURS
Status: DISCONTINUED | OUTPATIENT
Start: 2021-11-02 | End: 2021-11-05 | Stop reason: HOSPADM

## 2021-11-02 RX ORDER — ONDANSETRON 2 MG/ML
4 INJECTION INTRAMUSCULAR; INTRAVENOUS
Status: DISCONTINUED | OUTPATIENT
Start: 2021-11-02 | End: 2021-11-05 | Stop reason: HOSPADM

## 2021-11-02 RX ORDER — SODIUM CHLORIDE 0.9 % (FLUSH) 0.9 %
5-40 SYRINGE (ML) INJECTION AS NEEDED
Status: DISCONTINUED | OUTPATIENT
Start: 2021-11-02 | End: 2021-11-05 | Stop reason: SDUPTHER

## 2021-11-02 RX ORDER — BUDESONIDE 0.5 MG/2ML
500 INHALANT ORAL
Status: DISCONTINUED | OUTPATIENT
Start: 2021-11-02 | End: 2021-11-05 | Stop reason: HOSPADM

## 2021-11-02 RX ORDER — LORAZEPAM 2 MG/ML
3 INJECTION INTRAMUSCULAR
Status: DISCONTINUED | OUTPATIENT
Start: 2021-11-02 | End: 2021-11-05 | Stop reason: HOSPADM

## 2021-11-02 RX ORDER — SODIUM POLYSTYRENE SULFONATE 15 G/60ML
15 SUSPENSION ORAL; RECTAL
Status: ACTIVE | OUTPATIENT
Start: 2021-11-02 | End: 2021-11-03

## 2021-11-02 RX ADMIN — LORAZEPAM 2 MG: 2 INJECTION INTRAMUSCULAR; INTRAVENOUS at 21:05

## 2021-11-02 RX ADMIN — GADOBENATE DIMEGLUMINE 10 ML: 529 INJECTION, SOLUTION INTRAVENOUS at 23:07

## 2021-11-02 RX ADMIN — Medication 10 ML: at 22:00

## 2021-11-02 RX ADMIN — SODIUM CHLORIDE 1000 ML: 900 INJECTION, SOLUTION INTRAVENOUS at 19:16

## 2021-11-02 RX ADMIN — SODIUM CHLORIDE 1000 ML: 900 INJECTION, SOLUTION INTRAVENOUS at 17:24

## 2021-11-02 NOTE — ED NOTES
Dr. Francisca Ash notified that patient's potassium is 6.2 per lab. Also stated by lab that result may be elevated due to jaundice.

## 2021-11-02 NOTE — ED PROVIDER NOTES
Patient is a 79-year-old female with a history of daily alcohol use, asthma, and gastroesophageal reflux disease. Patient presents today with primary complaint of increased swelling in her bilateral lower extremities as well as increasing yellowness in her eyes has been present for the past 3 weeks. Patient reports no associated fever/chills, history of hepatitis, abdominal pain, diarrhea, headache, chest pain, or nausea/vomiting. Patient does report increased alcohol intake since onset of her symptoms become more concerned about the symptoms which increased her stress which caused her to drink more alcohol than usual.           Past Medical History:   Diagnosis Date    Alcohol abuse     Alcohol withdrawal syndrome (HCC)     Anemia     Asthma     Bile duct calculus     CAP (community acquired pneumonia)     Constipation     GERD (gastroesophageal reflux disease)     Iron deficiency     Liver function test abnormality     Macrocytosis     Vertigo        Past Surgical History:   Procedure Laterality Date    COLONOSCOPY N/A 2/25/2021    COLONOSCOPY with polypectomies performed by Franco Jose MD at [facility]         Family History:   Problem Relation Age of Onset    No Known Problems Mother     No Known Problems Father     No Known Problems Maternal Grandmother     No Known Problems Maternal Grandfather     No Known Problems Paternal Grandmother     No Known Problems Paternal Grandfather     Cancer Sister         uterine?  Cancer Daughter         uterine?        Social History     Socioeconomic History    Marital status: SINGLE     Spouse name: Not on file    Number of children: Not on file    Years of education: Not on file    Highest education level: Not on file   Occupational History    Not on file   Tobacco Use    Smoking status: Current Every Day Smoker     Packs/day: 0.50     Years: 20.00     Pack years: 10.00    Smokeless tobacco: Never Used   Substance and Sexual Activity    Alcohol use: Yes     Alcohol/week: 3.0 standard drinks     Types: 3 Glasses of wine per week    Drug use: Not Currently    Sexual activity: Never   Other Topics Concern    Not on file   Social History Narrative    Not on file     Social Determinants of Health     Financial Resource Strain:     Difficulty of Paying Living Expenses:    Food Insecurity:     Worried About Running Out of Food in the Last Year:     [address] in the Last Year:    Transportation Needs:     Lack of Transportation (Medical):  Lack of Transportation (Non-Medical):    Physical Activity:     Days of Exercise per Week:     Minutes of Exercise per Session:    Stress:     Feeling of Stress :    Social Connections:     Frequency of Communication with Friends and Family:     Frequency of Social Gatherings with Friends and Family:     Attends Hinduism Services:     Active Member of Clubs or Organizations:     Attends Club or Organization Meetings:     Marital Status:    Intimate Partner Violence:     Fear of Current or Ex-Partner:     Emotionally Abused:     Physically Abused:     Sexually Abused: ALLERGIES: Patient has no known allergies. Review of Systems   Constitutional: Negative for chills and fever. HENT: Negative for rhinorrhea and sore throat. Eyes: Negative for discharge and redness. Respiratory: Negative for cough and shortness of breath. Cardiovascular: Negative for chest pain and leg swelling. Gastrointestinal: Negative for abdominal pain, diarrhea, nausea and vomiting. Genitourinary: Negative for difficulty urinating and dysuria. Musculoskeletal: Negative for back pain and neck pain. Skin: Negative for rash and wound. Neurological: Negative for syncope, light-headedness and headaches.        Vitals:    11/02/21 1444   BP: 115/88   Pulse: 95   Resp: 18   Temp: 97.1 °F (36.2 °C)   SpO2: 96%            Physical Exam  Constitutional:       General: She is not in acute distress. Appearance: She is not ill-appearing, toxic-appearing or diaphoretic. HENT:      Head: Normocephalic and atraumatic. Right Ear: External ear normal.      Left Ear: External ear normal.      Nose: No congestion or rhinorrhea. Mouth/Throat:      Mouth: Mucous membranes are moist.      Pharynx: No oropharyngeal exudate or posterior oropharyngeal erythema. Eyes:      General: Scleral icterus present. Right eye: No discharge. Left eye: No discharge. Pupils: Pupils are equal, round, and reactive to light. Neck:      Vascular: No carotid bruit. Cardiovascular:      Rate and Rhythm: Normal rate and regular rhythm. Heart sounds: No murmur heard. No friction rub. No gallop. Pulmonary:      Effort: Pulmonary effort is normal. No respiratory distress. Breath sounds: No stridor. No wheezing, rhonchi or rales. Abdominal:      General: Abdomen is flat. There is no distension. Tenderness: There is abdominal tenderness. There is no right CVA tenderness, left CVA tenderness, guarding or rebound. Comments: Mild right upper quadrant tenderness with associated hepatomegaly   Musculoskeletal:         General: No swelling, tenderness, deformity or signs of injury. Cervical back: No rigidity or tenderness. Right lower leg: Edema present. Left lower leg: Edema present. Comments: +1 pitting edema from just above the ankle down to the foot   Lymphadenopathy:      Cervical: No cervical adenopathy. Skin:     General: Skin is warm. Capillary Refill: Capillary refill takes less than 2 seconds. Coloration: Skin is not jaundiced or pale. Findings: No bruising, erythema, lesion or rash. Neurological:      General: No focal deficit present. Mental Status: She is alert and oriented to person, place, and time. Sensory: No sensory deficit. Motor: No weakness.    Psychiatric:         Mood and Affect: Mood normal. [facility]  ED Course as of Nov 02 1909   Tue Nov 02, 2021   1607 No evidence of peaked T waves, widened QRS, prolonged MA or any other evidence of hyperkalemia noted on EKG.   Will defer calcium at this time   EKG, 12 LEAD, INITIAL [JK]   1906 This was discussed with gastroenterologist, Dr. Yasmeen Mederos, who recommended MRCP, admission to medicine team.  Case was discussed with hospitalist team who will be admitting patient to their service    [JK]      ED Course User Index  [JK] Minerva Haney MD       Procedures

## 2021-11-02 NOTE — Clinical Note
Status[de-identified] INPATIENT [101] Type of Bed: Telemetry [19] Cardiac Monitoring Required?: Yes Inpatient Hospitalization Certified Necessary for the Following Reasons: 3. Patient receiving treatment that can only be provided in an inpatient setting (further clarification in H&P documentation) Admitting Diagnosis: Jaundice [[id number]] Admitting Physician: Yajaira Dockery [[id number]] Attending Physician: Yajaira Dockery [[id number]] Estimated Length of Stay: 5-7 Midnights Discharge Plan[de-identified] Home with Office Follow-up

## 2021-11-02 NOTE — H&P
History and Physical          Subjective     HPI: Neal Azevedo is a 55 y.o. female with a PMHx of alcohol abuse, GERD, and asthma who presented to the ED with c/o BLE edema and scleral icterus for the past 3 weeks associated with decreased appetite. She denies abd pain, but does state she vomited yesterday. She also states she has been drinking more alcohol than usual due to being concerned about her symptoms. In the ED, labs are significant for elevated LFTs, Na 126, K 6.2, INR 1.3. Abdominal US with dilated CBD and hepatomegaly. GI was consulted and recommended MRCP. Patient will be admitted for further evaluation and treatment. PMHx:  Past Medical History:   Diagnosis Date    Alcohol abuse     Alcohol withdrawal syndrome (HCC)     Anemia     Asthma     Bile duct calculus     CAP (community acquired pneumonia)     Constipation     GERD (gastroesophageal reflux disease)     Iron deficiency     Liver function test abnormality     Macrocytosis     Vertigo        PSurgHx:  Past Surgical History:   Procedure Laterality Date    COLONOSCOPY N/A 2/25/2021    COLONOSCOPY with polypectomies performed by Kayla Christensen MD at [facility]       SocialHx:  Social History     Socioeconomic History    Marital status: SINGLE     Spouse name: Not on file    Number of children: Not on file    Years of education: Not on file    Highest education level: Not on file   Occupational History    Not on file   Tobacco Use    Smoking status: Current Every Day Smoker     Packs/day: 0.50     Years: 20.00     Pack years: 10.00    Smokeless tobacco: Never Used   Substance and Sexual Activity    Alcohol use:  Yes     Alcohol/week: 3.0 standard drinks     Types: 3 Glasses of wine per week    Drug use: Not Currently    Sexual activity: Never   Other Topics Concern    Not on file   Social History Narrative    Not on file     Social Determinants of Health     Financial Resource Strain:     Difficulty of Paying Living Expenses:    Food Insecurity:     Worried About [address] [facility] in the Last Year:     [address] in the Last Year:    Transportation Needs:     Lack of Transportation (Medical):  Lack of Transportation (Non-Medical):    Physical Activity:     Days of Exercise per Week:     Minutes of Exercise per Session:    Stress:     Feeling of Stress :    Social Connections:     Frequency of Communication with Friends and Family:     Frequency of Social Gatherings with Friends and Family:     Attends Yarsanism Services:     Active Member of Clubs or Organizations:     Attends Club or Organization Meetings:     Marital Status:    Intimate Partner Violence:     Fear of Current or Ex-Partner:     Emotionally Abused:     Physically Abused:     Sexually Abused:        FamilyHx:  Family History   Problem Relation Age of Onset    No Known Problems Mother     No Known Problems Father     No Known Problems Maternal Grandmother     No Known Problems Maternal Grandfather     No Known Problems Paternal Grandmother     No Known Problems Paternal Grandfather     Cancer Sister         uterine?  Cancer Daughter         uterine? Home Medications:  Prior to Admission Medications   Prescriptions Last Dose Informant Patient Reported? Taking? bumetanide (BUMEX) 1 mg tablet   Yes No   Sig: Take 1 mg by mouth two (2) times a day. cyproheptadine (PERIACTIN) 4 mg tablet   Yes No   Sig: Take 4 mg by mouth two (2) times a day. ferrous sulfate (Iron) 325 mg (65 mg iron) tablet   Yes No   Sig: Take 65 mg by mouth Daily (before breakfast). fluticasone propion-salmeteroL (ADVAIR/WIXELA) 250-50 mcg/dose diskus inhaler   Yes No   Sig: INHALE 1 PUFF BY MOUTH TWICE DAILY   omeprazole (PRILOSEC) 20 mg capsule   Yes No   Sig: Take 20 mg by mouth daily. spironolactone (ALDACTONE) 25 mg tablet   Yes No   Sig: Take 25 mg by mouth daily.    tiZANidine (ZANAFLEX) 4 mg tablet   Yes No   Sig: TAKE 1 TABLET BY MOUTH UP TO TWICE DAILY AS NEEDED FOR SEVERE SPASMS      Facility-Administered Medications: None       Allergies:  No Known Allergies     Review of Systems:  CONST: no weight loss, no falls, no fever or chills, +decreased appetite   HEENT: No change in vision, no earache, no tinnitus, no sore throat or sinus congestion. +scleral icterus  NECK: No pain or stiffness. PULM: No shortness of breath, no cough or wheeze. CV: no pnd or orthopnea, no CP, no palpitations, +BLE edema  GI: No abdominal pain, no nausea, no vomiting or diarrhea, no melena or bright red blood per rectum. : No urinary frequency, no urgency, no hesitancy or dysuria. MSK: No joint or muscle pain, no back pain, no recent trauma. INTEG: No rash, no itching, no lesions. ENDO No polyuria, no polydipsia, no heat or cold intolerance. HEME: No anemia or easy bruising or bleeding. NEURO No headache, no dizziness, no seizures, no numbness, no tingling or weakness.    PSYCH: Anxiety and depression      Objective     Physical Exam:  Visit Vitals  /88   Pulse 95   Temp 97.1 °F (36.2 °C)   Resp 18   SpO2 96%       General: NAD, appears stated age, alert  Skin: warm, dry, no rashes  Eyes: PERRL, scleral icterus bilaterally  HENT: normocephalic/atraumatic, moist mucus membranes  Respiratory: CTA with no signs of respiratory distress  Cardiovascular: RRR, no m/r/g  GI: soft, RUQ TTP, normal bowel sounds  Extremities: no cyanosis, 2+ pitting edema BLE  Neuro: moves all extremities, no focal deficits, normal speech  Psych: appropriate mood and affect, no visual or auditory hallucinations    Laboratory Studies:  Recent Results (from the past 24 hour(s))   EKG, 12 LEAD, INITIAL    Collection Time: 11/02/21  2:58 PM   Result Value Ref Range    Ventricular Rate 82 BPM    Atrial Rate 82 BPM    P-R Interval 156 ms    QRS Duration 76 ms    Q-T Interval 366 ms    QTC Calculation (Bezet) 427 ms    Calculated P Axis 68 degrees    Calculated R Axis 55 degrees    Calculated T Axis 51 degrees    Diagnosis       Normal sinus rhythm  Nonspecific T wave abnormality  Abnormal ECG  When compared with ECG of 30-NOV-2020 19:30,  No significant change was found  Confirmed by Erica Guzman MD, Angelia Islas ([id number]) on 11/2/2021 3:40:16 PM     CBC WITH AUTOMATED DIFF    Collection Time: 11/02/21  3:00 PM   Result Value Ref Range    WBC 10.9 4.6 - 13.2 K/uL    RBC 2.93 (L) 4.20 - 5.30 M/uL    HGB 9.8 (L) 12.0 - 16.0 g/dL    HCT 28.7 (L) 35.0 - 45.0 %    MCV 98.0 78.0 - 100.0 FL    MCH 33.4 24.0 - 34.0 PG    MCHC 34.1 31.0 - 37.0 g/dL    RDW 18.8 (H) 11.6 - 14.5 %    PLATELET 96 (L) 284 - 420 K/uL    MPV 12.2 (H) 9.2 - 11.8 FL    NEUTROPHILS 70 40 - 73 %    LYMPHOCYTES 15 (L) 21 - 52 %    MONOCYTES 13 (H) 3 - 10 %    EOSINOPHILS 0 0 - 5 %    BASOPHILS 0 0 - 2 %    ABS. NEUTROPHILS 7.7 1.8 - 8.0 K/UL    ABS. LYMPHOCYTES 1.7 0.9 - 3.6 K/UL    ABS. MONOCYTES 1.4 (H) 0.05 - 1.2 K/UL    ABS. EOSINOPHILS 0.0 0.0 - 0.4 K/UL    ABS. BASOPHILS 0.0 0.0 - 0.1 K/UL    DF AUTOMATED     METABOLIC PANEL, BASIC    Collection Time: 11/02/21  3:00 PM   Result Value Ref Range    Sodium 126 (L) 136 - 145 mmol/L    Potassium 6.2 (HH) 3.5 - 5.5 mmol/L    Chloride 95 (L) 100 - 111 mmol/L    CO2 27 21 - 32 mmol/L    Anion gap 4 3.0 - 18 mmol/L    Glucose 101 (H) 74 - 99 mg/dL    BUN 6 (L) 7.0 - 18 MG/DL    Creatinine 1.09 0.6 - 1.3 MG/DL    BUN/Creatinine ratio 6 (L) 12 - 20      GFR est AA >60 >60 ml/min/1.73m2    GFR est non-AA 52 (L) >60 ml/min/1.73m2    Calcium 9.0 8.5 - 10.1 MG/DL   HEPATIC FUNCTION PANEL    Collection Time: 11/02/21  3:00 PM   Result Value Ref Range    Protein, total 7.9 6.4 - 8.2 g/dL    Albumin 2.4 (L) 3.4 - 5.0 g/dL    Globulin 5.5 (H) 2.0 - 4.0 g/dL    A-G Ratio 0.4 (L) 0.8 - 1.7      Bilirubin, total 13.0 (H) 0.2 - 1.0 MG/DL    Bilirubin, direct 6.3 (H) 0.0 - 0.2 MG/DL    Alk.  phosphatase 428 (H) 45 - 117 U/L    AST (SGOT) 305 (H) 10 - 38 U/L    ALT (SGPT) 96 (H) 13 - 56 U/L ACETAMINOPHEN    Collection Time: 11/02/21  3:00 PM   Result Value Ref Range    Acetaminophen level <2 (L) 10.0 - 30.0 ug/mL   ETHYL ALCOHOL    Collection Time: 11/02/21  3:00 PM   Result Value Ref Range    ALCOHOL(ETHYL),SERUM 247 (H) 0 - 3 MG/DL   SALICYLATE    Collection Time: 11/02/21  3:00 PM   Result Value Ref Range    Salicylate level <8.6 (L) 2.8 - 20.0 MG/DL   POC LACTIC ACID    Collection Time: 11/02/21  3:10 PM   Result Value Ref Range    Lactic Acid (POC) 3.12 (HH) 0.40 - 2.00 mmol/L   PROTHROMBIN TIME + INR    Collection Time: 11/02/21  3:30 PM   Result Value Ref Range    Prothrombin time 16.1 (H) 11.5 - 15.2 sec    INR 1.3 (H) 0.8 - 1.2     URINALYSIS W/ RFLX MICROSCOPIC    Collection Time: 11/02/21  5:24 PM   Result Value Ref Range    Color DARK YELLOW      Appearance CLOUDY      Specific gravity 1.006 1.005 - 1.030      pH (UA) 6.5 5.0 - 8.0      Protein Negative NEG mg/dL    Glucose Negative NEG mg/dL    Ketone Negative NEG mg/dL    Bilirubin LARGE (A) NEG      Blood Negative NEG      Urobilinogen 2.0 (H) 0.2 - 1.0 EU/dL    Nitrites Negative NEG      Leukocyte Esterase Negative NEG     LIPASE    Collection Time: 11/02/21  5:24 PM   Result Value Ref Range    Lipase 142 73 - 393 U/L       Imaging Reviewed:  XR CHEST PA LAT    Result Date: 11/2/2021  PA lateral CXR: HISTORY: Shortness of breath. COMPARISON: 1/25/2020 Cardiac silhouette and vascularity within normal limits. No consolidation or pleural effusion. Mild scarring in right upper lateral chest, grossly unchanged. No acute pulmonary disease. No CHF or pneumonia    US ABD LTD    Result Date: 11/2/2021  Ultrasound right upper quadrant: HISTORY: Jaundice. Comparison November 21, 2020 Pancreatic head is unremarkable. Liver is echogenic, 21 cm in length. No discrete mass. No intrahepatic biliary dilatation Gallbladder with biliary sludge. No gallstones. No wall thickening. No sonographic Gutierrez signs reported.  Common bile duct is dilated to 1 cm. Right kidney 9.5 cm in length. Normal echogenicity. No hydronephrosis, renal mass or renal stones. Color and spectral Doppler shows normal portal venous flow. 1. Dilated common bile duct with no intrahepatic biliary dilatation. Further evaluation with MRCP recommended. Hepatomegaly with likely fatty infiltration 2. Biliary sludge in the gallbladder with no findings of acute cholecystitis. Assessment/Plan     Active Problems:    Jaundice (11/2/2021)      Alcohol abuse (11/2/2021)      Alcohol intoxication ([address] (11/2/2021)      Hyperkalemia (11/2/2021)      Hyponatremia (11/2/2021)      Elevated LFTs (11/2/2021)      Elevated LFTs, jaundice  - elevated Tbili, ALT, AST, alk phos, INR  - dilated CBD on US  - GI consulted  - MRCP in progress  - NPO p MN in case she needs ERCP    Hyperkalemia  - 6.2 on initial labs  - no treatment ordered in ED  - kayexalate ordered, monitor K    Hyponatremia  - IV fluids: normal saline  - monitor BMP     Alcohol abuse/intox  - alcohol level 247 in ED  - monitor for w/d symptoms; CIWA protocol       Cont acceptable home medications for chronic conditions   - DVT protocol    I have personally reviewed all pertinent labs, films and EKGs that have officially resulted. I reviewed available electronic documentation outlining the initial presentation as well as the emergency room physician's encounter.     [facility]  Hospitalist Division  Office:  [phone]  Pager: [phone]

## 2021-11-03 PROBLEM — E44.1 MILD PROTEIN-CALORIE MALNUTRITION (HCC): Status: ACTIVE | Noted: 2021-11-03

## 2021-11-03 LAB
ALBUMIN SERPL-MCNC: 2.2 G/DL (ref 3.4–5)
ALBUMIN/GLOB SERPL: 0.5 {RATIO} (ref 0.8–1.7)
ALP SERPL-CCNC: 385 U/L (ref 45–117)
ALT SERPL-CCNC: 80 U/L (ref 13–56)
ANION GAP SERPL CALC-SCNC: 5 MMOL/L (ref 3–18)
AST SERPL-CCNC: 245 U/L (ref 10–38)
BILIRUB DIRECT SERPL-MCNC: 9.1 MG/DL (ref 0–0.2)
BILIRUB SERPL-MCNC: 12.2 MG/DL (ref 0.2–1)
BUN SERPL-MCNC: 6 MG/DL (ref 7–18)
BUN/CREAT SERPL: 6 (ref 12–20)
CALCIUM SERPL-MCNC: 8.6 MG/DL (ref 8.5–10.1)
CHLORIDE SERPL-SCNC: 99 MMOL/L (ref 100–111)
CO2 SERPL-SCNC: 28 MMOL/L (ref 21–32)
CREAT SERPL-MCNC: 0.94 MG/DL (ref 0.6–1.3)
ERYTHROCYTE [DISTWIDTH] IN BLOOD BY AUTOMATED COUNT: 18.7 % (ref 11.6–14.5)
GLOBULIN SER CALC-MCNC: 4.6 G/DL (ref 2–4)
GLUCOSE SERPL-MCNC: 101 MG/DL (ref 74–99)
HCT VFR BLD AUTO: 26.6 % (ref 35–45)
HGB BLD-MCNC: 9 G/DL (ref 12–16)
LACTATE BLD-SCNC: 2.16 MMOL/L (ref 0.4–2)
LACTATE SERPL-SCNC: 1.6 MMOL/L (ref 0.4–2)
MCH RBC QN AUTO: 33.6 PG (ref 24–34)
MCHC RBC AUTO-ENTMCNC: 33.8 G/DL (ref 31–37)
MCV RBC AUTO: 99.3 FL (ref 78–100)
PLATELET # BLD AUTO: 75 K/UL (ref 135–420)
PMV BLD AUTO: 11.4 FL (ref 9.2–11.8)
POTASSIUM SERPL-SCNC: 3.4 MMOL/L (ref 3.5–5.5)
PROT SERPL-MCNC: 6.8 G/DL (ref 6.4–8.2)
RBC # BLD AUTO: 2.68 M/UL (ref 4.2–5.3)
SODIUM SERPL-SCNC: 132 MMOL/L (ref 136–145)
WBC # BLD AUTO: 8.6 K/UL (ref 4.6–13.2)

## 2021-11-03 PROCEDURE — 74011250637 HC RX REV CODE- 250/637: Performed by: PHYSICIAN ASSISTANT

## 2021-11-03 PROCEDURE — 36415 COLL VENOUS BLD VENIPUNCTURE: CPT

## 2021-11-03 PROCEDURE — 80048 BASIC METABOLIC PNL TOTAL CA: CPT

## 2021-11-03 PROCEDURE — 83605 ASSAY OF LACTIC ACID: CPT

## 2021-11-03 PROCEDURE — 85027 COMPLETE CBC AUTOMATED: CPT

## 2021-11-03 PROCEDURE — 81256 HFE GENE: CPT

## 2021-11-03 PROCEDURE — 83516 IMMUNOASSAY NONANTIBODY: CPT

## 2021-11-03 PROCEDURE — 2709999900 HC NON-CHARGEABLE SUPPLY

## 2021-11-03 PROCEDURE — 80076 HEPATIC FUNCTION PANEL: CPT

## 2021-11-03 PROCEDURE — 99232 SBSQ HOSP IP/OBS MODERATE 35: CPT | Performed by: INTERNAL MEDICINE

## 2021-11-03 PROCEDURE — 74011000250 HC RX REV CODE- 250: Performed by: PHYSICIAN ASSISTANT

## 2021-11-03 PROCEDURE — 74011000258 HC RX REV CODE- 258: Performed by: INTERNAL MEDICINE

## 2021-11-03 PROCEDURE — 74011250636 HC RX REV CODE- 250/636: Performed by: PHYSICIAN ASSISTANT

## 2021-11-03 PROCEDURE — 65660000000 HC RM CCU STEPDOWN

## 2021-11-03 PROCEDURE — 74011250636 HC RX REV CODE- 250/636: Performed by: INTERNAL MEDICINE

## 2021-11-03 PROCEDURE — 82784 ASSAY IGA/IGD/IGG/IGM EACH: CPT

## 2021-11-03 RX ORDER — SAME BUTANEDISULFONATE/BETAINE 400-600 MG
500 POWDER IN PACKET (EA) ORAL 2 TIMES DAILY
Status: DISCONTINUED | OUTPATIENT
Start: 2021-11-04 | End: 2021-11-05 | Stop reason: HOSPADM

## 2021-11-03 RX ADMIN — ARFORMOTEROL TARTRATE 15 MCG: 15 SOLUTION RESPIRATORY (INHALATION) at 12:22

## 2021-11-03 RX ADMIN — HEPARIN SODIUM 5000 UNITS: 5000 INJECTION INTRAVENOUS; SUBCUTANEOUS at 13:39

## 2021-11-03 RX ADMIN — BUDESONIDE 500 MCG: 0.5 SUSPENSION RESPIRATORY (INHALATION) at 12:22

## 2021-11-03 RX ADMIN — SODIUM CHLORIDE 75 ML/HR: 900 INJECTION, SOLUTION INTRAVENOUS at 00:21

## 2021-11-03 RX ADMIN — PIPERACILLIN AND TAZOBACTAM 3.38 G: 3; .375 INJECTION, POWDER, LYOPHILIZED, FOR SOLUTION INTRAVENOUS at 23:32

## 2021-11-03 RX ADMIN — Medication 10 ML: at 04:58

## 2021-11-03 RX ADMIN — BUDESONIDE 500 MCG: 0.5 SUSPENSION RESPIRATORY (INHALATION) at 23:32

## 2021-11-03 RX ADMIN — BUDESONIDE 500 MCG: 0.5 SUSPENSION RESPIRATORY (INHALATION) at 00:21

## 2021-11-03 RX ADMIN — ARFORMOTEROL TARTRATE 15 MCG: 15 SOLUTION RESPIRATORY (INHALATION) at 00:21

## 2021-11-03 RX ADMIN — SODIUM CHLORIDE 75 ML/HR: 900 INJECTION, SOLUTION INTRAVENOUS at 13:42

## 2021-11-03 RX ADMIN — BUMETANIDE 1 MG: 1 TABLET ORAL at 00:22

## 2021-11-03 RX ADMIN — Medication 10 ML: at 04:59

## 2021-11-03 RX ADMIN — ARFORMOTEROL TARTRATE 15 MCG: 15 SOLUTION RESPIRATORY (INHALATION) at 23:32

## 2021-11-03 RX ADMIN — HEPARIN SODIUM 5000 UNITS: 5000 INJECTION INTRAVENOUS; SUBCUTANEOUS at 00:21

## 2021-11-03 NOTE — PROGRESS NOTES
Reason for Admission:  Jaundice [R17]                 RUR Score:    14%            Plan for utilizing home health:    Not at this time                      Likelihood of Readmission:   LOW                         Transition of Care Plan:              Initial assessment completed with patient. Cognitive status of patient: oriented to time, place, person and situation. Face sheet information confirmed:  yes. The patient designates sonDougie to participate in her discharge plan and to receive any needed information. This patient lives in a single family home with son. Patient is able to navigate steps as needed. Prior to hospitalization, patient was considered to be independent with ADLs/IADLS : yes . Patient has a current ACP document on file: no      Healthcare Decision Maker:     Click here to complete [address] including selection of the Healthcare Decision Maker Relationship (ie \"Primary\")    The son will be available to transport patient home upon discharge. The patient has no DME available in the home. Patient is not currently active with home health. Patient has not stayed in a skilled nursing facility or rehab. This patient is on dialysis :no    Currently, the discharge plan is Home. The patient states that she can obtain her medications from the pharmacy, and take her medications as directed. Patient's current insurance is [employer]. Care Management Interventions  PCP Verified by CM:  Yes  Mode of Transport at Discharge: Self  Transition of Care Consult (CM Consult): Discharge Planning  Support Systems: Child(everardo)  Confirm Follow Up Transport: Family  Discharge Location  Discharge Placement: Home        Elisabet Ken RN - Outcomes Manager  [phone]

## 2021-11-03 NOTE — ROUTINE PROCESS
Bedside and Verbal shift change report given to Estephania Sotelo (oncoming nurse) by Sohail Lucas (offgoing nurse). Report included the following information SBAR, Kardex, Procedure Summary, Intake/Output, MAR and Recent Results.

## 2021-11-03 NOTE — PROGRESS NOTES
0530  Pt brought from Ed via stretcher, awake, no deficit or distress. VS taken, made comfortable, Assessment done    Bedside and Verbal shift change report given to  Che Goldman RN (oncoming nurse) by Yvonne Espitia RN (offgoing nurse). Report included the following information SBAR, Kardex, Intake/Output and MAR.

## 2021-11-03 NOTE — ED NOTES
Received call from MRI tech that pt will not do scan until she gets something to relax her. Will medicate pt accordingly to have scan done.

## 2021-11-03 NOTE — ROUTINE PROCESS
Bedside and Verbal shift change report given to Estephania Sotelo (oncoming nurse) by Diann Aquino (offgoing nurse). Report included the following information SBAR, Kardex, Procedure Summary, Intake/Output, MAR and Recent Results.

## 2021-11-03 NOTE — CONSULTS
[email or web address]  [phone]    GASTROENTEROLOGY CONSULT      Impression:   1. Jaundice - likely acute alcoholic hepatitis df score is 33.4; no steroids due to lactic acidosis, concern for infection  - 11/3 tBili 12.2, Direct Bili 9.1.   - 1/2021 tBili 0.5  2. Common Bile Duct Dilation - chronic finding seen on [place] 1/9/2019  3. Etoh Abuse  4. Elevated LFT -  - 11/3 ALT 80, , .   - 1/2021 , ALT 39, Alk phos 285  - Negative for viral HAV/HBV/HCV 1/2021  5. Possible Cirrhosis   - 3//2021 MRI Abdomen - early or mild cirrhosis suggested, trace ascites. - Labs 1/2021 no fibrosis or steatosis; iron sat 71%, elevated AP only with + ASMA, o/w negative -- could be PBC, although not seen on MRI   6. Chronic Anemia - stable  - 11/3 Hgb 9.0, HCT 26.6.   - 1/2021 Hgb 9.9, Hct 31.9  - EGD-[place] 2/2021 significant for mild HP negative chronic gastritis w/ FIM and two TA rectal polyps w/ recommended repeat EGD and colo 5 yrs. 7. Hyperkalemia  8. Hyponatremia  9. Hx of heroin addiction - on methadone       Plan:   1. Await MRI/MRCP results. 2. Trend LFT. Suspect alcohol induced hepatitis. Will check hemochromatosis and repeat ASMA. 3. Advised alcohol cessation. 4. Monitor H/H, transfuse if Hgb <7.  5. Continue medical management per primary team.      Chief Complaint: Jaundice      HPI:  Ying Yoon is a 55 y.o. female who I am being asked to see in consultation for an opinion regarding new onset jaundice x 3 weeks. PT states she noted yellowing of her sclera which concerned her enough to present to the ED 11/2. PT reports she drinks 2-3 12oz cans of beer per day. PT informed the ED provider that she increased etoh consumption secondary to stress when she first noticed the color change. She also reports darker urine and light colored stools. Denies fever, chills, nausea, vomiting, black/bloody stools, abdominal pain, or AMS.     Follows w/ [facility] for chronic elevated liver enzymes, dilated CBD, and etoh abuse. Including a prior w/u for CBD dilation w/ MRI 3/2021 negative for mass lesion or biliary obstruction. Labs notable for positive ASMA, no finding to suggest PBC on imaging. PMH:   Past Medical History:   Diagnosis Date    Alcohol abuse     Alcohol withdrawal syndrome (HCC)     Anemia     Asthma     Bile duct calculus     CAP (community acquired pneumonia)     Constipation     GERD (gastroesophageal reflux disease)     Iron deficiency     Liver function test abnormality     Macrocytosis     Vertigo        PSH:   Past Surgical History:   Procedure Laterality Date    COLONOSCOPY N/A 2/25/2021    COLONOSCOPY with polypectomies performed by Anshul Sauer MD at [facility]       Social HX:   Social History     Socioeconomic History    Marital status: SINGLE     Spouse name: Not on file    Number of children: Not on file    Years of education: Not on file    Highest education level: Not on file   Occupational History    Not on file   Tobacco Use    Smoking status: Current Every Day Smoker     Packs/day: 0.50     Years: 20.00     Pack years: 10.00    Smokeless tobacco: Never Used   Substance and Sexual Activity    Alcohol use: Yes     Alcohol/week: 3.0 standard drinks     Types: 3 Glasses of wine per week    Drug use: Not Currently    Sexual activity: Never   Other Topics Concern    Not on file   Social History Narrative    Not on file     Social Determinants of Health     Financial Resource Strain:     Difficulty of Paying Living Expenses: Not on file   Food Insecurity:     Worried About Running Out of Food in the Last Year: Not on file    Teofilo of Food in the Last Year: Not on file   Transportation Needs:     Lack of Transportation (Medical): Not on file    Lack of Transportation (Non-Medical):  Not on file   Physical Activity:     Days of Exercise per Week: Not on file    Minutes of Exercise per Session: Not on file   Stress:     Feeling of Stress : Not on file   Social Connections:     Frequency of Communication with Friends and Family: Not on file    Frequency of Social Gatherings with Friends and Family: Not on file    Attends Judaism Services: Not on file    Active Member of Clubs or Organizations: Not on file    Attends Club or Organization Meetings: Not on file    Marital Status: Not on file   Intimate Partner Violence:     Fear of Current or Ex-Partner: Not on file    Emotionally Abused: Not on file    Physically Abused: Not on file    Sexually Abused: Not on file   Housing Stability:     Unable to Pay for Housing in the Last Year: Not on file    Number of [place] in the Last Year: Not on file    Unstable Housing in the Last Year: Not on file       FHX:   Family History   Problem Relation Age of Onset    No Known Problems Mother     No Known Problems Father     No Known Problems Maternal Grandmother     No Known Problems Maternal Grandfather     No Known Problems Paternal Grandmother     No Known Problems Paternal Grandfather     Cancer Sister         uterine?  Cancer Daughter         uterine? Allergy:   No Known Allergies    Patient Active Problem List   Diagnosis Code    Pneumonia J18.9    CAP (community acquired pneumonia) J18.9    SIRS (systemic inflammatory response syndrome) ([facility]) R65.10    Jaundice R17    Alcohol abuse F10.10    Alcohol intoxication ([facility] [address] F10.929    Hyperkalemia E87.5    Hyponatremia E87.1    Elevated LFTs R79.89       Home Medications:     Medications Prior to Admission   Medication Sig    tiZANidine (ZANAFLEX) 4 mg tablet TAKE 1 TABLET BY MOUTH UP TO TWICE DAILY AS NEEDED FOR SEVERE SPASMS    fluticasone propion-salmeteroL (ADVAIR/WIXELA) 250-50 mcg/dose diskus inhaler INHALE 1 PUFF BY MOUTH TWICE DAILY    ferrous sulfate (Iron) 325 mg (65 mg iron) tablet Take 65 mg by mouth Daily (before breakfast).  spironolactone (ALDACTONE) 25 mg tablet Take 25 mg by mouth daily.     omeprazole (PRILOSEC) 20 mg capsule Take 20 mg by mouth daily.  cyproheptadine (PERIACTIN) 4 mg tablet Take 4 mg by mouth two (2) times a day.  bumetanide (BUMEX) 1 mg tablet Take 1 mg by mouth two (2) times a day. Review of Systems:     Constitutional: No fevers, chills, weight loss, fatigue. Skin: No rashes, pruritis, ulcerations, erythema. HENT: No headaches, nosebleeds, sinus pressure, rhinorrhea, sore throat. Eyes: + scleral icterus. No visual changes, blurred vision, eye pain, photophobia. Cardiovascular: No chest pain, heart palpitations. Respiratory: No cough, SOB, wheezing, chest discomfort, orthopnea. Gastrointestinal: See HPI   Genitourinary: No dysuria, bleeding, discharge, pyuria. Musculoskeletal: No weakness, arthralgias, wasting. Endo: No sweats. Heme: No bruising, easy bleeding. Allergies: As noted. Neurological: Cranial nerves intact. Alert and oriented. Gait not assessed. Psychiatric:  No anxiety, depression, hallucinations. Visit Vitals  /71   Pulse (!) 101   Temp 99.8 °F (37.7 °C)   Resp 20   SpO2 93%       Physical Assessment:     constitutional: well developed, well nourished, normal habitus, no deformities, in no acute distress. skin: no rashes, ulcers, or other lesions  eyes: mild icterus; normal lids; pupils: normal  HEENT: normocephalic, atraumatic  neck: supple, normal ROM   respiratory: normal chest excursion; no intercostal retraction or accessory muscle use; clear to ascultation bilaterally    cardiovascular: regular rate and rhythm, no murmur, rub or gallop.   abdominal: normal bowel sounds, soft, no tenderness or masses. no hernias appreciated. liver: normal size and consistency. spleen: not palpable. rectal: hemoccult/guaiac: not performed. extremities: no significant deformity or contracture, no edema. Gait not assessed   neurologic: cranial nerves: II-XII normal. No asterixis. psychiatric: judgement/insight: within normal limits.  memory: within normal limits for recent and remote events. mood and affect: no evidence of depression, anxiety or agitation. orientation: oriented to time, space and person. Basic Metabolic Profile   Recent Labs     11/03/21  0641   *   K 3.4*   CL 99*   CO2 28   BUN 6*   *   CA 8.6         CBC w/Diff    Recent Labs     11/03/21  0641   WBC 8.6   RBC 2.68*   HGB 9.0*   HCT 26.6*   MCV 99.3   MCH 33.6   MCHC 33.8   RDW 18.7*   PLT 75*    Recent Labs     11/02/21  1500   GRANS 70   LYMPH 15*   EOS 0        Hepatic Function   Recent Labs     11/03/21  0641 11/02/21  1724 11/02/21  1500   ALB 2.2*  --    < >   TP 6.8  --    < >   TBILI 12.2*  --    < >   *  --    < >   LPSE  --  142  --     < > = values in this interval not displayed. Coags   Recent Labs     11/02/21  1530   PTP 16.1*   INR 1.3*           [place]anto [place], [place].   11/03/21, 10:47 AM  [facility], [address] [place] [address]  Cell: [phone]  [email or web address]

## 2021-11-03 NOTE — PROGRESS NOTES
Admit Date: 11/2/2021  Date of Service: 11/3/2021      Assessment:           54 yo F with hx of alcohol abuse, GERD, and asthma admitted acute liver injury presenting with decreased appetite and abdominal pain with acute liver injury. Plan:     #Acute liver injury - DDX choledocolithiasis vs alcohol induced hepatitis vs DILI vs infection/hepatitis vs Autoimme vs PBC. Pt denies taking any OTC medications. Given dilated CBD, most concerning for choledoco but CBD dilation seen on US in 1/2019. MRI 3/2021 may show early or mild cirrhosis? Elevated liver associated enzymes on presentation (ALT 96, , , Bili 13, D. Bili 6.3, INR 1.3). No pancreatitis with lipase 142. Screening for acetaminophen and salicylates negative. VDEYY showing dilated CBD, hepatomegaly with fatty infiltrate, biliary sludge w/o cholecystitis. MELD-Na score 23 today (27 from yesterday). Discriminate score for alcoholic hepatitis 67.7.  -Work up for HAV/HBV/HCV 1/2021  -GI consulted - appreciate recommendations  -Follow up hemochromatosis and ASMA  -Follow up MRI/MRCP results  -NPO in case ERCP needed    #Hx of cirrhosis? LE edema? - previously on spironolactone?  -Continue Bumex 1mg BID    #Elevated lactate - likely in setting of above issue - resolved  -lactate 2.21 -> 1.6    #Hyponatremia - improving  -Na 126 -> 132  -Given NS - continue to follow BMP  -Likely due to alcohol abuse and poor PO intake    #Hyperkalemia - resolved  -K 6.2 -> 3.4  -treated with kayexalate    #Normocytic anemia  -Hgb 9, MCV 99.3  -Baseline Hgb 9-11  -Hx of DIAZ?  On ferrous sulfate previously    #Thrombocytopenia - most likely due to underproduction from alcohol abuse  -plt 96->75    #Alcohol abuse - EtOH 247 in the ED  -CIWA protocol  -Start thiamine, multi-vitamin, and folic acid after MRCP reading    #Asthma - on Advair at home?  -Continue Pulmicort and Brovana    Current Antibtiocs: None      Lines:   Peripheral    Case discussed with:  [x]Patient []Family  [x]Nursing  []Case Management  DVT Prophylaxis:  []Lovenox  [x]Hep SQ  []SCDs  []Coumadin   []On Heparin gtt    I have independently examined the patient and reviewed all lab studies and imgaing as well as review of nursing notes and physican notes from the past 24 hours. Pt discussed with Attending Dr. Juan Pablo Waters. Alina Esparza, DO  Internal Medicine, PGY-3      No Known Allergies        Subjective:      Pt seen and examined. Pt states that she is thirsty and no longer having nausea or abdominal pain. Having some lower extremity swelling x 2 weeks. Objective:        Visit Vitals  /71   Pulse (!) 101   Temp 99.8 °F (37.7 °C)   Resp 20   SpO2 93%     Temp (24hrs), Av °F (36.7 °C), Min:97.1 °F (36.2 °C), Max:99.8 °F (37.7 °C)        General:   awake alert and oriented, laying on side in fetal position   Skin:   no rashes or skin lesions noted on limited exam, dry and warm   HEENT:  ++scleral icterus; oral mucosa moist, lips moist   Lymph Nodes:   not assessed today   Lungs:   non, labored; bilaterally clear to aspiration- no crackles wheezes rales or rhonchi   Heart:  RRR, s1 and s2; +murmur; mild bilateral non-pitting LE edema to just above ankles   Abdomen:  soft, non-distended, active bowel sounds, +mild tenderness to palpation RUQ   Genitourinary:  deferred   Extremities:   average muscle tone; no contractures, no joint effusions   Neurologic:  No gross focal motor or sensory abnormalities; CN 2-12 intact; Follows commands. +tremors   Psychiatric:   appropriate and interactive.        Labs: Results:   Chemistry Recent Labs     21  0641 21  1500   * 101*   * 126*   K 3.4* 6.2*   CL 99* 95*   CO2 28 27   BUN 6* 6*   CREA 0.94 1.09   CA 8.6 9.0   AGAP 5 4   BUCR 6* 6*   * 428*   TP 6.8 7.9   ALB 2.2* 2.4*   GLOB 4.6* 5.5*   AGRAT 0.5* 0.4*      CBC w/Diff Recent Labs     21  0641 21  1500   WBC 8.6 10.9   RBC 2.68* 2.93*   HGB 9.0* 9.8* HCT 26.6* 28.7*   PLT 75* 96*   GRANS  --  70   LYMPH  --  15*   EOS  --  0        No results found for: SDES Lab Results   Component Value Date/Time    Culture result: NO GROWTH 6 DAYS 07/08/2017 05:30 AM    Culture result: NO GROWTH 6 DAYS 07/08/2017 05:25 AM    Culture result: MODERATE  NORMAL RESPIRATORY SAMANTHA   07/07/2017 06:54 PM    Culture result: RARE  STAPHYLOCOCCUS AUREUS   (A) 07/07/2017 06:54 PM    Culture result: RARE  GRAM NEGATIVE RODS   (A) 07/07/2017 06:54 PM        Results     ** No results found for the last 336 hours. **          Imaging:     XR CHEST PA LAT    Result Date: 11/2/2021  No acute pulmonary disease. No CHF or pneumonia    US ABD LTD    Result Date: 11/2/2021  1. Dilated common bile duct with no intrahepatic biliary dilatation. Further evaluation with MRCP recommended. Hepatomegaly with likely fatty infiltration 2. Biliary sludge in the gallbladder with no findings of acute cholecystitis.

## 2021-11-03 NOTE — ED NOTES
Patient was just brought over from  to [facility] and patients vital signs are as follows blood pressure 105/67, heart rate 86, temperature 97.7, O2 sat 95% on room air. Patient states no pain at this time.

## 2021-11-03 NOTE — ED NOTES
Patient appears to be asleep, rise and fall of chest noted. Patient is on a cardiac monitor and vital signs are within norm. No concerns at this time.

## 2021-11-03 NOTE — ED NOTES
Gave report to Nurse Octaviano Samuels on the 5th floor. Updated vital signs have been put in. No concerns at this time.

## 2021-11-04 ENCOUNTER — APPOINTMENT (OUTPATIENT)
Dept: NUCLEAR MEDICINE | Age: 56
DRG: 280 | End: 2021-11-04
Attending: PHYSICIAN ASSISTANT
Payer: MEDICAID

## 2021-11-04 LAB
ALBUMIN SERPL-MCNC: 2 G/DL (ref 3.4–5)
ALBUMIN/GLOB SERPL: 0.5 {RATIO} (ref 0.8–1.7)
ALP SERPL-CCNC: 352 U/L (ref 45–117)
ALT SERPL-CCNC: 71 U/L (ref 13–56)
ANION GAP SERPL CALC-SCNC: 7 MMOL/L (ref 3–18)
AST SERPL-CCNC: 203 U/L (ref 10–38)
BASOPHILS # BLD: 0 K/UL (ref 0–0.1)
BASOPHILS NFR BLD: 0 % (ref 0–2)
BILIRUB DIRECT SERPL-MCNC: 9.1 MG/DL (ref 0–0.2)
BILIRUB SERPL-MCNC: 12.4 MG/DL (ref 0.2–1)
BUN SERPL-MCNC: 8 MG/DL (ref 7–18)
BUN/CREAT SERPL: 9 (ref 12–20)
CALCIUM SERPL-MCNC: 8.1 MG/DL (ref 8.5–10.1)
CHLORIDE SERPL-SCNC: 101 MMOL/L (ref 100–111)
CO2 SERPL-SCNC: 29 MMOL/L (ref 21–32)
CREAT SERPL-MCNC: 0.91 MG/DL (ref 0.6–1.3)
DIFFERENTIAL METHOD BLD: ABNORMAL
EOSINOPHIL # BLD: 0.1 K/UL (ref 0–0.4)
EOSINOPHIL NFR BLD: 1 % (ref 0–5)
ERYTHROCYTE [DISTWIDTH] IN BLOOD BY AUTOMATED COUNT: 19.6 % (ref 11.6–14.5)
GLOBULIN SER CALC-MCNC: 4 G/DL (ref 2–4)
GLUCOSE BLD STRIP.AUTO-MCNC: 102 MG/DL (ref 70–110)
GLUCOSE BLD STRIP.AUTO-MCNC: 65 MG/DL (ref 70–110)
GLUCOSE BLD STRIP.AUTO-MCNC: 68 MG/DL (ref 70–110)
GLUCOSE BLD STRIP.AUTO-MCNC: 96 MG/DL (ref 70–110)
GLUCOSE SERPL-MCNC: 53 MG/DL (ref 74–99)
HCT VFR BLD AUTO: 26.4 % (ref 35–45)
HGB BLD-MCNC: 8.9 G/DL (ref 12–16)
IGA SERPL-MCNC: 839 MG/DL (ref 70–400)
IGG SERPL-MCNC: 1640 MG/DL (ref 700–1600)
IGM SERPL-MCNC: 241 MG/DL (ref 40–230)
INR PPP: 1.4 (ref 0.8–1.2)
LYMPHOCYTES # BLD: 1.4 K/UL (ref 0.9–3.6)
LYMPHOCYTES NFR BLD: 18 % (ref 21–52)
MCH RBC QN AUTO: 33.3 PG (ref 24–34)
MCHC RBC AUTO-ENTMCNC: 33.7 G/DL (ref 31–37)
MCV RBC AUTO: 98.9 FL (ref 78–100)
MONOCYTES # BLD: 1 K/UL (ref 0.05–1.2)
MONOCYTES NFR BLD: 12 % (ref 3–10)
NEUTS SEG # BLD: 5.4 K/UL (ref 1.8–8)
NEUTS SEG NFR BLD: 69 % (ref 40–73)
PLATELET # BLD AUTO: 75 K/UL (ref 135–420)
PMV BLD AUTO: 10.6 FL (ref 9.2–11.8)
POTASSIUM SERPL-SCNC: 3 MMOL/L (ref 3.5–5.5)
PROT SERPL-MCNC: 6 G/DL (ref 6.4–8.2)
PROTHROMBIN TIME: 17.2 SEC (ref 11.5–15.2)
RBC # BLD AUTO: 2.67 M/UL (ref 4.2–5.3)
SODIUM SERPL-SCNC: 137 MMOL/L (ref 136–145)
WBC # BLD AUTO: 7.9 K/UL (ref 4.6–13.2)

## 2021-11-04 PROCEDURE — 36415 COLL VENOUS BLD VENIPUNCTURE: CPT

## 2021-11-04 PROCEDURE — 74011250637 HC RX REV CODE- 250/637: Performed by: FAMILY MEDICINE

## 2021-11-04 PROCEDURE — 74011250637 HC RX REV CODE- 250/637: Performed by: PHYSICIAN ASSISTANT

## 2021-11-04 PROCEDURE — 74011000258 HC RX REV CODE- 258: Performed by: INTERNAL MEDICINE

## 2021-11-04 PROCEDURE — 74011250637 HC RX REV CODE- 250/637: Performed by: INTERNAL MEDICINE

## 2021-11-04 PROCEDURE — 99232 SBSQ HOSP IP/OBS MODERATE 35: CPT | Performed by: INTERNAL MEDICINE

## 2021-11-04 PROCEDURE — 74011250636 HC RX REV CODE- 250/636: Performed by: PHYSICIAN ASSISTANT

## 2021-11-04 PROCEDURE — 65660000000 HC RM CCU STEPDOWN

## 2021-11-04 PROCEDURE — 80076 HEPATIC FUNCTION PANEL: CPT

## 2021-11-04 PROCEDURE — 85025 COMPLETE CBC W/AUTO DIFF WBC: CPT

## 2021-11-04 PROCEDURE — 74011250636 HC RX REV CODE- 250/636: Performed by: STUDENT IN AN ORGANIZED HEALTH CARE EDUCATION/TRAINING PROGRAM

## 2021-11-04 PROCEDURE — 82962 GLUCOSE BLOOD TEST: CPT

## 2021-11-04 PROCEDURE — 74011250636 HC RX REV CODE- 250/636: Performed by: INTERNAL MEDICINE

## 2021-11-04 PROCEDURE — 2709999900 HC NON-CHARGEABLE SUPPLY

## 2021-11-04 PROCEDURE — 94640 AIRWAY INHALATION TREATMENT: CPT

## 2021-11-04 PROCEDURE — 74011000250 HC RX REV CODE- 250: Performed by: PHYSICIAN ASSISTANT

## 2021-11-04 PROCEDURE — 85610 PROTHROMBIN TIME: CPT

## 2021-11-04 PROCEDURE — 80048 BASIC METABOLIC PNL TOTAL CA: CPT

## 2021-11-04 RX ORDER — DEXTROSE 50 % IN WATER (D50W) INTRAVENOUS SYRINGE
25-50 AS NEEDED
Status: DISCONTINUED | OUTPATIENT
Start: 2021-11-04 | End: 2021-11-05 | Stop reason: HOSPADM

## 2021-11-04 RX ORDER — THERA TABS 400 MCG
1 TAB ORAL DAILY
Status: DISCONTINUED | OUTPATIENT
Start: 2021-11-04 | End: 2021-11-05 | Stop reason: HOSPADM

## 2021-11-04 RX ORDER — DEXTROSE, SODIUM CHLORIDE, AND POTASSIUM CHLORIDE 5; .45; .3 G/100ML; G/100ML; G/100ML
INJECTION INTRAVENOUS CONTINUOUS
Status: DISCONTINUED | OUTPATIENT
Start: 2021-11-04 | End: 2021-11-05 | Stop reason: HOSPADM

## 2021-11-04 RX ORDER — KIT FOR THE PREPARATION OF TECHNETIUM TC 99M MEBROFENIN 45 MG/10ML
6.6 INJECTION, POWDER, LYOPHILIZED, FOR SOLUTION INTRAVENOUS
Status: COMPLETED | OUTPATIENT
Start: 2021-11-04 | End: 2021-11-04

## 2021-11-04 RX ORDER — MAGNESIUM SULFATE 100 %
4 CRYSTALS MISCELLANEOUS AS NEEDED
Status: DISCONTINUED | OUTPATIENT
Start: 2021-11-04 | End: 2021-11-05 | Stop reason: HOSPADM

## 2021-11-04 RX ORDER — FOLIC ACID 1 MG/1
1 TABLET ORAL DAILY
Status: DISCONTINUED | OUTPATIENT
Start: 2021-11-04 | End: 2021-11-05 | Stop reason: HOSPADM

## 2021-11-04 RX ORDER — LANOLIN ALCOHOL/MO/W.PET/CERES
100 CREAM (GRAM) TOPICAL DAILY
Status: DISCONTINUED | OUTPATIENT
Start: 2021-11-04 | End: 2021-11-05 | Stop reason: HOSPADM

## 2021-11-04 RX ADMIN — Medication 10 ML: at 23:54

## 2021-11-04 RX ADMIN — POTASSIUM CHLORIDE, DEXTROSE MONOHYDRATE AND SODIUM CHLORIDE: 300; 5; 450 INJECTION, SOLUTION INTRAVENOUS at 23:53

## 2021-11-04 RX ADMIN — Medication 100 MG: at 16:38

## 2021-11-04 RX ADMIN — HEPARIN SODIUM 5000 UNITS: 5000 INJECTION INTRAVENOUS; SUBCUTANEOUS at 16:38

## 2021-11-04 RX ADMIN — BUMETANIDE 1 MG: 1 TABLET ORAL at 16:57

## 2021-11-04 RX ADMIN — Medication 10 ML: at 16:39

## 2021-11-04 RX ADMIN — PIPERACILLIN AND TAZOBACTAM 3.38 G: 3; .375 INJECTION, POWDER, LYOPHILIZED, FOR SOLUTION INTRAVENOUS at 04:37

## 2021-11-04 RX ADMIN — KIT FOR THE PREPARATION OF TECHNETIUM TC 99M MEBROFENIN 6.6 MILLICURIE: 45 INJECTION, POWDER, LYOPHILIZED, FOR SOLUTION INTRAVENOUS at 15:00

## 2021-11-04 RX ADMIN — BUDESONIDE 500 MCG: 0.5 SUSPENSION RESPIRATORY (INHALATION) at 07:26

## 2021-11-04 RX ADMIN — ARFORMOTEROL TARTRATE 15 MCG: 15 SOLUTION RESPIRATORY (INHALATION) at 19:37

## 2021-11-04 RX ADMIN — PIPERACILLIN AND TAZOBACTAM 3.38 G: 3; .375 INJECTION, POWDER, LYOPHILIZED, FOR SOLUTION INTRAVENOUS at 16:37

## 2021-11-04 RX ADMIN — THERA TABS 1 TABLET: TAB at 16:38

## 2021-11-04 RX ADMIN — FOLIC ACID 1 MG: 1 TABLET ORAL at 16:38

## 2021-11-04 RX ADMIN — ARFORMOTEROL TARTRATE 15 MCG: 15 SOLUTION RESPIRATORY (INHALATION) at 07:26

## 2021-11-04 RX ADMIN — PIPERACILLIN AND TAZOBACTAM 3.38 G: 3; .375 INJECTION, POWDER, LYOPHILIZED, FOR SOLUTION INTRAVENOUS at 23:53

## 2021-11-04 RX ADMIN — POTASSIUM CHLORIDE, DEXTROSE MONOHYDRATE AND SODIUM CHLORIDE: 300; 5; 450 INJECTION, SOLUTION INTRAVENOUS at 07:20

## 2021-11-04 RX ADMIN — BUDESONIDE 500 MCG: 0.5 SUSPENSION RESPIRATORY (INHALATION) at 19:37

## 2021-11-04 RX ADMIN — Medication 500 MG: at 16:50

## 2021-11-04 NOTE — PROGRESS NOTES
MEW score  4 due to elevated . Patient up to the bathroom, will recheck 0430 recheck HR 97 MEW score 2.

## 2021-11-04 NOTE — PROGRESS NOTES
Lab called with critical glucose of 53. POC test 68. Patient NPO and not diabetic. MD notified. Fluids ordered for D5 with K+.

## 2021-11-04 NOTE — PROGRESS NOTES
Brief Progress Note:  Attempted to see patient 2x over a three hour period, PT was with radiology both times. Will review HIDA scan tomorrow and follow up with PT in AM.    Stephanie Arango PA-C  11/4/21, 4:27 PM  [facility].  [email or web address]  Phone: [phone]  Pager: [phone]

## 2021-11-04 NOTE — PROGRESS NOTES
Pt resting and in no respiratory distress. Alert and compliant with her nebulizer medication treatment plan.

## 2021-11-04 NOTE — ROUTINE PROCESS
Bedside and Verbal shift change report given to JOSELO Ramos (oncoming nurse) by Jozef Dockery (offgoing nurse). Report included the following information SBAR, Kardex, Procedure Summary, Intake/Output, MAR and Recent Results.

## 2021-11-04 NOTE — PROGRESS NOTES
Admit Date: 11/2/2021  Date of Service: 11/4/2021      Assessment:           56 yo F with hx of alcohol abuse, GERD, and asthma admitted acute liver injury presenting with decreased appetite and abdominal pain with acute liver injury concerning for alcoholic hepatitis or transient obstruction. Plan:     #Acute liver injury - Most likely alcoholic hepatitis vs transient obstruction. Pt denies taking any OTC medications. MRCP read overnight. Negative for choledoco, concern for acute navin, small ascites, portal HTN, and moderate anasarca. LAEs improving. MELD-Na: 27->23->21.  -Work up for HAV/HBV/HCV 1/2021 negative  -GI consulted - appreciate recommendations  -Follow up hemochromatosis and ASMA  -Consider IR to tap/sample ascites? Imaging showing possible early or mild cirrhosis with trace ascites in 3/2021 - no mention on more recent imaging  -Follow up HIDA scan  -Start Saccharomyces boulardii     #Possible acute navin - negative on US, concern on MRI  -Started on zosyn  -Awaiting HIDA scan - if still concerned will consult general surgery    #Hypoglycemia - continuing to monitor  -Currently on 1L D5 1.2NS + 40mEq KCl  -Started on diet - should resolve issue    #Hx of cirrhosis? LE edema? - previously on spironolactone?  -Continue Bumex 1mg BID     #Elevated lactate - likely in setting of above issue acute liver injury - resolved  -lactate 2.21 -> 1.6     #Hyponatremia - resolved  -Na 126 -> 132->137  -Given NS - continue to follow BMP  -Likely due to alcohol abuse and poor PO intake     #Hyperkalemia - resolved  -K 6.2 -> 3.4  -treated with kayexalate    #Hypokalemia   -Given in IVF replacement     #Normocytic anemia  -Hgb 9, MCV 99.3  -Baseline Hgb 9-11  -Hx of DIAZ?  On ferrous sulfate previously     #Thrombocytopenia - most likely due to underproduction from alcohol abuse  -plt 96->75     #Alcohol abuse - EtOH 247 in the ED  -[facility] protocol  -Start thiamine, multi-vitamin, and folic acid     #Asthma - on Advair at home?  -Continue Pulmicort and Brovana     Current Antibtiocs: None        Lines:   Peripheral     Case discussed with:  [x]? Patient  []? Family  [x]? Nursing  []? Case Management  DVT Prophylaxis:  []? Lovenox  [x]? Hep SQ  []?SCDs  []? Coumadin   []? On Heparin gtt     I have independently examined the patient and reviewed all lab studies and imgaing as well as review of nursing notes and physican notes from the past 24 hours.      Pt discussed with Attending Dr. Loreli Favre, DO  Internal Medicine, PGY-3      No Known Allergies        Subjective:      Pt seen and examined. Pt states that she feels the same. No abdominal pain but feeling hungry and thirst.      Objective:        Visit Vitals  BP (!) 102/46 (BP 1 Location: Left upper arm, BP Patient Position: At rest)   Pulse 92   Temp 99 °F (37.2 °C)   Resp 16   Ht 5' 7\" (1.702 m)   Wt 54.9 kg (121 lb)   SpO2 97%   BMI 18.95 kg/m²     Temp (24hrs), Av.8 °F (37.1 °C), Min:98.5 °F (36.9 °C), Max:99 °F (37.2 °C)      General:   awake alert and oriented,   Skin:   no rashes or skin lesions noted on limited exam, dry and warm   HEENT:  ++scleral icterus; oral mucosa moist, lips dry   Lymph Nodes:   not assessed today   Lungs:   non, labored; bilaterally clear to aspiration- no crackles wheezes rales or rhonchi   Heart:  RRR, s1 and s2; +murmur; mild bilateral non-pitting LE edema to just above ankles   Abdomen:  soft, non-distended, active bowel sounds, +mild tenderness to palpation RUQ   Genitourinary:  deferred   Extremities:   average muscle tone; no contractures, no joint effusions   Neurologic:  No gross focal motor or sensory abnormalities; CN 2-12 intact; Follows commands. +tremors   Psychiatric:   appropriate and interactive.        Labs: Results:   Chemistry Recent Labs     21  0321 21  0641 21  1500   GLU 53* 101* 101*    132* 126*   K 3.0* 3.4* 6.2*    99* 95*   CO2 29 28 27   BUN 8 6* 6*   CREA 0.91 0.94 1.09 CA 8.1* 8.6 9.0   AGAP 7 5 4   BUCR 9* 6* 6*   * 385* 428*   TP 6.0* 6.8 7.9   ALB 2.0* 2.2* 2.4*   GLOB 4.0 4.6* 5.5*   AGRAT 0.5* 0.5* 0.4*      CBC w/Diff Recent Labs     11/04/21  0321 11/03/21  0641 11/02/21  1500   WBC 7.9 8.6 10.9   RBC 2.67* 2.68* 2.93*   HGB 8.9* 9.0* 9.8*   HCT 26.4* 26.6* 28.7*   PLT 75* 75* 96*   GRANS 69  --  70   LYMPH 18*  --  15*   EOS 1  --  0        No results found for: SDES Lab Results   Component Value Date/Time    Culture result: NO GROWTH 6 DAYS 07/08/2017 05:30 AM    Culture result: NO GROWTH 6 DAYS 07/08/2017 05:25 AM    Culture result: MODERATE  NORMAL RESPIRATORY SAMANTHA   07/07/2017 06:54 PM    Culture result: RARE  STAPHYLOCOCCUS AUREUS   (A) 07/07/2017 06:54 PM    Culture result: RARE  GRAM NEGATIVE RODS   (A) 07/07/2017 06:54 PM        Results     ** No results found for the last 336 hours. **          Imaging:     XR CHEST PA LAT    Result Date: 11/2/2021  No acute pulmonary disease. No CHF or pneumonia    US ABD LTD    Result Date: 11/2/2021  1. Dilated common bile duct with no intrahepatic biliary dilatation. Further evaluation with MRCP recommended. Hepatomegaly with likely fatty infiltration 2. Biliary sludge in the gallbladder with no findings of acute cholecystitis. MRI ABD W MRCP W WO CONT    Result Date: 11/3/2021  1. Moderate hepatic steatosis and significant hepatomegaly. No suspicious hepatic lesion. 2.  Biliary duct dilation but no choledocholithiasis or obstructing mass. 3.  Potential cholecystitis given gallbladder dilation, wall thickening and small volume pericholecystic fluid. 4.  Possible portal hypertension given small recanalized paraumbilical vein, small volume ascites. There is mild colonic wall thickening which may be sequela of portal hypertension or infectious/inflammatory process. 5.  Moderate anasarca. Preliminary report provided by Dr. Otis Hare on 11/3/2021 at 0022 hours.

## 2021-11-05 VITALS
SYSTOLIC BLOOD PRESSURE: 92 MMHG | OXYGEN SATURATION: 92 % | DIASTOLIC BLOOD PRESSURE: 62 MMHG | WEIGHT: 121 LBS | TEMPERATURE: 98.3 F | RESPIRATION RATE: 17 BRPM | HEIGHT: 67 IN | BODY MASS INDEX: 18.99 KG/M2 | HEART RATE: 89 BPM

## 2021-11-05 LAB
ALBUMIN SERPL-MCNC: 2.2 G/DL (ref 3.4–5)
ALBUMIN/GLOB SERPL: 0.5 {RATIO} (ref 0.8–1.7)
ALP SERPL-CCNC: 337 U/L (ref 45–117)
ALT SERPL-CCNC: 68 U/L (ref 13–56)
ANION GAP SERPL CALC-SCNC: 8 MMOL/L (ref 3–18)
AST SERPL-CCNC: 179 U/L (ref 10–38)
BILIRUB DIRECT SERPL-MCNC: 10.2 MG/DL (ref 0–0.2)
BILIRUB SERPL-MCNC: 13.4 MG/DL (ref 0.2–1)
BUN SERPL-MCNC: 10 MG/DL (ref 7–18)
BUN/CREAT SERPL: 10 (ref 12–20)
CALCIUM SERPL-MCNC: 8.3 MG/DL (ref 8.5–10.1)
CHLORIDE SERPL-SCNC: 98 MMOL/L (ref 100–111)
CO2 SERPL-SCNC: 31 MMOL/L (ref 21–32)
CREAT SERPL-MCNC: 1.04 MG/DL (ref 0.6–1.3)
ERYTHROCYTE [DISTWIDTH] IN BLOOD BY AUTOMATED COUNT: 19.9 % (ref 11.6–14.5)
GLOBULIN SER CALC-MCNC: 4.2 G/DL (ref 2–4)
GLUCOSE SERPL-MCNC: 77 MG/DL (ref 74–99)
HCT VFR BLD AUTO: 27.3 % (ref 35–45)
HGB BLD-MCNC: 9.1 G/DL (ref 12–16)
INR PPP: 1.4 (ref 0.8–1.2)
MCH RBC QN AUTO: 33.2 PG (ref 24–34)
MCHC RBC AUTO-ENTMCNC: 33.3 G/DL (ref 31–37)
MCV RBC AUTO: 99.6 FL (ref 78–100)
PLATELET # BLD AUTO: 89 K/UL (ref 135–420)
PMV BLD AUTO: 11.1 FL (ref 9.2–11.8)
POTASSIUM SERPL-SCNC: 3.8 MMOL/L (ref 3.5–5.5)
PROT SERPL-MCNC: 6.4 G/DL (ref 6.4–8.2)
PROTHROMBIN TIME: 17.4 SEC (ref 11.5–15.2)
RBC # BLD AUTO: 2.74 M/UL (ref 4.2–5.3)
SODIUM SERPL-SCNC: 137 MMOL/L (ref 136–145)
WBC # BLD AUTO: 9.2 K/UL (ref 4.6–13.2)

## 2021-11-05 PROCEDURE — 80048 BASIC METABOLIC PNL TOTAL CA: CPT

## 2021-11-05 PROCEDURE — 80076 HEPATIC FUNCTION PANEL: CPT

## 2021-11-05 PROCEDURE — 85610 PROTHROMBIN TIME: CPT

## 2021-11-05 PROCEDURE — 99238 HOSP IP/OBS DSCHRG MGMT 30/<: CPT | Performed by: INTERNAL MEDICINE

## 2021-11-05 PROCEDURE — 74011250637 HC RX REV CODE- 250/637: Performed by: PHYSICIAN ASSISTANT

## 2021-11-05 PROCEDURE — 74011000258 HC RX REV CODE- 258: Performed by: INTERNAL MEDICINE

## 2021-11-05 PROCEDURE — 85027 COMPLETE CBC AUTOMATED: CPT

## 2021-11-05 PROCEDURE — 74011250637 HC RX REV CODE- 250/637: Performed by: FAMILY MEDICINE

## 2021-11-05 PROCEDURE — 74011250636 HC RX REV CODE- 250/636: Performed by: INTERNAL MEDICINE

## 2021-11-05 PROCEDURE — 74011250636 HC RX REV CODE- 250/636: Performed by: PHYSICIAN ASSISTANT

## 2021-11-05 PROCEDURE — 74011250637 HC RX REV CODE- 250/637: Performed by: INTERNAL MEDICINE

## 2021-11-05 PROCEDURE — 99222 1ST HOSP IP/OBS MODERATE 55: CPT | Performed by: COLON & RECTAL SURGERY

## 2021-11-05 PROCEDURE — 36415 COLL VENOUS BLD VENIPUNCTURE: CPT

## 2021-11-05 RX ORDER — LANOLIN ALCOHOL/MO/W.PET/CERES
100 CREAM (GRAM) TOPICAL DAILY
Qty: 30 TABLET | Refills: 0 | Status: ON HOLD | OUTPATIENT
Start: 2021-11-06 | End: 2022-06-01

## 2021-11-05 RX ORDER — PENTOXIFYLLINE 400 MG/1
400 TABLET, EXTENDED RELEASE ORAL
Qty: 90 TABLET | Refills: 0 | Status: SHIPPED | OUTPATIENT
Start: 2021-11-05 | End: 2021-11-16 | Stop reason: SDUPTHER

## 2021-11-05 RX ORDER — THERA TABS 400 MCG
1 TAB ORAL DAILY
Qty: 30 TABLET | Refills: 0 | Status: ON HOLD | OUTPATIENT
Start: 2021-11-06 | End: 2022-06-01

## 2021-11-05 RX ORDER — FOLIC ACID 1 MG/1
1 TABLET ORAL DAILY
Qty: 30 TABLET | Refills: 0 | Status: ON HOLD | OUTPATIENT
Start: 2021-11-06 | End: 2022-06-01

## 2021-11-05 RX ADMIN — Medication 500 MG: at 10:13

## 2021-11-05 RX ADMIN — FOLIC ACID 1 MG: 1 TABLET ORAL at 10:13

## 2021-11-05 RX ADMIN — THERA TABS 1 TABLET: TAB at 10:13

## 2021-11-05 RX ADMIN — PIPERACILLIN AND TAZOBACTAM 3.38 G: 3; .375 INJECTION, POWDER, LYOPHILIZED, FOR SOLUTION INTRAVENOUS at 05:06

## 2021-11-05 RX ADMIN — BUMETANIDE 1 MG: 1 TABLET ORAL at 10:13

## 2021-11-05 RX ADMIN — Medication 100 MG: at 10:13

## 2021-11-05 RX ADMIN — PIPERACILLIN AND TAZOBACTAM 3.38 G: 3; .375 INJECTION, POWDER, LYOPHILIZED, FOR SOLUTION INTRAVENOUS at 10:13

## 2021-11-05 NOTE — PROGRESS NOTES
Admit Date: 11/2/2021  Date of Service: 11/5/2021      Assessment:           56 yo F with hx of alcohol abuse, GERD, and asthma admitted acute liver injury presenting with decreased appetite and abdominal pain with acute liver injury concerning for alcoholic hepatitis or transient obstruction. Plan:     #Acute liver injury - Most likely alcoholic hepatitis vs transient obstruction. Pt denies taking any OTC medications. MRCP read overnight. Negative for choledoco, concern for acute navin, small ascites, portal HTN, and moderate anasarca. LAEs improving. MELD-Na: 27->23->21->22. -Work up for HAV/HBV/HCV 1/2021 negative, ASMA - 35 possible autoimmune hepatitis or chronic active hepatitis and possible PBC; Ig panel with slightly elevated - possible autoimmune hepatitis?  -Consider IR to tap/sample ascites? Imaging showing possible early or mild cirrhosis with trace ascites in 3/2021 - no mention on more recent imaging  -HIDA scan - possible cystic duct obstruction but false positives can be seen in hyperbili and hepatocellular disease  -GI consulted - recommend alcohol abstinence, consider pentoxifylline 400mg TID at d/c (can benefit some with acute alcoholic hepatitis where glucocorticoids C/I or concern with compliance), consider liver biopsy for cirrhosis after acute liver injury resolved, follow up with GI in 4-6 weeks     #Possible acute navin - negative on US, concern on MRI  -Started on zosyn  -General surgery consulted - follow up recommendations - NPO    #Hyperbilirubinemia  -T. Bili 13->12.2->12.2->13.4 - continue to trend - hoping to resolve with treatment of above problems     #Hypoglycemia - continuing to monitor  -Currently on 1L D5 1.2NS + 40mEq KCl  -Started on diet - should resolve issue     #Hx of cirrhosis?  LE edema? - previously on spironolactone?  -Continue Bumex 1mg BID     #Elevated lactate - likely in setting of above issue acute liver injury - resolved  -lactate 2.21 -> 1.6     #Hyponatremia - resolved  -Na 126 -> 132->137->137  -Given NS - continue to follow BMP  -Likely due to alcohol abuse and poor PO intake     #Hyperkalemia - resolved  -K 6.2 -> 3.4  -treated with kayexalate     #Hypokalemia   -Given in IVF replacement     #Normocytic anemia  -Hgb 9, MCV 99.3  -Baseline Hgb 9-11  -Hx of DIAZ? On ferrous sulfate previously     #Thrombocytopenia - most likely due to underproduction from alcohol abuse  -plt 96->75->75->85     #Alcohol abuse - EtOH 247 in the ED  -[facility] protocol  -Start thiamine, multi-vitamin, and folic acid     #Asthma - on Advair at home?  -Continue Pulmicort and Brovana     Current Antibtiocs: None        Lines:   Peripheral     Case discussed with:  [x]? ?Patient  []? ? Family  [x]? ? Nursing  []? ?Case Management  DVT Prophylaxis:  []? ? Lovenox  [x]? ? Hep SQ  []? ?SCDs  []? ? Coumadin   []? ?On Heparin gtt     I have independently examined the patient and reviewed all lab studies and imgaing as well as review of nursing notes and physican notes from the past 24 hours.      Pt discussed with Attending Dr. Vaishali Martinez, DO  Internal Medicine, PGY-3    No Known Allergies        Subjective:      Pt seen and examined. Pt states that she feels better today than she did yesterday. Her abdominal pain is improving. She states that when she ate her dinner yesterday, her abdominal pain improving. She is asking about when she can go home and about her treatment plan for today.      Objective:        Visit Vitals  BP (!) 97/58   Pulse 95   Temp 99.1 °F (37.3 °C)   Resp 16   Ht 5' 7\" (1.702 m)   Wt 54.9 kg (121 lb)   SpO2 95%   BMI 18.95 kg/m²     Temp (24hrs), Av.5 °F (36.9 °C), Min:98.1 °F (36.7 °C), Max:99.1 °F (37.3 °C)    General:   awake alert and oriented,   Skin:   no rashes or skin lesions noted on limited exam, dry and warm   HEENT:  +scleral icterus; oral mucosa moist, lips moist   Lymph Nodes:   not assessed today   Lungs:   non, labored; bilaterally clear to aspiration- no crackles wheezes rales or rhonchi   Heart:  RRR, s1 and s2; +murmur; mild bilateral non-pitting LE edema to just above ankles - slightly improved   Abdomen:  soft, non-distended, active bowel sounds, +mild tenderness to palpation RUQ   Genitourinary:  deferred   Extremities:   average muscle tone; no contractures, no joint effusions   Neurologic:  No gross focal motor or sensory abnormalities; CN 2-12 intact;  Follows commands. +tremors   Psychiatric:   appropriate and interactive. Labs: Results:   Chemistry Recent Labs     11/05/21 0258 11/04/21 0321 11/03/21 0641   GLU 77 53* 101*    137 132*   K 3.8 3.0* 3.4*   CL 98* 101 99*   CO2 31 29 28   BUN 10 8 6*   CREA 1.04 0.91 0.94   CA 8.3* 8.1* 8.6   AGAP 8 7 5   BUCR 10* 9* 6*   * 352* 385*   TP 6.4 6.0* 6.8   ALB 2.2* 2.0* 2.2*   GLOB 4.2* 4.0 4.6*   AGRAT 0.5* 0.5* 0.5*      CBC w/Diff Recent Labs     11/05/21 0258 11/04/21 0321 11/03/21  0641 11/02/21  1500 11/02/21  1500   WBC 9.2 7.9 8.6   < > 10.9   RBC 2.74* 2.67* 2.68*   < > 2.93*   HGB 9.1* 8.9* 9.0*   < > 9.8*   HCT 27.3* 26.4* 26.6*   < > 28.7*   PLT 89* 75* 75*   < > 96*   GRANS  --  69  --   --  70   LYMPH  --  18*  --   --  15*   EOS  --  1  --   --  0    < > = values in this interval not displayed. No results found for: JOEY Lab Results   Component Value Date/Time    Culture result: NO GROWTH 6 DAYS 07/08/2017 05:30 AM    Culture result: NO GROWTH 6 DAYS 07/08/2017 05:25 AM    Culture result: MODERATE  NORMAL RESPIRATORY SAMANTHA   07/07/2017 06:54 PM    Culture result: RARE  STAPHYLOCOCCUS AUREUS   (A) 07/07/2017 06:54 PM    Culture result: RARE  GRAM NEGATIVE RODS   (A) 07/07/2017 06:54 PM        Results     ** No results found for the last 336 hours. **          Imaging:     NM HEPATOBILIARY DUCT SCAN    Result Date: 11/4/2021  Exam limited as above.  No definite radiotracer activity in the gallbladder detected, which is consistent with cystic duct obstruction in the appropriate clinical setting. Of note, false positives can be seen with severe hepatocellular disease and hyperbilirubinemia. XR CHEST PA LAT    Result Date: 11/2/2021  No acute pulmonary disease. No CHF or pneumonia    US ABD LTD    Result Date: 11/2/2021  1. Dilated common bile duct with no intrahepatic biliary dilatation. Further evaluation with MRCP recommended. Hepatomegaly with likely fatty infiltration 2. Biliary sludge in the gallbladder with no findings of acute cholecystitis. MRI ABD W MRCP W WO CONT    Result Date: 11/3/2021  1. Moderate hepatic steatosis and significant hepatomegaly. No suspicious hepatic lesion. 2.  Biliary duct dilation but no choledocholithiasis or obstructing mass. 3.  Potential cholecystitis given gallbladder dilation, wall thickening and small volume pericholecystic fluid. 4.  Possible portal hypertension given small recanalized paraumbilical vein, small volume ascites. There is mild colonic wall thickening which may be sequela of portal hypertension or infectious/inflammatory process. 5.  Moderate anasarca. Preliminary report provided by Dr. Flora Metz on 11/3/2021 at 0022 hours.

## 2021-11-05 NOTE — DISCHARGE SUMMARY
Discharge Summary     Patient: Mavis Dhillon MRN: [id number]  SSN: [id number]    YOB: 1965  Age: 55 y.o. Sex: female       Admit Date: 11/2/2021    Discharge Date: 11/5/2021      Admission Diagnoses: Jaundice [R17]    Discharge Diagnoses:   Problem List as of 11/5/2021 Date Reviewed: 7/8/2017          Codes Class Noted - Resolved    Mild protein-calorie malnutrition ([facility] ICD-10-CM: E44.1  ICD-9-CM: 263.1  11/3/2021 - Present        Jaundice ICD-10-CM: R17  ICD-9-CM: 782.4  11/2/2021 - Present        Alcohol abuse ICD-10-CM: F10.10  ICD-9-CM: 305.00  11/2/2021 - Present        Alcohol intoxication ([facility] ICD-10-CM: F10.929  ICD-9-CM: 305.00  11/2/2021 - Present        Hyperkalemia ICD-10-CM: E87.5  ICD-9-CM: 276.7  11/2/2021 - Present        Hyponatremia ICD-10-CM: E87.1  ICD-9-CM: 276.1  11/2/2021 - Present        Elevated LFTs ICD-10-CM: R79.89  ICD-9-CM: 790.6  11/2/2021 - Present        SIRS (systemic inflammatory response syndrome) ([facility] ICD-10-CM: R65.10  ICD-9-CM: 995.90  7/8/2017 - Present        Pneumonia ICD-10-CM: J18.9  ICD-9-CM: 675  7/7/2017 - Present        CAP (community acquired pneumonia) ICD-10-CM: J18.9  ICD-9-CM: 631  7/7/2017 - Present               Discharge Condition: Stable    Hospital Course:   Ms. Bibiana Valdes is a 55year old female with a history of alcohol abuse, GERD, and asthma admitted for acute liver injury with dilated common bile duct concerning for alcoholic hepatitis or transient obstruction. She was admitted from 11/2/21 to 11/5/21 at [facility]. #Acute liver injury - Most likely alcoholic hepatitis vs transient obstruction. Pt denies taking any OTC medications. Liver associated enzymes downtrending (ALT 96, , alk phos 428 on presentation)  -Work up for HAV/HBV/HCV 1/2021 negative, ASMA - 35 possible autoimmune hepatitis or chronic active hepatitis and possible PBC; Ig panel with slightly elevated - possible autoimmune hepatitis?   -MRCP: Negative for choledocolithiasis, concern for acute navin, small ascites, portal HTN, and moderate anasarca. -HIDA scan: possible cystic duct obstruction but false positives can be seen in hyperbili and hepatocellular disease  -GI consulted - recommend alcohol abstinence, start pentoxifylline 400mg three times daily at discharge (can benefit some with acute alcoholic hepatitis where glucocorticoids contraindicated or concern with compliance), consider liver biopsy for cirrhosis after acute liver injury resolved, follow up with GI in 4-6 weeks     #Possible acute navin - negative on US, concern on MRI  -General surgery consulted - low suspicion for acute cholecystitis - no need for cholecystectomy at this time  -Strict return to Emergency Department precautions listed below:     #Hyperbilirubinemia  -Total Bilirubin 13->12.2->12.2->13.4 - will resolve with alcohol abstinence and time     #Hyponatremia - resolved  -Na 126 -> 132->137->137  -Given NS - continue to follow BMP  -Likely due to alcohol abuse and poor PO intake     #Normocytic anemia  -Hgb 9, MCV 99.3  -Baseline Hgb 9-11  -Hx of DIAZ?  On ferrous sulfate previously     #Thrombocytopenia - most likely due to underproduction from alcohol abuse  -plt 96->75->75->85     #Alcohol abuse - EtOH 247 in the ED - tremors but no seizures from withdrawal while inpatient  -Start thiamine, multi-vitamin, and folic acid supplements     #Asthma  -Continue home medications      PLEASE RETURN TO THE EMERGENCY DEPARTMENT WITH ANY OF THE FOLLOWING SYMPTOMS: Worsening of abdominal pain, fevers, chills, nausea, vomiting, decreased appetite, unable to tolerate food or water      Consults: Gastroenterology and General Surgery    Significant Diagnostic Studies: labs: elevated liver associated enzymes and radiology: MRI: no gall stone in common bile duct but concern for acute cholecystitis     Disposition: home    Discharge Medications:   Current Discharge Medication List      START taking these medications    Details   pentoxifylline CR (TRENTAL) 400 mg CR tablet Take 1 Tablet by mouth three (3) times daily (with meals). Qty: 90 Tablet, Refills: 0      folic acid (FOLVITE) 1 mg tablet Take 1 Tablet by mouth daily. Qty: 30 Tablet, Refills: 0      therapeutic multivitamin (THERAGRAN) tablet Take 1 Tablet by mouth daily. Qty: 30 Tablet, Refills: 0      thiamine HCL (B-1) 100 mg tablet Take 1 Tablet by mouth daily. Qty: 30 Tablet, Refills: 0         CONTINUE these medications which have NOT CHANGED    Details   fluticasone propion-salmeteroL (ADVAIR/WIXELA) 250-50 mcg/dose diskus inhaler INHALE 1 PUFF BY MOUTH TWICE DAILY      ferrous sulfate (Iron) 325 mg (65 mg iron) tablet Take 65 mg by mouth Daily (before breakfast). omeprazole (PRILOSEC) 20 mg capsule Take 20 mg by mouth daily. cyproheptadine (PERIACTIN) 4 mg tablet Take 4 mg by mouth two (2) times a day. bumetanide (BUMEX) 1 mg tablet Take 1 mg by mouth two (2) times a day.          STOP taking these medications       tiZANidine (ZANAFLEX) 4 mg tablet Comments:   Reason for Stopping:         spironolactone (ALDACTONE) 25 mg tablet Comments:   Reason for Stopping:               Activity: Activity as tolerated  Diet: Regular Diet - REFRAIN from drinking alcohol as it will worsen your symptoms  Wound Care: None needed    Follow-up Appointments   Procedures    FOLLOW UP VISIT Appointment in: 3 - 5 Days PCP, follow up with GI in 4-6 weeks     PCP, follow up with GI in 4-6 weeks     Standing Status:   Standing     Number of Occurrences:   1     Order Specific Question:   Appointment in     Answer:   3 - 5 Days       Signed By: Duran Haywood DO     November 5, 2021

## 2021-11-05 NOTE — PROGRESS NOTES
Discharge order noted for today. Orders reviewed. No needs identified at this time.  remains available if needed.   Verner Berry RN - Outcomes Manager  [phone]

## 2021-11-05 NOTE — PROGRESS NOTES
Nutrition Note    Surgery not recommending cholecystectomy, diet resumed for dinner meal tonight. Pt asking when she can be discharge and discharge order noted for today. Pt reports shes hungry but had poor intake of breakfast meal yesterday due to dislike of hospital meals and food preferences. D5 ½ NS with 40 mEq/L KCL at 75 mL/hr providing 306 kcal per day. Folic acid, MVI & thiamine started yesterday, hx of alcohol abuse.     Nutrition Recommendations/Plan:   - Continue oral diet and add Magic Cup, TID.  - Continue thiamine, folic acid and multivitamin supplementation.  - IVF per MD.    Electronically signed by Jesus Manuel Baker RD on 11/5/2021 at 3:10 PM    Contact: [phone]

## 2021-11-05 NOTE — PROGRESS NOTES
HPI: Mavis Dhillon is a 55 y.o. female presenting with chief complain of hyperbilirubinemia. Patient presented to the emergency department with edema as well as scleral icterus. Bilirubin is currently 13. She has been worked up and found to have potential alcoholic hepatitis. She describes some very mild abdominal pain which was in the entire upper abdomen but not focal to the right upper quadrant or centrally. She states currently she denies any pain. She is hungry and would like to eat. Imaging work-up showed sludge in the gallbladder on ultrasound but no cholecystitis. MRCP was performed due to chronically dilated common bile duct and showed no common duct stone, possible gallbladder wall thickening and pericholecystic fluid. However, the patient does have mild ascites. Asked for an opinion regarding whether or not the patient would benefit from cholecystectomy. Past Medical History:   Diagnosis Date    Alcohol abuse     Alcohol withdrawal syndrome (HCC)     Anemia     Asthma     Bile duct calculus     CAP (community acquired pneumonia)     Constipation     GERD (gastroesophageal reflux disease)     Iron deficiency     Liver function test abnormality     Macrocytosis     Vertigo        Past Surgical History:   Procedure Laterality Date    COLONOSCOPY N/A 2/25/2021    COLONOSCOPY with polypectomies performed by Garrick Freeman MD at [facility]       Family History   Problem Relation Age of Onset    No Known Problems Mother     No Known Problems Father     No Known Problems Maternal Grandmother     No Known Problems Maternal Grandfather     No Known Problems Paternal Grandmother     No Known Problems Paternal Grandfather     Cancer Sister         uterine?  Cancer Daughter         uterine?        Social History     Socioeconomic History    Marital status: SINGLE   Tobacco Use    Smoking status: Current Every Day Smoker     Packs/day: 0.50     Years: 20.00     Pack years: 10.00    Smokeless tobacco: Never Used   Substance and Sexual Activity    Alcohol use: Yes     Alcohol/week: 3.0 standard drinks     Types: 3 Glasses of wine per week    Drug use: Not Currently    Sexual activity: Never       Review of Systems -all others are negative        No Known Allergies    Vitals:    11/04/21 1655 11/04/21 2126 11/05/21 0506 11/05/21 1200   BP: 109/64 112/74 (!) 97/58 92/62   Pulse: 100 97 95 89   Resp: 16 16 16 17   Temp: 98.1 °F (36.7 °C) 98.5 °F (36.9 °C) 99.1 °F (37.3 °C) 98.3 °F (36.8 °C)   SpO2: 94% 94% 95% 92%   Weight:       Height:           Physical Exam  Constitutional:       Appearance: She is well-developed. HENT:      Head: Normocephalic and atraumatic. Eyes:      Conjunctiva/sclera: Conjunctivae normal.   Abdominal:      General: There is no distension. Palpations: Abdomen is soft. Tenderness: There is no abdominal tenderness. Musculoskeletal:         General: Normal range of motion. Lymphadenopathy:      Cervical: No cervical adenopathy. Skin:     General: Skin is warm and dry. Findings: No rash. Neurological:      Sensory: No sensory deficit.    Psychiatric:         Speech: Speech normal.       CMP:   Lab Results   Component Value Date/Time     11/05/2021 02:58 AM    K 3.8 11/05/2021 02:58 AM    CL 98 (L) 11/05/2021 02:58 AM    CO2 31 11/05/2021 02:58 AM    AGAP 8 11/05/2021 02:58 AM    GLU 77 11/05/2021 02:58 AM    BUN 10 11/05/2021 02:58 AM    CREA 1.04 11/05/2021 02:58 AM    GFRAA >60 11/05/2021 02:58 AM    GFRNA 55 (L) 11/05/2021 02:58 AM    CA 8.3 (L) 11/05/2021 02:58 AM    ALB 2.2 (L) 11/05/2021 02:58 AM    TP 6.4 11/05/2021 02:58 AM    GLOB 4.2 (H) 11/05/2021 02:58 AM    AGRAT 0.5 (L) 11/05/2021 02:58 AM    ALT 68 (H) 11/05/2021 02:58 AM     CBC:   Lab Results   Component Value Date/Time    WBC 9.2 11/05/2021 02:58 AM    HGB 9.1 (L) 11/05/2021 02:58 AM    HCT 27.3 (L) 11/05/2021 02:58 AM    PLT 89 (L) 11/05/2021 02:58 AM Ultrasound with sludge, no wall thickening or pericholecystic fluid, negative sonographic Gutierrez's  MRCP with no stone in the common bile duct or pancreatic mass, possible thickening of the gallbladder wall with pericholecystic fluid  HIDA scan shows nonfilling of the gallbladder, but this can be falsely positive study given liver dysfunction    Assessment / Plan    Hyperbilirubinemia, possibly secondary to alcoholic hepatitis  Patient is currently asymptomatic with no gallstones  Do not recommend laparoscopic cholecystectomy  Patient can follow-up with general surgery as outpatient for continuation of care  She may be a candidate for cholecystectomy in the future if she develops recurrent abdominal pain  Call with concerns    The diagnoses and plan were discussed with the patient. All questions answered. Plan of care agreed to by all concerned.

## 2021-11-05 NOTE — PROGRESS NOTES
[email or web address]  [phone]    Gastroenterology follow up-Progress note    Impression:  1. Jaundice - likely acute alcoholic hepatitis df score is 33.4; no steroids due to lactic acidosis, concern for infection   - 11/5 tBili 13.4, Direct 10.2  - 11/3 tBili 12.2, Direct Bili 9.1.   - 1/2021 tBili 0.5  2. Common Bile Duct Dilation - chronic finding seen on [place] 1/9/2019  - MRI w/ CBD dilation but no choledocholithiasis or obstructing mass, potential cholecystitis w/ GB dilation and wall thickening - suspect due to chronic liver disease and fasting state  - HIDA - limited, no tracer activity in gallbladder - suspect false positive due to poor excretion in setting of hyperbilirubinemia. Await Surgery input, doubt cholecystectomy indicated. 3. Etoh Abuse  4. Elevated LFT - stable/trending down slowly  - 11/3 ALT 80, , .   - 1/2021 , ALT 39, Alk phos 285  - Negative for viral HAV/HBV/HCV 1/2021  5. Possible Cirrhosis   - 3//2021 MRI Abdomen - early or mild cirrhosis suggested, trace ascites. - Labs 1/2021 no fibrosis or steatosis; iron sat 71%, elevated AP only with + ASMA, o/w negative -- could be PBC, although not seen on MRI   6. Chronic Anemia - stable  - 11/3 Hgb 9.0, HCT 26.6.   - 1/2021 Hgb 9.9, Hct 31.9  - EGD-[facility] 2/2021 significant for mild HP negative chronic gastritis w/ FIM and two TA rectal polyps w/ recommended repeat EGD and colo 5 yrs. 7. Hyperkalemia  8. Hyponatremia  9. Hx of heroin addiction - on methadone       MRI/MRCP 11/2/21 IMPRESSION  1. Moderate hepatic steatosis and significant hepatomegaly. No suspicious  hepatic lesion. 2.  Biliary duct dilation but no choledocholithiasis or obstructing mass. 3.  Potential cholecystitis given gallbladder dilation, wall thickening and  small volume pericholecystic fluid. 4.  Possible portal hypertension given small recanalized paraumbilical vein,  small volume ascites.  There is mild colonic wall thickening which may be sequela  of portal hypertension or infectious/inflammatory process. 5.  Moderate anasarca. HIDA 11/5/21 IMPRESSION   Exam limited as above. No definite radiotracer activity in the gallbladder  detected, which is consistent with cystic duct obstruction in the appropriate  clinical setting. Of note, false positives can be seen with severe  hepatocellular disease and hyperbilirubinemia. Plan:  1. Note primary team plans to consult general surgery. 2. Alcohol abstinence  3. Consider Pentoxifylline 400mg TID at discharge - can provide some benefit in patients w/ acute alcoholic hepatitis where glucocorticoids are contraindicated or whom compliance w/ follow up is a concern. 4. Consider liver biopsy for definitive diagnosis and rule in/out cirrhosis once acute hepatitis resolved  5. Medical management per primary team  6. Follow up with GI as outpatient in ~4-6 weeks  GI to sign off. Thank you for this consultation and the opportunity to participate in the care of this patient. Please do not hesitate to call with any questions or concerns, or should event occur that may necessitate additional GI evaluation. Chief Complaint: Jaundice      Subjective:  Patient feels well without complaints this morning. She is hopeful to be discharged soon. Discussed importance of alcohol cessation and using available community resources and family support to be successful. ROS: Denies any fevers, chills, rash.        General: well developed, well nourished, no acute distress  Eyes: sclera icteric, EOM normal  Cardiovascular: heart normal, intact distal pulses, normal rate and regular rhythm  Pulmonary: breath sounds normal and effort normal  Abdominal: appearance normal, bowel sounds normal and soft, non-acute, non-tender    Patient Active Problem List   Diagnosis Code    Pneumonia J18.9    CAP (community acquired pneumonia) J18.9    SIRS (systemic inflammatory response syndrome) (HCC) R65.10    Jaundice R17    Alcohol abuse F10.10    Alcohol intoxication ([facility] F10.929    Hyperkalemia E87.5    Hyponatremia E87.1    Elevated LFTs R79.89    Mild protein-calorie malnutrition (HCC) E44.1         Visit Vitals  BP (!) 97/58   Pulse 95   Temp 99.1 °F (37.3 °C)   Resp 16   Ht 5' 7\" (1.702 m)   Wt 54.9 kg (121 lb)   SpO2 95%   BMI 18.95 kg/m²           Intake/Output Summary (Last 24 hours) at 11/5/2021 0935  Last data filed at 11/5/2021 0535  Gross per 24 hour   Intake 465 ml   Output 600 ml   Net -135 ml       CBC w/Diff    Lab Results   Component Value Date/Time    WBC 9.2 11/05/2021 02:58 AM    RBC 2.74 (L) 11/05/2021 02:58 AM    HGB 9.1 (L) 11/05/2021 02:58 AM    HCT 27.3 (L) 11/05/2021 02:58 AM    MCV 99.6 11/05/2021 02:58 AM    MCH 33.2 11/05/2021 02:58 AM    MCHC 33.3 11/05/2021 02:58 AM    RDW 19.9 (H) 11/05/2021 02:58 AM    PLT 89 (L) 11/05/2021 02:58 AM    Lab Results   Component Value Date/Time    GRANS 69 11/04/2021 03:21 AM    LYMPH 18 (L) 11/04/2021 03:21 AM    EOS 1 11/04/2021 03:21 AM    BANDS 23 (H) 01/25/2020 01:39 PM    BASOS 0 11/04/2021 03:21 AM      Basic Metabolic Profile   Recent Labs     11/05/21 0258      K 3.8   CL 98*   CO2 31   BUN 10   CA 8.3*        Hepatic Function    Lab Results   Component Value Date/Time    ALB 2.2 (L) 11/05/2021 02:58 AM    TP 6.4 11/05/2021 02:58 AM     (H) 11/05/2021 02:58 AM    No results found for: TBIL       Coags   Recent Labs     11/05/21  0258 11/04/21  0321   PTP 17.4* 17.2*   INR 1.4* 1.4*               RISSA Fairchild  11/05/21, 9:53 AM   [facility]. [email or web address]  Phone: [phone]  Pager: [phone]

## 2021-11-05 NOTE — DIABETES MGMT
Diabetes/ Glycemic Control Plan of Care  Recommendations:   · Suggest POC glucose testing 4 times daily/hypoglycemia panel. 1259 Addendum:  Noted hypoglycemia panel in place; orders obtained and entered for POC glucose testing q 6 hours. Assessment:   Pt with no known history of diabetes or use of diabetes medications. Hypoglycemia may be related to NPO status and acute liver injury. Currently receiving IVF- D5. DX:   1. Jaundice     2. Acute hyperkalemia     3. Alcohol abuse     4. Alcoholic intoxication with complication ([address]     5. Acute cholecystitis        Fasting/ Morning blood glucose:   Lab Results   Component Value Date/Time    Glucose 77 11/05/2021 02:58 AM    Glucose (POC) 96 11/04/2021 04:53 PM     IV Fluids containing dextrose: D5/0.45NaCl with 40 mEq KCl/L at 75 ml/hr  Steroids:   none    Blood glucose values:   11/5:  Lab - 77  11/4:  Lab - 52;  POC - 68, 65, 102, 96    Within target range (70-180mg/dL):  NO  Current insulin orders: none  Total Daily Dose previous 24 hours = n/a  Current A1c: No results found for: HBA1C, [id number], [id number]   Adequate glycemic control PTA: n/a  Nutrition/Diet:   Active Orders   Diet    DIET NPO      Meal Intake:  No data found. Supplement Intake:  No data found. Home diabetes medications:   Key Antihyperglycemic Medications     Patient is on no antihyperglycemic meds.           Plan/Goals:   Blood glucose will be within target of 70 - 180 mg/dl within 72 hours    Education:  [] Refer to Diabetes Education Record                       [x] Education not indicated at this time     Jennifer Chris MS, RD, Formerly named [facility]  Diabetes and Glycemic Control   [employer]  '

## 2021-11-15 LAB
ACTIN IGG SERPL-ACNC: 35 UNITS (ref 0–19)
HFE GENE MUT ANL BLD/T: NORMAL

## 2021-11-16 ENCOUNTER — HOSPITAL ENCOUNTER (EMERGENCY)
Age: 56
Discharge: HOME | End: 2021-11-16
Attending: STUDENT IN AN ORGANIZED HEALTH CARE EDUCATION/TRAINING PROGRAM
Payer: MEDICAID

## 2021-11-16 VITALS
DIASTOLIC BLOOD PRESSURE: 88 MMHG | OXYGEN SATURATION: 97 % | HEART RATE: 92 BPM | SYSTOLIC BLOOD PRESSURE: 103 MMHG | RESPIRATION RATE: 18 BRPM | TEMPERATURE: 97.6 F

## 2021-11-16 DIAGNOSIS — R60.9 EDEMA, UNSPECIFIED TYPE: Primary | ICD-10-CM

## 2021-11-16 LAB
ALBUMIN SERPL-MCNC: 2.3 G/DL (ref 3.4–5)
ALBUMIN/GLOB SERPL: 0.4 {RATIO} (ref 0.8–1.7)
ALP SERPL-CCNC: 345 U/L (ref 45–117)
ALT SERPL-CCNC: 63 U/L (ref 13–56)
AMMONIA PLAS-SCNC: 55 UMOL/L (ref 11–32)
ANION GAP SERPL CALC-SCNC: 8 MMOL/L (ref 3–18)
APPEARANCE UR: CLEAR
AST SERPL-CCNC: 145 U/L (ref 10–38)
BASOPHILS # BLD: 0 K/UL (ref 0–0.1)
BASOPHILS NFR BLD: 0 % (ref 0–2)
BILIRUB SERPL-MCNC: 11.8 MG/DL (ref 0.2–1)
BILIRUB UR QL: ABNORMAL
BUN SERPL-MCNC: 11 MG/DL (ref 7–18)
BUN/CREAT SERPL: 12 (ref 12–20)
CALCIUM SERPL-MCNC: 9.6 MG/DL (ref 8.5–10.1)
CHLORIDE SERPL-SCNC: 94 MMOL/L (ref 100–111)
CO2 SERPL-SCNC: 28 MMOL/L (ref 21–32)
COLOR UR: ABNORMAL
CREAT SERPL-MCNC: 0.92 MG/DL (ref 0.6–1.3)
DIFFERENTIAL METHOD BLD: ABNORMAL
EOSINOPHIL # BLD: 0 K/UL (ref 0–0.4)
EOSINOPHIL NFR BLD: 0 % (ref 0–5)
ERYTHROCYTE [DISTWIDTH] IN BLOOD BY AUTOMATED COUNT: 17.1 % (ref 11.6–14.5)
ETHANOL SERPL-MCNC: 117 MG/DL (ref 0–3)
GLOBULIN SER CALC-MCNC: 5.7 G/DL (ref 2–4)
GLUCOSE SERPL-MCNC: 58 MG/DL (ref 74–99)
GLUCOSE UR STRIP.AUTO-MCNC: NEGATIVE MG/DL
HCT VFR BLD AUTO: 27.1 % (ref 35–45)
HGB BLD-MCNC: 8.8 G/DL (ref 12–16)
HGB UR QL STRIP: NEGATIVE
IMM GRANULOCYTES # BLD AUTO: 0 K/UL
IMM GRANULOCYTES NFR BLD AUTO: 0 %
KETONES UR QL STRIP.AUTO: NEGATIVE MG/DL
LEUKOCYTE ESTERASE UR QL STRIP.AUTO: NEGATIVE
LYMPHOCYTES # BLD: 1.3 K/UL (ref 0.9–3.6)
LYMPHOCYTES NFR BLD: 13 % (ref 21–52)
MAGNESIUM SERPL-MCNC: 1.8 MG/DL (ref 1.6–2.6)
MCH RBC QN AUTO: 34.5 PG (ref 24–34)
MCHC RBC AUTO-ENTMCNC: 32.5 G/DL (ref 31–37)
MCV RBC AUTO: 106.3 FL (ref 78–100)
MONOCYTES # BLD: 0.5 K/UL (ref 0.05–1.2)
MONOCYTES NFR BLD: 5 % (ref 3–10)
NEUTS SEG # BLD: 7.9 K/UL (ref 1.8–8)
NEUTS SEG NFR BLD: 82 % (ref 40–73)
NITRITE UR QL STRIP.AUTO: NEGATIVE
NRBC # BLD: 0 K/UL (ref 0–0.01)
NRBC BLD-RTO: 0 PER 100 WBC
PH UR STRIP: 6.5 [PH] (ref 5–8)
PLATELET # BLD AUTO: 150 K/UL (ref 135–420)
PLATELET COMMENTS,PCOM: ABNORMAL
PMV BLD AUTO: 10.4 FL (ref 9.2–11.8)
POTASSIUM SERPL-SCNC: 4.1 MMOL/L (ref 3.5–5.5)
PROT SERPL-MCNC: 8 G/DL (ref 6.4–8.2)
PROT UR STRIP-MCNC: NEGATIVE MG/DL
RBC # BLD AUTO: 2.55 M/UL (ref 4.2–5.3)
RBC MORPH BLD: ABNORMAL
RBC MORPH BLD: ABNORMAL
SODIUM SERPL-SCNC: 130 MMOL/L (ref 136–145)
SP GR UR REFRACTOMETRY: 1.01 (ref 1–1.03)
UROBILINOGEN UR QL STRIP.AUTO: >8 EU/DL (ref 0.2–1)
WBC # BLD AUTO: 9.7 K/UL (ref 4.6–13.2)

## 2021-11-16 PROCEDURE — 82140 ASSAY OF AMMONIA: CPT

## 2021-11-16 PROCEDURE — 82077 ASSAY SPEC XCP UR&BREATH IA: CPT

## 2021-11-16 PROCEDURE — 93005 ELECTROCARDIOGRAM TRACING: CPT

## 2021-11-16 PROCEDURE — 81003 URINALYSIS AUTO W/O SCOPE: CPT

## 2021-11-16 PROCEDURE — 85025 COMPLETE CBC W/AUTO DIFF WBC: CPT

## 2021-11-16 PROCEDURE — 80053 COMPREHEN METABOLIC PANEL: CPT

## 2021-11-16 PROCEDURE — 99283 EMERGENCY DEPT VISIT LOW MDM: CPT

## 2021-11-16 PROCEDURE — 83735 ASSAY OF MAGNESIUM: CPT

## 2021-11-16 PROCEDURE — 87086 URINE CULTURE/COLONY COUNT: CPT

## 2021-11-16 RX ORDER — CYPROHEPTADINE HYDROCHLORIDE 4 MG/1
4 TABLET ORAL 2 TIMES DAILY
Qty: 60 TABLET | Refills: 0 | Status: SHIPPED | OUTPATIENT
Start: 2021-11-16 | End: 2022-06-03

## 2021-11-16 RX ORDER — BUMETANIDE 1 MG/1
1 TABLET ORAL 2 TIMES DAILY
Qty: 60 TABLET | Refills: 0 | Status: SHIPPED | OUTPATIENT
Start: 2021-11-16 | End: 2022-06-03

## 2021-11-16 RX ORDER — PENTOXIFYLLINE 400 MG/1
400 TABLET, EXTENDED RELEASE ORAL
Qty: 90 TABLET | Refills: 0 | Status: SHIPPED | OUTPATIENT
Start: 2021-11-16

## 2021-11-17 LAB
ATRIAL RATE: 85 BPM
CALCULATED P AXIS, ECG09: 90 DEGREES
CALCULATED R AXIS, ECG10: 59 DEGREES
CALCULATED T AXIS, ECG11: 99 DEGREES
DIAGNOSIS, 93000: NORMAL
P-R INTERVAL, ECG05: 154 MS
Q-T INTERVAL, ECG07: 414 MS
QRS DURATION, ECG06: 78 MS
QTC CALCULATION (BEZET), ECG08: 492 MS
VENTRICULAR RATE, ECG03: 85 BPM

## 2021-11-17 NOTE — ED PROVIDER NOTES
HPI   Patient presents for worsening edema to her bilateral lower extremities since discharge from the hospital.  States that she has never had this before. She denies any associated chest pain or difficulty breathing. She is able to lay flat does not have any exertional dyspnea, no known history of heart failure. She was having this throughout her time in the hospital as well. She does have alcoholic hepatitis and states that she is continuing to drink. States that she is still having dark urine and yellow eyes. She has not yet followed up with outpatient GI. States that she was unable to get some prescriptions filled that she states that they were not sent and she requests these. She is still able to eat and drink. Denies any other new symptoms. Past Medical History:   Diagnosis Date    Alcohol abuse     Alcohol withdrawal syndrome (HCC)     Anemia     Asthma     Bile duct calculus     CAP (community acquired pneumonia)     Constipation     GERD (gastroesophageal reflux disease)     Iron deficiency     Liver function test abnormality     Macrocytosis     Vertigo        Past Surgical History:   Procedure Laterality Date    COLONOSCOPY N/A 2/25/2021    COLONOSCOPY with polypectomies performed by Daily Simmons MD at [facility]         Family History:   Problem Relation Age of Onset    No Known Problems Mother     No Known Problems Father     No Known Problems Maternal Grandmother     No Known Problems Maternal Grandfather     No Known Problems Paternal Grandmother     No Known Problems Paternal Grandfather     Cancer Sister         uterine?  Cancer Daughter         uterine?        Social History     Socioeconomic History    Marital status: SINGLE     Spouse name: Not on file    Number of children: Not on file    Years of education: Not on file    Highest education level: Not on file   Occupational History    Not on file   Tobacco Use    Smoking status: Current Every Day Smoker     Packs/day: 0.50     Years: 20.00     Pack years: 10.00    Smokeless tobacco: Never Used   Substance and Sexual Activity    Alcohol use: Yes     Alcohol/week: 3.0 standard drinks     Types: 3 Glasses of wine per week    Drug use: Not Currently    Sexual activity: Never   Other Topics Concern    Not on file   Social History Narrative    Not on file     Social Determinants of Health     Financial Resource Strain:     Difficulty of Paying Living Expenses: Not on file   Food Insecurity:     Worried About Running Out of Food in the Last Year: Not on file    Teofilo of Food in the Last Year: Not on file   Transportation Needs:     Lack of Transportation (Medical): Not on file    Lack of Transportation (Non-Medical): Not on file   Physical Activity:     Days of Exercise per Week: Not on file    Minutes of Exercise per Session: Not on file   Stress:     Feeling of Stress : Not on file   Social Connections:     Frequency of Communication with Friends and Family: Not on file    Frequency of Social Gatherings with Friends and Family: Not on file    Attends Gnosticism Services: Not on file    Active Member of [address] [place] [address] or Organizations: Not on file    Attends Club or Organization Meetings: Not on file    Marital Status: Not on file   Intimate Partner Violence:     Fear of Current or Ex-Partner: Not on file    Emotionally Abused: Not on file    Physically Abused: Not on file    Sexually Abused: Not on file   Housing Stability:     Unable to Pay for Housing in the Last Year: Not on file    Number of [place] in the Last Year: Not on file    Unstable Housing in the Last Year: Not on file         ALLERGIES: Patient has no known allergies.     Review of Systems  Constitutional: [no fever]  HENT: [no ear pain]  Eyes: [no change in vision]  Respiratory: [no SOB]  Cardio: [no chest pain]  GI: [no blood in stool]  : [no hematuria]  MSK: [no back pain]  Skin: Jaundice  Neuro: [no headache]    Vitals: 11/16/21 2003   BP: 103/88   Pulse: 92   Resp: 18   Temp: 97.6 °F (36.4 °C)   SpO2: 97%            Physical Exam   General: [No acute distress]  Head: [Normocephalic, atraumatic]  Psych: [cooperative and alert]  Eyes: Positive scleral icterus  ENT: [moist oral mucosa]  Neck: [supple]  CV: [regular rate and rhythm, 3+ pitting edema, palpable radial pulses]  Pulm: [clear breath sounds bilaterally without any wheezing or rhonchi, normal respiratory rate]  GI: [normal bowel sounds, soft, non-tender]  MSK: [moves all four extremities]  Skin: [no rashes]  Neuro: [alert and conversive]     MDM     Patient is a 64-year-old female who presents with bilateral lower extremity pitting edema. Patient clinically does not have any concerning findings of heart failure. Letter was already sent at triage was reviewed. EKG has significant artifact along the baseline which makes interpretation difficult however appears to have a normal axis, narrow complex QRS, R wave progression is appropriate. No obvious ST elevation or depression noted. Unable to fully evaluate T waves but do not see any obvious inversions were possible. Overall shows no signs of ACS or arrhythmia. Patient is equal swelling bilaterally and no history of blood clots setting this is unlikely. CBC shows a macrocytic anemia, patient is already on folate and thiamine supplements, UA shows moderate bilirubin otherwise unremarkable, CMP shows continued elevated liver function however downtrending from previous. Alcohol level is elevated at 117. Patient is monitored in the emerge department for approximately 2 hours and is clinically sober at this time. Kidney function is normal  We discussed the importance of alcohol cessation with the patient. We discussed that she continues to drink she will continue to kill her liver. We discussed that this is why she is not improving. Discussed the importance of outpatient follow-up.   We will prescribe her her home medication Bumex that she has not been taking. Patient stable for discharge at this time. Patient is in agreement with the plan to be discharged at this time. All the patient's questions were answered. Patient was given written instructions on the diagnosis, and states understanding of the plan moving forward. We did discuss important signs and symptoms that should prompt quick return to the emergency department. Disposition: Patient was discharged home in stable condition.   They will follow up with GI and her PCP    Prescriptions: Bumex    Diagnosis: Acute bilateral dependent edema    Procedures

## 2021-11-17 NOTE — DISCHARGE INSTRUCTIONS
Keep legs elevated, increase exercise, use compression stockings and take Bumex medication to help with edema to lower extremities.

## 2021-11-17 NOTE — ED NOTES
Jaundiced; still drinking ETOH. C/o BLE swelling and dark urine. Right sided abd pain. D/c'd last week from hospital.  Has first outpatient appt w/GI in December. I performed a brief evaluation, including history and physical, of the patient here in triage and I have determined that pt will need further treatment and evaluation from the main side ER physician. I have placed initial orders to help in expediting patients care.      November 16, 2021 at 8:06 PM - RISSA Estrella        Visit Vitals  /88 (BP 1 Location: Right upper arm, BP Patient Position: Sitting)   Pulse 92   Temp 97.6 °F (36.4 °C)   Resp 18   SpO2 97%

## 2021-11-18 LAB
BACTERIA SPEC CULT: NORMAL
CC UR VC: NORMAL
SERVICE CMNT-IMP: NORMAL

## 2022-03-18 PROBLEM — E87.1 HYPONATREMIA: Status: ACTIVE | Noted: 2021-11-02

## 2022-03-18 PROBLEM — F10.929 ALCOHOL INTOXICATION (HCC): Status: ACTIVE | Noted: 2021-11-02

## 2022-03-18 PROBLEM — F10.10 ALCOHOL ABUSE: Status: ACTIVE | Noted: 2021-11-02

## 2022-03-19 PROBLEM — R65.10 SIRS (SYSTEMIC INFLAMMATORY RESPONSE SYNDROME) (HCC): Status: ACTIVE | Noted: 2017-07-08

## 2022-03-19 PROBLEM — E44.1 MILD PROTEIN-CALORIE MALNUTRITION (HCC): Status: ACTIVE | Noted: 2021-11-03

## 2022-03-19 PROBLEM — R79.89 ELEVATED LFTS: Status: ACTIVE | Noted: 2021-11-02

## 2022-03-19 PROBLEM — E87.5 HYPERKALEMIA: Status: ACTIVE | Noted: 2021-11-02

## 2022-03-19 PROBLEM — R17 JAUNDICE: Status: ACTIVE | Noted: 2021-11-02

## 2022-03-20 PROBLEM — J18.9 PNEUMONIA: Status: ACTIVE | Noted: 2017-07-07

## 2022-03-20 PROBLEM — J18.9 CAP (COMMUNITY ACQUIRED PNEUMONIA): Status: ACTIVE | Noted: 2017-07-07

## 2022-04-20 ENCOUNTER — TRANSCRIBE ORDER (OUTPATIENT)
Dept: SCHEDULING | Age: 57
End: 2022-04-20

## 2022-04-20 DIAGNOSIS — Z12.31 VISIT FOR SCREENING MAMMOGRAM: Primary | ICD-10-CM

## 2022-05-01 PROCEDURE — 94762 N-INVAS EAR/PLS OXIMTRY CONT: CPT

## 2022-05-03 ENCOUNTER — HOSPITAL ENCOUNTER (EMERGENCY)
Age: 57
Discharge: HOME | End: 2022-05-03
Attending: EMERGENCY MEDICINE
Payer: MEDICAID

## 2022-05-03 ENCOUNTER — APPOINTMENT (OUTPATIENT)
Dept: GENERAL RADIOLOGY | Age: 57
End: 2022-05-03
Attending: PHYSICIAN ASSISTANT
Payer: MEDICAID

## 2022-05-03 VITALS
RESPIRATION RATE: 14 BRPM | SYSTOLIC BLOOD PRESSURE: 111 MMHG | HEART RATE: 77 BPM | DIASTOLIC BLOOD PRESSURE: 65 MMHG | BODY MASS INDEX: 21.97 KG/M2 | OXYGEN SATURATION: 97 % | WEIGHT: 140 LBS | HEIGHT: 67 IN | TEMPERATURE: 97.5 F

## 2022-05-03 DIAGNOSIS — F10.920 ALCOHOLIC INTOXICATION WITHOUT COMPLICATION (HCC): ICD-10-CM

## 2022-05-03 DIAGNOSIS — M79.89 LEG SWELLING: Primary | ICD-10-CM

## 2022-05-03 LAB
ALBUMIN SERPL-MCNC: 3.3 G/DL (ref 3.4–5)
ALBUMIN/GLOB SERPL: 0.7 {RATIO} (ref 0.8–1.7)
ALP SERPL-CCNC: 299 U/L (ref 45–117)
ALT SERPL-CCNC: 76 U/L (ref 13–56)
AMMONIA PLAS-SCNC: 84 UMOL/L (ref 11–32)
AMPHET UR QL SCN: NEGATIVE
ANION GAP SERPL CALC-SCNC: 5 MMOL/L (ref 3–18)
AST SERPL-CCNC: 250 U/L (ref 10–38)
BARBITURATES UR QL SCN: NEGATIVE
BASOPHILS # BLD: 0 K/UL (ref 0–0.1)
BASOPHILS NFR BLD: 1 % (ref 0–2)
BENZODIAZ UR QL: NEGATIVE
BILIRUB SERPL-MCNC: 2.2 MG/DL (ref 0.2–1)
BNP SERPL-MCNC: 136 PG/ML (ref 0–900)
BUN SERPL-MCNC: 9 MG/DL (ref 7–18)
BUN/CREAT SERPL: 12 (ref 12–20)
CALCIUM SERPL-MCNC: 9.1 MG/DL (ref 8.5–10.1)
CANNABINOIDS UR QL SCN: NEGATIVE
CHLORIDE SERPL-SCNC: 103 MMOL/L (ref 100–111)
CO2 SERPL-SCNC: 27 MMOL/L (ref 21–32)
COCAINE UR QL SCN: NEGATIVE
CREAT SERPL-MCNC: 0.78 MG/DL (ref 0.6–1.3)
DIFFERENTIAL METHOD BLD: ABNORMAL
EOSINOPHIL # BLD: 0 K/UL (ref 0–0.4)
EOSINOPHIL NFR BLD: 1 % (ref 0–5)
ERYTHROCYTE [DISTWIDTH] IN BLOOD BY AUTOMATED COUNT: 15.7 % (ref 11.6–14.5)
ETHANOL SERPL-MCNC: 333 MG/DL (ref 0–3)
GLOBULIN SER CALC-MCNC: 4.7 G/DL (ref 2–4)
GLUCOSE BLD STRIP.AUTO-MCNC: 88 MG/DL (ref 70–110)
GLUCOSE SERPL-MCNC: 108 MG/DL (ref 74–99)
HCT VFR BLD AUTO: 30.5 % (ref 35–45)
HDSCOM,HDSCOM: ABNORMAL
HGB BLD-MCNC: 10 G/DL (ref 12–16)
IMM GRANULOCYTES # BLD AUTO: 0 K/UL (ref 0–0.04)
IMM GRANULOCYTES NFR BLD AUTO: 0 % (ref 0–0.5)
LYMPHOCYTES # BLD: 1.9 K/UL (ref 0.9–3.6)
LYMPHOCYTES NFR BLD: 30 % (ref 21–52)
MAGNESIUM SERPL-MCNC: 2 MG/DL (ref 1.6–2.6)
MCH RBC QN AUTO: 32.3 PG (ref 24–34)
MCHC RBC AUTO-ENTMCNC: 32.8 G/DL (ref 31–37)
MCV RBC AUTO: 98.4 FL (ref 78–100)
METHADONE UR QL: POSITIVE
MONOCYTES # BLD: 0.7 K/UL (ref 0.05–1.2)
MONOCYTES NFR BLD: 11 % (ref 3–10)
NEUTS SEG # BLD: 3.7 K/UL (ref 1.8–8)
NEUTS SEG NFR BLD: 58 % (ref 40–73)
NRBC # BLD: 0 K/UL (ref 0–0.01)
NRBC BLD-RTO: 0 PER 100 WBC
OPIATES UR QL: NEGATIVE
PCP UR QL: NEGATIVE
PLATELET # BLD AUTO: 103 K/UL (ref 135–420)
PMV BLD AUTO: 10.1 FL (ref 9.2–11.8)
POTASSIUM SERPL-SCNC: 4.4 MMOL/L (ref 3.5–5.5)
PROT SERPL-MCNC: 8 G/DL (ref 6.4–8.2)
RBC # BLD AUTO: 3.1 M/UL (ref 4.2–5.3)
SODIUM SERPL-SCNC: 135 MMOL/L (ref 136–145)
TROPONIN-HIGH SENSITIVITY: 15 NG/L (ref 0–54)
WBC # BLD AUTO: 6.3 K/UL (ref 4.6–13.2)

## 2022-05-03 PROCEDURE — 82140 ASSAY OF AMMONIA: CPT

## 2022-05-03 PROCEDURE — 82077 ASSAY SPEC XCP UR&BREATH IA: CPT

## 2022-05-03 PROCEDURE — 82962 GLUCOSE BLOOD TEST: CPT

## 2022-05-03 PROCEDURE — 74011250636 HC RX REV CODE- 250/636: Performed by: PHYSICIAN ASSISTANT

## 2022-05-03 PROCEDURE — 71046 X-RAY EXAM CHEST 2 VIEWS: CPT

## 2022-05-03 PROCEDURE — 84484 ASSAY OF TROPONIN QUANT: CPT

## 2022-05-03 PROCEDURE — 93005 ELECTROCARDIOGRAM TRACING: CPT

## 2022-05-03 PROCEDURE — 96374 THER/PROPH/DIAG INJ IV PUSH: CPT

## 2022-05-03 PROCEDURE — 85025 COMPLETE CBC W/AUTO DIFF WBC: CPT

## 2022-05-03 PROCEDURE — 83735 ASSAY OF MAGNESIUM: CPT

## 2022-05-03 PROCEDURE — 83880 ASSAY OF NATRIURETIC PEPTIDE: CPT

## 2022-05-03 PROCEDURE — 80053 COMPREHEN METABOLIC PANEL: CPT

## 2022-05-03 PROCEDURE — 80307 DRUG TEST PRSMV CHEM ANLYZR: CPT

## 2022-05-03 PROCEDURE — 99285 EMERGENCY DEPT VISIT HI MDM: CPT

## 2022-05-03 RX ORDER — FUROSEMIDE 10 MG/ML
40 INJECTION INTRAMUSCULAR; INTRAVENOUS
Status: COMPLETED | OUTPATIENT
Start: 2022-05-03 | End: 2022-05-03

## 2022-05-03 RX ORDER — BUMETANIDE 1 MG/1
1 TABLET ORAL 2 TIMES DAILY
Qty: 28 TABLET | Refills: 0 | Status: SHIPPED | OUTPATIENT
Start: 2022-05-03 | End: 2022-05-17

## 2022-05-03 RX ADMIN — FUROSEMIDE 40 MG: 10 INJECTION, SOLUTION INTRAMUSCULAR; INTRAVENOUS at 17:25

## 2022-05-03 NOTE — ED PROVIDER NOTES
[address] [facility],[address]  [facility] EMERGENCY DEPT    Date: 5/3/2022  Patient Name: Mona Mae    History of Presenting Illness     Chief Complaint   Patient presents with    Swelling     55 y.o. female with a past medical history of Alcohol abuse and Anemia presents to the ED complaining of worsening bilateral lower extremity edema over the past month. Patient states she drinks about 1-2 beers per day. She is currently taking Bumex 1 mg daily, and states she is compliant with this. Patient denies any fever, chills, shortness of breath, chest pain, numbness or weakness, other symptoms. Patient denies any other associated signs or symptoms. Patient denies any other complaints. Nursing notes regarding the HPI and triage nursing notes were reviewed. Prior medical records were reviewed. Current Outpatient Medications   Medication Sig Dispense Refill    bumetanide (BUMEX) 1 mg tablet Take 1 Tablet by mouth two (2) times a day. 60 Tablet 0    cyproheptadine (PERIACTIN) 4 mg tablet Take 1 Tablet by mouth two (2) times a day. 60 Tablet 0    pentoxifylline CR (TRENTAL) 400 mg CR tablet Take 1 Tablet by mouth three (3) times daily (with meals). 90 Tablet 0    folic acid (FOLVITE) 1 mg tablet Take 1 Tablet by mouth daily. 30 Tablet 0    therapeutic multivitamin (THERAGRAN) tablet Take 1 Tablet by mouth daily. 30 Tablet 0    thiamine HCL (B-1) 100 mg tablet Take 1 Tablet by mouth daily. 30 Tablet 0    fluticasone propion-salmeteroL (ADVAIR/WIXELA) 250-50 mcg/dose diskus inhaler INHALE 1 PUFF BY MOUTH TWICE DAILY      ferrous sulfate (Iron) 325 mg (65 mg iron) tablet Take 65 mg by mouth Daily (before breakfast).  omeprazole (PRILOSEC) 20 mg capsule Take 20 mg by mouth daily.          Past History     Past Medical History:  Past Medical History:   Diagnosis Date    Alcohol abuse     Alcohol withdrawal syndrome (HCC)     Anemia     Asthma     Bile duct calculus     CAP (community acquired pneumonia)     Constipation     GERD (gastroesophageal reflux disease)     Iron deficiency     Liver function test abnormality     Macrocytosis     Vertigo        Past Surgical History:  Past Surgical History:   Procedure Laterality Date    COLONOSCOPY N/A 2/25/2021    COLONOSCOPY with polypectomies performed by Collin Dickens MD at [facility]       Family History:  Family History   Problem Relation Age of Onset    No Known Problems Mother     No Known Problems Father     No Known Problems Maternal Grandmother     No Known Problems Maternal Grandfather     No Known Problems Paternal Grandmother     No Known Problems Paternal Grandfather     Cancer Sister         uterine?  Cancer Daughter         uterine? Social History:  Social History     Tobacco Use    Smoking status: Current Every Day Smoker     Packs/day: 0.50     Years: 20.00     Pack years: 10.00    Smokeless tobacco: Never Used   Substance Use Topics    Alcohol use: Yes     Alcohol/week: 3.0 standard drinks     Types: 3 Glasses of wine per week    Drug use: Not Currently     Comment: pt takees methadone       Allergies:  No Known Allergies    Patient's primary care provider (as noted in EPIC): Rogelio Barnett MD    Review of Systems   Constitutional:  Denies malaise, fever, chills. Chest:  Denies injury. Cardiac:  Denies chest pain or palpitations. Respiratory:  Denies cough, wheezing, difficulty breathing, shortness of breath. GI/ABD:  Denies injury, pain, distention, nausea, vomiting, diarrhea. Extremity/MS: + BLE edema. Neuro:  Denies headache, LOC, dizziness, neurologic symptoms/deficits/paresthesias. Skin: Denies injury, rash, itching or skin changes. All other systems negative as reviewed.      Visit Vitals  /65   Pulse 77   Temp 97.5 °F (36.4 °C)   Resp 12   Ht 5' 7\" (1.702 m)   Wt 63.5 kg (140 lb)   SpO2 97%   BMI 21.93 kg/m²       PHYSICAL EXAM:    CONSTITUTIONAL:  Alert, in no apparent distress;  well developed;  well nourished. HEAD:  Normocephalic, atraumatic. EYES:  EOMI. Non-icteric sclera. Normal conjunctiva. ENTM:  Nose:  no rhinorrhea. Throat:  no erythema or exudate, mucous membranes moist.  NECK:  Supple  RESPIRATORY:  Chest clear, equal breath sounds, good air movement. CARDIOVASCULAR:  Regular rate and rhythm. No murmurs, rubs, or gallops. GI:  Normal bowel sounds, abdomen soft and non-tender. No rebound or guarding. BACK:  Non-tender. UPPER EXT:  Normal inspection. LOWER EXT: 2+ pitting to BLE, no TTP; NVI distally. NEURO:  Moves all four extremities, and grossly normal motor exam.  SKIN:  No rashes;  Normal for age. PSYCH:  Alert and normal affect.     IMPRESSION AND MEDICAL DECISION MAKING:    Recent Results (from the past 12 hour(s))   GLUCOSE, POC    Collection Time: 05/03/22  2:58 PM   Result Value Ref Range    Glucose (POC) 88 70 - 110 mg/dL   EKG, 12 LEAD, INITIAL    Collection Time: 05/03/22  3:19 PM   Result Value Ref Range    Ventricular Rate 77 BPM    Atrial Rate 77 BPM    P-R Interval 158 ms    QRS Duration 74 ms    Q-T Interval 396 ms    QTC Calculation (Bezet) 448 ms    Calculated P Axis 68 degrees    Calculated R Axis 48 degrees    Calculated T Axis 51 degrees    Diagnosis       Normal sinus rhythm  Normal ECG  When compared with ECG of 16-NOV-2021 20:16,  No significant change was found     DRUG SCREEN, URINE    Collection Time: 05/03/22  3:21 PM   Result Value Ref Range    BENZODIAZEPINES Negative NEG      BARBITURATES Negative NEG      THC (TH-CANNABINOL) Negative NEG      OPIATES Negative NEG      PCP(PHENCYCLIDINE) Negative NEG      COCAINE Negative NEG      AMPHETAMINES Negative NEG      METHADONE Positive (A) NEG      HDSCOM (NOTE)    CBC WITH AUTOMATED DIFF    Collection Time: 05/03/22  3:37 PM   Result Value Ref Range    WBC 6.3 4.6 - 13.2 K/uL    RBC 3.10 (L) 4.20 - 5.30 M/uL    HGB 10.0 (L) 12.0 - 16.0 g/dL    HCT 30.5 (L) 35.0 - 45.0 %    MCV 98.4 78.0 - 100.0 FL    MCH 32.3 24.0 - 34.0 PG    MCHC 32.8 31.0 - 37.0 g/dL    RDW 15.7 (H) 11.6 - 14.5 %    PLATELET 461 (L) 047 - 420 K/uL    MPV 10.1 9.2 - 11.8 FL    NRBC 0.0 0  WBC    ABSOLUTE NRBC 0.00 0.00 - 0.01 K/uL    NEUTROPHILS 58 40 - 73 %    LYMPHOCYTES 30 21 - 52 %    MONOCYTES 11 (H) 3 - 10 %    EOSINOPHILS 1 0 - 5 %    BASOPHILS 1 0 - 2 %    IMMATURE GRANULOCYTES 0 0.0 - 0.5 %    ABS. NEUTROPHILS 3.7 1.8 - 8.0 K/UL    ABS. LYMPHOCYTES 1.9 0.9 - 3.6 K/UL    ABS. MONOCYTES 0.7 0.05 - 1.2 K/UL    ABS. EOSINOPHILS 0.0 0.0 - 0.4 K/UL    ABS. BASOPHILS 0.0 0.0 - 0.1 K/UL    ABS. IMM. GRANS. 0.0 0.00 - 0.04 K/UL    DF AUTOMATED     METABOLIC PANEL, COMPREHENSIVE    Collection Time: 05/03/22  3:37 PM   Result Value Ref Range    Sodium 135 (L) 136 - 145 mmol/L    Potassium 4.4 3.5 - 5.5 mmol/L    Chloride 103 100 - 111 mmol/L    CO2 27 21 - 32 mmol/L    Anion gap 5 3.0 - 18 mmol/L    Glucose 108 (H) 74 - 99 mg/dL    BUN 9 7.0 - 18 MG/DL    Creatinine 0.78 0.6 - 1.3 MG/DL    BUN/Creatinine ratio 12 12 - 20      GFR est AA >60 >60 ml/min/1.73m2    GFR est non-AA >60 >60 ml/min/1.73m2    Calcium 9.1 8.5 - 10.1 MG/DL    Bilirubin, total 2.2 (H) 0.2 - 1.0 MG/DL    ALT (SGPT) 76 (H) 13 - 56 U/L    AST (SGOT) 250 (H) 10 - 38 U/L    Alk.  phosphatase 299 (H) 45 - 117 U/L    Protein, total 8.0 6.4 - 8.2 g/dL    Albumin 3.3 (L) 3.4 - 5.0 g/dL    Globulin 4.7 (H) 2.0 - 4.0 g/dL    A-G Ratio 0.7 (L) 0.8 - 1.7     MAGNESIUM    Collection Time: 05/03/22  3:37 PM   Result Value Ref Range    Magnesium 2.0 1.6 - 2.6 mg/dL   TROPONIN-HIGH SENSITIVITY    Collection Time: 05/03/22  3:37 PM   Result Value Ref Range    Troponin-High Sensitivity 15 0 - 54 ng/L   NT-PRO BNP    Collection Time: 05/03/22  3:37 PM   Result Value Ref Range    NT pro- 0 - 900 PG/ML   AMMONIA    Collection Time: 05/03/22  3:37 PM   Result Value Ref Range    Ammonia, plasma 84 (H) 11 - 32 UMOL/L   ETHYL ALCOHOL    Collection Time: 05/03/22 3:37 PM   Result Value Ref Range    ALCOHOL(ETHYL),SERUM 333 (H) 0 - 3 MG/DL     Patient is intoxicated during visit today. She does have 2+ pitting edema to bilateral lower extremities. Given a dose of IV Lasix. Plan for patient to diurese some and become more sober prior to discharge. 6:03 PM : Pt care transferred to [place], [place], ED provider. History of patient complaint(s), available diagnostic reports and current treatment plan has been discussed thoroughly.      RISSA Barrientos

## 2022-05-03 NOTE — ED TRIAGE NOTES
Pt was seen by her PCP on Friday who advised her to come to the ED for bilateral leg swelling x1 month. Pt did not come because she did not have a ride. Pt went to her dental clinic today and they called EMS for her because they felt she wasn't acting her normal self. Pt drowsy, nodding off during questioning in triage. Endorses 2-3 etoh drinks per day but denies any usage so far today.

## 2022-05-03 NOTE — ED NOTES
6:32 PM :Pt care assumed from [facility], ED provider. Pt complaint(s), current treatment plan, progression and available diagnostic results have been discussed thoroughly. Rounding occurred: no  Intended Disposition: Home   Pending diagnostic reports and/or labs (please list): sober and diuresis     6:33 PM  Patient states she is now feeling the urge to pee and requesting to eat. Will feed. 8:15 PM  Patient is requesting to leave. Will walk patient if clinically sober will discharge home. Patient reports leg swelling has improved after multiple large volume episodes of urination. 8:26 PM  Patient is clinically sober, is able to dress herself and go to the restroom without assistance. Patient states she feels comfortable going home. Patient reports she is supposed to be on a diuretic but reports that her primary care doctor did not send in the prescription. Have agreed to discharge home with 14-day course of Bumex however have discussed the need for close primary care follow-up. Patient reports she will call her primary care doctor tomorrow morning for an appointment and to make sure they agree with her taking Bumex. Have given strict return precautions. Will discharge home.

## 2022-05-04 LAB
ATRIAL RATE: 77 BPM
CALCULATED P AXIS, ECG09: 68 DEGREES
CALCULATED R AXIS, ECG10: 48 DEGREES
CALCULATED T AXIS, ECG11: 51 DEGREES
DIAGNOSIS, 93000: NORMAL
P-R INTERVAL, ECG05: 158 MS
Q-T INTERVAL, ECG07: 396 MS
QRS DURATION, ECG06: 74 MS
QTC CALCULATION (BEZET), ECG08: 448 MS
VENTRICULAR RATE, ECG03: 77 BPM

## 2022-05-04 NOTE — ED NOTES
PIV removed. Catheter remains intact. 8:35 PM  05/03/22     Discharge instructions given to pt (name) with verbalization of understanding. Patient accompanied by family. Patient discharged with the following prescriptions Bumex. Patient discharged to home (destination).       Dae Horvath RN

## 2022-05-04 NOTE — ED NOTES
Assumed care of pt post report from [facility], RN     Pt resting with eyes closed. Equal chest rise/fall   Remains on cardiac monitoring. Stretcher in low/locked position. Call bell within reach.

## 2022-05-04 NOTE — ED NOTES
Responded to pt's call kenny. Pt reports the Purewick became dislodged and pt is now soaked in urine. Large urinary incontinence episode noted. Pt provided with Warm cloths. Pt cleansing self independently. Pt changed into personal clothing and ambulated independently to bathroom. Strong, steady gait noted.

## 2022-05-31 ENCOUNTER — APPOINTMENT (OUTPATIENT)
Dept: CT IMAGING | Age: 57
DRG: 279 | End: 2022-05-31
Attending: EMERGENCY MEDICINE
Payer: MEDICAID

## 2022-05-31 ENCOUNTER — HOSPITAL ENCOUNTER (INPATIENT)
Age: 57
LOS: 2 days | Discharge: HOME | DRG: 279 | End: 2022-06-03
Attending: EMERGENCY MEDICINE | Admitting: INTERNAL MEDICINE
Payer: MEDICAID

## 2022-05-31 ENCOUNTER — APPOINTMENT (OUTPATIENT)
Dept: GENERAL RADIOLOGY | Age: 57
DRG: 279 | End: 2022-05-31
Attending: EMERGENCY MEDICINE
Payer: MEDICAID

## 2022-05-31 DIAGNOSIS — E87.1 HYPONATREMIA: Primary | ICD-10-CM

## 2022-05-31 DIAGNOSIS — K75.9 HEPATITIS: ICD-10-CM

## 2022-05-31 DIAGNOSIS — K76.82 HEPATIC ENCEPHALOPATHY: ICD-10-CM

## 2022-05-31 DIAGNOSIS — E87.79 OTHER HYPERVOLEMIA: ICD-10-CM

## 2022-05-31 LAB
ALBUMIN SERPL-MCNC: 2.8 G/DL (ref 3.4–5)
ALBUMIN/GLOB SERPL: 0.5 {RATIO} (ref 0.8–1.7)
ALP SERPL-CCNC: 464 U/L (ref 45–117)
ALT SERPL-CCNC: 58 U/L (ref 13–56)
AMMONIA PLAS-SCNC: 34 UMOL/L (ref 11–32)
ANION GAP SERPL CALC-SCNC: 6 MMOL/L (ref 3–18)
AST SERPL-CCNC: 186 U/L (ref 10–38)
BASOPHILS # BLD: 0 K/UL (ref 0–0.1)
BASOPHILS NFR BLD: 0 % (ref 0–2)
BILIRUB SERPL-MCNC: 9.5 MG/DL (ref 0.2–1)
BUN SERPL-MCNC: 9 MG/DL (ref 7–18)
BUN/CREAT SERPL: 11 (ref 12–20)
CALCIUM SERPL-MCNC: 9.4 MG/DL (ref 8.5–10.1)
CHLORIDE SERPL-SCNC: 91 MMOL/L (ref 100–111)
CO2 SERPL-SCNC: 27 MMOL/L (ref 21–32)
CREAT SERPL-MCNC: 0.85 MG/DL (ref 0.6–1.3)
DIFFERENTIAL METHOD BLD: ABNORMAL
EOSINOPHIL # BLD: 0 K/UL (ref 0–0.4)
EOSINOPHIL NFR BLD: 0 % (ref 0–5)
ERYTHROCYTE [DISTWIDTH] IN BLOOD BY AUTOMATED COUNT: 17.2 % (ref 11.6–14.5)
ETHANOL SERPL-MCNC: 78 MG/DL (ref 0–3)
GLOBULIN SER CALC-MCNC: 5.3 G/DL (ref 2–4)
GLUCOSE SERPL-MCNC: 60 MG/DL (ref 74–99)
HCT VFR BLD AUTO: 28.5 % (ref 35–45)
HGB BLD-MCNC: 9.8 G/DL (ref 12–16)
IMM GRANULOCYTES # BLD AUTO: 0.1 K/UL (ref 0–0.04)
IMM GRANULOCYTES NFR BLD AUTO: 1 % (ref 0–0.5)
LACTATE BLD-SCNC: 3.41 MMOL/L (ref 0.4–2)
LYMPHOCYTES # BLD: 0.5 K/UL (ref 0.9–3.6)
LYMPHOCYTES NFR BLD: 6 % (ref 21–52)
MCH RBC QN AUTO: 32.5 PG (ref 24–34)
MCHC RBC AUTO-ENTMCNC: 34.4 G/DL (ref 31–37)
MCV RBC AUTO: 94.4 FL (ref 78–100)
MONOCYTES # BLD: 1.3 K/UL (ref 0.05–1.2)
MONOCYTES NFR BLD: 14 % (ref 3–10)
NEUTS SEG # BLD: 7.1 K/UL (ref 1.8–8)
NEUTS SEG NFR BLD: 79 % (ref 40–73)
NRBC # BLD: 0 K/UL (ref 0–0.01)
NRBC BLD-RTO: 0 PER 100 WBC
PLATELET # BLD AUTO: 88 K/UL (ref 135–420)
PMV BLD AUTO: 9.6 FL (ref 9.2–11.8)
POTASSIUM SERPL-SCNC: 5.1 MMOL/L (ref 3.5–5.5)
PROT SERPL-MCNC: 8.1 G/DL (ref 6.4–8.2)
RBC # BLD AUTO: 3.02 M/UL (ref 4.2–5.3)
SODIUM SERPL-SCNC: 124 MMOL/L (ref 136–145)
WBC # BLD AUTO: 9 K/UL (ref 4.6–13.2)

## 2022-05-31 PROCEDURE — 82077 ASSAY SPEC XCP UR&BREATH IA: CPT

## 2022-05-31 PROCEDURE — 87040 BLOOD CULTURE FOR BACTERIA: CPT

## 2022-05-31 PROCEDURE — 70450 CT HEAD/BRAIN W/O DYE: CPT

## 2022-05-31 PROCEDURE — 74011000250 HC RX REV CODE- 250: Performed by: EMERGENCY MEDICINE

## 2022-05-31 PROCEDURE — 83605 ASSAY OF LACTIC ACID: CPT

## 2022-05-31 PROCEDURE — 99285 EMERGENCY DEPT VISIT HI MDM: CPT

## 2022-05-31 PROCEDURE — 74011250636 HC RX REV CODE- 250/636: Performed by: EMERGENCY MEDICINE

## 2022-05-31 PROCEDURE — 71045 X-RAY EXAM CHEST 1 VIEW: CPT

## 2022-05-31 PROCEDURE — 80053 COMPREHEN METABOLIC PANEL: CPT

## 2022-05-31 PROCEDURE — 85025 COMPLETE CBC W/AUTO DIFF WBC: CPT

## 2022-05-31 PROCEDURE — 82140 ASSAY OF AMMONIA: CPT

## 2022-05-31 PROCEDURE — 96374 THER/PROPH/DIAG INJ IV PUSH: CPT

## 2022-05-31 PROCEDURE — 93005 ELECTROCARDIOGRAM TRACING: CPT

## 2022-05-31 RX ORDER — SODIUM CHLORIDE 0.9 % (FLUSH) 0.9 %
5-10 SYRINGE (ML) INJECTION AS NEEDED
Status: DISCONTINUED | OUTPATIENT
Start: 2022-05-31 | End: 2022-06-03 | Stop reason: HOSPADM

## 2022-05-31 RX ORDER — CEFEPIME HYDROCHLORIDE 2 G/1
2 INJECTION, POWDER, FOR SOLUTION INTRAVENOUS EVERY 12 HOURS
Status: DISCONTINUED | OUTPATIENT
Start: 2022-05-31 | End: 2022-05-31 | Stop reason: DRUGHIGH

## 2022-05-31 RX ADMIN — CEFEPIME HYDROCHLORIDE 2 G: 2 INJECTION, POWDER, FOR SOLUTION INTRAVENOUS at 23:23

## 2022-06-01 ENCOUNTER — APPOINTMENT (OUTPATIENT)
Dept: GENERAL RADIOLOGY | Age: 57
DRG: 279 | End: 2022-06-01
Attending: HOSPITALIST
Payer: MEDICAID

## 2022-06-01 ENCOUNTER — APPOINTMENT (OUTPATIENT)
Dept: NON INVASIVE DIAGNOSTICS | Age: 57
DRG: 279 | End: 2022-06-01
Attending: INTERNAL MEDICINE
Payer: MEDICAID

## 2022-06-01 PROBLEM — Z86.59 HISTORY OF ALCOHOL WITHDRAWAL DELIRIUM: Chronic | Status: ACTIVE | Noted: 2022-06-01

## 2022-06-01 PROBLEM — K75.9 HEPATITIS: Status: ACTIVE | Noted: 2022-06-01

## 2022-06-01 PROBLEM — E72.20 HYPERAMMONEMIA (HCC): Status: ACTIVE | Noted: 2022-06-01

## 2022-06-01 PROBLEM — K21.9 GERD (GASTROESOPHAGEAL REFLUX DISEASE): Status: ACTIVE | Noted: 2022-06-01

## 2022-06-01 PROBLEM — K70.30 ALCOHOLIC CIRRHOSIS OF LIVER (HCC): Status: ACTIVE | Noted: 2022-06-01

## 2022-06-01 PROBLEM — G93.40 ENCEPHALOPATHY: Status: ACTIVE | Noted: 2022-06-01

## 2022-06-01 PROBLEM — E87.70 VOLUME OVERLOAD: Status: ACTIVE | Noted: 2022-06-01

## 2022-06-01 PROBLEM — K76.82 HEPATIC ENCEPHALOPATHY (HCC): Status: ACTIVE | Noted: 2022-06-01

## 2022-06-01 PROBLEM — K70.30 ALCOHOLIC CIRRHOSIS OF LIVER (HCC): Chronic | Status: ACTIVE | Noted: 2022-06-01

## 2022-06-01 PROBLEM — K21.9 GERD (GASTROESOPHAGEAL REFLUX DISEASE): Chronic | Status: ACTIVE | Noted: 2022-06-01

## 2022-06-01 PROBLEM — F10.10 ALCOHOL ABUSE: Chronic | Status: ACTIVE | Noted: 2021-11-02

## 2022-06-01 PROBLEM — D69.6 THROMBOCYTOPENIA (HCC): Status: ACTIVE | Noted: 2022-06-01

## 2022-06-01 PROBLEM — E87.1 HYPONATREMIA: Chronic | Status: ACTIVE | Noted: 2021-11-02

## 2022-06-01 PROBLEM — K76.82 HEPATIC ENCEPHALOPATHY: Status: ACTIVE | Noted: 2022-06-01

## 2022-06-01 PROBLEM — J45.909 ASTHMA: Status: ACTIVE | Noted: 2022-06-01

## 2022-06-01 PROBLEM — Z72.0 TOBACCO ABUSE: Chronic | Status: ACTIVE | Noted: 2022-06-01

## 2022-06-01 PROBLEM — Z72.0 TOBACCO ABUSE: Status: ACTIVE | Noted: 2022-06-01

## 2022-06-01 PROBLEM — G93.40 ENCEPHALOPATHY: Status: RESOLVED | Noted: 2022-06-01 | Resolved: 2022-06-01

## 2022-06-01 PROBLEM — F10.920 ACUTE ALCOHOLIC INTOXICATION WITHOUT COMPLICATION (HCC): Chronic | Status: ACTIVE | Noted: 2021-11-02

## 2022-06-01 PROBLEM — E16.2 HYPOGLYCEMIA: Status: ACTIVE | Noted: 2022-06-01

## 2022-06-01 PROBLEM — F10.920 ACUTE ALCOHOLIC INTOXICATION WITHOUT COMPLICATION (HCC): Status: ACTIVE | Noted: 2021-11-02

## 2022-06-01 PROBLEM — J45.909 ASTHMA: Chronic | Status: ACTIVE | Noted: 2022-06-01

## 2022-06-01 PROBLEM — K76.82 HEPATIC ENCEPHALOPATHY (HCC): Status: RESOLVED | Noted: 2022-06-01 | Resolved: 2022-06-01

## 2022-06-01 PROBLEM — D50.9 IRON DEFICIENCY ANEMIA: Status: ACTIVE | Noted: 2022-06-01

## 2022-06-01 PROBLEM — G93.40 ACUTE ENCEPHALOPATHY: Status: ACTIVE | Noted: 2022-06-01

## 2022-06-01 PROBLEM — F10.929 ALCOHOL INTOXICATION (HCC): Chronic | Status: ACTIVE | Noted: 2021-11-02

## 2022-06-01 PROBLEM — D50.9 IRON DEFICIENCY ANEMIA: Chronic | Status: ACTIVE | Noted: 2022-06-01

## 2022-06-01 PROBLEM — K76.82 HEPATIC ENCEPHALOPATHY: Status: RESOLVED | Noted: 2022-06-01 | Resolved: 2022-06-01

## 2022-06-01 LAB
ALBUMIN SERPL-MCNC: 2.6 G/DL (ref 3.4–5)
ALBUMIN/GLOB SERPL: 0.6 {RATIO} (ref 0.8–1.7)
ALP SERPL-CCNC: 406 U/L (ref 45–117)
ALT SERPL-CCNC: 46 U/L (ref 13–56)
APPEARANCE UR: CLEAR
AST SERPL-CCNC: 137 U/L (ref 10–38)
ATRIAL RATE: 97 BPM
BILIRUB DIRECT SERPL-MCNC: 6.5 MG/DL (ref 0–0.2)
BILIRUB SERPL-MCNC: 8.9 MG/DL (ref 0.2–1)
BILIRUB UR QL: ABNORMAL
CALCULATED P AXIS, ECG09: 74 DEGREES
CALCULATED R AXIS, ECG10: 69 DEGREES
CALCULATED T AXIS, ECG11: 73 DEGREES
COLOR UR: ABNORMAL
DIAGNOSIS, 93000: NORMAL
ECHO AO ROOT DIAM: 3.1 CM
ECHO AO ROOT INDEX: 1.78 CM/M2
ECHO EST RA PRESSURE: 3 MMHG
ECHO LA VOL 2C: 48 ML (ref 22–52)
ECHO LA VOL 4C: 52 ML (ref 22–52)
ECHO LA VOLUME AREA LENGTH: 54 ML
ECHO LA VOLUME INDEX A2C: 28 ML/M2 (ref 16–34)
ECHO LA VOLUME INDEX A4C: 30 ML/M2 (ref 16–34)
ECHO LA VOLUME INDEX AREA LENGTH: 31 ML/M2 (ref 16–34)
ECHO LV E' LATERAL VELOCITY: 14 CM/S
ECHO LV E' SEPTAL VELOCITY: 11 CM/S
ECHO LV FRACTIONAL SHORTENING: 32 % (ref 28–44)
ECHO LV INTERNAL DIMENSION DIASTOLE INDEX: 2.87 CM/M2
ECHO LV INTERNAL DIMENSION DIASTOLIC: 5 CM (ref 3.9–5.3)
ECHO LV INTERNAL DIMENSION SYSTOLIC INDEX: 1.95 CM/M2
ECHO LV INTERNAL DIMENSION SYSTOLIC: 3.4 CM
ECHO LV IVSD: 0.8 CM (ref 0.6–0.9)
ECHO LV MASS 2D: 135.8 G (ref 67–162)
ECHO LV MASS INDEX 2D: 78 G/M2 (ref 43–95)
ECHO LV POSTERIOR WALL DIASTOLIC: 0.8 CM (ref 0.6–0.9)
ECHO LV RELATIVE WALL THICKNESS RATIO: 0.32
ECHO LVOT AREA: 3.1 CM2
ECHO LVOT DIAM: 2 CM
ECHO LVOT MEAN GRADIENT: 3 MMHG
ECHO LVOT PEAK GRADIENT: 6 MMHG
ECHO LVOT PEAK VELOCITY: 1.2 M/S
ECHO LVOT STROKE VOLUME INDEX: 41.3 ML/M2
ECHO LVOT SV: 71.9 ML
ECHO LVOT VTI: 22.9 CM
ECHO MV A VELOCITY: 0.95 M/S
ECHO MV E DECELERATION TIME (DT): 247.1 MS
ECHO MV E VELOCITY: 0.79 M/S
ECHO MV E/A RATIO: 0.83
ECHO MV E/E' LATERAL: 5.64
ECHO MV E/E' RATIO (AVERAGED): 6.41
ECHO MV E/E' SEPTAL: 7.18
ECHO RIGHT VENTRICULAR SYSTOLIC PRESSURE (RVSP): 30 MMHG
ECHO RV TAPSE: 2.8 CM (ref 1.7–?)
ECHO TV REGURGITANT MAX VELOCITY: 2.58 M/S
ECHO TV REGURGITANT PEAK GRADIENT: 27 MMHG
FLUAV RNA SPEC QL NAA+PROBE: NOT DETECTED
FLUBV RNA SPEC QL NAA+PROBE: NOT DETECTED
GLOBULIN SER CALC-MCNC: 4.6 G/DL (ref 2–4)
GLUCOSE BLD STRIP.AUTO-MCNC: 105 MG/DL (ref 70–110)
GLUCOSE BLD STRIP.AUTO-MCNC: 124 MG/DL (ref 70–110)
GLUCOSE BLD STRIP.AUTO-MCNC: 139 MG/DL (ref 70–110)
GLUCOSE BLD STRIP.AUTO-MCNC: 64 MG/DL (ref 70–110)
GLUCOSE BLD STRIP.AUTO-MCNC: 77 MG/DL (ref 70–110)
GLUCOSE BLD STRIP.AUTO-MCNC: 77 MG/DL (ref 70–110)
GLUCOSE UR STRIP.AUTO-MCNC: 100 MG/DL
HGB UR QL STRIP: NEGATIVE
INR PPP: 1.3 (ref 0.8–1.2)
KETONES UR QL STRIP.AUTO: 15 MG/DL
LACTATE BLD-SCNC: 1.27 MMOL/L (ref 0.4–2)
LEUKOCYTE ESTERASE UR QL STRIP.AUTO: NEGATIVE
NITRITE UR QL STRIP.AUTO: NEGATIVE
P-R INTERVAL, ECG05: 142 MS
PH UR STRIP: 6 [PH] (ref 5–8)
PROT SERPL-MCNC: 7.2 G/DL (ref 6.4–8.2)
PROT UR STRIP-MCNC: NEGATIVE MG/DL
PROTHROMBIN TIME: 16.7 SEC (ref 11.5–15.2)
Q-T INTERVAL, ECG07: 320 MS
QRS DURATION, ECG06: 68 MS
QTC CALCULATION (BEZET), ECG08: 406 MS
SARS-COV-2, COV2: NOT DETECTED
SP GR UR REFRACTOMETRY: 1.01 (ref 1–1.03)
UROBILINOGEN UR QL STRIP.AUTO: >8 EU/DL (ref 0.2–1)
VENTRICULAR RATE, ECG03: 97 BPM

## 2022-06-01 PROCEDURE — 87636 SARSCOV2 & INF A&B AMP PRB: CPT

## 2022-06-01 PROCEDURE — 83605 ASSAY OF LACTIC ACID: CPT

## 2022-06-01 PROCEDURE — 93306 TTE W/DOPPLER COMPLETE: CPT

## 2022-06-01 PROCEDURE — 74011250636 HC RX REV CODE- 250/636: Performed by: INTERNAL MEDICINE

## 2022-06-01 PROCEDURE — 74011250637 HC RX REV CODE- 250/637: Performed by: PHYSICIAN ASSISTANT

## 2022-06-01 PROCEDURE — 73100 X-RAY EXAM OF WRIST: CPT

## 2022-06-01 PROCEDURE — 2709999900 HC NON-CHARGEABLE SUPPLY

## 2022-06-01 PROCEDURE — 74011000250 HC RX REV CODE- 250: Performed by: EMERGENCY MEDICINE

## 2022-06-01 PROCEDURE — 80076 HEPATIC FUNCTION PANEL: CPT

## 2022-06-01 PROCEDURE — 82962 GLUCOSE BLOOD TEST: CPT

## 2022-06-01 PROCEDURE — 74011250637 HC RX REV CODE- 250/637: Performed by: INTERNAL MEDICINE

## 2022-06-01 PROCEDURE — 36415 COLL VENOUS BLD VENIPUNCTURE: CPT

## 2022-06-01 PROCEDURE — 74011000250 HC RX REV CODE- 250: Performed by: INTERNAL MEDICINE

## 2022-06-01 PROCEDURE — 86381 MITOCHONDRIAL ANTIBODY EACH: CPT

## 2022-06-01 PROCEDURE — 85610 PROTHROMBIN TIME: CPT

## 2022-06-01 PROCEDURE — 74011250636 HC RX REV CODE- 250/636: Performed by: EMERGENCY MEDICINE

## 2022-06-01 PROCEDURE — 74011000258 HC RX REV CODE- 258: Performed by: EMERGENCY MEDICINE

## 2022-06-01 PROCEDURE — 86015 ACTIN ANTIBODY EACH: CPT

## 2022-06-01 PROCEDURE — 99222 1ST HOSP IP/OBS MODERATE 55: CPT | Performed by: INTERNAL MEDICINE

## 2022-06-01 PROCEDURE — 81003 URINALYSIS AUTO W/O SCOPE: CPT

## 2022-06-01 PROCEDURE — 65270000029 HC RM PRIVATE

## 2022-06-01 PROCEDURE — 96375 TX/PRO/DX INJ NEW DRUG ADDON: CPT

## 2022-06-01 PROCEDURE — 74011000258 HC RX REV CODE- 258: Performed by: INTERNAL MEDICINE

## 2022-06-01 PROCEDURE — 82107 ALPHA-FETOPROTEIN L3: CPT

## 2022-06-01 RX ORDER — SODIUM CHLORIDE 0.9 % (FLUSH) 0.9 %
5-40 SYRINGE (ML) INJECTION EVERY 8 HOURS
Status: DISCONTINUED | OUTPATIENT
Start: 2022-06-01 | End: 2022-06-03 | Stop reason: HOSPADM

## 2022-06-01 RX ORDER — MAGNESIUM SULFATE 100 %
4 CRYSTALS MISCELLANEOUS AS NEEDED
Status: DISCONTINUED | OUTPATIENT
Start: 2022-06-01 | End: 2022-06-03 | Stop reason: HOSPADM

## 2022-06-01 RX ORDER — LORAZEPAM 2 MG/ML
1 INJECTION, SOLUTION INTRAMUSCULAR; INTRAVENOUS
Status: DISCONTINUED | OUTPATIENT
Start: 2022-06-01 | End: 2022-06-03 | Stop reason: HOSPADM

## 2022-06-01 RX ORDER — BUMETANIDE 1 MG/1
1 TABLET ORAL 2 TIMES DAILY
Status: DISCONTINUED | OUTPATIENT
Start: 2022-06-01 | End: 2022-06-03 | Stop reason: HOSPADM

## 2022-06-01 RX ORDER — LORAZEPAM 1 MG/1
2 TABLET ORAL
Status: DISCONTINUED | OUTPATIENT
Start: 2022-06-01 | End: 2022-06-03 | Stop reason: HOSPADM

## 2022-06-01 RX ORDER — ONDANSETRON 4 MG/1
4 TABLET, ORALLY DISINTEGRATING ORAL
Status: DISCONTINUED | OUTPATIENT
Start: 2022-06-01 | End: 2022-06-03 | Stop reason: HOSPADM

## 2022-06-01 RX ORDER — FOLIC ACID 1 MG/1
1 TABLET ORAL DAILY
Status: DISCONTINUED | OUTPATIENT
Start: 2022-06-01 | End: 2022-06-03 | Stop reason: HOSPADM

## 2022-06-01 RX ORDER — BUDESONIDE AND FORMOTEROL FUMARATE DIHYDRATE 160; 4.5 UG/1; UG/1
2 AEROSOL RESPIRATORY (INHALATION)
Status: DISCONTINUED | OUTPATIENT
Start: 2022-06-01 | End: 2022-06-01 | Stop reason: CLARIF

## 2022-06-01 RX ORDER — PENTOXIFYLLINE 400 MG/1
400 TABLET, EXTENDED RELEASE ORAL
Status: DISCONTINUED | OUTPATIENT
Start: 2022-06-01 | End: 2022-06-03 | Stop reason: HOSPADM

## 2022-06-01 RX ORDER — ARFORMOTEROL TARTRATE 15 UG/2ML
15 SOLUTION RESPIRATORY (INHALATION)
Status: DISCONTINUED | OUTPATIENT
Start: 2022-06-01 | End: 2022-06-03 | Stop reason: HOSPADM

## 2022-06-01 RX ORDER — IPRATROPIUM BROMIDE AND ALBUTEROL SULFATE 2.5; .5 MG/3ML; MG/3ML
3 SOLUTION RESPIRATORY (INHALATION)
Status: DISCONTINUED | OUTPATIENT
Start: 2022-06-01 | End: 2022-06-01

## 2022-06-01 RX ORDER — DEXTROSE MONOHYDRATE 100 MG/ML
0-250 INJECTION, SOLUTION INTRAVENOUS AS NEEDED
Status: DISCONTINUED | OUTPATIENT
Start: 2022-06-01 | End: 2022-06-03 | Stop reason: HOSPADM

## 2022-06-01 RX ORDER — LANOLIN ALCOHOL/MO/W.PET/CERES
100 CREAM (GRAM) TOPICAL DAILY
Status: DISCONTINUED | OUTPATIENT
Start: 2022-06-01 | End: 2022-06-03 | Stop reason: HOSPADM

## 2022-06-01 RX ORDER — LORAZEPAM 2 MG/ML
3 INJECTION, SOLUTION INTRAMUSCULAR; INTRAVENOUS
Status: DISCONTINUED | OUTPATIENT
Start: 2022-06-01 | End: 2022-06-03 | Stop reason: HOSPADM

## 2022-06-01 RX ORDER — POLYETHYLENE GLYCOL 3350 17 G/17G
17 POWDER, FOR SOLUTION ORAL DAILY PRN
Status: DISCONTINUED | OUTPATIENT
Start: 2022-06-01 | End: 2022-06-03 | Stop reason: HOSPADM

## 2022-06-01 RX ORDER — ONDANSETRON 2 MG/ML
4 INJECTION INTRAMUSCULAR; INTRAVENOUS
Status: DISCONTINUED | OUTPATIENT
Start: 2022-06-01 | End: 2022-06-03 | Stop reason: HOSPADM

## 2022-06-01 RX ORDER — IPRATROPIUM BROMIDE AND ALBUTEROL SULFATE 2.5; .5 MG/3ML; MG/3ML
3 SOLUTION RESPIRATORY (INHALATION)
Status: DISCONTINUED | OUTPATIENT
Start: 2022-06-01 | End: 2022-06-03 | Stop reason: HOSPADM

## 2022-06-01 RX ORDER — IBUPROFEN 200 MG
1 TABLET ORAL DAILY PRN
Status: DISCONTINUED | OUTPATIENT
Start: 2022-06-01 | End: 2022-06-03 | Stop reason: HOSPADM

## 2022-06-01 RX ORDER — CALCIUM CARB/MAGNESIUM CARB 311-232MG
5 TABLET ORAL
Status: DISCONTINUED | OUTPATIENT
Start: 2022-06-01 | End: 2022-06-03 | Stop reason: HOSPADM

## 2022-06-01 RX ORDER — LORAZEPAM 1 MG/1
1 TABLET ORAL
Status: DISCONTINUED | OUTPATIENT
Start: 2022-06-01 | End: 2022-06-03 | Stop reason: HOSPADM

## 2022-06-01 RX ORDER — PANTOPRAZOLE SODIUM 40 MG/1
40 TABLET, DELAYED RELEASE ORAL
Status: DISCONTINUED | OUTPATIENT
Start: 2022-06-01 | End: 2022-06-03

## 2022-06-01 RX ORDER — CHOLECALCIFEROL (VITAMIN D3) 125 MCG
5 CAPSULE ORAL
Status: DISCONTINUED | OUTPATIENT
Start: 2022-06-01 | End: 2022-06-01

## 2022-06-01 RX ORDER — THERA TABS 400 MCG
1 TAB ORAL DAILY
Status: DISCONTINUED | OUTPATIENT
Start: 2022-06-01 | End: 2022-06-03 | Stop reason: HOSPADM

## 2022-06-01 RX ORDER — BUDESONIDE 0.5 MG/2ML
500 INHALANT ORAL
Status: DISCONTINUED | OUTPATIENT
Start: 2022-06-01 | End: 2022-06-03 | Stop reason: HOSPADM

## 2022-06-01 RX ORDER — SODIUM CHLORIDE 0.9 % (FLUSH) 0.9 %
5-40 SYRINGE (ML) INJECTION AS NEEDED
Status: DISCONTINUED | OUTPATIENT
Start: 2022-06-01 | End: 2022-06-03 | Stop reason: HOSPADM

## 2022-06-01 RX ORDER — CYPROHEPTADINE HYDROCHLORIDE 4 MG/1
4 TABLET ORAL 2 TIMES DAILY
Status: DISCONTINUED | OUTPATIENT
Start: 2022-06-01 | End: 2022-06-01

## 2022-06-01 RX ORDER — LORAZEPAM 2 MG/ML
2 INJECTION, SOLUTION INTRAMUSCULAR; INTRAVENOUS
Status: DISCONTINUED | OUTPATIENT
Start: 2022-06-01 | End: 2022-06-03 | Stop reason: HOSPADM

## 2022-06-01 RX ORDER — DEXTROSE MONOHYDRATE 100 MG/ML
125 INJECTION, SOLUTION INTRAVENOUS ONCE
Status: COMPLETED | OUTPATIENT
Start: 2022-06-01 | End: 2022-06-01

## 2022-06-01 RX ADMIN — FOLIC ACID 1 MG: 1 TABLET ORAL at 11:06

## 2022-06-01 RX ADMIN — CEFEPIME HYDROCHLORIDE 2 G: 2 INJECTION, POWDER, FOR SOLUTION INTRAVENOUS at 15:14

## 2022-06-01 RX ADMIN — PENTOXIFYLLINE 400 MG: 400 TABLET, EXTENDED RELEASE ORAL at 15:16

## 2022-06-01 RX ADMIN — THIAMINE HYDROCHLORIDE 100 MG: 100 INJECTION, SOLUTION INTRAMUSCULAR; INTRAVENOUS at 15:21

## 2022-06-01 RX ADMIN — PANTOPRAZOLE 40 MG: 40 TABLET, DELAYED RELEASE ORAL at 16:30

## 2022-06-01 RX ADMIN — LACTULOSE 15 ML: 20 SOLUTION ORAL at 22:21

## 2022-06-01 RX ADMIN — DEXTROSE MONOHYDRATE 125 ML: 100 INJECTION, SOLUTION INTRAVENOUS at 02:42

## 2022-06-01 RX ADMIN — LACTULOSE 15 ML: 20 SOLUTION ORAL at 16:00

## 2022-06-01 RX ADMIN — Medication 100 MG: at 11:06

## 2022-06-01 RX ADMIN — SODIUM CHLORIDE, PRESERVATIVE FREE 10 ML: 5 INJECTION INTRAVENOUS at 22:27

## 2022-06-01 RX ADMIN — LACTULOSE 15 ML: 20 SOLUTION ORAL at 11:05

## 2022-06-01 RX ADMIN — THERA TABS 1 TABLET: TAB at 06:13

## 2022-06-01 RX ADMIN — LACTULOSE 15 ML: 20 SOLUTION ORAL at 06:13

## 2022-06-01 RX ADMIN — SODIUM CHLORIDE, PRESERVATIVE FREE 10 ML: 5 INJECTION INTRAVENOUS at 15:24

## 2022-06-01 RX ADMIN — SODIUM CHLORIDE, PRESERVATIVE FREE 10 ML: 5 INJECTION INTRAVENOUS at 06:15

## 2022-06-01 RX ADMIN — PENTOXIFYLLINE 400 MG: 400 TABLET, EXTENDED RELEASE ORAL at 18:54

## 2022-06-01 RX ADMIN — FOLIC ACID: 5 INJECTION, SOLUTION INTRAMUSCULAR; INTRAVENOUS; SUBCUTANEOUS at 07:11

## 2022-06-01 RX ADMIN — ONDANSETRON 4 MG: 2 INJECTION INTRAMUSCULAR; INTRAVENOUS at 07:06

## 2022-06-01 RX ADMIN — IPRATROPIUM BROMIDE AND ALBUTEROL SULFATE 3 ML: .5; 2.5 SOLUTION RESPIRATORY (INHALATION) at 06:16

## 2022-06-01 RX ADMIN — BUMETANIDE 1 MG: 1 TABLET ORAL at 18:54

## 2022-06-01 NOTE — PROGRESS NOTES
Problem: Patient Education: Go to Patient Education Activity  Goal: Patient/Family Education  Outcome: Progressing Towards Goal     Problem: Alcohol Withdrawal  Goal: *STG: Participates in treatment plan  Outcome: Not Progressing Towards Goal  Goal: *STG: Remains safe in hospital  Outcome: Not Progressing Towards Goal  Goal: *STG: Seeks staff when symptoms of withdrawal increase  Outcome: Not Progressing Towards Goal  Goal: *STG: Complies with medication therapy  Outcome: Not Progressing Towards Goal  Goal: *STG: Attends activities and groups  Outcome: Not Progressing Towards Goal  Goal: *STG: Will identify negative impact of chemical dependency including the use of tobacco, alcohol, and other substances  Outcome: Not Progressing Towards Goal  Goal: *STG: Verbalizes abstinence as an achievable goal  Outcome: Not Progressing Towards Goal  Goal: *STG: Agrees to participate in outpatient after care program to support ongoing mental health  Outcome: Not Progressing Towards Goal  Goal: *STG: Able to indentify relapse triggers including interpersonal/social and familial factors  Outcome: Not Progressing Towards Goal  Goal: *STG: Identify lifestyle changes to support long term sobriety such as vocation, employment, education, and legal issues  Outcome: Not Progressing Towards Goal  Goal: *STG: Maintains appropriate nutrition and hydration  Outcome: Not Progressing Towards Goal  Goal: *STG: Vital signs within defined limits  Outcome: Not Progressing Towards Goal  Goal: *STG/LTG: Relapse prevention plan in place to include housing/aftercare, leisure activities, and spirituality  Outcome: Not Progressing Towards Goal  Goal: Interventions  Outcome: Not Progressing Towards Goal     Problem: Patient Education: Go to Patient Education Activity  Goal: Patient/Family Education  Outcome: Not Progressing Towards Goal     Problem: Pain  Goal: *Control of Pain  Outcome: Not Progressing Towards Goal     Problem: Falls - Risk of  Goal: *Absence of Falls  Description: Document Blanca Shine Fall Risk and appropriate interventions in the flowsheet.   Outcome: Not Progressing Towards Goal  Note: Fall Risk Interventions:  Mobility Interventions: Bed/chair exit alarm,PT Consult for mobility concerns,OT consult for ADLs,Patient to call before getting OOB         Medication Interventions: Bed/chair exit alarm,Patient to call before getting OOB,Teach patient to arise slowly                   Problem: Patient Education: Go to Patient Education Activity  Goal: Patient/Family Education  Outcome: Not Progressing Towards Goal     Problem: General Medical Care Plan  Goal: *Vital signs within specified parameters  Outcome: Not Progressing Towards Goal  Goal: *Labs within defined limits  Outcome: Not Progressing Towards Goal  Goal: *Absence of infection signs and symptoms  Outcome: Not Progressing Towards Goal  Goal: *Optimal pain control at patient's stated goal  Outcome: Not Progressing Towards Goal  Goal: *Skin integrity maintained  Outcome: Not Progressing Towards Goal  Goal: *Fluid volume balance  Outcome: Not Progressing Towards Goal  Goal: *Optimize nutritional status  Outcome: Not Progressing Towards Goal  Goal: *Anxiety reduced or absent  Outcome: Not Progressing Towards Goal  Goal: *Progressive mobility and function (eg: ADL's)  Outcome: Not Progressing Towards Goal     Problem: Patient Education: Go to Patient Education Activity  Goal: Patient/Family Education  Outcome: Not Progressing Towards Goal

## 2022-06-01 NOTE — ED TRIAGE NOTES
Pt here in the ED for AMS since this evening. Pt A&O x4 at this time. 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 Pt states she had 1 beer today. Denies any pain or SOB. BS 78.

## 2022-06-01 NOTE — DIABETES MGMT
Diabetes/ Glycemic Control Plan of Care    Recommendations:   1.) POCT and follow hypoglycemia protocol. Discussed with Dr. Lucia Garza and order obtained. 6/01/2022:  Seen patient this afternoon and noted that she did not eat lunch due to no appetite. Patient was treated early this morning for hypoglycemia:  D10 at 02:42    Results for Kia Mckoy (MRN [id number]) as of 6/1/2022 13:39   Ref. Range 6/1/2022 02:32 6/1/2022 04:04 6/1/2022 04:09 6/1/2022 06:12 6/1/2022 07:12   GLUCOSE,FAST - POC Latest Ref Range: 70 - 110 mg/dL 64 (L) 77  77 105     Assessment:   DX:   1. Hyponatremia     2. Hepatic encephalopathy ([address]     3. Hepatitis     4. Other hypervolemia        Fasting/ Morning blood glucose:   Lab Results   Component Value Date/Time    Glucose 60 (L) 05/31/2022 11:00 PM    Glucose (POC) 105 06/01/2022 07:12 AM     IV Fluids containing dextrose:  None    Steroids:   None    Blood glucose values: Within target range (70-180mg/dL):  No.    Current insulin orders:   None    Total Daily Dose previous 24 hours: None. Current A1c: No results found for: HBA1C, ZBM1QBCS, EOU7TDHM   equivalent  to ave Blood Glucose of (pending result A1c level at time of review, insulin protocol) mg/dl for 2-3 months prior to admission    Adequate glycemic control PTA:  N/A. No history of diabetes    Nutrition/Diet:   Active Orders   Diet    ADULT DIET Regular; 4 carb choices (60 gm/meal); Low Fat/Low Chol/High Fiber/2 gm Na; Low Sodium (2 gm); 1500 ml      Meal Intake:  No data found. Supplement Intake:  No data found. *  Home diabetes medications:  Patient reported no history of diabetes  Key Antihyperglycemic Medications     Patient is on no antihyperglycemic meds.           Plan/Goals:   Blood glucose will be within target of 70 - 180 mg/dl within 72 hours     Education:  [] Refer to Diabetes Education Record                       [x] Education not indicated at this time     Michell Garcia RN [facility]  Pager: [phone]

## 2022-06-01 NOTE — PROGRESS NOTES
Pharmacy Note     Cefepime 2gm q12h ordered for treatment of sepsis. Per [facility] Policy, Cefepime will be changed to 2 gm q12h extended infusion. Estimated Creatinine Clearance: Estimated Creatinine Clearance: 79.2 mL/min (by C-G formula based on SCr of 0.78 mg/dL). Dialysis Status, ZIGGY, CKD: N/A    BMI:  Body mass index is 21.93 kg/m². Rationale for Adjustment:  [facility] B-Lactam extended infusion policy    Pharmacy will continue to monitor and adjust dose as necessary. Please call with any questions.     Thank you,  Jaden Woodard, PHARMD

## 2022-06-01 NOTE — ROUTINE PROCESS
Bedside and Verbal shift change report given to Daniel Duque RN (oncoming nurse) by Hamzah Pratt RN (offgoing nurse). Report included the following information SBAR, Kardex, MAR and Recent Results.     SITUATION:  Code Status: Full Code  Reason for Admission: Encephalopathy [G93.40]  Hospital day: 0  Problem List:       Hospital Problems  Date Reviewed: 6/1/2022          Codes Class Noted POA    * (Principal) Acute encephalopathy ICD-10-CM: G93.40  ICD-9-CM: 348.30  6/1/2022 Yes        Thrombocytopenia ([facility] ICD-10-CM: D69.6  ICD-9-CM: 287.5  6/1/2022 Yes        Asthma (Chronic) ICD-10-CM: J45.909  ICD-9-CM: 493.90  6/1/2022 Yes        GERD (gastroesophageal reflux disease) (Chronic) ICD-10-CM: K21.9  ICD-9-CM: 530.81  6/1/2022 Yes        Iron deficiency anemia (Chronic) ICD-10-CM: D50.9  ICD-9-CM: 280.9  6/1/2022 Yes        History of alcohol withdrawal delirium (Chronic) ICD-10-CM: Z86.59  ICD-9-CM: V11.8  6/1/2022 Yes    Overview Signed 6/1/2022  4:34 AM by Keegan Dillon, DO     Previous Agitation and Hallucinations, but Patient denies previous Seizures due to ETOH Withdrawal             Tobacco abuse (Chronic) ICD-10-CM: Z72.0  ICD-9-CM: 305.1  6/1/2022 Yes        Hypoglycemia ICD-10-CM: E16.2  ICD-9-CM: 251.2  6/1/2022 Yes        Hyperammonemia ([facility] ICD-10-CM: E72.20  ICD-9-CM: 270.6  6/1/2022 Yes        Alcoholic cirrhosis of liver ([facility] (Chronic) ICD-10-CM: K70.30  ICD-9-CM: 571.2  6/1/2022 Yes        Volume overload ICD-10-CM: E87.70  ICD-9-CM: 276.69  6/1/2022 Yes    Overview Signed 6/1/2022  4:34 AM by Brush Christians, DO     Unclear whether due to Acute Hepatic Failure vs. New Onset Congestive Heart Failure (Dilated Cardiomyopathy 2°/2 ETOH?)             Alcohol abuse (Chronic) ICD-10-CM: F10.10  ICD-9-CM: 305.00  11/2/2021 Yes        Acute alcoholic intoxication without complication ([facility] LDH-11-ML: H68.031  ICD-9-CM: 305.00  11/2/2021 Yes        Hyponatremia ICD-10-CM: E87.1  ICD-9-CM: 276.1  11/2/2021 Yes              BACKGROUND:   Past Medical History:   Past Medical History:   Diagnosis Date    Alcohol abuse     Alcohol withdrawal syndrome (HCC)     Anemia     Asthma     Bile duct calculus     CAP (community acquired pneumonia)     Constipation     GERD (gastroesophageal reflux disease)     Iron deficiency     Liver function test abnormality     Macrocytosis     Vertigo       Patient taking anticoagulants no    Patient has a defibrillator: no    History of shots YES for example, flu, pneumonia, tetanus   Isolation History NO for example, MRSA, CDiff    ASSESSMENT:  Changes in Assessment Throughout Shift: NONE  Significant Changes in 24 hours (for example, RR/code, fall)  Patient has Central Line: no   Patient has Barron Cath: no   Mobility Issues  PT  IV Patency  OR Checklist  Pending Tests    Last Vitals:  Vitals w/ MEWS Score (last day)     Date/Time MEWS Score Pulse Resp Temp BP Level of Consciousness SpO2    06/01/22 0547 1 91 16 98.9 °F (37.2 °C) 115/68 Alert (0) 97 %    06/01/22 0506 -- 87 9 -- 123/67 -- 96 %    06/01/22 0324 -- 101 15 -- 105/54 -- 98 %    06/01/22 02:44:46 2 107 17 99.5 °F (37.5 °C) 119/55 Alert (0) 97 %    05/31/22 2325 -- 97 18 -- 130/67 -- 96 %    05/31/22 2252 1 100 15 100.1 °F (37.8 °C) 143/71 Alert (0) 92 %        PAIN    Pain Assessment    Pain Intensity 1: 0 (06/01/22 0547)              Patient Stated Pain Goal: 0  Intervention effective: N/A  Time of last intervention: N/A Reassessment Completed: yes   Other actions taken for pain: Distraction    Last 3 Weights:  Last 3 Recorded Weights in this Encounter    05/31/22 2252   Weight: 63.5 kg (140 lb)   Weight change:     INTAKE/OUPUT    Current Shift: No intake/output data recorded. Last three shifts: No intake/output data recorded.     RECOMMENDATIONS AND DISCHARGE PLANNING  Patient needs and requests: Assistance with ADL's    Pending tests/procedures: labs     Discharge plan for patient: Home    Discharge planning Needs or Barriers: None    Estimated Discharge Date: 6/03/2022 Posted on Whiteboard in Patients Room: yes       \"HEALS\" SAFETY CHECK  A safety check occurred in the patient's room between off going nurse and oncoming nurse listed above. The safety check included the below items:    H  High Alert Medications Verify all high alert medication drips (heparin, PCA, etc.)  E  Equipment Suction is set up for ALL patients (with belia)  Red plugs utilized for all equipment (IV pumps, etc.)  WOWs wiped down at end of shift. Room stocked with oxygen, suction, and other unit-specific supplies  A  Alarms Bed alarm is set for fall risk patients  Ensure chair alarm is in place and activated if patient is up in a chair  L  Lines Check IV for any infiltration  Barron bag is empty if patient has a Barron   Tubing and IV bags are labeled  S  Safety  Room is clean, patient is clean, and equipment is clean. Hallways are clear from equipment besides carts. Fall bracelet on for fall risk patients  Ensure room is clear and free of clutter  Suction is set up for ALL patients (with belia)  Hallways are clear from equipment besides carts.    Isolation precautions followed, supplies available outside room, sign posted    Juwan Zayas RN

## 2022-06-01 NOTE — ROUTINE PROCESS
TRANSFER - IN REPORT:    Verbal report received from Moustapha Weiss RN(name) on Eliel Cartwright  being received from ED(unit) for routine progression of care      Report consisted of patients Situation, Background, Assessment and   Recommendations(SBAR). Information from the following report(s) SBAR, Kardex, ED Summary, Recent Results and Cardiac Rhythm NSR was reviewed with the receiving nurse. Opportunity for questions and clarification was provided. Assessment completed upon patients arrival to unit and care assumed.

## 2022-06-01 NOTE — H&P
History and Physical    Patient: Galo Galvan MRN: [id number]  SSN: [id number]    YOB: 1965  Age: 55 y.o. Sex: female      Subjective:      Galo Galvan is a 55 y.o. female who presents to [facility] ER with complaint of Confusion. Patient was reportedly seen sitting on her porch, which is unusual for her, and had difficulty walking and was then uncharacteristically contrary with her Daughter, prompting Patient to be brought to [facility] ER. Per [facility] ER Physician, Patient was providing one-word answers to questions and would also respond non-linearly or inappropriately otherwise. Patient was A&O x4/4 when interviewed. Patient states that she has been having no appetite and only eats a solid meal once a week and otherwise subsists on two 12-ounce Beers per day and has done so for approximately 1 month. Patient reports that she has no appetite and that her legs have swelled up on her in the last two or so days. Patient states that she has recently cut back and normally drinks 4-5 Beers/day for the last 5-6 years. Patient also reports Smoking 0.5 PPD x25 years. Patient denies fevers, chills, nausea, vomiting, diarrhea, dysuria, cough, chest pain, and abdominal pain, and stool abnormality. Notably, Patient reports that she has had Alcohol Withdrawal in the past and has had agitation and hallucinations, but no seizures. In [facility] ER, Patient is noted to have Temperature 100.1°F, Heart Rate 107 bpm, Blood Pressure 119/55 mm Hg, SpO2 97% on 2 L/min O2, WBC 9.0, Hgb 9.8, Platelets 68K, Neut% 79%, Neut# 7.1, Na+ 124, Cl- 91, Glucose 60 mg/dL, Total Bilirubin 9.5, Albumin 2.8, ALT 58, , Alk Phos 464, Ammonia 34, ETOH 78 mg/dL, and Lactic Acid 3.41. Patient is admitted to [facility] Telemetry Unit for management of Hepatic Encephalopathy and Hepatitis (Viral vs. Alcoholic?) with Volume Overload (due to Hepatic Failure vs. Cardiac Source).     Past Medical History:   Diagnosis Date    Alcohol abuse     Alcohol withdrawal syndrome (HCC)     Anemia     Asthma     Bile duct calculus     CAP (community acquired pneumonia)     Constipation     GERD (gastroesophageal reflux disease)     Iron deficiency     Liver function test abnormality     Macrocytosis     Vertigo      Past Surgical History:   Procedure Laterality Date    COLONOSCOPY N/A 2/25/2021    COLONOSCOPY with polypectomies performed by Edmar Martinez MD at [facility] ENDOSCOPY      Family History   Problem Relation Age of Onset    No Known Problems Mother     No Known Problems Father     No Known Problems Maternal Grandmother     No Known Problems Maternal Grandfather     No Known Problems Paternal Grandmother     No Known Problems Paternal Grandfather     Cancer Sister         uterine?  Cancer Daughter         uterine? Social History     Tobacco Use    Smoking status: Current Every Day Smoker     Packs/day: 0.50     Years: 20.00     Pack years: 10.00    Smokeless tobacco: Never Used   Substance Use Topics    Alcohol use: Yes     Alcohol/week: 3.0 standard drinks     Types: 3 Glasses of wine per week      Prior to Admission medications    Medication Sig Start Date End Date Taking? Authorizing Provider   bumetanide (BUMEX) 1 mg tablet Take 1 Tablet by mouth two (2) times a day. 11/16/21  Yes Rosa Maria Chen MD   cyproheptadine (PERIACTIN) 4 mg tablet Take 1 Tablet by mouth two (2) times a day. 11/16/21  Yes Rosa Maria Chen MD   pentoxifylline CR (TRENTAL) 400 mg CR tablet Take 1 Tablet by mouth three (3) times daily (with meals).  11/16/21  Yes Rosa Maria Chen MD   fluticasone propion-salmeteroL (ADVAIR/WIXELA) 250-50 mcg/dose diskus inhaler INHALE 1 PUFF BY MOUTH TWICE DAILY 11/23/20   Provider, Historical        No Known Allergies    Review of Systems:  (+) Loss of Appetite  (-) Fevers  (-) Chills  (-) Cough  (-) Increased Sputum Production  (+) BLE Edema  (-) Chest Pain  (-) Abdominal Pain  (-) Nausea  (-) Vomiting  (-) Diarrhea  (-) Constipation  (-) Dysuria  (-) Seizure  All other systems have been reviewed and are negative      Objective:     Vitals:    06/01/22 0324 06/01/22 0506 06/01/22 0547 06/01/22 1012   BP: (!) 105/54 123/67 115/68 115/68   Pulse: (!) 101 87 91    Resp: 15 9 16    Temp:   98.9 °F (37.2 °C)    SpO2: 98% 96% 97%    Weight:    63.5 kg (140 lb)   Height:    5' 7\" (1.702 m)        Physical Exam:  General:  Older adult female lying in bed in no acute distress  HEENT:  Atraumatic, normocephalic; (+) Moderately Icteric Conjunctiva; Pupils equally round and reactive to light with accommodation; Extraocular muscles intact; Moist Oropharynx without erythema, edema, or exudates; (+) Mild sublingual Jaundice; (+) NC in place and running at 2 L/min O2  Chest:  No pectus carinatum; No pectus excavatum  Cardiovascular:  Regular rate and rhythm without rubs, gallops, or murmurs  Respiratory:  (+) Mild Crackles over Bilateral Mid-to-Lower lung fields; (+) Transient Mild wheezes; No rhonchi; normal effort of breathing (+) on 2 L/min O2 via NC  Abdominal:  Soft, non-tense, non-tender abdomen; BS present without guarding, rebound, or masses  :  Deferred  Extremities:  Pulses 2+ x4 with (+) Right worse than Left Minimal to 1+ Pitting Edema over Bilateral Proximal Thigh without clubbing or cyanosis  Musculoskeletal:  Strength 5/5 and symmetrical in BUE and BLE  Integument:  No rash on face, forearms, or legs  Neurological:  A&O x4/4; No gross deficits of Visual Acuity, Eye Movement, Jaw Opening, Facial Expression, Hearing, Phonation, or Head Movement;  No gross deficits of Tongue Movement or Slurring of Speech  Psychiatric:  (+) Affect is Withdrawn and Minimally Somnolent; Language is present and fluent; (+) Behavior is Distant and Somnolent, but otherwise appropriate      Laboratory Studies:  CMP:   Lab Results   Component Value Date/Time     (L) 05/31/2022 11:00 PM    K 5.1 05/31/2022 11:00 PM    CL 91 (L) 05/31/2022 11:00 PM    CO2 27 05/31/2022 11:00 PM    AGAP 6 05/31/2022 11:00 PM    GLU 60 (L) 05/31/2022 11:00 PM    BUN 9 05/31/2022 11:00 PM    CREA 0.85 05/31/2022 11:00 PM    GFRAA >60 05/31/2022 11:00 PM    GFRNA >60 05/31/2022 11:00 PM    CA 9.4 05/31/2022 11:00 PM    ALB 2.8 (L) 05/31/2022 11:00 PM    TP 8.1 05/31/2022 11:00 PM    GLOB 5.3 (H) 05/31/2022 11:00 PM    AGRAT 0.5 (L) 05/31/2022 11:00 PM    ALT 58 (H) 05/31/2022 11:00 PM     CBC:   Lab Results   Component Value Date/Time    WBC 9.0 05/31/2022 11:00 PM    HGB 9.8 (L) 05/31/2022 11:00 PM    HCT 28.5 (L) 05/31/2022 11:00 PM    PLT 88 (L) 05/31/2022 11:00 PM     All Cardiac Markers in the last 24 hours: No results found for: CPK, CK, CKMMB, CKMB, RCK3, CKMBT, CKNDX, CKND1, MARGARITA, TROPT, TROIQ, DANN, TROPT, TNIPOC, BNP, BNPP  Recent Glucose Results:   Lab Results   Component Value Date/Time    GLU 60 (L) 05/31/2022 11:00 PM     COAGS: No results found for: APTT, PTP, INR, INREXT, INREXT     Images Reviewed:  CT HEAD WO CONT    Result Date: 6/1/2022  EXAM:  CT Head without Contrast           CLINICAL INDICATION:  Confusion, liver failure        COMPARISON:  10/04/19. TECHNIQUE:  - Helical volumetric CT imaging of the head is performed from the base of the skull to the vertex without IV contrast administration. Axial, coronal and sagittal images are generated from the volumetric data set. - Dose optimization techniques are utilized as appropriate to the performed exam with combination of automated exposure control, adjustment of mA and/or kV according to patient size, and use of iterative reconstructive technique. FINDINGS: Brain: - Hemorrhage/ hematoma:  No acute intracranial hemorrhage/ hematoma. - Mass, mass effect:  None. - Gray-white matter differentiation:  Mild white matter hypodensities, suggestive of chronic microvascular ischemia. - Interval assessment:  No significant interval change.  CSF spaces:  No evidence of abnormal effacement or enlargement. Calvarium:  Intact. Sinuses, mastoids:  Clear. 1.  No acute intracranial abnormalities. 2.  Mild white matter hypodensity suggestive of chronic microvascular ischemic changes. XR CHEST PORT    Result Date: 6/1/2022  Portable Chest CPT CODE: 29840 HISTORY: SIRS. FINDINGS: Comparison 5/3/2022; 11/2/2021. Wires overlie the patient. Slightly lordotic positioning. Mild bronchial wall thickening without focal consolidation. No pulmonary edema, effusion, or pneumothorax. Heart size and mediastinal contours within normal limits. Atherosclerotic disease aorta. Mild bronchial wall thickening. Correlate for reactive airway disease or bronchitis.  No focal consolidation for pneumonia at this time      Assessment:     Hospital Problems  Date Reviewed: 6/1/2022          Codes Class Noted POA    * (Principal) Acute encephalopathy ICD-10-CM: G93.40  ICD-9-CM: 348.30  6/1/2022 Yes        Hyperammonemia ([facility] ICD-10-CM: E72.20  ICD-9-CM: 270.6  6/1/2022 Yes        Volume overload ICD-10-CM: E87.70  ICD-9-CM: 276.69  6/1/2022 Yes    Overview Signed 6/1/2022  4:34 AM by Nikhil Phillips, DO     Unclear whether due to Acute Hepatic Failure vs. New Onset Congestive Heart Failure (Dilated Cardiomyopathy 2°/2 ETOH?)             Acute alcoholic intoxication without complication ([facility] UNN-34-IJ: F10.920  ICD-9-CM: 305.00  11/2/2021 Yes        Hyponatremia ICD-10-CM: E87.1  ICD-9-CM: 276.1  11/2/2021 Yes        Thrombocytopenia ([facility] ICD-10-CM: D69.6  ICD-9-CM: 287.5  6/1/2022 Yes        Hypoglycemia ICD-10-CM: E16.2  ICD-9-CM: 251.2  6/1/2022 Yes        Asthma (Chronic) ICD-10-CM: J45.909  ICD-9-CM: 493.90  6/1/2022 Yes        GERD (gastroesophageal reflux disease) (Chronic) ICD-10-CM: K21.9  ICD-9-CM: 530.81  6/1/2022 Yes        Iron deficiency anemia (Chronic) ICD-10-CM: D50.9  ICD-9-CM: 280.9  6/1/2022 Yes        History of alcohol withdrawal delirium (Chronic) ICD-10-CM: Z86.59  ICD-9-CM: V11.8  6/1/2022 Yes    Overview Signed 6/1/2022  4:34 AM by Sally Boyer, DO     Previous Agitation and Hallucinations, but Patient denies previous Seizures due to ETOH Withdrawal             Tobacco abuse (Chronic) ICD-10-CM: Z72.0  ICD-9-CM: 305.1  6/1/2022 Yes        Alcoholic cirrhosis of liver (HCC) (Chronic) ICD-10-CM: K70.30  ICD-9-CM: 571.2  6/1/2022 Yes        Alcohol abuse (Chronic) ICD-10-CM: F10.10  ICD-9-CM: 305.00  11/2/2021 Yes              Plan:     Acute Encephalopathy suspect Hepatic Encephalopathy 2°/2 Hyperammonemia  Lactulose TID. If Patient too confused, PRN Lactulose Enema. Clarification:  Patient was found to be confused by [facility] ER Physician. Patient was better focused and A&O x4/4 during my evaluation, leaving insufficient clinical evidence from my examination to make the diagnosis; however, [facility] ER Physician explicitly stated that Patient was altered during hand-off. However, Chart Review shows that [facility] ER Physician had scant documentation of his objective clinical findings for confusion, but does document this in subjective (as reported by [facility]). Notably, [facility] ER Physician documents \"No Diagnoses\" on official ED Provider Note. Based off of the recollection of my physical examination, Patient did have to exert effort to maintain focus and it is very likely that Patient was previously altered for [facility] ER Physician, so much so that I included the diagnosis in my work-up as it was corroborated by statements made by Nursing Staff and [facility] ER Physician at the time. [If the above is insufficient and clinical evidence is necessary from this Clinician's perspective (for billing purposes), drop the diagnosis, please.]    Acute ETOH Use with H/O of ETOH Abuse  Daily Thiamine, Folic Acid, and Multivitamin. Likely Acute Hepatitis (Alocoholic Hepatitis vs. Viral Hepatitis)  Consult Gastroenterology. Continue Pentoxifylline for Alcoholic Hepatitis.     Volume Overload 2°/2 Acute Hepatic Failure (vs. Dilated Cardiomyopathy or unrelated CHF)  Echocardiogram in AM.  Continue home Bumetanide 1 mg BID. Hyponatremia likely 2°/2 Beer Potomania  Monitor for correction without intervention and then consider Fluid Restriction for Hypotonic Hyponatremia. Continue Cyproheptadine for Appetite Induction. H/O ETOH Withdrawal  Previous Agitation and Hallucinations, but denies previous Seizure  CIWA Protocol. DVT Prophylaxis  DVT mechanoprophylaxis is achieved with SCDs.     Signed By: Ricardo Yeager DO     June 1, 2022

## 2022-06-01 NOTE — ED NOTES
TRANSFER - OUT REPORT:    Verbal report given to Gustavo Dupree RN(name) on Violette Bradley  being transferred to [facility](unit) for routine progression of care       Report consisted of patients Situation, Background, Assessment and   Recommendations(SBAR). Information from the following report(s) SBAR and ED Summary was reviewed with the receiving nurse. Lines:   Peripheral IV 05/31/22 Right Forearm (Active)        Opportunity for questions and clarification was provided.       Patient transported with:   O2 @ 2 liters

## 2022-06-01 NOTE — ED PROVIDER NOTES
EMERGENCY DEPARTMENT HISTORY AND PHYSICAL EXAM    10:50 PM      Date: 5/31/2022  Patient Name: Layla Lisa    History of Presenting Illness     No chief complaint on file. History Provided By: patient    Additional History (Context): Layla Lisa is a 55 y.o. female presents with arrives by EMS with confusion history of liver failure. Patient herself has no complaint of pain. .    11:43 PM discussed with daughters Suzie Zeng, via phone, patient was \"talking out of her head,\" would be asked a question and would not answer he would talk about something completely different. Was found at her residence seated out on the porch which was unusual.  Required tremendous assistance to get her to standing position then complained of leg pain when walking. Went inside was tremulous and would not sit down. Felt very hot. PCP: Dala Phalen, MD    Chief Complaint:   Duration:    Timing:    Location:   Quality:   Severity:   Modifying Factors:   Associated Symptoms:       Current Outpatient Medications   Medication Sig Dispense Refill    bumetanide (BUMEX) 1 mg tablet Take 1 Tablet by mouth two (2) times a day. 60 Tablet 0    cyproheptadine (PERIACTIN) 4 mg tablet Take 1 Tablet by mouth two (2) times a day. 60 Tablet 0    pentoxifylline CR (TRENTAL) 400 mg CR tablet Take 1 Tablet by mouth three (3) times daily (with meals). 90 Tablet 0    folic acid (FOLVITE) 1 mg tablet Take 1 Tablet by mouth daily. 30 Tablet 0    therapeutic multivitamin (THERAGRAN) tablet Take 1 Tablet by mouth daily. 30 Tablet 0    thiamine HCL (B-1) 100 mg tablet Take 1 Tablet by mouth daily. 30 Tablet 0    fluticasone propion-salmeteroL (ADVAIR/WIXELA) 250-50 mcg/dose diskus inhaler INHALE 1 PUFF BY MOUTH TWICE DAILY      ferrous sulfate (Iron) 325 mg (65 mg iron) tablet Take 65 mg by mouth Daily (before breakfast).  omeprazole (PRILOSEC) 20 mg capsule Take 20 mg by mouth daily.          Past History     Past Medical History:  Past Medical History:   Diagnosis Date    Alcohol abuse     Alcohol withdrawal syndrome (HCC)     Anemia     Asthma     Bile duct calculus     CAP (community acquired pneumonia)     Constipation     GERD (gastroesophageal reflux disease)     Iron deficiency     Liver function test abnormality     Macrocytosis     Vertigo        Past Surgical History:  Past Surgical History:   Procedure Laterality Date    COLONOSCOPY N/A 2/25/2021    COLONOSCOPY with polypectomies performed by China Poole MD at [facility]       Family History:  Family History   Problem Relation Age of Onset    No Known Problems Mother     No Known Problems Father     No Known Problems Maternal Grandmother     No Known Problems Maternal Grandfather     No Known Problems Paternal Grandmother     No Known Problems Paternal Grandfather     Cancer Sister         uterine?  Cancer Daughter         uterine? Social History:  Social History     Tobacco Use    Smoking status: Current Every Day Smoker     Packs/day: 0.50     Years: 20.00     Pack years: 10.00    Smokeless tobacco: Never Used   Substance Use Topics    Alcohol use: Yes     Alcohol/week: 3.0 standard drinks     Types: 3 Glasses of wine per week    Drug use: Not Currently     Comment: pt takees methadone       Allergies:  No Known Allergies      Review of Systems     Review of Systems   Constitutional: Negative for diaphoresis and fever. HENT: Negative for congestion and sore throat. Eyes: Negative for pain and itching. Respiratory: Negative for cough and shortness of breath. Cardiovascular: Negative for chest pain and palpitations. Gastrointestinal: Negative for abdominal pain and diarrhea. Endocrine: Negative for polydipsia and polyuria. Genitourinary: Negative for dysuria and hematuria. Musculoskeletal: Negative for arthralgias and myalgias. Skin: Negative for rash and wound. Neurological: Negative for seizures and syncope. Hematological: Does not bruise/bleed easily. Psychiatric/Behavioral: Negative for agitation and hallucinations. Physical Exam       No data found. IPVITALS  No data found. Physical Exam  Vitals and nursing note reviewed. Constitutional:       Appearance: She is well-developed. HENT:      Head: Normocephalic and atraumatic. Eyes:      General: No scleral icterus. Conjunctiva/sclera: Conjunctivae normal.   Neck:      Vascular: No JVD. Cardiovascular:      Rate and Rhythm: Normal rate and regular rhythm. Heart sounds: Normal heart sounds. Comments: 4 intact extremity pulses  Pulmonary:      Effort: Pulmonary effort is normal.      Breath sounds: Normal breath sounds. Abdominal:      General: There is distension. Palpations: Abdomen is soft. There is hepatomegaly. Tenderness: There is no abdominal tenderness. Musculoskeletal:         General: Normal range of motion. Cervical back: Normal range of motion and neck supple. Lymphadenopathy:      Cervical: No cervical adenopathy. Skin:     General: Skin is warm and dry. Neurological:      Mental Status: She is alert. Diagnostic Study Results   Labs -  No results found for this or any previous visit (from the past 24 hour(s)). Radiologic Studies -   No orders to display     No results found. Medications ordered:   Medications - No data to display      Medical Decision Making   Initial Medical Decision Making and DDx:  Evaluate for infectious source patient has a borderline temp, antibiotics given. At this point do not feel we have evidence for SBP. ED Course: Progress Notes, Reevaluation, and Consults:  ED Course as of 06/04/22 1524   Tue May 31, 2022   2324 Twelve-lead EKG sinus rhythm at 97 nonspecific repull abnormality [CB]   Wed Jun 01, 2022   0251 [place] with Dr. Ina Busch hospitalist, discussed elevated ammonia, hyponatremia hypoglycemia borderline. Low-grade temp.   Still pending urinalysis coronavirus and influenza testing. Head CT negative by my read. He will assess the patient and let me know where to admit [CB]      ED Course User Index  [CB] Juli Crespo MD         I am the first provider for this patient. I reviewed the vital signs, available nursing notes, past medical history, past surgical history, family history and social history. No data found. Vital Signs-Reviewed the patient's vital signs. Pulse Oximetry Analysis, Cardiac Monitor, 12 lead ekg:      Interpreted by the EP. Records Reviewed: Nursing notes reviewed (Time of Review: 10:50 PM)    Procedures:   Critical Care Time:   Aspirin: (was aspirin given for stroke?)    Diagnosis     Clinical Impression: No diagnosis found. Disposition:       Follow-up Information    None          Patient's Medications   Start Taking    No medications on file   Continue Taking    BUMETANIDE (BUMEX) 1 MG TABLET    Take 1 Tablet by mouth two (2) times a day. CYPROHEPTADINE (PERIACTIN) 4 MG TABLET    Take 1 Tablet by mouth two (2) times a day. FERROUS SULFATE (IRON) 325 MG (65 MG IRON) TABLET    Take 65 mg by mouth Daily (before breakfast). FLUTICASONE PROPION-SALMETEROL (ADVAIR/WIXELA) 250-50 MCG/DOSE DISKUS INHALER    INHALE 1 PUFF BY MOUTH TWICE DAILY    FOLIC ACID (FOLVITE) 1 MG TABLET    Take 1 Tablet by mouth daily. OMEPRAZOLE (PRILOSEC) 20 MG CAPSULE    Take 20 mg by mouth daily. PENTOXIFYLLINE CR (TRENTAL) 400 MG CR TABLET    Take 1 Tablet by mouth three (3) times daily (with meals). THERAPEUTIC MULTIVITAMIN (THERAGRAN) TABLET    Take 1 Tablet by mouth daily. THIAMINE HCL (B-1) 100 MG TABLET    Take 1 Tablet by mouth daily.    These Medications have changed    No medications on file   Stop Taking    No medications on file     _______________________________    Notes:    Mariana Perez MD using Dragon dictation      _______________________________

## 2022-06-01 NOTE — PROGRESS NOTES
..  Reason for Admission:  Encephalopathy [G93.40]                 RUR Score:    16           Plan for utilizing home health:   NA  Do you (patient/family) have any concerns for transition/discharge?  no    Transition of Care Plan:       Initial assessment completed with patient at bedside. Patient is  oriented to time, place, person and situation. Face sheet information confirmed:  yes. The patient designates her son, Chao Balbuena to participate in her discharge plan and to receive any needed information. This patient lives in a one level house with her adult son, with three steps to enter. Patient is able to navigate steps as needed. Prior to hospitalization, patient was considered to be independent with ADLs/IADLS : yes. The son, Chao Balbuena will be available to transport patient home upon discharge. The patient does not utilize any DMEs. Patient is not  currently active with home health and has not stayed in a skilled nursing facility or rehab. This patient is on dialysis :no    Currently, the discharge plan is Home. The patient states that she can obtain her medications from the pharmacy, and take her medications as directed. Patient's current insurance is [facility]      Care Management Interventions  Transition of Care Consult (CM Consult): Discharge Planning  Support Systems: Other Family Member(s)  Confirm Follow Up Transport: Family  Discharge Location  Patient Expects to be Discharged to[de-identified] [address] [facility].  HEAVEN Olvera  Case Management

## 2022-06-01 NOTE — PROGRESS NOTES
Patient seen and evaluated, lying in bed, no acute distress. Patient admitted by my colleague earlier this morning. 46-year-old female with a history of cirrhosis presents to the emergency room secondary to confusion. Patient is currently alert awake and oriented. She does not recollect the events that led to her hospitalization. Patient has a known history of alcoholic cirrhosis. Ammonia levels mildly elevated, continue lactulose 3 times daily. GI consulted  Elevated LFTs secondary to history of cirrhosis. Anemia of chronic disease-monitor H&H. Thrombocytopenia likely secondary to underlying cirrhosis. Further treatment plan as outlined in H&P, will continue to follow.

## 2022-06-01 NOTE — CONSULTS
[facility] [place], [address]  [phone]  [email or web address]    GASTROENTEROLOGY CONSULT        Impression:   1. Alcoholic hepatitis - Maddrey DF 19.6 (good prognosis)  2. Alcoholic cirrhosis of liver  3. Jaundice   - Tbili 9.5  4. Alcohol abuse with withdrawal   - EtOH 78  5. Chronically elevated LFTs   - ALT 58; ;   6. 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 Chronic stable anemia without GI bleed    - H/H 9.8/28.5  8. Focal intestinal metaplasia    - s/p EGD 2/2021 by Dr Wei Porter  9. History of adenomatous rectal polyps  10. Hyponatremia  11. At risk for malnutrition  12. Chronic biliary duct dilation but no choledocholithiasis or obstructing mass   - positive smooth muscle Ab 11/2021  13. Encephalopathy  14. Hyperammonemia   - Ammonia 34  15. Cigarette smoker        Plan:     1. Best supportive care as established. Start acid suppression with pantoprazole BID. 2. Alcohol cessation discussed with patient. Consideration for trial of baclofen or naltrexone to help with achieving cessation. AAA is also appropriate. 3. RUQ US, monitoring LFTs and clinical status. Additional serologies as ordered (AFP, coag, LFTs, AMA, Smooth muscle Ab). 4. CIWA per primary team.   5. Low sodium diet. Caloric support needed with assistance from dietitian. 6. No NSAIDs  7. Recommend outpatient ERCP to evaluate ductal dilatation      Reason for consult: alcohol hepatitis      HPI:  Amy Cazares is a 55 y.o. female who I am being asked to see in consultation for an opinion regarding alcoholic hepatitis. Patient with recurrent alcohol abuse admitted for inpatient management 11/2021 for same presentation. She returned to ED 5/31/22 via EMS for evaluation of altered mental status. In ED patient admitted to drinking beer earlier in the day. Labs with metabolic and chemistry abnormalities. She was admitted for medical management.   Seen today, does not have abdominal pain, nausea, vomiting. She was concerned about limited mobility of right hand. She admits to continued alcohol intake. Supplemental O2 in use and not having difficulty breathing. Chart review from hospital admission 11/3/2021  - Trental 400 mg administration  - EGD and colonoscopy 2/2021 (Dr Dayron Gunn): significant for mild HP; negative chronic gastritis w/ focal intestinal metaplasia and two TA rectal polyps w/ recommended repeat EGD and colo 5 yrs.   - Negative for viral HAV/HBV/HCV 1/2021  - Labs 1/2021 no fibrosis or steatosis; iron sat 71%, elevated AP only with + ASMA, o/w negative -- could be PBC, although not seen on MRI    Active Hospital Problems    Diagnosis Date Noted    Acute encephalopathy 06/01/2022    Thrombocytopenia ([address] 06/01/2022    Asthma 06/01/2022    GERD (gastroesophageal reflux disease) 06/01/2022    Iron deficiency anemia 06/01/2022    History of alcohol withdrawal delirium 06/01/2022     Previous Agitation and Hallucinations, but Patient denies previous Seizures due to ETOH Withdrawal      Tobacco abuse 06/01/2022    Hypoglycemia 06/01/2022    Hyperammonemia ([address] 07/97/5384    Alcoholic cirrhosis of liver ([address] 06/01/2022    Volume overload 06/01/2022     Unclear whether due to Acute Hepatic Failure vs. New Onset Congestive Heart Failure (Dilated Cardiomyopathy 2°/2 ETOH?)      Alcohol abuse 11/02/2021    Acute alcoholic intoxication without complication ([address] 45/10/9134    Hyponatremia 11/02/2021       PMH:   Past Medical History:   Diagnosis Date    Alcohol abuse     Alcohol withdrawal syndrome (HCC)     Anemia     Asthma     Bile duct calculus     CAP (community acquired pneumonia)     Constipation     GERD (gastroesophageal reflux disease)     Iron deficiency     Liver function test abnormality     Macrocytosis     Vertigo        PSH:   Past Surgical History:   Procedure Laterality Date    COLONOSCOPY N/A 2/25/2021    COLONOSCOPY with polypectomies performed by China Poole MD at [facility]       Social HX:   Social History     Socioeconomic History    Marital status: SINGLE     Spouse name: Not on file    Number of children: Not on file    Years of education: Not on file    Highest education level: Not on file   Occupational History    Not on file   Tobacco Use    Smoking status: Current Every Day Smoker     Packs/day: 0.50     Years: 20.00     Pack years: 10.00    Smokeless tobacco: Never Used   Substance and Sexual Activity    Alcohol use: Yes     Alcohol/week: 3.0 standard drinks     Types: 3 Glasses of wine per week    Drug use: Not Currently     Comment: pt takees methadone    Sexual activity: Never   Other Topics Concern    Not on file   Social History Narrative    Not on file     Social Determinants of Health     Financial Resource Strain:     Difficulty of Paying Living Expenses: Not on file   Food Insecurity:     Worried About Running Out of Food in the Last Year: Not on file    Teofilo of Food in the Last Year: Not on file   Transportation Needs:     Lack of Transportation (Medical): Not on file    Lack of Transportation (Non-Medical):  Not on file   Physical Activity:     Days of Exercise per Week: Not on file    Minutes of Exercise per Session: Not on file   Stress:     Feeling of Stress : Not on file   Social Connections:     Frequency of Communication with Friends and Family: Not on file    Frequency of Social Gatherings with Friends and Family: Not on file    Attends Mandaen Services: Not on file    Active Member of Clubs or Organizations: Not on file    Attends Club or Organization Meetings: Not on file    Marital Status: Not on file   Intimate Partner Violence:     Fear of Current or Ex-Partner: Not on file    Emotionally Abused: Not on file    Physically Abused: Not on file    Sexually Abused: Not on file   Housing Stability:     Unable to Pay for Housing in the Last Year: Not on file  Number of Places Lived in the Last Year: Not on file    Unstable Housing in the Last Year: Not on file       FHX:   Family History   Problem Relation Age of Onset    No Known Problems Mother     No Known Problems Father     No Known Problems Maternal Grandmother     No Known Problems Maternal Grandfather     No Known Problems Paternal Grandmother     No Known Problems Paternal Grandfather     Cancer Sister         uterine?  Cancer Daughter         uterine?        Allergy:   No Known Allergies      Home Medications:       Current Facility-Administered Medications:     sodium chloride (NS) flush 5-40 mL, 5-40 mL, IntraVENous, Q8H, Jamari Draper DO, 10 mL at 06/01/22 0615    sodium chloride (NS) flush 5-40 mL, 5-40 mL, IntraVENous, PRN, Jamari Draper DO    polyethylene glycol (MIRALAX) packet 17 g, 17 g, Oral, DAILY PRN, Jamari Draper DO    ondansetron (ZOFRAN ODT) tablet 4 mg, 4 mg, Oral, Q8H PRN **OR** ondansetron (ZOFRAN) injection 4 mg, 4 mg, IntraVENous, Q6H PRN, Jamari Draper DO, 4 mg at 06/01/22 0706    nicotine (NICODERM CQ) 14 mg/24 hr patch 1 Patch, 1 Patch, TransDERmal, DAILY PRN, Jamari Draper DO    nitroglycerin (NITROBID) 2 % ointment 0.5 Inch, 0.5 Inch, Topical, Q6H PRN, Jamari Draper DO    thiamine (B-1) 100 mg in 0.9% sodium chloride 50 mL IVPB, 100 mg, IntraVENous, DAILY, Vasile Draper DO    therapeutic multivitamin (THERAGRAN) tablet 1 Tablet, 1 Tablet, Oral, DAILY, Jamari Draper DO, 1 Tablet at 06/01/22 9932    thiamine HCL (B-1) tablet 100 mg, 100 mg, Oral, DAILY, Jamari Draper DO    folic acid (FOLVITE) tablet 1 mg, 1 mg, Oral, DAILY, Jamari Draper DO    LORazepam (ATIVAN) tablet 1 mg, 1 mg, Oral, Q1H PRN **OR** LORazepam (ATIVAN) 2 mg/mL injection 1 mg, 1 mg, IntraVENous, Q1H PRN, Jamari Draper DO    LORazepam (ATIVAN) tablet 2 mg, 2 mg, Oral, Q1H PRN **OR** LORazepam (ATIVAN) 2 mg/mL injection 2 mg, 2 mg, IntraVENous, Q1H PRN, Baron Eliud Shannon,     LORazepam (ATIVAN) 2 mg/mL injection 3 mg, 3 mg, IntraVENous, Q15MIN PRN, Jamari Draper DO    albuterol-ipratropium (DUO-NEB) 2.5 MG-0.5 MG/3 ML, 3 mL, Nebulization, Q4H RT, Jamari Draper DO, 3 mL at 06/01/22 0616    lactulose (CHRONULAC) 10 gram/15 mL solution 15 mL, 15 mL, Oral, TID, Jamari Draper, , 15 mL at 06/01/22 0613    lactulose (CHRONULAC) 10 gram/15 mL 150 mL in sterile water irrigation 350 mL rectal enema, 500 mL, Rectal, TID PRN, Jamari Draper DO    melatonin (rapid dissolve) tablet 5 mg, 5 mg, Oral, QHS PRN, Jamari Draper DO    bumetanide (BUMEX) tablet 1 mg, 1 mg, Oral, BID, Jamari Draper DO    cyproheptadine (PERIACTIN) tablet 4 mg, 4 mg, Oral, BID, Jamari Draper DO    pentoxifylline CR (TRENTAL) tablet 400 mg, 400 mg, Oral, TID WITH MEALS, Jamari Draper DO    budesonide (PULMICORT) 500 mcg/2 ml nebulizer suspension, 500 mcg, Nebulization, BID RT **AND** arformoteroL (BROVANA) neb solution 15 mcg, 15 mcg, Nebulization, BID RT, Jamari Draper DO    sodium chloride (NS) flush 5-10 mL, 5-10 mL, IntraVENous, PRN, Deepak Mo MD    cefepime (MAXIPIME) 2 g in 0.9% sodium chloride (MBP/ADV) 100 mL MBP, 2 g, IntraVENous, Q12H, Deepak Mo MD         Review of Systems:     Constitution systems: Negative for fever, chills   Eyes: Negative for visual changes. ENT: Negative for sore throat, painful swallowing. Respiratory: Negative for cough, hemoptysis; + shortness of breath. Cardiology: Negative for chest pain   GI:  as in HPI  : Negative for urinary frequency   Skin: Negative for rash. Hematology: Negative for easy bruising, blood clots. Musculo-skelatal: right hand with limited mobility  Neurologic: memory problems related to HE. Psychology: Negative for anxiety, depression.         Visit Vitals  /68 (BP 1 Location: Right upper arm, BP Patient Position: At rest;Semi fowlers)   Pulse 91   Temp 98.9 °F (37.2 °C)   Resp 16 Ht 5' 7\" (1.702 m)   Wt 63.5 kg (140 lb)   SpO2 97%   Breastfeeding No   BMI 21.93 kg/m²         Physical Assessment:   GENERAL ASSESSMENT: well developed, no deformities, in no acute distress. SKIN: normal color, no lesions  HEAD: normocephalic  EYES: scleral icterus  NOSE: normal external appearance and nares patent  MOUTH: moist mucosa  NECK: normal  CHEST: respiratory effort normal with no retractions or accessory muscle use; supplement O2 via NC  HEART: regular rate    ABDOMEN: soft, protuberant, non-acute   ANAL: deferred/not performed  EXTREMITY: no deformity or contracture, normal and symmetric movement   NEURO: gross motor exam normal by observation  PSYC: judgement/insight: within normal limits. memory: within normal limits for recent and remote events. mood and affect: no evidence of depression, anxiety or agitation. orientation: oriented to time, space and person. CBC   Lab Results   Component Value Date/Time    WBC 9.0 05/31/2022 11:00 PM    HGB 9.8 (L) 05/31/2022 11:00 PM    HCT 28.5 (L) 05/31/2022 11:00 PM    PLATELET 88 (L) 99/26/4706 11:00 PM    MCV 94.4 05/31/2022 11:00 PM           CHEMISTRY   Lab Results   Component Value Date/Time    Sodium 124 (L) 05/31/2022 11:00 PM    Potassium 5.1 05/31/2022 11:00 PM    Chloride 91 (L) 05/31/2022 11:00 PM    CO2 27 05/31/2022 11:00 PM    Anion gap 6 05/31/2022 11:00 PM    Glucose 60 (L) 05/31/2022 11:00 PM    BUN 9 05/31/2022 11:00 PM    Creatinine 0.85 05/31/2022 11:00 PM    BUN/Creatinine ratio 11 (L) 05/31/2022 11:00 PM    GFR est AA >60 05/31/2022 11:00 PM    GFR est non-AA >60 05/31/2022 11:00 PM    Calcium 9.4 05/31/2022 11:00 PM      Lab Results   Component Value Date/Time    ALT (SGPT) 58 (H) 05/31/2022 11:00 PM    AST (SGOT) 186 (H) 05/31/2022 11:00 PM    Alk.  phosphatase 464 (H) 05/31/2022 11:00 PM    Bilirubin, direct 10.2 (H) 11/05/2021 02:58 AM    Bilirubin, total 9.5 (H) 05/31/2022 11:00 PM           OTHER LABS   No results found for: AFPP4, [id number], [id number], 533170, [id number], FETO2, XALFE   Lab Results   Component Value Date/Time    Ammonia, plasma 34 (H) 05/31/2022 11:00 PM        No results found for: LCA  No results found for: 320692, 704432, YOA54CCOLE, [id number]      No results found for: HAMAT, HAAB, HABT, HAAT, HBSAG, HBSB, HBSAT, HBABN, HBCM, HBCAB, HBCAT, XBCABS, HBEAB, HBEAG, XHEPCS, 361575, [address], HBCMLT, HBCLT, HBEBLT, [id number], [id number], HAVMLT, 052262, HBCMLT, [id number], HCGAT    No results found for: AFPP4    No results found for: IRON, FE, TIBC, IBCT, PSAT, FERR    Pancreatic Markers:  No results found for: AMYLPOCT, AML, LIPPOCT, LPSE          COAG   No results found for: APTT, PTP, INR, INREXT         STOOL ANALYSIS   No results found for: OB1, OB2, OB3, OCBL, POCOBL, SOB, OCCBLDEXT       MICROBIOLOGY   Results     Procedure Component Value Units Date/Time    COVID-19 WITH INFLUENZA A/B [[id number]] Collected: 06/01/22 0230    Order Status: Completed Specimen: Nasopharyngeal Updated: 06/01/22 0417     SARS-CoV-2 by PCR Not detected        Comment: Not Detected results do not preclude SARS-CoV-2 infection and should not be used as the sole basis for patient management decisions. Results must be combined with clinical observations, patient history, and epidemiological information. Influenza A by PCR Not detected        Influenza B by PCR Not detected        Comment: Testing was performed using lala Tejal SARS-CoV-2 and Influenza A/B nucleic acid assay. This test is a multiplex Real-Time Reverse Transcriptase Polymerase Chain Reaction (RT-PCR) based in [employer] diagnostic test intended for the qualitative detection of nucleic acids from SARS-CoV-2, Influenza A, and Influenza B in nasopharyngeal for use under the FDA's Emergency Use Authorization (EAU) only.        Fact sheet for Patients: FindDrives.pl  Fact sheet for Healthcare Providers: FindDrives.pl         CULTURE, BLOOD [[id number]] Collected: 05/31/22 2300    Order Status: Completed Specimen: Blood Updated: 06/01/22 0641     Special Requests: NO SPECIAL REQUESTS        Culture result: NO GROWTH AFTER 7 HOURS       CULTURE, BLOOD [[id number]] Collected: 05/31/22 2250    Order Status: Completed Specimen: Blood Updated: 06/01/22 0641     Special Requests: NO SPECIAL REQUESTS        Culture result: NO GROWTH AFTER 7 HOURS                      Radiology    XR CHEST PORT  Narrative: Portable Chest    CPT CODE: 03464    HISTORY: SIRS. FINDINGS:     Comparison 5/3/2022; 11/2/2021. Wires overlie the patient. Slightly lordotic positioning. Mild bronchial wall  thickening without focal consolidation. No pulmonary edema, effusion, or  pneumothorax. Heart size and mediastinal contours within normal limits. Atherosclerotic disease aorta. Impression: Mild bronchial wall thickening. Correlate for reactive airway disease or  bronchitis. No focal consolidation for pneumonia at this time  CT HEAD WO CONT  Narrative: EXAM:  CT Head without Contrast              CLINICAL INDICATION:  Confusion, liver failure           COMPARISON:  10/04/19. TECHNIQUE:      - Helical volumetric CT imaging of the head is performed from the base of the  skull to the vertex without IV contrast administration. Axial, coronal and  sagittal images are generated from the volumetric data set. - Dose optimization techniques are utilized as appropriate to the performed exam  with combination of automated exposure control, adjustment of mA and/or kV  according to patient size, and use of iterative reconstructive technique. FINDINGS:     Brain:    - Hemorrhage/ hematoma:  No acute intracranial hemorrhage/ hematoma. - Mass, mass effect:  None. - Gray-white matter differentiation:  Mild white matter hypodensities,  suggestive of chronic microvascular ischemia.  - Interval assessment:  No significant interval change.     CSF spaces:  No evidence of abnormal effacement or enlargement. Calvarium:  Intact. Sinuses, mastoids:  Clear. Impression:               1.  No acute intracranial abnormalities. 2.  Mild white matter hypodensity suggestive of chronic microvascular ischemic  changes. MRI Results (most recent):  Results from [place] encounter on 11/02/21    MRI ABD W MRCP W WO CONT    Narrative  MRCP and MR abdomen with and without contrast    Indication: Alcohol abuse with jaundice, decreased appetite, vomiting, elevated  liver enzymes, dilated CBD and hepatomegaly. Technique: MRCP performed consisting of the following pulse sequences: Axial and  coronal single shot, axial T2 with fat saturation, radial and coronal thin and  thick slab fluid sensitive sequences. Source data reviewed along with  reconstructed images created at the console to fully evaluate the tortuous  biliary structures. 3-D postprocessing imaging was performed in conjunction. Comparison: 11/2/2021; 11/21/2020    Findings: Images are degraded by motion. There is signal dropout throughout the liver parenchyma on out of phase images. Liver is 20.9 cm craniocaudal at the mid clavicular line. No washout lesion. Spleen is normal in caliber and without mass. Gallbladder is 4.4 cm transverse  diameter. It is 10.5 cm longitudinal. There is mild wall thickening and mild  pericholecystic fluid. CBD is 12 mm. Mild intrahepatic ductal dilation. No  choledocholithiasis or appreciable mass at the ampulla. Pancreatic duct is  normal in caliber. Pancreas demonstrates normal T1 and T2 signal intensity. Colonic interposition. Mild colonic wall thickening. Small volume ascites. Gastric decompression. Small bowel loops are normal in caliber. No  lymphadenopathy. Bilateral adrenal glands are normal. Kidneys enhance symmetrically. No  hydronephrosis. There is a up to 1.8 cm right upper pole renal cyst.    Aorta is normal in caliber.  Hepatic veins, portal vein and SMV are patent. There  is a small recanalized paraumbilical vein. No significant collateral vessels. No  paraesophageal varices. Limited visualized lung bases are unremarkable. Moderate anasarca. Degenerative disc disease. Impression  1. Moderate hepatic steatosis and significant hepatomegaly. No suspicious  hepatic lesion. 2.  Biliary duct dilation but no choledocholithiasis or obstructing mass. 3.  Potential cholecystitis given gallbladder dilation, wall thickening and  small volume pericholecystic fluid. 4.  Possible portal hypertension given small recanalized paraumbilical vein,  small volume ascites. There is mild colonic wall thickening which may be sequela  of portal hypertension or infectious/inflammatory process. 5.  Moderate anasarca. Preliminary report provided by Dr. Krystyna Vaughn on 11/3/2021 at 0022 hours. RISSA Wilson.    [facility]  Office: [phone]  Cell: [phone]  [email or web address]

## 2022-06-01 NOTE — PROGRESS NOTES
conducted an initial consultation and Spiritual Assessment for Sheron Abebe, who is a 55 y.o.,female. Patients Primary Language is: [place]. According to the patients EMR Rastafari Affiliation is: No preference. The reason the Patient came to the hospital is:   Patient Active Problem List    Diagnosis Date Noted    Acute encephalopathy 06/01/2022    Thrombocytopenia ([address] 06/01/2022    Asthma 06/01/2022    GERD (gastroesophageal reflux disease) 06/01/2022    Iron deficiency anemia 06/01/2022    History of alcohol withdrawal delirium 06/01/2022    Tobacco abuse 06/01/2022    Hypoglycemia 06/01/2022    Hyperammonemia ([address] 47/68/5706    Alcoholic cirrhosis of liver ([address] 06/01/2022    Volume overload 06/01/2022    Hepatitis 06/01/2022    Mild protein-calorie malnutrition ([address] 11/03/2021    Jaundice 11/02/2021    Alcohol abuse 11/02/2021    Acute alcoholic intoxication without complication ([address] 26/93/8915    Hyperkalemia 11/02/2021    Hyponatremia 11/02/2021    Elevated LFTs 11/02/2021    SIRS (systemic inflammatory response syndrome) ([address] 07/08/2017    Pneumonia 07/07/2017    CAP (community acquired pneumonia) 07/07/2017        The  provided the following Interventions:  Initiated a relationship of care and support with patient in room 452. Found patient resting peacefully at this time and not able to communicate clearly at this time though she did try. There is no advance directive present. Provided information about Spiritual Care Services. Offered prayer and assurance of continued prayers on patients behalf. The following outcomes were achieved:  Patient shared limited information about her medical narrative . Jimbo Saldivar Patient processed feeling about current hospitalization. Patient expressed gratitude for pastoral care visit. Assessment:  Patient does not have any Baptism/cultural needs that will affect patients preferences in health care.   There are no further spiritual or Taoist issues which require Spiritual Care Services interventions at this time. Plan:  Chaplains will continue to follow and will provide pastoral care on an as needed/requested basis    . [facility]   Spiritual Care   [phone]

## 2022-06-02 ENCOUNTER — APPOINTMENT (OUTPATIENT)
Dept: ULTRASOUND IMAGING | Age: 57
DRG: 279 | End: 2022-06-02
Attending: PHYSICIAN ASSISTANT
Payer: MEDICAID

## 2022-06-02 LAB
ALBUMIN SERPL-MCNC: 2.6 G/DL (ref 3.4–5)
ALBUMIN/GLOB SERPL: 0.6 {RATIO} (ref 0.8–1.7)
ALP SERPL-CCNC: 377 U/L (ref 45–117)
ALT SERPL-CCNC: 44 U/L (ref 13–56)
ANION GAP SERPL CALC-SCNC: 9 MMOL/L (ref 3–18)
AST SERPL-CCNC: 121 U/L (ref 10–38)
BASOPHILS # BLD: 0 K/UL (ref 0–0.1)
BASOPHILS NFR BLD: 0 % (ref 0–2)
BILIRUB SERPL-MCNC: 8.8 MG/DL (ref 0.2–1)
BUN SERPL-MCNC: 10 MG/DL (ref 7–18)
BUN/CREAT SERPL: 10 (ref 12–20)
CALCIUM SERPL-MCNC: 9.4 MG/DL (ref 8.5–10.1)
CHLORIDE SERPL-SCNC: 91 MMOL/L (ref 100–111)
CO2 SERPL-SCNC: 30 MMOL/L (ref 21–32)
CREAT SERPL-MCNC: 0.97 MG/DL (ref 0.6–1.3)
DIFFERENTIAL METHOD BLD: ABNORMAL
EOSINOPHIL # BLD: 0 K/UL (ref 0–0.4)
EOSINOPHIL NFR BLD: 0 % (ref 0–5)
ERYTHROCYTE [DISTWIDTH] IN BLOOD BY AUTOMATED COUNT: 17.7 % (ref 11.6–14.5)
GLOBULIN SER CALC-MCNC: 4.3 G/DL (ref 2–4)
GLUCOSE BLD STRIP.AUTO-MCNC: 103 MG/DL (ref 70–110)
GLUCOSE BLD STRIP.AUTO-MCNC: 107 MG/DL (ref 70–110)
GLUCOSE BLD STRIP.AUTO-MCNC: 76 MG/DL (ref 70–110)
GLUCOSE BLD STRIP.AUTO-MCNC: 96 MG/DL (ref 70–110)
GLUCOSE SERPL-MCNC: 109 MG/DL (ref 74–99)
HCT VFR BLD AUTO: 25.5 % (ref 35–45)
HGB BLD-MCNC: 8.7 G/DL (ref 12–16)
IMM GRANULOCYTES # BLD AUTO: 0.1 K/UL (ref 0–0.04)
IMM GRANULOCYTES NFR BLD AUTO: 1 % (ref 0–0.5)
INR PPP: 1.3 (ref 0.8–1.2)
LYMPHOCYTES # BLD: 1 K/UL (ref 0.9–3.6)
LYMPHOCYTES NFR BLD: 10 % (ref 21–52)
MCH RBC QN AUTO: 32.8 PG (ref 24–34)
MCHC RBC AUTO-ENTMCNC: 34.1 G/DL (ref 31–37)
MCV RBC AUTO: 96.2 FL (ref 78–100)
MITOCHONDRIA M2 IGG SER-ACNC: <20 UNITS (ref 0–20)
MONOCYTES # BLD: 1.7 K/UL (ref 0.05–1.2)
MONOCYTES NFR BLD: 17 % (ref 3–10)
NEUTS SEG # BLD: 7.4 K/UL (ref 1.8–8)
NEUTS SEG NFR BLD: 72 % (ref 40–73)
NRBC # BLD: 0 K/UL (ref 0–0.01)
NRBC BLD-RTO: 0 PER 100 WBC
PLATELET # BLD AUTO: 84 K/UL (ref 135–420)
PMV BLD AUTO: 9.9 FL (ref 9.2–11.8)
POTASSIUM SERPL-SCNC: 3.3 MMOL/L (ref 3.5–5.5)
PROT SERPL-MCNC: 6.9 G/DL (ref 6.4–8.2)
PROTHROMBIN TIME: 16.8 SEC (ref 11.5–15.2)
RBC # BLD AUTO: 2.65 M/UL (ref 4.2–5.3)
SMA IGG SER-ACNC: 59 UNITS (ref 0–19)
SODIUM SERPL-SCNC: 130 MMOL/L (ref 136–145)
WBC # BLD AUTO: 10.2 K/UL (ref 4.6–13.2)

## 2022-06-02 PROCEDURE — 94640 AIRWAY INHALATION TREATMENT: CPT

## 2022-06-02 PROCEDURE — 85610 PROTHROMBIN TIME: CPT

## 2022-06-02 PROCEDURE — 99221 1ST HOSP IP/OBS SF/LOW 40: CPT | Performed by: NURSE PRACTITIONER

## 2022-06-02 PROCEDURE — 80053 COMPREHEN METABOLIC PANEL: CPT

## 2022-06-02 PROCEDURE — 74011000250 HC RX REV CODE- 250: Performed by: INTERNAL MEDICINE

## 2022-06-02 PROCEDURE — 85025 COMPLETE CBC W/AUTO DIFF WBC: CPT

## 2022-06-02 PROCEDURE — 74011000258 HC RX REV CODE- 258: Performed by: EMERGENCY MEDICINE

## 2022-06-02 PROCEDURE — 76705 ECHO EXAM OF ABDOMEN: CPT

## 2022-06-02 PROCEDURE — 74011250637 HC RX REV CODE- 250/637: Performed by: INTERNAL MEDICINE

## 2022-06-02 PROCEDURE — 74011250636 HC RX REV CODE- 250/636: Performed by: INTERNAL MEDICINE

## 2022-06-02 PROCEDURE — 82962 GLUCOSE BLOOD TEST: CPT

## 2022-06-02 PROCEDURE — 65270000029 HC RM PRIVATE

## 2022-06-02 PROCEDURE — 36415 COLL VENOUS BLD VENIPUNCTURE: CPT

## 2022-06-02 PROCEDURE — 2709999900 HC NON-CHARGEABLE SUPPLY

## 2022-06-02 PROCEDURE — 94761 N-INVAS EAR/PLS OXIMETRY MLT: CPT

## 2022-06-02 PROCEDURE — 74011250636 HC RX REV CODE- 250/636: Performed by: EMERGENCY MEDICINE

## 2022-06-02 PROCEDURE — 99232 SBSQ HOSP IP/OBS MODERATE 35: CPT | Performed by: HOSPITALIST

## 2022-06-02 PROCEDURE — 74011250637 HC RX REV CODE- 250/637: Performed by: HOSPITALIST

## 2022-06-02 PROCEDURE — 74011250637 HC RX REV CODE- 250/637: Performed by: PHYSICIAN ASSISTANT

## 2022-06-02 RX ORDER — METHADONE HYDROCHLORIDE 10 MG/ML
110 CONCENTRATE ORAL DAILY
Status: DISCONTINUED | OUTPATIENT
Start: 2022-06-02 | End: 2022-06-03 | Stop reason: HOSPADM

## 2022-06-02 RX ORDER — ACETAMINOPHEN 325 MG/1
650 TABLET ORAL
Status: DISCONTINUED | OUTPATIENT
Start: 2022-06-02 | End: 2022-06-03 | Stop reason: HOSPADM

## 2022-06-02 RX ADMIN — THERA TABS 1 TABLET: TAB at 10:04

## 2022-06-02 RX ADMIN — PANTOPRAZOLE 40 MG: 40 TABLET, DELAYED RELEASE ORAL at 18:55

## 2022-06-02 RX ADMIN — BUMETANIDE 1 MG: 1 TABLET ORAL at 18:56

## 2022-06-02 RX ADMIN — BUDESONIDE 500 MCG: 0.5 SUSPENSION RESPIRATORY (INHALATION) at 07:33

## 2022-06-02 RX ADMIN — ARFORMOTEROL TARTRATE 15 MCG: 15 SOLUTION RESPIRATORY (INHALATION) at 19:08

## 2022-06-02 RX ADMIN — BUMETANIDE 1 MG: 1 TABLET ORAL at 10:04

## 2022-06-02 RX ADMIN — PENTOXIFYLLINE 400 MG: 400 TABLET, EXTENDED RELEASE ORAL at 18:55

## 2022-06-02 RX ADMIN — LACTULOSE 15 ML: 20 SOLUTION ORAL at 18:56

## 2022-06-02 RX ADMIN — CEFEPIME HYDROCHLORIDE 2 G: 2 INJECTION, POWDER, FOR SOLUTION INTRAVENOUS at 00:01

## 2022-06-02 RX ADMIN — ACETAMINOPHEN 650 MG: 325 TABLET ORAL at 00:25

## 2022-06-02 RX ADMIN — SODIUM CHLORIDE, PRESERVATIVE FREE 10 ML: 5 INJECTION INTRAVENOUS at 22:27

## 2022-06-02 RX ADMIN — LACTULOSE 15 ML: 20 SOLUTION ORAL at 10:05

## 2022-06-02 RX ADMIN — ONDANSETRON 4 MG: 2 INJECTION INTRAMUSCULAR; INTRAVENOUS at 12:16

## 2022-06-02 RX ADMIN — PENTOXIFYLLINE 400 MG: 400 TABLET, EXTENDED RELEASE ORAL at 12:20

## 2022-06-02 RX ADMIN — Medication 5 MG: at 00:01

## 2022-06-02 RX ADMIN — METHADONE HYDROCHLORIDE 110 MG: 10 CONCENTRATE ORAL at 12:13

## 2022-06-02 RX ADMIN — PANTOPRAZOLE 40 MG: 40 TABLET, DELAYED RELEASE ORAL at 10:04

## 2022-06-02 RX ADMIN — Medication 100 MG: at 10:04

## 2022-06-02 RX ADMIN — IPRATROPIUM BROMIDE AND ALBUTEROL SULFATE 3 ML: .5; 2.5 SOLUTION RESPIRATORY (INHALATION) at 19:08

## 2022-06-02 RX ADMIN — SODIUM CHLORIDE, PRESERVATIVE FREE 10 ML: 5 INJECTION INTRAVENOUS at 18:56

## 2022-06-02 RX ADMIN — LACTULOSE 15 ML: 20 SOLUTION ORAL at 22:27

## 2022-06-02 RX ADMIN — BUDESONIDE 500 MCG: 0.5 SUSPENSION RESPIRATORY (INHALATION) at 19:08

## 2022-06-02 RX ADMIN — SODIUM CHLORIDE, PRESERVATIVE FREE 10 ML: 5 INJECTION INTRAVENOUS at 05:24

## 2022-06-02 RX ADMIN — FOLIC ACID 1 MG: 1 TABLET ORAL at 10:05

## 2022-06-02 RX ADMIN — ARFORMOTEROL TARTRATE 15 MCG: 15 SOLUTION RESPIRATORY (INHALATION) at 07:33

## 2022-06-02 RX ADMIN — IPRATROPIUM BROMIDE AND ALBUTEROL SULFATE 3 ML: .5; 2.5 SOLUTION RESPIRATORY (INHALATION) at 16:21

## 2022-06-02 RX ADMIN — IPRATROPIUM BROMIDE AND ALBUTEROL SULFATE 3 ML: .5; 2.5 SOLUTION RESPIRATORY (INHALATION) at 11:49

## 2022-06-02 RX ADMIN — IPRATROPIUM BROMIDE AND ALBUTEROL SULFATE 3 ML: .5; 2.5 SOLUTION RESPIRATORY (INHALATION) at 07:33

## 2022-06-02 NOTE — PROGRESS NOTES
Problem: Alcohol Withdrawal  Goal: *STG: Participates in treatment plan  Outcome: Progressing Towards Goal  Goal: *STG: Remains safe in hospital  Outcome: Progressing Towards Goal  Goal: *STG: Seeks staff when symptoms of withdrawal increase  Outcome: Progressing Towards Goal  Goal: *STG: Complies with medication therapy  Outcome: Progressing Towards Goal  Goal: *STG: Attends activities and groups  Outcome: Progressing Towards Goal  Goal: *STG: Will identify negative impact of chemical dependency including the use of tobacco, alcohol, and other substances  Outcome: Progressing Towards Goal  Goal: *STG: Verbalizes abstinence as an achievable goal  Outcome: Progressing Towards Goal  Goal: *STG: Agrees to participate in outpatient after care program to support ongoing mental health  Outcome: Progressing Towards Goal  Goal: *STG: Able to indentify relapse triggers including interpersonal/social and familial factors  Outcome: Progressing Towards Goal  Goal: *STG: Identify lifestyle changes to support long term sobriety such as vocation, employment, education, and legal issues  Outcome: Progressing Towards Goal  Goal: *STG: Maintains appropriate nutrition and hydration  Outcome: Progressing Towards Goal  Goal: *STG: Vital signs within defined limits  Outcome: Progressing Towards Goal  Goal: *STG/LTG: Relapse prevention plan in place to include housing/aftercare, leisure activities, and spirituality  Outcome: Progressing Towards Goal  Goal: Interventions  Outcome: Progressing Towards Goal     Problem: Patient Education: Go to Patient Education Activity  Goal: Patient/Family Education  Outcome: Progressing Towards Goal     Problem: Pain  Goal: *Control of Pain  Outcome: Progressing Towards Goal     Problem: Patient Education: Go to Patient Education Activity  Goal: Patient/Family Education  Outcome: Progressing Towards Goal     Problem: Falls - Risk of  Goal: *Absence of Falls  Description: Document Katya Fall Risk and appropriate interventions in the flowsheet.   Outcome: Progressing Towards Goal  Note: Fall Risk Interventions:  Mobility Interventions: Bed/chair exit alarm,Patient to call before getting OOB,Utilize walker, cane, or other assistive device         Medication Interventions: Bed/chair exit alarm,Patient to call before getting OOB,Teach patient to arise slowly                   Problem: Patient Education: Go to Patient Education Activity  Goal: Patient/Family Education  Outcome: Progressing Towards Goal     Problem: General Medical Care Plan  Goal: *Vital signs within specified parameters  Outcome: Progressing Towards Goal  Goal: *Labs within defined limits  Outcome: Progressing Towards Goal  Goal: *Absence of infection signs and symptoms  Outcome: Progressing Towards Goal  Goal: *Optimal pain control at patient's stated goal  Outcome: Progressing Towards Goal  Goal: *Skin integrity maintained  Outcome: Progressing Towards Goal  Goal: *Fluid volume balance  Outcome: Progressing Towards Goal  Goal: *Optimize nutritional status  Outcome: Progressing Towards Goal  Goal: *Anxiety reduced or absent  Outcome: Progressing Towards Goal  Goal: *Progressive mobility and function (eg: ADL's)  Outcome: Progressing Towards Goal     Problem: Patient Education: Go to Patient Education Activity  Goal: Patient/Family Education  Outcome: Progressing Towards Goal

## 2022-06-02 NOTE — ACP (ADVANCE CARE PLANNING)
Advance Care Planning     General Advance Care Planning (ACP) Conversation      Date of Conversation: 6/2/2022    Conducted with: Patient, Janet Valdez NP (Palliative) and this writer. Healthcare Decision Maker:     Patient has no official healthcare decision maker listed. Patient has 4 children. One son Fritz Wetzel, RK#[phone]) and three daughters: Fritz Wetzel, CR#[phone]), (Catalina Zaldivar, UV#[phone]) and Ana Velez RN#[phone]). Patient resides with her son Justen Gaston). Patient was not interested in completing an advance medical directive (AMD) naming a decision maker. She is aware that any medical decisions will have to be from the majority of her four children. Content/Action Overview: This writer, along with Janet Valdez NP, with the Palliative Care team, visited with patient today to offer support. Patient was resting comfortably, in bed. Patient is alert and oriented. There were no family members at the bedside. Patient reported that her and her son live together. She reported to being independent with her ADLs and IADLs. She stated, however, that her son cooks the meals and will also drive her around, at times. She stated that Medicaid transport takes her to her appointments. Patient reported that she is here, at the hospital, because she cannot move her right hand. She also reported that she has not had an appetite for the past few weeks. She reported that she does drink alcohol; 2 beers a day. Patient was then asked whether she has ever completed an AMD or POA. Patient stated that she has not done an AMD or a POA. She also made it clear that she was not interested in completing an AMD, with the Palliative Care team. She understands that the majority of her children would have to be called on to make any medical decisions. The topic of goals of care were then brought up.  Patient, at first, stated that she would want attempts at resuscitation, if her heart and breathing were to stop. Patient was then educated on the risks and burdens of CPR. Once she understood EVERYTHING that is involved with CPR; she then stated that she would NOT want attempts at resuscitation. Patient, however, refused to complete a POST form, that would formalize her decision of DNR. She understands that she will remain a full code, until she has completed the paperwork. Patient is okay with remaining a full code. At this time, patient will remain a FULL CODE WITH FULL INTERVENTIONS. The Palliative Care team then phoned patient's son Amelie Fagan to determine when he could come in for a family meeting. A voicemail was left for a return call. When son phones back, a family meeting will be arranged. Future goals of care discussions are warranted. Length of Voluntary ACP Conversation in minutes: 27          Faustino Andrade., [facility]  Palliative Care   ET#[phone]

## 2022-06-02 NOTE — PROGRESS NOTES
[facility], [place], [address]  [phone]  [email or web address]    Gastroenterology follow up-Progress note    Impression:  1. Alcoholic hepatitis    2. Alcoholic cirrhosis of liver  3. Jaundice              - Tbili 8.8  4. Alcohol abuse with withdrawal              - EtOH 78  5. Chronically elevated LFTs              - ALT 44; ;   6. 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 Chronic stable anemia without GI bleed   8. Focal intestinal metaplasia               - s/p EGD 2/2021 by Dr Pratibha Sharp  9. History of adenomatous rectal polyps  10. Chronic biliary duct dilation but no choledocholithiasis or obstructing mass              - positive smooth muscle Ab 11/2021  11. Encephalopathy  12. At risk for malnutrition  13. Hyponatremia  14. Hypokalemia  15. Hyperammonemia (improving)  16. Cigarette smoker  17. Methadone dependent        Plan:  1. Best supportive care as established. Continue acid suppression with pantoprazole BID. 2. Alcohol cessation discussed with patient. Consideration for trial of baclofen or naltrexone to help with achieving cessation. AAA is also appropriate. 3. RUQ US completed and final report pending. Monitoring LFTs and clinical status. Additional serologies as ordered (AFP, AMA, Smooth muscle Ab) and pending. 4. CIWA per primary team.   5. Low sodium diet. Caloric support as appropriate per dietitian. 6. No NSAIDs  7. Recommend outpatient ERCP to evaluate ductal dilatation. 8. Titrate lactulose to achieve no more than 2 soft stools a day. Hold for 24-48 hours if diarrhea. 9. Medical management otherwise per primary team.          Subjective:  No acute GI events. Doing better today. Patient's main concern is her right wrist pain (XR done yesterday with finding of mild soft tissue swelling). Patient requesting her methadone medication.  RN [facility] aware and has coordinated with pharmacy for the methadone.      ROS: Denies any fevers, chills     General: awake, alert, no distress   Neck: supple and trachea normal   Cardiovascular: normal rate    Pulmonary/Chest Wall: unlabored respiratory effort   Abdominal: benign     Patient Active Problem List   Diagnosis Code    Pneumonia J18.9    CAP (community acquired pneumonia) J18.9    SIRS (systemic inflammatory response syndrome) ([facility] R65.10    Jaundice R17    Alcohol abuse F10.10    Acute alcoholic intoxication without complication ([facility] I07.776    Hyperkalemia E87.5    Hyponatremia E87.1    Elevated LFTs R79.89    Mild protein-calorie malnutrition (HCC) E44.1    Acute encephalopathy G93.40    Thrombocytopenia (HCC) D69.6    Asthma J45.909    GERD (gastroesophageal reflux disease) K21.9    Iron deficiency anemia D50.9    History of alcohol withdrawal delirium Z86.59    Tobacco abuse Z72.0    Hypoglycemia E16.2    Hyperammonemia (HCC) I70.95    Alcoholic cirrhosis of liver (HCC) K70.30    Volume overload E87.70    Hepatitis K75.9         Visit Vitals  BP (!) 101/53   Pulse 96   Temp 98.3 °F (36.8 °C)   Resp 20   Ht 5' 7\" (1.702 m)   Wt 63.5 kg (140 lb)   SpO2 95%   Breastfeeding No   BMI 21.93 kg/m²           Intake/Output Summary (Last 24 hours) at 6/2/2022 0936  Last data filed at 6/2/2022 0631  Gross per 24 hour   Intake 360 ml   Output 1100 ml   Net -740 ml       CBC w/Diff    Lab Results   Component Value Date/Time    WBC 10.2 06/02/2022 02:11 AM    RBC 2.65 (L) 06/02/2022 02:11 AM    HGB 8.7 (L) 06/02/2022 02:11 AM    HCT 25.5 (L) 06/02/2022 02:11 AM    MCV 96.2 06/02/2022 02:11 AM    MCH 32.8 06/02/2022 02:11 AM    MCHC 34.1 06/02/2022 02:11 AM    RDW 17.7 (H) 06/02/2022 02:11 AM    PLT 84 (L) 06/02/2022 02:11 AM    Lab Results   Component Value Date/Time    GRANS 72 06/02/2022 02:11 AM    LYMPH 10 (L) 06/02/2022 02:11 AM    EOS 0 06/02/2022 02:11 AM    BANDS 23 (H) 01/25/2020 01:39 PM    BASOS 0 06/02/2022 02:11 AM      Basic Metabolic Profile   Recent Labs     06/02/22 0211   *   K 3.3*   CL 91*   CO2 30   BUN 10   CA 9.4        Hepatic Function    Lab Results   Component Value Date/Time    ALB 2.6 (L) 06/02/2022 02:11 AM    TP 6.9 06/02/2022 02:11 AM     (H) 06/02/2022 02:11 AM    No results found for: TBIL       Coags   Recent Labs     06/02/22 0211 06/01/22  1132   PTP 16.8* 16.7*   INR 1.3* 1.3*         Radiology  XR WRIST RT AP/LAT    Result Date: 6/2/2022  Mild soft tissue swelling centered slightly proximal to the wrist near the level of IV catheter site. No evidence of acute fracture or dislocation. RISSA Wilson    [facility].   [email or web address]  Phone: [phone]  Pager: [phone]

## 2022-06-02 NOTE — CONSULTS
[facility]: [phone] ([phone])  Formerly [facility]: [phone]     Patient Name: Layla Lisa  YOB: 1965    Date of Initial Consult : 6/2/2022  Reason for Consult: Care decisions  Requesting Provider: Dr. Heather Trinh  Primary Care Physician: Dala Phalen, MD      SUMMARY:   Layla Lisa is a 55 y.o. female with a past history of alcpohol abuse, alcohol withdrawal syndrome, anemia, GERD and alcoholic cirrhosis, who was admitted on 5/31/2022 from home with a diagnosis of acute encephalopathy. Current medical issues leading to Palliative Medicine involvement include: 55year old chronically ill appearing female with multiple medical problems including hx of alcoholic cirrhosis presented to the ED with concerns of confusion. Palliative medicine is consulted for care decisions. CHIEF COMPLAINT: confusion    HPI/SUBJECTIVE:    Past history as above. Ms. Abel Rodriguez is a 55year old chronically ill appearing female who developed altered mental status and confusion at home. Also reports having very poor appetite for several weeks. Patient reports consuming approximately 2 beers per day at home. Denies pain or shortness of breath. The patient is:   [x] Verbal and participatory  [] Non-participatory due to:     GOALS OF CARE: Full code, full interventions  Patient/Health Care Proxy Stated Goals: Prolong life      TREATMENT PREFERENCES:   Code Status: Full Code         PALLIATIVE DIAGNOSES:   1. Encounter for palliative care/goals of care  2. Hepatic encephalopathy  3. History of cirrhosis  4. Debility       PLAN:   1. Encounter for palliative care/goals of care - seen at bedside along with Mr. Mariam MSW.  Ms. Abel Rodriguez is lying in bed, in no acute distress. Alerted to verbal stimuli, oriented x3. No family at bedside. Reports not being able to move her right wrist and that it is painful. States an x-ray was done and she is waiting on results. Patient states that she has several medical problems but denies hx of cirrhosis. Reports that she consumes alcohol, 2 beers per day and has had a poor appetite for several weeks. There is no AMD on file. Patient has 4 adult children, 3 daughters and 1 son, Mala Judd. Patient and Mala Judd live together and she states that Mala Judd works a lot. Inquired if patient had ever completed an AMD or MPOA and she said she had not. When asked who she would want to make her medical decisions if she was unable, she said it would be her son, Mala Judd. Offered to complete AMD with patient today to formalize her choice for MPOA; however, patient declined completing any paperwork. Explained the [facility] [employer] [facility] and that in the absence of any paperwork, legal medical decision making would default to a majority of her 4 adult children. Patient expressed understanding and agreement. Introduced and discussed goals of care including benefits and burdens of resuscitation, intubation and ventilation. Patient initially stated she would want to be resuscitated then stated that she would not want aggressive resuscitation. Patient declined to complete a POST form to formalize her choices for resuscitation. With patient's permission, placed phone call to her son, Mala Judd, to try and set up a family meeting to further discuss goals of care. Left a voicemail requesting a return phone call. Explained to patient that without completion of paperwork, her code status will remain full code with full interventions. Patient expressed understanding and agreement. Goals of care remain full code with full interventions. 2. Hepatic encephalopathy - primary team managing, ammonia level down to 34 on 5/31/2022, continue lactulose  3. History of cirrhosis -  GI consulted and following, RUQ ultrasound ordered  4.  Debility - PPS 50, patient and son, Mala Judd live together, reportedly independent with ADLs, son does the cooking, patient has Medicaid transportation to medical appointments otherwise her son drives her  5. Initial consult note routed to primary continuity provider  6. Communicated plan of care with: Palliative IDT      Advance Care Planning:  [] The [facility] Interdisciplinary Team has updated the ACP Navigator with [address] and Patient Capacity    Primary Decision Maker ([address]):   *Patient has 4 adult children, 3 daughters and 1 son. Legal decision making falls of a majority of the 4 adult children as per the [place] hierarchy of decision makers              As far as possible, the palliative care team has discussed with patient / health care proxy about goals of care / treatment preferences for patient.          HISTORY:     History obtained from: Chart and patient    Principal Problem:    Acute encephalopathy (6/1/2022)    Active Problems:    Alcohol abuse (11/2/2021)      Acute alcoholic intoxication without complication ([address] (40/9/6448)      Hyponatremia (11/2/2021)      Thrombocytopenia ([address] (6/1/2022)      Asthma (6/1/2022)      GERD (gastroesophageal reflux disease) (6/1/2022)      Iron deficiency anemia (6/1/2022)      History of alcohol withdrawal delirium (6/1/2022)      Overview: Previous Agitation and Hallucinations, but Patient denies previous       Seizures due to ETOH Withdrawal      Tobacco abuse (6/1/2022)      Hypoglycemia (6/1/2022)      Hyperammonemia ([address] (3/1/1381)      Alcoholic cirrhosis of liver ([address] (6/1/2022)      Volume overload (6/1/2022)      Overview: Unclear whether due to Acute Hepatic Failure vs. New Onset Congestive       Heart Failure (Dilated Cardiomyopathy 2°/2 ETOH?)      Hepatitis (6/1/2022)      Past Medical History:   Diagnosis Date    Alcohol abuse     Alcohol withdrawal syndrome (HCC)     Anemia     Asthma     Bile duct calculus     CAP (community acquired pneumonia)     Constipation     GERD (gastroesophageal reflux disease)     Iron deficiency     Liver function test abnormality     Macrocytosis     Vertigo       Past Surgical History:   Procedure Laterality Date    COLONOSCOPY N/A 2/25/2021    COLONOSCOPY with polypectomies performed by Melene Harada, MD at [facility]      Family History   Problem Relation Age of Onset    No Known Problems Mother     No Known Problems Father     No Known Problems Maternal Grandmother     No Known Problems Maternal Grandfather     No Known Problems Paternal Grandmother     No Known Problems Paternal Grandfather     Cancer Sister         uterine?  Cancer Daughter         uterine? History reviewed, no pertinent family history. Social History     Tobacco Use    Smoking status: Current Every Day Smoker     Packs/day: 0.50     Years: 20.00     Pack years: 10.00    Smokeless tobacco: Never Used   Substance Use Topics    Alcohol use:  Yes     Alcohol/week: 3.0 standard drinks     Types: 3 Glasses of wine per week     No Known Allergies   Current Facility-Administered Medications   Medication Dose Route Frequency    acetaminophen (TYLENOL) tablet 650 mg  650 mg Oral Q6H PRN    methadone (DOLOPHINE) 10 mg/mL concentrated solution 110 mg  110 mg Oral DAILY    sodium chloride (NS) flush 5-40 mL  5-40 mL IntraVENous Q8H    sodium chloride (NS) flush 5-40 mL  5-40 mL IntraVENous PRN    polyethylene glycol (MIRALAX) packet 17 g  17 g Oral DAILY PRN    ondansetron (ZOFRAN ODT) tablet 4 mg  4 mg Oral Q8H PRN    Or    ondansetron (ZOFRAN) injection 4 mg  4 mg IntraVENous Q6H PRN    nicotine (NICODERM CQ) 14 mg/24 hr patch 1 Patch  1 Patch TransDERmal DAILY PRN    nitroglycerin (NITROBID) 2 % ointment 0.5 Inch  0.5 Inch Topical Q6H PRN    therapeutic multivitamin (THERAGRAN) tablet 1 Tablet  1 Tablet Oral DAILY    thiamine HCL (B-1) tablet 100 mg  100 mg Oral DAILY    folic acid (FOLVITE) tablet 1 mg  1 mg Oral DAILY    LORazepam (ATIVAN) tablet 1 mg  1 mg Oral Q1H PRN    Or    LORazepam (ATIVAN) 2 mg/mL injection 1 mg  1 mg IntraVENous Q1H PRN    LORazepam (ATIVAN) tablet 2 mg  2 mg Oral Q1H PRN    Or    LORazepam (ATIVAN) 2 mg/mL injection 2 mg  2 mg IntraVENous Q1H PRN    LORazepam (ATIVAN) 2 mg/mL injection 3 mg  3 mg IntraVENous Q15MIN PRN    albuterol-ipratropium (DUO-NEB) 2.5 MG-0.5 MG/3 ML  3 mL Nebulization Q4H RT    lactulose (CHRONULAC) 10 gram/15 mL solution 15 mL  15 mL Oral TID    lactulose (CHRONULAC) 10 gram/15 mL 150 mL in sterile water irrigation 350 mL rectal enema  500 mL Rectal TID PRN    melatonin (rapid dissolve) tablet 5 mg  5 mg Oral QHS PRN    bumetanide (BUMEX) tablet 1 mg  1 mg Oral BID    pentoxifylline CR (TRENTAL) tablet 400 mg  400 mg Oral TID WITH MEALS    budesonide (PULMICORT) 500 mcg/2 ml nebulizer suspension  500 mcg Nebulization BID RT    And    arformoteroL (BROVANA) neb solution 15 mcg  15 mcg Nebulization BID RT    pantoprazole (PROTONIX) tablet 40 mg  40 mg Oral ACB&D    glucose chewable tablet 16 g  4 Tablet Oral PRN    glucagon (GLUCAGEN) injection 1 mg  1 mg IntraMUSCular PRN    dextrose 10% infusion 0-250 mL  0-250 mL IntraVENous PRN    sodium chloride (NS) flush 5-10 mL  5-10 mL IntraVENous PRN          Clinical Pain Assessment (nonverbal scale for nonverbal patients): Clinical Pain Assessment  Severity: 0          Duration: for how long has pt been experiencing pain (e.g., 2 days, 1 month, years)  Frequency: how often pain is an issue (e.g., several times per day, once every few days, constant)     FUNCTIONAL ASSESSMENT:     Palliative Performance Scale (PPS):  PPS: 50    ECOG  ECOG Status : Ambulatory, but unable to carry out work activities     PSYCHOSOCIAL/SPIRITUAL SCREENING:      Any spiritual / Yazdanism concerns:  [] Yes /  [x] No    Caregiver Burnout:  [] Yes /  [] No /  [x] No Caregiver Present      Anticipatory grief assessment:   [x] Normal  / [] Maladaptive        REVIEW OF SYSTEMS:     Systems: constitutional, ears/nose/mouth/throat, respiratory, gastrointestinal, genitourinary, musculoskeletal, integumentary, neurologic, psychiatric, endocrine. Positive findings noted below. Modified ESAS Completed by: provider           Pain: 0           Dyspnea: 0           Stool Occurrence(s): 5   Positive and pertinent negative findings in ROS are noted above in HPI. The following systems were [x] reviewed / [] unable to be reviewed as noted in HPI  Other findings are noted below. PHYSICAL EXAM:     Constitutional: lying in bed with eyes closed, alerted to verbal stimuli, oriented x3  Eyes: pupils equal, anicteric  ENMT: no nasal discharge, moist mucous membranes  Cardiovascular: regular rhythm, distal pulses intact  Respiratory: breathing not labored, symmetric  Gastrointestinal: soft non-tender, +bowel sounds  Last bowel movement: 6/2/2022 x5  Musculoskeletal: no deformity, no tenderness to palpation  Skin: warm, dry  Neurologic: following commands, moving all extremities  Psychiatric: flat affect, no hallucinations    Other: Wt Readings from Last 3 Encounters:   06/01/22 63.5 kg (140 lb)   05/03/22 63.5 kg (140 lb)   11/03/21 54.9 kg (121 lb)     Blood pressure (!) 111/56, pulse 89, temperature 98.1 °F (36.7 °C), resp. rate 20, height 5' 7\" (1.702 m), weight 63.5 kg (140 lb), SpO2 94 %, not currently breastfeeding.   Pain:  Pain Scale 1: Numeric (0 - 10)  Pain Intensity 1: 0     Pain Location 1: Head  Pain Orientation 1: Anterior  Pain Description 1: Aching  Pain Intervention(s) 1: Medication (see MAR) (Patient states she wants Tylenol only.)       LAB AND IMAGING FINDINGS:     Lab Results   Component Value Date/Time    WBC 10.2 06/02/2022 02:11 AM    HGB 8.7 (L) 06/02/2022 02:11 AM    PLATELET 84 (L) 40/77/1776 02:11 AM     Lab Results   Component Value Date/Time    Sodium 130 (L) 06/02/2022 02:11 AM    Potassium 3.3 (L) 06/02/2022 02:11 AM    Chloride 91 (L) 06/02/2022 02:11 AM    CO2 30 06/02/2022 02:11 AM    BUN 10 06/02/2022 02:11 AM Creatinine 0.97 06/02/2022 02:11 AM    Calcium 9.4 06/02/2022 02:11 AM    Magnesium 2.0 05/03/2022 03:37 PM      Lab Results   Component Value Date/Time    Alk. phosphatase 377 (H) 06/02/2022 02:11 AM    Protein, total 6.9 06/02/2022 02:11 AM    Albumin 2.6 (L) 06/02/2022 02:11 AM    Globulin 4.3 (H) 06/02/2022 02:11 AM     Lab Results   Component Value Date/Time    INR 1.3 (H) 06/02/2022 02:11 AM    Prothrombin time 16.8 (H) 06/02/2022 02:11 AM    aPTT 39.8 (H) 11/21/2020 01:49 PM      No results found for: IRON, FE, TIBC, IBCT, PSAT, FERR   No results found for: PH, PCO2, PO2  No components found for: [place]   Lab Results   Component Value Date/Time     11/30/2020 11:25 AM    CK - MB 3.0 11/30/2020 11:25 AM              Total time: 30 minutes    > 50% counseling / coordination:  Time spent in direct consultation with the patient, medical team, and family     Prolonged service was provided for  []30 min   []75 min in face to face time in the presence of the patient, spent as noted above. Time Start:   Time End:     Disclaimer: Sections of this note are dictated using utilizing voice recognition software, which may have resulted in some phonetic based errors in grammar and contents. Even though attempts were made to correct all the mistakes, some may have been missed, and remained in the body of the document. If questions arise, please contact our department.

## 2022-06-02 NOTE — PROGRESS NOTES
Patient inquired about methadone dose. MD was notified by RN. RN verified treatment and dose at [facility] by Bernard Mcut. Will continue to monitor.

## 2022-06-02 NOTE — PROGRESS NOTES
Problem: Alcohol Withdrawal  Goal: *STG: Participates in treatment plan  Outcome: Progressing Towards Goal  Goal: *STG: Remains safe in hospital  Outcome: Progressing Towards Goal  Goal: *STG: Seeks staff when symptoms of withdrawal increase  Outcome: Progressing Towards Goal  Goal: *STG: Complies with medication therapy  Outcome: Progressing Towards Goal  Goal: *STG: Attends activities and groups  Outcome: Progressing Towards Goal  Goal: *STG: Will identify negative impact of chemical dependency including the use of tobacco, alcohol, and other substances  Outcome: Progressing Towards Goal  Goal: *STG: Verbalizes abstinence as an achievable goal  Outcome: Progressing Towards Goal  Goal: *STG: Agrees to participate in outpatient after care program to support ongoing mental health  Outcome: Progressing Towards Goal  Goal: *STG: Able to indentify relapse triggers including interpersonal/social and familial factors  Outcome: Progressing Towards Goal  Goal: *STG: Identify lifestyle changes to support long term sobriety such as vocation, employment, education, and legal issues  Outcome: Progressing Towards Goal  Goal: *STG: Maintains appropriate nutrition and hydration  Outcome: Progressing Towards Goal  Goal: *STG: Vital signs within defined limits  Outcome: Progressing Towards Goal  Goal: *STG/LTG: Relapse prevention plan in place to include housing/aftercare, leisure activities, and spirituality  Outcome: Progressing Towards Goal  Goal: Interventions  Outcome: Progressing Towards Goal     Problem: Patient Education: Go to Patient Education Activity  Goal: Patient/Family Education  Outcome: Progressing Towards Goal     Problem: Pain  Goal: *Control of Pain  Outcome: Progressing Towards Goal     Problem: Patient Education: Go to Patient Education Activity  Goal: Patient/Family Education  Outcome: Progressing Towards Goal     Problem: Falls - Risk of  Goal: *Absence of Falls  Description: Document Katya Fall Risk and appropriate interventions in the flowsheet.   Outcome: Progressing Towards Goal  Note: Fall Risk Interventions:  Mobility Interventions: Communicate number of staff needed for ambulation/transfer,Patient to call before getting OOB,Utilize walker, cane, or other assistive device         Medication Interventions: Patient to call before getting OOB,Teach patient to arise slowly                   Problem: Patient Education: Go to Patient Education Activity  Goal: Patient/Family Education  Outcome: Progressing Towards Goal     Problem: General Medical Care Plan  Goal: *Vital signs within specified parameters  Outcome: Progressing Towards Goal  Goal: *Labs within defined limits  Outcome: Progressing Towards Goal  Goal: *Absence of infection signs and symptoms  Outcome: Progressing Towards Goal  Goal: *Optimal pain control at patient's stated goal  Outcome: Progressing Towards Goal  Goal: *Skin integrity maintained  Outcome: Progressing Towards Goal  Goal: *Fluid volume balance  Outcome: Progressing Towards Goal  Goal: *Optimize nutritional status  Outcome: Progressing Towards Goal  Goal: *Anxiety reduced or absent  Outcome: Progressing Towards Goal  Goal: *Progressive mobility and function (eg: ADL's)  Outcome: Progressing Towards Goal     Problem: Patient Education: Go to Patient Education Activity  Goal: Patient/Family Education  Outcome: Progressing Towards Goal

## 2022-06-02 NOTE — PROGRESS NOTES
Comprehensive Nutrition Assessment    Type and Reason for Visit: Initial,Positive nutrition screen    Nutrition Recommendations/Plan:   1. Continue folic acid, thiamine and MVI r/t EtOH dependence   2. Plan to add Ensure Enlive TID r/t poor PO intake   3. Plan to obtain current weight from nursing   4. Monitor tolerance and compliance with diet/supplement, PO intake,  weight and plan of care      Malnutrition Assessment:  Malnutrition Status: Moderate malnutrition (06/02/22 1630)    Context:  Acute illness     Findings of the 6 clinical characteristics of malnutrition:   Energy Intake:  75% or less of est energy req for 7 or more days  Weight Loss:  No significant weight loss     Body Fat Loss:  Mild body fat loss, Triceps,Buccal region   Muscle Mass Loss:  Mild muscle mass loss, Hand (interosseous),Clavicles (pectoralis & deltoids)  Fluid Accumulation:  No significant fluid accumulation,     Strength:  Not performed     Nutrition History and Allergies:   Past medical history of:. EtOH abuse, Alcoholic hepatitis, Alcohol withdrawal syndrome, anemia, asthma, bile duct calculus, CAP, constipation, GERD, iron deficiency, liver function test abnormality, Vertigo, presents to [address] [place] [address] ER with complaint of Confusion. Patient states that she has been having no appetite and only eats a solid meal once a week and otherwise subsists on two 12-ounce Beers per day and has done so for approximately 1 month. Patient reports that she has no appetite and that her legs have swelled up on her in the last two or so days. Patient states that she has recently cut back and normally drinks 4-5 Beers/day for the last 5-6 years. Patient also reports Smoking 0.5 PPD x25 years. Weight history per chart review: CBW: 06/01/22 : 63.5 kg (140 lb), 30 DAYS: 05/03/22 : 63.5 kg (140 lb), > 180 DAYS: 11/03/21 : 54.9 kg (121 lb), > 1 YEAR: 02/25/21 : 44.2 kg (97 lb 6 oz). Weight trending up but CBW was stated by patient.      Nutrition Assessment: Visited pt in room, was laying in bed, alert and able to communicate. Observed several snacks and a bag of fast food on shelf in room. Pt reported PO intake was less than 50%. Pt currently denies d/c/n/v and NKFA but chart review states emesis and diarrhea occurred. Per chart review, pt has displayed confusion, very poor appetite and PO intake, as well as EtOH consumption for over one month. PTA pt was consuming 2 beers daily which is a decrease of previously; prescribed folic acid, thiamine and MVI. Pt receptive to including oral supplement TID r/t poor PO intake. CBW was patient stated-requested to obtain current weight. Nutrition Related Findings:    Last BM 6/2-loose. Output: 200 (urine), 100mL (emesis-clear). Pertinent Medications: Bumex, Folic acid, Zofran, pantoprazole, thiamine, MVI. Wound Type: None    Current Nutrition Intake & Therapies:  Average Meal Intake: 1-25%  Average Supplement Intake: None ordered  ADULT ORAL NUTRITION SUPPLEMENT Breakfast, Lunch, Dinner; Standard High Calorie/High Protein  ADULT DIET Regular; 4 carb choices (60 gm/meal); Low Fat/Low Chol/High Fiber/2 gm Na; Low Sodium (2 gm); 1500 ml    Anthropometric Measures:  Height: 5' 7\" (170.2 cm)  Ideal Body Weight (IBW): 135 lbs (61 kg)  Admission Body Weight: 139 lb 15.9 oz (PT STATED)  Current Body Wt:  63.5 kg (139 lb 15.9 oz), 103.7 % IBW. Stated  Current BMI (kg/m2): 21.9        Weight Adjustment: No adjustment                 BMI Category: Normal weight (BMI 18.5-24. 9)    Estimated Daily Nutrient Needs:  Energy Requirements Based On: Kcal/kg (25-30)  Weight Used for Energy Requirements: Current  Energy (kcal/day): 7661-7671  Weight Used for Protein Requirements: Current (0.8-1.2)  Protein (g/day): 51-77  Method Used for Fluid Requirements: 1 ml/kcal  Fluid (ml/day): 6121-0754    Nutrition Diagnosis:   · Inadequate oral intake related to early satiety,psychological cause or life stress,impaired nutrient utilization as evidenced by poor intake prior to admission,intake 0-25%,diarrhea,vomiting,nausea    · Moderate malnutrition,In context of acute illness or injury related to impaired nutrient utilization,inadequate protein-energy intake,psychological cause or life stress as evidenced by intake 0-25%,poor intake prior to admission,mild loss of subcutaneous fat,mild muscle loss,nausea,vomiting,diarrhea      Nutrition Interventions:   Food and/or Nutrient Delivery: Continue current diet,Start oral nutrition supplement,Mineral supplement,Vitamin supplement  Nutrition Education/Counseling: Education not indicated,No recommendations at this time  Coordination of Nutrition Care: Continue to monitor while inpatient  Plan of Care discussed with: patient    Goals:     Goals: Meet at least 75% of estimated needs,by next RD assessment       Nutrition Monitoring and Evaluation:   Behavioral-Environmental Outcomes: None identified  Food/Nutrient Intake Outcomes: Food and nutrient intake,Supplement intake,Vitamin/mineral intake  Physical Signs/Symptoms Outcomes: Meal time behavior,Nutrition focused physical findings,Weight,GI status,Nausea/vomiting    Discharge Planning:    Continue oral nutrition supplement,Continue current diet    Stephane Duron MA, RDN, LD  Contact: [phone]

## 2022-06-02 NOTE — PROGRESS NOTES
Problem: Alcohol Withdrawal  Goal: *STG: Participates in treatment plan  6/2/2022 0201 by Oracio Esteban RN  Outcome: Progressing Towards Goal  6/2/2022 0029 by Oracio Esteban RN  Outcome: Progressing Towards Goal  Goal: *STG: Remains safe in hospital  6/2/2022 0201 by Oracio Esteban RN  Outcome: Progressing Towards Goal  6/2/2022 0029 by Oracio Esteban RN  Outcome: Progressing Towards Goal  Goal: *STG: Seeks staff when symptoms of withdrawal increase  6/2/2022 0201 by Oracio Esteban, JOSELO  Outcome: Progressing Towards Goal  6/2/2022 0029 by Oracio Esteban RN  Outcome: Progressing Towards Goal  Goal: *STG: Complies with medication therapy  6/2/2022 0201 by Oracio Esteban RN  Outcome: Progressing Towards Goal  6/2/2022 0029 by Oracio Esteban RN  Outcome: Progressing Towards Goal  Goal: *STG: Attends activities and groups  6/2/2022 0201 by Oracio Esteban RN  Outcome: Progressing Towards Goal  6/2/2022 0029 by Oracio Esteban RN  Outcome: Progressing Towards Goal  Goal: *STG: Will identify negative impact of chemical dependency including the use of tobacco, alcohol, and other substances  6/2/2022 0201 by Oracio Esteban RN  Outcome: Progressing Towards Goal  6/2/2022 0029 by Oracio Esteban RN  Outcome: Progressing Towards Goal  Goal: *STG: Verbalizes abstinence as an achievable goal  6/2/2022 0201 by Oracio Esteban RN  Outcome: Progressing Towards Goal  6/2/2022 0029 by Oracio Esteban RN  Outcome: Progressing Towards Goal  Goal: *STG: Agrees to participate in outpatient after care program to support ongoing mental health  6/2/2022 0201 by Oracio Esteban RN  Outcome: Progressing Towards Goal  6/2/2022 0029 by Oracio Esteban RN  Outcome: Progressing Towards Goal  Goal: *STG: Able to indentify relapse triggers including interpersonal/social and familial factors  6/2/2022 0201 by Oracio Esteban RN  Outcome: Progressing Towards Goal  6/2/2022 0029 by Oracio Esteban RN  Outcome: Progressing Towards Goal  Goal: *STG: Identify lifestyle changes to support long term sobriety such as vocation, employment, education, and legal issues  6/2/2022 0201 by Tremaine Patel RN  Outcome: Progressing Towards Goal  6/2/2022 0029 by Tremaine Patel RN  Outcome: Progressing Towards Goal  Goal: *STG: Maintains appropriate nutrition and hydration  6/2/2022 0201 by Tremaine Patel RN  Outcome: Progressing Towards Goal  6/2/2022 0029 by Tremaine Patel RN  Outcome: Progressing Towards Goal  Goal: *STG: Vital signs within defined limits  6/2/2022 0201 by Tremaine Patel RN  Outcome: Progressing Towards Goal  6/2/2022 0029 by Tremaine Patel RN  Outcome: Progressing Towards Goal  Goal: *STG/LTG: Relapse prevention plan in place to include housing/aftercare, leisure activities, and spirituality  6/2/2022 0201 by Tremaine Patel RN  Outcome: Progressing Towards Goal  6/2/2022 0029 by Tremaine Patel RN  Outcome: Progressing Towards Goal  Goal: Interventions  6/2/2022 0201 by Tremaine Patel RN  Outcome: Progressing Towards Goal  6/2/2022 0029 by Tremaine Patel RN  Outcome: Progressing Towards Goal     Problem: Patient Education: Go to Patient Education Activity  Goal: Patient/Family Education  6/2/2022 0201 by Tremaine Patel RN  Outcome: Progressing Towards Goal  6/2/2022 0029 by Tremaine Patel RN  Outcome: Progressing Towards Goal     Problem: Pain  Goal: *Control of Pain  6/2/2022 0201 by Tremaine Patel RN  Outcome: Progressing Towards Goal  6/2/2022 0029 by Tremaine Patel RN  Outcome: Progressing Towards Goal     Problem: Patient Education: Go to Patient Education Activity  Goal: Patient/Family Education  6/2/2022 0201 by Tremaine Patel RN  Outcome: Progressing Towards Goal  6/2/2022 0029 by Tremaine Patel RN  Outcome: Progressing Towards Goal     Problem: Falls - Risk of  Goal: *Absence of Falls  Description: Document [place] Fall Risk and appropriate interventions in the flowsheet.   6/2/2022 0201 by Tremaine Patel RN  Outcome: Progressing Towards Goal  Note: Fall Risk Interventions:  Mobility Interventions: Bed/chair exit alarm,Patient to call before getting OOB,Utilize walker, cane, or other assistive device         Medication Interventions: Bed/chair exit alarm,Patient to call before getting OOB,Teach patient to arise slowly                6/2/2022 0029 by Harmeet Ga RN  Outcome: Progressing Towards Goal  Note: Fall Risk Interventions:  Mobility Interventions: Bed/chair exit alarm,Patient to call before getting OOB,Utilize walker, cane, or other assistive device         Medication Interventions: Bed/chair exit alarm,Patient to call before getting OOB,Teach patient to arise slowly                   Problem: Patient Education: Go to Patient Education Activity  Goal: Patient/Family Education  6/2/2022 0201 by Harmeet Ga RN  Outcome: Progressing Towards Goal  6/2/2022 0029 by Harmeet Ga RN  Outcome: Progressing Towards Goal     Problem: General Medical Care Plan  Goal: *Vital signs within specified parameters  6/2/2022 0201 by Harmeet Ga RN  Outcome: Progressing Towards Goal  6/2/2022 0029 by Harmeet Ga RN  Outcome: Progressing Towards Goal  Goal: *Labs within defined limits  6/2/2022 0201 by Harmeet Ga RN  Outcome: Progressing Towards Goal  6/2/2022 0029 by Harmeet Ga RN  Outcome: Progressing Towards Goal  Goal: *Absence of infection signs and symptoms  6/2/2022 0201 by Harmeet Ga RN  Outcome: Progressing Towards Goal  6/2/2022 0029 by Harmeet Ga RN  Outcome: Progressing Towards Goal  Goal: *Optimal pain control at patient's stated goal  6/2/2022 0201 by Harmeet Ga RN  Outcome: Progressing Towards Goal  6/2/2022 0029 by Harmeet Ga RN  Outcome: Progressing Towards Goal  Goal: *Skin integrity maintained  6/2/2022 0201 by Harmeet Ga RN  Outcome: Progressing Towards Goal  6/2/2022 0029 by Harmeet Ga RN  Outcome: Progressing Towards Goal  Goal: *Fluid volume balance  6/2/2022 0201 by Jeani Clack, RN  Outcome: Progressing Towards Goal  6/2/2022 0029 by Chen East RN  Outcome: Progressing Towards Goal  Goal: *Optimize nutritional status  6/2/2022 0201 by Chen East RN  Outcome: Progressing Towards Goal  6/2/2022 0029 by Chen East RN  Outcome: Progressing Towards Goal  Goal: *Anxiety reduced or absent  6/2/2022 0201 by Chen East RN  Outcome: Progressing Towards Goal  6/2/2022 0029 by Chen East RN  Outcome: Progressing Towards Goal  Goal: *Progressive mobility and function (eg: ADL's)  6/2/2022 0201 by Chen East RN  Outcome: Progressing Towards Goal  6/2/2022 0029 by Chen East RN  Outcome: Progressing Towards Goal     Problem: Patient Education: Go to Patient Education Activity  Goal: Patient/Family Education  6/2/2022 0201 by Chen East RN  Outcome: Progressing Towards Goal  6/2/2022 0029 by Chen East RN  Outcome: Progressing Towards Goal

## 2022-06-02 NOTE — PROGRESS NOTES
[facility] Hospitalist Group  Progress Note    Patient: Aure Hernandez Age: 55 y.o. : 1965 MR#: [id number] SSN: [id number]  Date/Time: 2022     Subjective:     Patient seen and evaluated, lying in bed, no acute distress. 81-year-old female with a history of cirrhosis presented to the emergency room secondary to confusion. Patient admitted to the hospital secondary to possible hepatic encephalopathy, ammonia levels noted to be mildly elevated. Patient started on lactulose. Mental status is currently resolved and patient is at baseline. Assessment/Plan:     1. Hepatic encephalopathy-continue lactulose, mental status is resolved, patient is at baseline. 2. History of cirrhosis-patient seen by GI, ordered right upper quadrant ultrasound, results reviewed, no acute pathology noted. 3. Sequelae of cirrhosis including thrombocytopenia and elevated INR. 4. History of opiate use-patient is currently on methadone, restarted. 5. Anemia of chronic disease-monitor H&H, transfuse for hemoglobin less than 7. DVT prophylaxis- CDs  Full code    Anticipate discharge home in a.. Gonzalo Krabbe, MD  22      Case discussed with:  [x]Patient  []Family  [x]Nursing  []Case Management  DVT Prophylaxis:  []Lovenox  []Hep SQ  [x]SCDs  []Coumadin   []On Heparin gtt    Objective:   VS:   Visit Vitals  BP (!) 101/53   Pulse 96   Temp 98.3 °F (36.8 °C)   Resp 20   Ht 5' 7\" (1.702 m)   Wt 63.5 kg (140 lb)   SpO2 99%   Breastfeeding No   BMI 21.93 kg/m²      Tmax/24hrs: Temp (24hrs), Av.8 °F (37.1 °C), Min:98.1 °F (36.7 °C), Max:99.8 °F (37.7 °C)  IOBRIEF    Intake/Output Summary (Last 24 hours) at 2022 1212  Last data filed at 2022 0631  Gross per 24 hour   Intake 360 ml   Output 1100 ml   Net -740 ml       General:  Alert, cooperative, no acute distress    Pulmonary:  CTA Bilaterally. No Wheezing/Rhonchi/Rales. Cardiovascular: Regular rate and Rhythm.   GI: Soft, Non distended, Non tender. + Bowel sounds. Extremities:  No edema, cyanosis, clubbing. No calf tenderness. Neurologic: Alert and oriented X 3. No acute neuro deficits.   Additional:    Medications:   Current Facility-Administered Medications   Medication Dose Route Frequency    acetaminophen (TYLENOL) tablet 650 mg  650 mg Oral Q6H PRN    methadone (DOLOPHINE) 10 mg/mL concentrated solution 110 mg  110 mg Oral DAILY    sodium chloride (NS) flush 5-40 mL  5-40 mL IntraVENous Q8H    sodium chloride (NS) flush 5-40 mL  5-40 mL IntraVENous PRN    polyethylene glycol (MIRALAX) packet 17 g  17 g Oral DAILY PRN    ondansetron (ZOFRAN ODT) tablet 4 mg  4 mg Oral Q8H PRN    Or    ondansetron (ZOFRAN) injection 4 mg  4 mg IntraVENous Q6H PRN    nicotine (NICODERM CQ) 14 mg/24 hr patch 1 Patch  1 Patch TransDERmal DAILY PRN    nitroglycerin (NITROBID) 2 % ointment 0.5 Inch  0.5 Inch Topical Q6H PRN    therapeutic multivitamin (THERAGRAN) tablet 1 Tablet  1 Tablet Oral DAILY    thiamine HCL (B-1) tablet 100 mg  100 mg Oral DAILY    folic acid (FOLVITE) tablet 1 mg  1 mg Oral DAILY    LORazepam (ATIVAN) tablet 1 mg  1 mg Oral Q1H PRN    Or    LORazepam (ATIVAN) 2 mg/mL injection 1 mg  1 mg IntraVENous Q1H PRN    LORazepam (ATIVAN) tablet 2 mg  2 mg Oral Q1H PRN    Or    LORazepam (ATIVAN) 2 mg/mL injection 2 mg  2 mg IntraVENous Q1H PRN    LORazepam (ATIVAN) 2 mg/mL injection 3 mg  3 mg IntraVENous Q15MIN PRN    albuterol-ipratropium (DUO-NEB) 2.5 MG-0.5 MG/3 ML  3 mL Nebulization Q4H RT    lactulose (CHRONULAC) 10 gram/15 mL solution 15 mL  15 mL Oral TID    lactulose (CHRONULAC) 10 gram/15 mL 150 mL in sterile water irrigation 350 mL rectal enema  500 mL Rectal TID PRN    melatonin (rapid dissolve) tablet 5 mg  5 mg Oral QHS PRN    bumetanide (BUMEX) tablet 1 mg  1 mg Oral BID    pentoxifylline CR (TRENTAL) tablet 400 mg  400 mg Oral TID WITH MEALS    budesonide (PULMICORT) 500 mcg/2 ml nebulizer suspension  500 mcg Nebulization BID RT    And    arformoteroL (BROVANA) neb solution 15 mcg  15 mcg Nebulization BID RT    pantoprazole (PROTONIX) tablet 40 mg  40 mg Oral ACB&D    glucose chewable tablet 16 g  4 Tablet Oral PRN    glucagon (GLUCAGEN) injection 1 mg  1 mg IntraMUSCular PRN    dextrose 10% infusion 0-250 mL  0-250 mL IntraVENous PRN    sodium chloride (NS) flush 5-10 mL  5-10 mL IntraVENous PRN       Imaging:   XR Results (most recent):  Results from Hospital Encounter encounter on 05/31/22    XR WRIST RT AP/LAT    Narrative  RIGHT WRIST, TWO VIEWS    INDICATION: Above. Pain and soreness at the right wrist for several days,  tenderness to touch. TECHNIQUE: AP and lateral views of the right wrist are reviewed. FINDINGS:    IV catheter tubing is noted projected in the soft tissues of the distal forearm  slightly proximal to the wrist.    There is no evidence of acute osseous injury or dislocation. The visualized  joint spaces appear grossly maintained. There is mild soft tissue swelling best demonstrated dorsally on lateral view  proximal to the level of the wrist near the level of the IV catheter site. Impression  Mild soft tissue swelling centered slightly proximal to the wrist near the level  of IV catheter site. No evidence of acute fracture or dislocation. CT Results (most recent):  Results from Hospital Encounter encounter on 05/31/22    CT HEAD WO CONT    Narrative  EXAM:  CT Head without Contrast    CLINICAL INDICATION:  Confusion, liver failure    COMPARISON:  10/04/19. TECHNIQUE:    - Helical volumetric CT imaging of the head is performed from the base of the  skull to the vertex without IV contrast administration. Axial, coronal and  sagittal images are generated from the volumetric data set.   - Dose optimization techniques are utilized as appropriate to the performed exam  with combination of automated exposure control, adjustment of mA and/or kV  according to patient size, and use of iterative reconstructive technique. FINDINGS:    Brain:    - Hemorrhage/ hematoma:  No acute intracranial hemorrhage/ hematoma. - Mass, mass effect:  None. - Gray-white matter differentiation:  Mild white matter hypodensities,  suggestive of chronic microvascular ischemia.  - Interval assessment:  No significant interval change. CSF spaces:  No evidence of abnormal effacement or enlargement. Calvarium:  Intact. Sinuses, mastoids:  Clear. Impression  1. No acute intracranial abnormalities. 2.  Mild white matter hypodensity suggestive of chronic microvascular ischemic  changes. 05/31/22    ECHO ADULT COMPLETE 06/01/2022 6/1/2022    Interpretation Summary    Left Ventricle: Normal left ventricular systolic function with a visually estimated EF of 55 - 60%. Left ventricle size is normal. Normal wall thickness. Normal wall motion. Normal diastolic function.   Tricuspid Valve: The estimated RVSP is 30 mmHg.   Contrast used: Definity.     Signed by: Nic Null MD on 6/1/2022  2:34 PM       Labs:    Recent Results (from the past 48 hour(s))   CULTURE, BLOOD    Collection Time: 05/31/22 10:50 PM    Specimen: Blood   Result Value Ref Range    Special Requests: NO SPECIAL REQUESTS      Culture result: NO GROWTH 2 DAYS     CULTURE, BLOOD    Collection Time: 05/31/22 11:00 PM    Specimen: Blood   Result Value Ref Range    Special Requests: NO SPECIAL REQUESTS      Culture result: NO GROWTH 2 DAYS     METABOLIC PANEL, COMPREHENSIVE    Collection Time: 05/31/22 11:00 PM   Result Value Ref Range    Sodium 124 (L) 136 - 145 mmol/L    Potassium 5.1 3.5 - 5.5 mmol/L    Chloride 91 (L) 100 - 111 mmol/L    CO2 27 21 - 32 mmol/L    Anion gap 6 3.0 - 18 mmol/L    Glucose 60 (L) 74 - 99 mg/dL    BUN 9 7.0 - 18 MG/DL    Creatinine 0.85 0.6 - 1.3 MG/DL    BUN/Creatinine ratio 11 (L) 12 - 20      GFR est AA >60 >60 ml/min/1.73m2    GFR est non-AA >60 >60 ml/min/1.73m2    Calcium 9.4 8.5 - 10.1 MG/DL    Bilirubin, total 9.5 (H) 0.2 - 1.0 MG/DL    ALT (SGPT) 58 (H) 13 - 56 U/L    AST (SGOT) 186 (H) 10 - 38 U/L    Alk. phosphatase 464 (H) 45 - 117 U/L    Protein, total 8.1 6.4 - 8.2 g/dL    Albumin 2.8 (L) 3.4 - 5.0 g/dL    Globulin 5.3 (H) 2.0 - 4.0 g/dL    A-G Ratio 0.5 (L) 0.8 - 1.7     CBC WITH AUTOMATED DIFF    Collection Time: 05/31/22 11:00 PM   Result Value Ref Range    WBC 9.0 4.6 - 13.2 K/uL    RBC 3.02 (L) 4.20 - 5.30 M/uL    HGB 9.8 (L) 12.0 - 16.0 g/dL    HCT 28.5 (L) 35.0 - 45.0 %    MCV 94.4 78.0 - 100.0 FL    MCH 32.5 24.0 - 34.0 PG    MCHC 34.4 31.0 - 37.0 g/dL    RDW 17.2 (H) 11.6 - 14.5 %    PLATELET 88 (L) 431 - 420 K/uL    MPV 9.6 9.2 - 11.8 FL    NRBC 0.0 0  WBC    ABSOLUTE NRBC 0.00 0.00 - 0.01 K/uL    NEUTROPHILS 79 (H) 40 - 73 %    LYMPHOCYTES 6 (L) 21 - 52 %    MONOCYTES 14 (H) 3 - 10 %    EOSINOPHILS 0 0 - 5 %    BASOPHILS 0 0 - 2 %    IMMATURE GRANULOCYTES 1 (H) 0.0 - 0.5 %    ABS. NEUTROPHILS 7.1 1.8 - 8.0 K/UL    ABS. LYMPHOCYTES 0.5 (L) 0.9 - 3.6 K/UL    ABS. MONOCYTES 1.3 (H) 0.05 - 1.2 K/UL    ABS. EOSINOPHILS 0.0 0.0 - 0.4 K/UL    ABS. BASOPHILS 0.0 0.0 - 0.1 K/UL    ABS. IMM.  GRANS. 0.1 (H) 0.00 - 0.04 K/UL    DF AUTOMATED     AMMONIA    Collection Time: 05/31/22 11:00 PM   Result Value Ref Range    Ammonia, plasma 34 (H) 11 - 32 UMOL/L   ETHYL ALCOHOL    Collection Time: 05/31/22 11:00 PM   Result Value Ref Range    ALCOHOL(ETHYL),SERUM 78 (H) 0 - 3 MG/DL   POC LACTIC ACID    Collection Time: 05/31/22 11:09 PM   Result Value Ref Range    Lactic Acid (POC) 3.41 (HH) 0.40 - 2.00 mmol/L   EKG, 12 LEAD, INITIAL    Collection Time: 05/31/22 11:23 PM   Result Value Ref Range    Ventricular Rate 97 BPM    Atrial Rate 97 BPM    P-R Interval 142 ms    QRS Duration 68 ms    Q-T Interval 320 ms    QTC Calculation (Bezet) 406 ms    Calculated P Axis 74 degrees    Calculated R Axis 69 degrees    Calculated T Axis 73 degrees    Diagnosis Normal sinus rhythm  Nonspecific ST abnormality  Abnormal ECG  When compared with ECG of 03-MAY-2022 15:19,  ST now depressed in Anterior leads  Confirmed by Felipe Resendiz M.D., Onur Dunbar ([id number]) on 6/1/2022 10:14:27 PM     COVID-19 WITH INFLUENZA A/B    Collection Time: 06/01/22  2:30 AM   Result Value Ref Range    SARS-CoV-2 by PCR Not detected NOTD      Influenza A by PCR Not detected NOTD      Influenza B by PCR Not detected NOTD     GLUCOSE, POC    Collection Time: 06/01/22  2:32 AM   Result Value Ref Range    Glucose (POC) 64 (L) 70 - 110 mg/dL   GLUCOSE, POC    Collection Time: 06/01/22  4:04 AM   Result Value Ref Range    Glucose (POC) 77 70 - 110 mg/dL   POC LACTIC ACID    Collection Time: 06/01/22  4:09 AM   Result Value Ref Range    Lactic Acid (POC) 1.27 0.40 - 2.00 mmol/L   URINALYSIS W/ RFLX MICROSCOPIC    Collection Time: 06/01/22  4:40 AM   Result Value Ref Range    Color        Interfering substances due to urine color may affect results of dipstick chemistries.     Appearance CLEAR      Specific gravity 1.015 1.003 - 1.030      pH (UA) 6.0 5.0 - 8.0      Protein Negative NEG mg/dL    Glucose 100 (A) NEG mg/dL    Ketone 15 (A) NEG mg/dL    Bilirubin LARGE (A) NEG      Blood Negative NEG      Urobilinogen >8.0 (H) 0.2 - 1.0 EU/dL    Nitrites Negative NEG      Leukocyte Esterase Negative NEG     GLUCOSE, POC    Collection Time: 06/01/22  6:12 AM   Result Value Ref Range    Glucose (POC) 77 70 - 110 mg/dL   GLUCOSE, POC    Collection Time: 06/01/22  7:12 AM   Result Value Ref Range    Glucose (POC) 105 70 - 110 mg/dL   ECHO ADULT COMPLETE    Collection Time: 06/01/22 10:45 AM   Result Value Ref Range    IVSd 0.8 0.6 - 0.9 cm    LVIDd 5.0 3.9 - 5.3 cm    LVIDs 3.4 cm    LVOT Diameter 2.0 cm    LVPWd 0.8 0.6 - 0.9 cm    LVOT Peak Gradient 6 mmHg    LVOT Mean Gradient 3 mmHg    LVOT SV 71.9 ml    LVOT Peak Velocity 1.2 m/s    LVOT VTI 22.9 cm    LA Volume A/L 54 mL    LA Volume 2C 48 22 - 52 mL    LA Volume 4C 52 22 - 52 mL    MV A Velocity 0.95 m/s    MV E Wave Deceleration Time 247.1 ms    MV E Velocity 0.79 m/s    LV E' Lateral Velocity 14 cm/s    TR Peak Gradient 27 mmHg    TR Max Velocity 2.58 m/s    Aortic Root 3.1 cm    Fractional Shortening 2D 32 28 - 44 %    LVIDd Index 2.87 cm/m2    LVIDs Index 1.95 cm/m2    LV RWT Ratio 0.32     LV Mass 2D 135.8 67 - 162 g    LV Mass 2D Index 78.0 43 - 95 g/m2    MV E/A 0.83     E/E' Lateral 5.64     LA Volume Index A/L 31 16 - 34 mL/m2    LVOT Stroke Volume Index 41.3 mL/m2    LVOT Area 3.1 cm2    LA Volume Index 2C 28 16 - 34 mL/m2    LA Volume Index 4C 30 16 - 34 mL/m2    Ao Root Index 1.78 cm/m2    E/E' Ratio (Averaged) 6.41     LV E' Septal Velocity 11 cm/s    E/E' Septal 7.18     TAPSE 2.8 1.7 cm    Est. RA Pressure 3 mmHg    RVSP 30 mmHg   HEPATIC FUNCTION PANEL    Collection Time: 06/01/22 11:32 AM   Result Value Ref Range    Protein, total 7.2 6.4 - 8.2 g/dL    Albumin 2.6 (L) 3.4 - 5.0 g/dL    Globulin 4.6 (H) 2.0 - 4.0 g/dL    A-G Ratio 0.6 (L) 0.8 - 1.7      Bilirubin, total 8.9 (H) 0.2 - 1.0 MG/DL    Bilirubin, direct 6.5 (H) 0.0 - 0.2 MG/DL    Alk.  phosphatase 406 (H) 45 - 117 U/L    AST (SGOT) 137 (H) 10 - 38 U/L    ALT (SGPT) 46 13 - 56 U/L   PROTHROMBIN TIME + INR    Collection Time: 06/01/22 11:32 AM   Result Value Ref Range    Prothrombin time 16.7 (H) 11.5 - 15.2 sec    INR 1.3 (H) 0.8 - 1.2     GLUCOSE, POC    Collection Time: 06/01/22  3:50 PM   Result Value Ref Range    Glucose (POC) 139 (H) 70 - 110 mg/dL   GLUCOSE, POC    Collection Time: 06/01/22  9:19 PM   Result Value Ref Range    Glucose (POC) 124 (H) 70 - 385 mg/dL   METABOLIC PANEL, COMPREHENSIVE    Collection Time: 06/02/22  2:11 AM   Result Value Ref Range    Sodium 130 (L) 136 - 145 mmol/L    Potassium 3.3 (L) 3.5 - 5.5 mmol/L    Chloride 91 (L) 100 - 111 mmol/L    CO2 30 21 - 32 mmol/L    Anion gap 9 3.0 - 18 mmol/L    Glucose 109 (H) 74 - 99 mg/dL    BUN 10 7.0 - 18 MG/DL    Creatinine 0.97 0.6 - 1.3 MG/DL    BUN/Creatinine ratio 10 (L) 12 - 20      GFR est AA >60 >60 ml/min/1.73m2    GFR est non-AA 60 (L) >60 ml/min/1.73m2    Calcium 9.4 8.5 - 10.1 MG/DL    Bilirubin, total 8.8 (H) 0.2 - 1.0 MG/DL    ALT (SGPT) 44 13 - 56 U/L    AST (SGOT) 121 (H) 10 - 38 U/L    Alk. phosphatase 377 (H) 45 - 117 U/L    Protein, total 6.9 6.4 - 8.2 g/dL    Albumin 2.6 (L) 3.4 - 5.0 g/dL    Globulin 4.3 (H) 2.0 - 4.0 g/dL    A-G Ratio 0.6 (L) 0.8 - 1.7     CBC WITH AUTOMATED DIFF    Collection Time: 06/02/22  2:11 AM   Result Value Ref Range    WBC 10.2 4.6 - 13.2 K/uL    RBC 2.65 (L) 4.20 - 5.30 M/uL    HGB 8.7 (L) 12.0 - 16.0 g/dL    HCT 25.5 (L) 35.0 - 45.0 %    MCV 96.2 78.0 - 100.0 FL    MCH 32.8 24.0 - 34.0 PG    MCHC 34.1 31.0 - 37.0 g/dL    RDW 17.7 (H) 11.6 - 14.5 %    PLATELET 84 (L) 535 - 420 K/uL    MPV 9.9 9.2 - 11.8 FL    NRBC 0.0 0  WBC    ABSOLUTE NRBC 0.00 0.00 - 0.01 K/uL    NEUTROPHILS 72 40 - 73 %    LYMPHOCYTES 10 (L) 21 - 52 %    MONOCYTES 17 (H) 3 - 10 %    EOSINOPHILS 0 0 - 5 %    BASOPHILS 0 0 - 2 %    IMMATURE GRANULOCYTES 1 (H) 0.0 - 0.5 %    ABS. NEUTROPHILS 7.4 1.8 - 8.0 K/UL    ABS. LYMPHOCYTES 1.0 0.9 - 3.6 K/UL    ABS. MONOCYTES 1.7 (H) 0.05 - 1.2 K/UL    ABS. EOSINOPHILS 0.0 0.0 - 0.4 K/UL    ABS. BASOPHILS 0.0 0.0 - 0.1 K/UL    ABS. IMM.  GRANS. 0.1 (H) 0.00 - 0.04 K/UL    DF AUTOMATED     PROTHROMBIN TIME + INR    Collection Time: 06/02/22  2:11 AM   Result Value Ref Range    Prothrombin time 16.8 (H) 11.5 - 15.2 sec    INR 1.3 (H) 0.8 - 1.2     GLUCOSE, POC    Collection Time: 06/02/22  8:04 AM   Result Value Ref Range    Glucose (POC) 76 70 - 110 mg/dL   GLUCOSE, POC    Collection Time: 06/02/22 11:58 AM   Result Value Ref Range    Glucose (POC) 96 70 - 110 mg/dL       Signed By: Tony Saravia MD     June 2, 2022      I spent 25 minutes with the patient in face-to-face consultation, of which greater than 50% was spent in counseling and coordination of care as described above    Disclaimer: Sections of this note are dictated using utilizing voice recognition software. Minor typographical errors may be present. If questions arise, please do not hesitate to contact me or call our department.

## 2022-06-02 NOTE — ROUTINE PROCESS
Bedside and Verbal shift change report given to JOSELO Polk (oncoming nurse) by Ten Mckeon RN (offgoing nurse). Report included the following information SBAR, Kardex, MAR and Recent Results.     SITUATION:  Code Status: Full Code  Reason for Admission: Encephalopathy [G93.40]  Hospital day: 1  Problem List:       Hospital Problems  Date Reviewed: 6/1/2022          Codes Class Noted POA    * (Principal) Acute encephalopathy ICD-10-CM: G93.40  ICD-9-CM: 348.30  6/1/2022 Yes        Thrombocytopenia ([facility] [address] ICD-10-CM: D69.6  ICD-9-CM: 287.5  6/1/2022 Yes        Asthma (Chronic) ICD-10-CM: J45.909  ICD-9-CM: 493.90  6/1/2022 Yes        GERD (gastroesophageal reflux disease) (Chronic) ICD-10-CM: K21.9  ICD-9-CM: 530.81  6/1/2022 Yes        Iron deficiency anemia (Chronic) ICD-10-CM: D50.9  ICD-9-CM: 280.9  6/1/2022 Yes        History of alcohol withdrawal delirium (Chronic) ICD-10-CM: Z86.59  ICD-9-CM: V11.8  6/1/2022 Yes    Overview Signed 6/1/2022  4:34 AM by Rebeka Lucas,      Previous Agitation and Hallucinations, but Patient denies previous Seizures due to ETOH Withdrawal             Tobacco abuse (Chronic) ICD-10-CM: Z72.0  ICD-9-CM: 305.1  6/1/2022 Yes        Hypoglycemia ICD-10-CM: E16.2  ICD-9-CM: 251.2  6/1/2022 Yes        Hyperammonemia ([facility] [address] ICD-10-CM: E72.20  ICD-9-CM: 270.6  6/1/2022 Yes        Alcoholic cirrhosis of liver (HCC) (Chronic) ICD-10-CM: K70.30  ICD-9-CM: 571.2  6/1/2022 Yes        Volume overload ICD-10-CM: E87.70  ICD-9-CM: 276.69  6/1/2022 Yes    Overview Signed 6/1/2022  4:34 AM by Rebeka Lucas, DO     Unclear whether due to Acute Hepatic Failure vs. New Onset Congestive Heart Failure (Dilated Cardiomyopathy 2°/2 ETOH?)             Hepatitis ICD-10-CM: K75.9  ICD-9-CM: 573.3  6/1/2022 Yes        Alcohol abuse (Chronic) ICD-10-CM: F10.10  ICD-9-CM: 305.00  11/2/2021 Yes        Acute alcoholic intoxication without complication ([facility] TCN-91-FQ: Q62.798  ICD-9-CM: 305.00  11/2/2021 Yes        Hyponatremia ICD-10-CM: E87.1  ICD-9-CM: 276.1  11/2/2021 Yes              BACKGROUND:   Past Medical History:   Past Medical History:   Diagnosis Date    Alcohol abuse     Alcohol withdrawal syndrome (HCC)     Anemia     Asthma     Bile duct calculus     CAP (community acquired pneumonia)     Constipation     GERD (gastroesophageal reflux disease)     Iron deficiency     Liver function test abnormality     Macrocytosis     Vertigo       Patient taking anticoagulants no    Patient has a defibrillator: no    History of shots YES for example, flu, pneumonia, tetanus   Isolation History NO for example, MRSA, CDiff    ASSESSMENT:  Changes in Assessment Throughout Shift: NONE  Significant Changes in 24 hours (for example, RR/code, fall)  Patient has Central Line: no   Patient has Barron Cath: no   Mobility Issues  PT  IV Patency  OR Checklist  Pending Tests    Last Vitals:  Vitals w/ MEWS Score (last day)     Date/Time MEWS Score Pulse Resp Temp BP Level of Consciousness SpO2    06/02/22 0340 1 85 18 98.7 °F (37.1 °C) 111/64 Alert (0) 97 %    06/01/22 2340 1 94 18 99.3 °F (37.4 °C) 121/69 Alert (0) 94 %    06/01/22 2009 1 91 17 98.1 °F (36.7 °C) 120/62 Alert (0) 97 %    06/01/22 1504 -- 109 17 99.8 °F (37.7 °C) 114/61 -- 94 %    06/01/22 1057 2 86 16 99.8 °F (37.7 °C) 93/55 Alert (0) 100 %    06/01/22 1012 -- -- -- -- 115/68 -- --    06/01/22 0547 1 91 16 98.9 °F (37.2 °C) 115/68 Alert (0) 97 %    06/01/22 0506 -- 87 9 -- 123/67 -- 96 %    06/01/22 0324 -- 101 15 -- 105/54 -- 98 %    06/01/22 02:44:46 2 107 17 99.5 °F (37.5 °C) 119/55 Alert (0) 97 %        PAIN    Pain Assessment    Pain Intensity 1: 0 (06/02/22 0340)    Pain Location 1: Head    Pain Intervention(s) 1: Medication (see MAR) (Patient states she wants Tylenol only.)    Patient Stated Pain Goal: 0  Intervention effective: yes  Time of last intervention: 0025 Reassessment Completed: yes   Other actions taken for pain: Distraction    Last 3 Weights:  Last 3 Recorded Weights in this Encounter    05/31/22 2252 06/01/22 1012   Weight: 63.5 kg (140 lb) 63.5 kg (140 lb)   Weight change: 0 kg (0 lb)    INTAKE/OUPUT    Current Shift: No intake/output data recorded. Last three shifts: 05/31 1901 - 06/02 0700  In: 360 [P.O.:360]  Out: 1100 [Urine:1100]    RECOMMENDATIONS AND DISCHARGE PLANNING  Patient needs and requests: Assistance with ADL's    Pending tests/procedures: labs     Discharge plan for patient: Home    Discharge planning Needs or Barriers: None    Estimated Discharge Date: 6/03/2022 Posted on Whiteboard in Patients Room: yes       \"HEALS\" SAFETY CHECK  A safety check occurred in the patient's room between off going nurse and oncoming nurse listed above. The safety check included the below items:    H  High Alert Medications Verify all high alert medication drips (heparin, PCA, etc.)  E  Equipment Suction is set up for ALL patients (with belia)  Red plugs utilized for all equipment (IV pumps, etc.)  WOWs wiped down at end of shift. Room stocked with oxygen, suction, and other unit-specific supplies  A  Alarms Bed alarm is set for fall risk patients  Ensure chair alarm is in place and activated if patient is up in a chair  L  Lines Check IV for any infiltration  Barron bag is empty if patient has a Barron   Tubing and IV bags are labeled  S  Safety  Room is clean, patient is clean, and equipment is clean. Hallways are clear from equipment besides carts. Fall bracelet on for fall risk patients  Ensure room is clear and free of clutter  Suction is set up for ALL patients (with belia)  Hallways are clear from equipment besides carts.    Isolation precautions followed, supplies available outside room, sign posted    José Miguel Tracy RN

## 2022-06-03 VITALS
DIASTOLIC BLOOD PRESSURE: 50 MMHG | TEMPERATURE: 98.6 F | SYSTOLIC BLOOD PRESSURE: 97 MMHG | BODY MASS INDEX: 21.97 KG/M2 | OXYGEN SATURATION: 92 % | HEART RATE: 90 BPM | RESPIRATION RATE: 18 BRPM | HEIGHT: 67 IN | WEIGHT: 140 LBS

## 2022-06-03 LAB
AFP L3 MFR SERPL: NORMAL % (ref 0–9.9)
AFP SERPL-MCNC: 1.8 NG/ML (ref 0–9.2)
GLUCOSE BLD STRIP.AUTO-MCNC: 92 MG/DL (ref 70–110)

## 2022-06-03 PROCEDURE — 74011250637 HC RX REV CODE- 250/637: Performed by: PHYSICIAN ASSISTANT

## 2022-06-03 PROCEDURE — 74011250637 HC RX REV CODE- 250/637: Performed by: HOSPITALIST

## 2022-06-03 PROCEDURE — 74011000250 HC RX REV CODE- 250: Performed by: INTERNAL MEDICINE

## 2022-06-03 PROCEDURE — 74011250637 HC RX REV CODE- 250/637: Performed by: INTERNAL MEDICINE

## 2022-06-03 PROCEDURE — 99239 HOSP IP/OBS DSCHRG MGMT >30: CPT | Performed by: HOSPITALIST

## 2022-06-03 PROCEDURE — 82962 GLUCOSE BLOOD TEST: CPT

## 2022-06-03 RX ORDER — BACLOFEN 10 MG/1
5 TABLET ORAL 2 TIMES DAILY
Status: DISCONTINUED | OUTPATIENT
Start: 2022-06-03 | End: 2022-06-03 | Stop reason: HOSPADM

## 2022-06-03 RX ORDER — PANTOPRAZOLE SODIUM 40 MG/1
40 TABLET, DELAYED RELEASE ORAL
Qty: 30 TABLET | Refills: 0 | Status: SHIPPED | OUTPATIENT
Start: 2022-06-04

## 2022-06-03 RX ORDER — BUMETANIDE 1 MG/1
1 TABLET ORAL DAILY
Qty: 30 TABLET | Refills: 0 | Status: SHIPPED | OUTPATIENT
Start: 2022-06-03

## 2022-06-03 RX ORDER — LANOLIN ALCOHOL/MO/W.PET/CERES
100 CREAM (GRAM) TOPICAL DAILY
Qty: 30 TABLET | Refills: 0 | Status: SHIPPED | OUTPATIENT
Start: 2022-06-04

## 2022-06-03 RX ORDER — PANTOPRAZOLE SODIUM 40 MG/1
40 TABLET, DELAYED RELEASE ORAL
Status: DISCONTINUED | OUTPATIENT
Start: 2022-06-04 | End: 2022-06-03 | Stop reason: HOSPADM

## 2022-06-03 RX ORDER — BACLOFEN 10 MG/1
10 TABLET ORAL 3 TIMES DAILY
Status: DISCONTINUED | OUTPATIENT
Start: 2022-06-03 | End: 2022-06-03

## 2022-06-03 RX ORDER — THERA TABS 400 MCG
1 TAB ORAL DAILY
Qty: 30 TABLET | Refills: 0 | Status: SHIPPED | OUTPATIENT
Start: 2022-06-04

## 2022-06-03 RX ORDER — FOLIC ACID 1 MG/1
1 TABLET ORAL DAILY
Qty: 30 TABLET | Refills: 0 | Status: SHIPPED | OUTPATIENT
Start: 2022-06-04

## 2022-06-03 RX ADMIN — THERA TABS 1 TABLET: TAB at 09:29

## 2022-06-03 RX ADMIN — METHADONE HYDROCHLORIDE 110 MG: 10 CONCENTRATE ORAL at 09:29

## 2022-06-03 RX ADMIN — Medication 100 MG: at 09:29

## 2022-06-03 RX ADMIN — SODIUM CHLORIDE, PRESERVATIVE FREE 10 ML: 5 INJECTION INTRAVENOUS at 05:34

## 2022-06-03 RX ADMIN — BUMETANIDE 1 MG: 1 TABLET ORAL at 09:29

## 2022-06-03 RX ADMIN — PENTOXIFYLLINE 400 MG: 400 TABLET, EXTENDED RELEASE ORAL at 09:29

## 2022-06-03 RX ADMIN — BACLOFEN 5 MG: 10 TABLET ORAL at 12:12

## 2022-06-03 RX ADMIN — FOLIC ACID 1 MG: 1 TABLET ORAL at 09:29

## 2022-06-03 NOTE — PROGRESS NOTES
D/C order noted for today. Orders reviewed. No needs identified at this time. CM remains available if needed.      YOVANY GarciaN, RN  Pager # [phone]  Care Manager

## 2022-06-03 NOTE — DISCHARGE INSTRUCTIONS
DISCHARGE SUMMARY from Nurse    PATIENT INSTRUCTIONS:    After general anesthesia or intravenous sedation, for 24 hours or while taking prescription Narcotics:  · Limit your activities  · Do not drive and operate hazardous machinery  · Do not make important personal or business decisions  · Do  not drink alcoholic beverages  · If you have not urinated within 8 hours after discharge, please contact your surgeon on call. Report the following to your surgeon:  · Excessive pain, swelling, redness or odor of or around the surgical area  · Temperature over 100.5  · Nausea and vomiting lasting longer than 4 hours or if unable to take medications  · Any signs of decreased circulation or nerve impairment to extremity: change in color, persistent  numbness, tingling, coldness or increase pain  · Any questions    What to do at Home:  Recommended activity: Activity as tolerated,     If you experience any of the following symptoms chest pain, shortness of breath, nausea, vomiting, or fever, please follow up with your primary care provider. *  Please give a list of your current medications to your Primary Care Provider. *  Please update this list whenever your medications are discontinued, doses are      changed, or new medications (including over-the-counter products) are added. *  Please carry medication information at all times in case of emergency situations. These are general instructions for a healthy lifestyle:    No smoking/ No tobacco products/ Avoid exposure to second hand smoke  Surgeon General's Warning:  Quitting smoking now greatly reduces serious risk to your health.     Obesity, smoking, and sedentary lifestyle greatly increases your risk for illness    A healthy diet, regular physical exercise & weight monitoring are important for maintaining a healthy lifestyle    You may be retaining fluid if you have a history of heart failure or if you experience any of the following symptoms:  Weight gain of 3 pounds or more overnight or 5 pounds in a week, increased swelling in our hands or feet or shortness of breath while lying flat in bed. Please call your doctor as soon as you notice any of these symptoms; do not wait until your next office visit. Patient armband removed and shredded. The discharge information has been reviewed with the patient. The patient verbalized understanding. Discharge medications reviewed with the patient and appropriate educational materials and side effects teaching were provided.   ___________________________________________________________________________________________________________________________________

## 2022-06-03 NOTE — ROUTINE PROCESS
Bedside and Verbal shift change report given to Willian Butcher (oncoming nurse) by Laura Westfall RN   (offgoing nurse). Report included the following information SBAR, Kardex, Intake/Output, MAR and Recent Results.

## 2022-06-03 NOTE — PROGRESS NOTES
Physician Progress Note      Jake Mondragon  CSN #:                  [id number]  :                       1965  ADMIT DATE:       2022 10:38 PM  [address] [place] DATE:  RESPONDING  PROVIDER #:        Terrence Matos MD          QUERY TEXT:    Dear hospitalist  Pt admitted with confusion. Noted documentation of alcoholic  hepatitis by  GI  consultant. If possible, please document in progress notes and discharge summary  if you concur: The medical record reflects the following:  Risk Factors: alcoholism; cirrhosis;  Clinical Indicators: this admit  ALT  58;  AST  186;  alk phos   464  GI  notes- Chronically elevated LFTs  ????????????- ALT 44; ;   Treatment: Best supportive care as established. Continue acid suppression with pantoprazole BID. 2. Alcohol cessation discussed with patient. Thank you,   [email or web address]  Options provided:  -- alcoholic  hepatitis  confirmed present on admission  -- alcoholic  hepatitis ruled out  -- Defer to GI  consultant documentation regarding  alcoholic  hepatitis  -- Other - I will add my own diagnosis  -- Disagree - Not applicable / Not valid  -- Disagree - Clinically unable to determine / Unknown  -- Refer to Clinical Documentation Reviewer    PROVIDER RESPONSE TEXT:    I defer to GI consultant regarding documentation of alcoholic hepatitis.     Query created by: Kisha Moulton on 2022 3:07 PM      Electronically signed by:  Terrence Matos MD 6/3/2022 10:31 AM

## 2022-06-03 NOTE — PROGRESS NOTES
[facility], [address]  [phone]  [email or web address]    Gastroenterology follow up-Progress note    Impression:  1. Alcoholic hepatitis    2. Alcoholic cirrhosis of liver  3. Jaundice              - Tbili 8.8  4. Alcohol abuse with withdrawal              - EtOH 78  5. Chronically elevated LFTs              - ALT 44; ;   6. 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 Chronic stable anemia without GI bleed   8. Focal intestinal metaplasia               - s/p EGD 2/2021 by Dr Nasra Shannon  9. History of adenomatous rectal polyps  10. Chronic biliary duct dilation but no choledocholithiasis or obstructing mass              - positive smooth muscle Ab    11. Encephalopathy - resolved  12. At risk for malnutrition  13. Hyponatremia  14. Hypokalemia  15. Hyperammonemia (improving)  16. Cigarette smoker  17. Methadone dependent  18. Hypotension      Plan:  1. Best supportive care as established. - reduce pantoprazole to 40 mg daily; lactulose to 15 mL BID  2. Alcohol cessation emphasized with patient. Started baclofen 5 mg BID to help with achieving cessation. Patient agrees to try medication. AAA is also appropriate. 3. Monitoring LFTs and clinical status. .  4. CIWA per primary team.   5. Low sodium diet. Caloric support as appropriate per dietitian. 6. No NSAIDs  7. Recommend outpatient ERCP to evaluate persistent ductal dilatation. 8. Lytes repletion and medical management otherwise per primary team.    Close outpatient GI follow-up emphasized to patient. She verbalizes understanding. Will sign off but available as needed. Thank you for this consultation and the opportunity to participate in the care of this patient. Please do not hesitate to call with any questions or concerns, or should event occur that may necessitate additional GI evaluation. Subjective:  No acute GI events. Doing better today.  Her right wrist pain is lessening and better ROM. BM yesterday. No diarrhea.       ROS: Denies any fevers, chills     General: awake, alert, no distress   Neck: supple and trachea normal   Cardiovascular: normal rate    Pulmonary/Chest Wall: unlabored respiratory effort   Abdominal: benign     Patient Active Problem List   Diagnosis Code    Pneumonia J18.9    CAP (community acquired pneumonia) J18.9    SIRS (systemic inflammatory response syndrome) (HCC) R65.10    Jaundice R17    Alcohol abuse F10.10    Acute alcoholic intoxication without complication (HCC) W90.375    Hyperkalemia E87.5    Hyponatremia E87.1    Elevated LFTs R79.89    Mild protein-calorie malnutrition (HCC) E44.1    Acute encephalopathy G93.40    Thrombocytopenia (HCC) D69.6    Asthma J45.909    GERD (gastroesophageal reflux disease) K21.9    Iron deficiency anemia D50.9    History of alcohol withdrawal delirium Z86.59    Tobacco abuse Z72.0    Hypoglycemia E16.2    Hyperammonemia (HCC) M26.66    Alcoholic cirrhosis of liver (HCC) K70.30    Volume overload E87.70    Hepatitis K75.9    Encounter for palliative care Z51.5    Debility R53.81         Visit Vitals  BP (!) 97/50 (BP 1 Location: Left upper arm, BP Patient Position: Lying)   Pulse 90   Temp 98.6 °F (37 °C)   Resp 18   Ht 5' 7\" (1.702 m)   Wt 63.5 kg (140 lb)   SpO2 92%   Breastfeeding No   BMI 21.93 kg/m²           Intake/Output Summary (Last 24 hours) at 6/3/2022 0850  Last data filed at 6/3/2022 0400  Gross per 24 hour   Intake --   Output 1150 ml   Net -1150 ml         Labs     Admission on 05/31/2022   Component Date Value Ref Range Status    Special Requests: 05/31/2022 NO SPECIAL REQUESTS    Preliminary    Culture result: 05/31/2022 NO GROWTH 3 DAYS    Preliminary    Special Requests: 05/31/2022 NO SPECIAL REQUESTS    Preliminary    Culture result: 05/31/2022 NO GROWTH 3 DAYS    Preliminary    Color 06/01/2022 Interfering substances due to urine color may affect results of dipstick chemistries. Final    COLOR ORANGE    Appearance 06/01/2022 CLEAR    Final    Specific gravity 06/01/2022 1.015  1.003 - 1.030   Final    pH (UA) 06/01/2022 6.0  5.0 - 8.0   Final    Protein 06/01/2022 Negative  NEG mg/dL Final    Glucose 06/01/2022 100* NEG mg/dL Final    Ketone 06/01/2022 15* NEG mg/dL Final    Bilirubin 06/01/2022 LARGE* NEG   Final    Blood 06/01/2022 Negative  NEG   Final    Urobilinogen 06/01/2022 >8.0* 0.2 - 1.0 EU/dL Final    Nitrites 06/01/2022 Negative  NEG   Final    Leukocyte Esterase 06/01/2022 Negative  NEG   Final    Sodium 05/31/2022 124* 136 - 145 mmol/L Final    Potassium 05/31/2022 5.1  3.5 - 5.5 mmol/L Final    SLIGHTLY HEMOLYZED SPECIMEN    Chloride 05/31/2022 91* 100 - 111 mmol/L Final    CO2 05/31/2022 27  21 - 32 mmol/L Final    Anion gap 05/31/2022 6  3.0 - 18 mmol/L Final    Glucose 05/31/2022 60* 74 - 99 mg/dL Final    BUN 05/31/2022 9  7.0 - 18 MG/DL Final    Creatinine 05/31/2022 0.85  0.6 - 1.3 MG/DL Final    BUN/Creatinine ratio 05/31/2022 11* 12 - 20   Final    GFR est AA 05/31/2022 >60  >60 ml/min/1.73m2 Final    GFR est non-AA 05/31/2022 >60  >60 ml/min/1.73m2 Final    Comment: (NOTE)  Estimated GFR is calculated using the Modification of Diet in Renal   Disease (MDRD) Study equation, reported for both  Americans   (GFRAA) and non- Americans (GFRNA), and normalized to 1.73m2   body surface area. The physician must decide which value applies to   the patient. The MDRD study equation should only be used in   individuals age 25 or older. It has not been validated for the   following: pregnant women, patients with serious comorbid conditions,   or on certain medications, or persons with extremes of body size,   muscle mass, or nutritional status.       Calcium 05/31/2022 9.4  8.5 - 10.1 MG/DL Final    Bilirubin, total 05/31/2022 9.5* 0.2 - 1.0 MG/DL Final    ALT (SGPT) 05/31/2022 58* 13 - 56 U/L Final  AST (SGOT) 05/31/2022 186* 10 - 38 U/L Final    SLIGHTLY HEMOLYZED SPECIMEN    Alk. phosphatase 05/31/2022 464* 45 - 117 U/L Final    Protein, total 05/31/2022 8.1  6.4 - 8.2 g/dL Final    Albumin 05/31/2022 2.8* 3.4 - 5.0 g/dL Final    Globulin 05/31/2022 5.3* 2.0 - 4.0 g/dL Final    A-G Ratio 05/31/2022 0.5* 0.8 - 1.7   Final    WBC 05/31/2022 9.0  4.6 - 13.2 K/uL Final    RBC 05/31/2022 3.02* 4.20 - 5.30 M/uL Final    HGB 05/31/2022 9.8* 12.0 - 16.0 g/dL Final    HCT 05/31/2022 28.5* 35.0 - 45.0 % Final    MCV 05/31/2022 94.4  78.0 - 100.0 FL Final    MCH 05/31/2022 32.5  24.0 - 34.0 PG Final    MCHC 05/31/2022 34.4  31.0 - 37.0 g/dL Final    RDW 05/31/2022 17.2* 11.6 - 14.5 % Final    PLATELET 83/84/7665 88* 135 - 420 K/uL Final    MPV 05/31/2022 9.6  9.2 - 11.8 FL Final    NRBC 05/31/2022 0.0  0  WBC Final    ABSOLUTE NRBC 05/31/2022 0.00  0.00 - 0.01 K/uL Final    NEUTROPHILS 05/31/2022 79* 40 - 73 % Final    LYMPHOCYTES 05/31/2022 6* 21 - 52 % Final    MONOCYTES 05/31/2022 14* 3 - 10 % Final    EOSINOPHILS 05/31/2022 0  0 - 5 % Final    BASOPHILS 05/31/2022 0  0 - 2 % Final    IMMATURE GRANULOCYTES 05/31/2022 1* 0.0 - 0.5 % Final    ABS. NEUTROPHILS 05/31/2022 7.1  1.8 - 8.0 K/UL Final    ABS. LYMPHOCYTES 05/31/2022 0.5* 0.9 - 3.6 K/UL Final    ABS. MONOCYTES 05/31/2022 1.3* 0.05 - 1.2 K/UL Final    ABS. EOSINOPHILS 05/31/2022 0.0  0.0 - 0.4 K/UL Final    ABS. BASOPHILS 05/31/2022 0.0  0.0 - 0.1 K/UL Final    ABS. IMM.  GRANS. 05/31/2022 0.1* 0.00 - 0.04 K/UL Final    DF 05/31/2022 AUTOMATED    Final    Ventricular Rate 05/31/2022 97  BPM Final    Atrial Rate 05/31/2022 97  BPM Final    P-R Interval 05/31/2022 142  ms Final    QRS Duration 05/31/2022 68  ms Final    Q-T Interval 05/31/2022 320  ms Final    QTC Calculation (Bezet) 05/31/2022 406  ms Final    Calculated P Axis 05/31/2022 74  degrees Final    Calculated R Axis 05/31/2022 69  degrees Final    Calculated T Axis 05/31/2022 73  degrees Final    Diagnosis 05/31/2022    Final                    Value:Normal sinus rhythm  Nonspecific ST abnormality  Abnormal ECG  When compared with ECG of 03-MAY-2022 15:19,  ST now depressed in Anterior leads  Confirmed by Viet Wisdom M.D., [address] [place] [address] ([id number]) on 6/1/2022 10:14:27 PM      Ammonia, plasma 05/31/2022 34* 11 - 32 UMOL/L Final    ALCOHOL(ETHYL),SERUM 05/31/2022 78* 0 - 3 MG/DL Final    Lactic Acid (POC) 05/31/2022 3.41* 0.40 - 2.00 mmol/L Final    SARS-CoV-2 by PCR 06/01/2022 Not detected  NOTD   Final    Not Detected results do not preclude SARS-CoV-2 infection and should not be used as the sole basis for patient management decisions. Results must be combined with clinical observations, patient history, and epidemiological information.  Influenza A by PCR 06/01/2022 Not detected  NOTD   Final    Influenza B by PCR 06/01/2022 Not detected  NOTD   Final    Comment: Testing was performed using lala Tejal SARS-CoV-2 and Influenza A/B nucleic acid assay. This test is a multiplex Real-Time Reverse Transcriptase Polymerase Chain Reaction (RT-PCR) based in [employer] diagnostic test intended for the qualitative detection of nucleic acids from SARS-CoV-2, Influenza A, and Influenza B in nasopharyngeal for use under the FDA's Emergency Use Authorization (EAU) only. Fact sheet for Patients: FindDrives.pl  Fact sheet for Healthcare Providers: FindDrives.pl      Glucose (POC) 06/01/2022 64* 70 - 110 mg/dL Final    Comment: (NOTE)  The FDA has indicated that no capillary point of care blood glucose   monitoring systems are approved for use in \"critically ill\" patients,   however they have not defined this population. The College of   American Pathologists has recommended that these devices should not   be used in cases such as severe hypotension, dehydration, shock, and   hyperglycemic-hyperosmolar state, amongst others. Venous or arterial   collection is the recommended specimen for testing these patients.  Glucose (POC) 06/01/2022 77  70 - 110 mg/dL Final    Comment: (NOTE)  The FDA has indicated that no capillary point of care blood glucose   monitoring systems are approved for use in \"critically ill\" patients,   however they have not defined this population. The College of   American Pathologists has recommended that these devices should not   be used in cases such as severe hypotension, dehydration, shock, and   hyperglycemic-hyperosmolar state, amongst others. Venous or arterial   collection is the recommended specimen for testing these patients.       Lactic Acid (POC) 06/01/2022 1.27  0.40 - 2.00 mmol/L Final    IVSd 06/01/2022 0.8  0.6 - 0.9 cm Final    LVIDd 06/01/2022 5.0  3.9 - 5.3 cm Final    LVIDs 06/01/2022 3.4  cm Final    LVOT Diameter 06/01/2022 2.0  cm Final    LVPWd 06/01/2022 0.8  0.6 - 0.9 cm Final    LVOT Peak Gradient 06/01/2022 6  mmHg Final    LVOT Mean Gradient 06/01/2022 3  mmHg Final    LVOT SV 06/01/2022 71.9  ml Final    LVOT Peak Velocity 06/01/2022 1.2  m/s Final    LVOT VTI 06/01/2022 22.9  cm Final    LA Volume A/L 06/01/2022 54  mL Final    LA Volume 2C 06/01/2022 48  22 - 52 mL Final    LA Volume 4C 06/01/2022 52  22 - 52 mL Final    MV A Velocity 06/01/2022 0.95  m/s Final    MV E Wave Deceleration Time 06/01/2022 247.1  ms Final    MV E Velocity 06/01/2022 0.79  m/s Final    LV E' Lateral Velocity 06/01/2022 14  cm/s Final    TR Peak Gradient 06/01/2022 27  mmHg Final    TR Max Velocity 06/01/2022 2.58  m/s Final    Aortic Root 06/01/2022 3.1  cm Final    Fractional Shortening 2D 06/01/2022 32  28 - 44 % Final    LVIDd Index 06/01/2022 2.87  cm/m2 Final    LVIDs Index 06/01/2022 1.95  cm/m2 Final    LV RWT Ratio 06/01/2022 0.32   Final    LV Mass 2D 06/01/2022 135.8  67 - 162 g Final    LV Mass 2D Index 06/01/2022 78.0  43 - 95 g/m2 Final    MV E/A 06/01/2022 0.83   Final    E/E' Lateral 06/01/2022 5.64   Final    LA Volume Index A/L 06/01/2022 31  16 - 34 mL/m2 Final    LVOT Stroke Volume Index 06/01/2022 41.3  mL/m2 Final    LVOT Area 06/01/2022 3.1  cm2 Final    LA Volume Index 2C 06/01/2022 28  16 - 34 mL/m2 Final    LA Volume Index 4C 06/01/2022 30  16 - 34 mL/m2 Final    Ao Root Index 06/01/2022 1.78  cm/m2 Final    E/E' Ratio (Averaged) 06/01/2022 6.41   Final    LV E' Septal Velocity 06/01/2022 11  cm/s Final    E/E' Septal 06/01/2022 7.18   Final    TAPSE 06/01/2022 2.8  1.7 cm Final    Est. RA Pressure 06/01/2022 3  mmHg Final    RVSP 06/01/2022 30  mmHg Final    Glucose (POC) 06/01/2022 77  70 - 110 mg/dL Final    Comment: (NOTE)  The FDA has indicated that no capillary point of care blood glucose   monitoring systems are approved for use in \"critically ill\" patients,   however they have not defined this population. The College of   American Pathologists has recommended that these devices should not   be used in cases such as severe hypotension, dehydration, shock, and   hyperglycemic-hyperosmolar state, amongst others. Venous or arterial   collection is the recommended specimen for testing these patients.  Glucose (POC) 06/01/2022 105  70 - 110 mg/dL Final    Comment: (NOTE)  The FDA has indicated that no capillary point of care blood glucose   monitoring systems are approved for use in \"critically ill\" patients,   however they have not defined this population. The College of   American Pathologists has recommended that these devices should not   be used in cases such as severe hypotension, dehydration, shock, and   hyperglycemic-hyperosmolar state, amongst others. Venous or arterial   collection is the recommended specimen for testing these patients.       AFP, serum 06/01/2022 1.8  0.0 - 9.2 ng/mL Final    Comment: (NOTE)  Roche Diagnostics Electrochemiluminescence Immunoassay (ECLIA)  Values obtained with different assay methods or kits cannot be  used interchangeably. Results cannot be interpreted as absolute  evidence of the presence or absence of malignant disease. This test is not interpretable in pregnant females.  AFP-L3%, Serum 06/01/2022 Comment  0.0 - 9.9 % Final    Comment: (NOTE)  AFP-L3% result is below the detection limit of the assay. Performed At: [address] [id number]  Harleen Beard MD VW:[id number]      Michochondrial (M2) Ab 06/01/2022 <20.0  0.0 - 20.0 Units Final    Comment: (NOTE)                                 Negative    0.0 - 20.0                                 Equivocal  20.1 - 24.9                                 Positive         >24.9  Mitochondrial (M2) Antibodies are found in 90-96% of  patients with primary biliary cirrhosis. Performed At: [address] [id number]  Harleen Beard MD ZO:[id number]      Actin (Smooth Muscle) Ab 06/01/2022 59* 0 - 19 Units Final    Comment: (NOTE)                  Negative                     0 - 19                  Weak positive               20 - 30                  Moderate to strong positive     >30  Actin Antibodies are found in 52-85% of patients with  autoimmune hepatitis or chronic active hepatitis and  in 22% of patients with primary biliary cirrhosis. Performed At: [address] [id number]  Harleen Beard MD GP:[id number]      Protein, total 06/01/2022 7.2  6.4 - 8.2 g/dL Final    Albumin 06/01/2022 2.6* 3.4 - 5.0 g/dL Final    Globulin 06/01/2022 4.6* 2.0 - 4.0 g/dL Final    A-G Ratio 06/01/2022 0.6* 0.8 - 1.7   Final    Bilirubin, total 06/01/2022 8.9* 0.2 - 1.0 MG/DL Final    Bilirubin, direct 06/01/2022 6.5* 0.0 - 0.2 MG/DL Final    Alk.  phosphatase 06/01/2022 406* 45 - 117 U/L Final    AST (SGOT) 06/01/2022 137* 10 - 38 U/L Final    ALT (SGPT) 06/01/2022 46  13 - 56 U/L Final    Prothrombin time 06/01/2022 16.7* 11.5 - 15.2 sec Final    INR 06/01/2022 1.3* 0.8 - 1.2   Final    Comment:            INR Therapeutic Ranges         (on stable oral anticoagulant):     INDICATION                INR  DVT/PE/Atrial Fib          2.0-3.0  MI/Mechanical Heart Valve  2.5-3.5      Glucose (POC) 06/01/2022 139* 70 - 110 mg/dL Final    Comment: (NOTE)  The FDA has indicated that no capillary point of care blood glucose   monitoring systems are approved for use in \"critically ill\" patients,   however they have not defined this population. The College of   American Pathologists has recommended that these devices should not   be used in cases such as severe hypotension, dehydration, shock, and   hyperglycemic-hyperosmolar state, amongst others. Venous or arterial   collection is the recommended specimen for testing these patients.  Glucose (POC) 06/01/2022 124* 70 - 110 mg/dL Final    Comment: (NOTE)  The FDA has indicated that no capillary point of care blood glucose   monitoring systems are approved for use in \"critically ill\" patients,   however they have not defined this population. The College of   American Pathologists has recommended that these devices should not   be used in cases such as severe hypotension, dehydration, shock, and   hyperglycemic-hyperosmolar state, amongst others. Venous or arterial   collection is the recommended specimen for testing these patients.       Sodium 06/02/2022 130* 136 - 145 mmol/L Final    Potassium 06/02/2022 3.3* 3.5 - 5.5 mmol/L Final    Chloride 06/02/2022 91* 100 - 111 mmol/L Final    CO2 06/02/2022 30  21 - 32 mmol/L Final    Anion gap 06/02/2022 9  3.0 - 18 mmol/L Final    Glucose 06/02/2022 109* 74 - 99 mg/dL Final    BUN 06/02/2022 10  7.0 - 18 MG/DL Final    Creatinine 06/02/2022 0.97  0.6 - 1.3 MG/DL Final    BUN/Creatinine ratio 06/02/2022 10* 12 - 20   Final    GFR est AA 06/02/2022 >60  >60 ml/min/1.73m2 Final    GFR est non-AA 06/02/2022 60* >60 ml/min/1.73m2 Final    Comment: (NOTE)  Estimated GFR is calculated using the Modification of Diet in Renal   Disease (MDRD) Study equation, reported for both  Americans   (GFRAA) and non- Americans (GFRNA), and normalized to 1.73m2   body surface area. The physician must decide which value applies to   the patient. The MDRD study equation should only be used in   individuals age 25 or older. It has not been validated for the   following: pregnant women, patients with serious comorbid conditions,   or on certain medications, or persons with extremes of body size,   muscle mass, or nutritional status.  Calcium 06/02/2022 9.4  8.5 - 10.1 MG/DL Final    Bilirubin, total 06/02/2022 8.8* 0.2 - 1.0 MG/DL Final    ALT (SGPT) 06/02/2022 44  13 - 56 U/L Final    AST (SGOT) 06/02/2022 121* 10 - 38 U/L Final    Alk. phosphatase 06/02/2022 377* 45 - 117 U/L Final    Protein, total 06/02/2022 6.9  6.4 - 8.2 g/dL Final    Albumin 06/02/2022 2.6* 3.4 - 5.0 g/dL Final    Globulin 06/02/2022 4.3* 2.0 - 4.0 g/dL Final    A-G Ratio 06/02/2022 0.6* 0.8 - 1.7   Final    WBC 06/02/2022 10.2  4.6 - 13.2 K/uL Final    RBC 06/02/2022 2.65* 4.20 - 5.30 M/uL Final    HGB 06/02/2022 8.7* 12.0 - 16.0 g/dL Final    HCT 06/02/2022 25.5* 35.0 - 45.0 % Final    MCV 06/02/2022 96.2  78.0 - 100.0 FL Final    MCH 06/02/2022 32.8  24.0 - 34.0 PG Final    MCHC 06/02/2022 34.1  31.0 - 37.0 g/dL Final    RDW 06/02/2022 17.7* 11.6 - 14.5 % Final    PLATELET 00/50/1671 84* 135 - 420 K/uL Final    MPV 06/02/2022 9.9  9.2 - 11.8 FL Final    NRBC 06/02/2022 0.0  0  WBC Final    ABSOLUTE NRBC 06/02/2022 0.00  0.00 - 0.01 K/uL Final    NEUTROPHILS 06/02/2022 72  40 - 73 % Final    LYMPHOCYTES 06/02/2022 10* 21 - 52 % Final    MONOCYTES 06/02/2022 17* 3 - 10 % Final    EOSINOPHILS 06/02/2022 0  0 - 5 % Final    BASOPHILS 06/02/2022 0  0 - 2 % Final    IMMATURE GRANULOCYTES 06/02/2022 1* 0.0 - 0.5 % Final    ABS.  NEUTROPHILS 06/02/2022 7.4 1.8 - 8.0 K/UL Final    ABS. LYMPHOCYTES 06/02/2022 1.0  0.9 - 3.6 K/UL Final    ABS. MONOCYTES 06/02/2022 1.7* 0.05 - 1.2 K/UL Final    ABS. EOSINOPHILS 06/02/2022 0.0  0.0 - 0.4 K/UL Final    ABS. BASOPHILS 06/02/2022 0.0  0.0 - 0.1 K/UL Final    ABS. IMM. GRANS. 06/02/2022 0.1* 0.00 - 0.04 K/UL Final    DF 06/02/2022 AUTOMATED    Final    Prothrombin time 06/02/2022 16.8* 11.5 - 15.2 sec Final    INR 06/02/2022 1.3* 0.8 - 1.2   Final    Comment:            INR Therapeutic Ranges         (on stable oral anticoagulant):     INDICATION                INR  DVT/PE/Atrial Fib          2.0-3.0  MI/Mechanical Heart Valve  2.5-3.5      Glucose (POC) 06/02/2022 76  70 - 110 mg/dL Final    Comment: (NOTE)  The FDA has indicated that no capillary point of care blood glucose   monitoring systems are approved for use in \"critically ill\" patients,   however they have not defined this population. The College of   American Pathologists has recommended that these devices should not   be used in cases such as severe hypotension, dehydration, shock, and   hyperglycemic-hyperosmolar state, amongst others. Venous or arterial   collection is the recommended specimen for testing these patients.  Glucose (POC) 06/02/2022 96  70 - 110 mg/dL Final    Comment: (NOTE)  The FDA has indicated that no capillary point of care blood glucose   monitoring systems are approved for use in \"critically ill\" patients,   however they have not defined this population. The College of   American Pathologists has recommended that these devices should not   be used in cases such as severe hypotension, dehydration, shock, and   hyperglycemic-hyperosmolar state, amongst others. Venous or arterial   collection is the recommended specimen for testing these patients.       Glucose (POC) 06/02/2022 103  70 - 110 mg/dL Final    Comment: (NOTE)  The FDA has indicated that no capillary point of care blood glucose   monitoring systems are approved for use in \"critically ill\" patients,   however they have not defined this population. The College of   American Pathologists has recommended that these devices should not   be used in cases such as severe hypotension, dehydration, shock, and   hyperglycemic-hyperosmolar state, amongst others. Venous or arterial   collection is the recommended specimen for testing these patients.  Glucose (POC) 06/02/2022 107  70 - 110 mg/dL Final    Comment: (NOTE)  The FDA has indicated that no capillary point of care blood glucose   monitoring systems are approved for use in \"critically ill\" patients,   however they have not defined this population. The College of   American Pathologists has recommended that these devices should not   be used in cases such as severe hypotension, dehydration, shock, and   hyperglycemic-hyperosmolar state, amongst others. Venous or arterial   collection is the recommended specimen for testing these patients.  Glucose (POC) 06/03/2022 92  70 - 110 mg/dL Final    Comment: (NOTE)  The FDA has indicated that no capillary point of care blood glucose   monitoring systems are approved for use in \"critically ill\" patients,   however they have not defined this population. The College of   American Pathologists has recommended that these devices should not   be used in cases such as severe hypotension, dehydration, shock, and   hyperglycemic-hyperosmolar state, amongst others. Venous or arterial   collection is the recommended specimen for testing these patients. Radiology  US ABD LTD    Result Date: 6/2/2022  Increased hepatic echotexture in keeping with hepatic steatosis seen previously by MRI. Hepatomegaly. Biliary ductal dilatation similar to prior. Echogenic material in the gallbladder, likely echogenic bile/\"sludge. \"  Mildly distended gallbladder similar to prior as described. No evidence of gallbladder wall thickening, pericholecystic fluid, or sonographic Gutierrez's sign. Pancreas poorly visualized due to overlying bowel gas. RISSA Tinajero    [facility].   [email or web address]  Phone: [phone]  Pager: [phone]

## 2022-06-03 NOTE — PROGRESS NOTES
Discharge instructions completed with patient. Provided patient opportunity to ask any questions. All questions answered to patient's satisfaction. Patient being discharged to home.

## 2022-06-03 NOTE — PROGRESS NOTES
Bedside shift change report given to Keshawn Wheeler RN (oncoming nurse) by [facility], RN (offgoing nurse). Report included the following information SBAR, Kardex, MAR and Quality Measures.

## 2022-06-03 NOTE — DISCHARGE SUMMARY
Hospitalist Discharge Summary    Patient: Violette Bradley MRN: [id number]  CSN: [id number]    YOB: 1965  Age: 55 y.o. Sex: female    DOA: 5/31/2022 LOS:  LOS: 2 days   Discharge Date:     Admission Diagnoses: Encephalopathy [G93.40]    Discharge Diagnoses:    1. Hepatic encephalopathy  2. Cirrhosis  3. Alcohol abuse  4. Chronic smoker  5. Anemia of chronic disease  6. Thrombocytopenia and elevated INR secondary to cirrhosis    Discharge Condition: [address]    Discharge To: Home    CODE STATUS: Full Code      PHYSICAL EXAM  Visit Vitals  BP (!) 97/50 (BP 1 Location: Left upper arm, BP Patient Position: Lying)   Pulse 90   Temp 98.6 °F (37 °C)   Resp 18   Ht 5' 7\" (1.702 m)   Wt 63.5 kg (140 lb)   SpO2 92%   Breastfeeding No   BMI 21.93 kg/m²       General: Alert, cooperative, no acute distress    Lungs:  CTA Bilaterally. No Wheezing/Rhonchi/Rales. Heart:  Regular rate and Rhythm. Abdomen: Soft, Non distended, Non tender. + Bowel sounds. Extremities: No edema/ cyanosis/ clubbing  Neurologic:  AA oriented X 3. Moves all extremities. HPI:    Violette Bradley is a 55 y.o. female who presents to [facility] ER with complaint of Confusion. Patient was reportedly seen sitting on her porch, which is unusual for her, and had difficulty walking and was then uncharacteristically contrary with her Daughter, prompting Patient to be brought to [facility] ER. Per [facility] ER Physician, Patient was providing one-word answers to questions and would also respond non-linearly or inappropriately otherwise. Patient was A&O x4/4 when interviewed. Patient states that she has been having no appetite and only eats a solid meal once a week and otherwise subsists on two 12-ounce Beers per day and has done so for approximately 1 month. Patient reports that she has no appetite and that her legs have swelled up on her in the last two or so days.   Patient states that she has recently cut back and normally drinks 4-5 Beers/day for the last 5-6 years. Patient also reports Smoking 0.5 PPD x25 years. Patient denies fevers, chills, nausea, vomiting, diarrhea, dysuria, cough, chest pain, and abdominal pain, and stool abnormality. Notably, Patient reports that she has had Alcohol Withdrawal in the past and has had agitation and hallucinations, but no seizures.      In [facility] ER, Patient is noted to have Temperature 100.1°F, Heart Rate 107 bpm, Blood Pressure 119/55 mm Hg, SpO2 97% on 2 L/min O2, WBC 9.0, Hgb 9.8, Platelets 61J, Neut% 79%, Neut# 7.1, Na+ 124, Cl- 91, Glucose 60 mg/dL, Total Bilirubin 9.5, Albumin 2.8, ALT 58, , Alk Phos 464, Ammonia 34, ETOH 78 mg/dL, and Lactic Acid 3.41.     Patient is admitted to [facility] Telemetry Unit for management of Hepatic Encephalopathy and Hepatitis (Viral vs. Alcoholic?) with Volume Overload (due to Hepatic Failure vs. Cardiac Source). Hospital Course:     Patient admitted to the hospital secondary to altered mental status. Patient has a known history of cirrhosis. Ammonia levels not significantly elevated, patient seen by GI and started on lactulose with improvement of her mental status. Patient is still drinking alcohol despite being diagnosed with cirrhosis. She has been advised to completely stop drinking alcohol. Patient also takes methadone daily which has been restarted. Patient's mental status resolved at the time of discharge and she is currently back to baseline. Patient has been counseled to stop drinking alcohol and stop smoking. She has been advised to take her medications as advised and follow-up with her PCP in 1 to 2 weeks. Patient verbalized understanding. Follow up Care: With PCP in 1 to 2 weeks    Consults: Gastroenterology    Significant Diagnostic Studies:     Imaging:  XR Results (most recent):  Results from Hospital Encounter encounter on 05/31/22    XR WRIST RT AP/LAT    Narrative  RIGHT WRIST, TWO VIEWS    INDICATION: Above.  Pain and soreness at the right wrist for several days,  tenderness to touch. TECHNIQUE: AP and lateral views of the right wrist are reviewed. FINDINGS:    IV catheter tubing is noted projected in the soft tissues of the distal forearm  slightly proximal to the wrist.    There is no evidence of acute osseous injury or dislocation. The visualized  joint spaces appear grossly maintained. There is mild soft tissue swelling best demonstrated dorsally on lateral view  proximal to the level of the wrist near the level of the IV catheter site. Impression  Mild soft tissue swelling centered slightly proximal to the wrist near the level  of IV catheter site. No evidence of acute fracture or dislocation. CT Results (most recent):  Results from Hospital Encounter encounter on 05/31/22    CT HEAD WO CONT    Narrative  EXAM:  CT Head without Contrast    CLINICAL INDICATION:  Confusion, liver failure    COMPARISON:  10/04/19. TECHNIQUE:    - Helical volumetric CT imaging of the head is performed from the base of the  skull to the vertex without IV contrast administration. Axial, coronal and  sagittal images are generated from the volumetric data set. - Dose optimization techniques are utilized as appropriate to the performed exam  with combination of automated exposure control, adjustment of mA and/or kV  according to patient size, and use of iterative reconstructive technique. FINDINGS:    Brain:    - Hemorrhage/ hematoma:  No acute intracranial hemorrhage/ hematoma. - Mass, mass effect:  None. - Gray-white matter differentiation:  Mild white matter hypodensities,  suggestive of chronic microvascular ischemia.  - Interval assessment:  No significant interval change. CSF spaces:  No evidence of abnormal effacement or enlargement. Calvarium:  Intact. Sinuses, mastoids:  Clear. Impression  1. No acute intracranial abnormalities.     2.  Mild white matter hypodensity suggestive of chronic microvascular ischemic  changes. 05/31/22    ECHO ADULT COMPLETE 06/01/2022 6/1/2022    Interpretation Summary    Left Ventricle: Normal left ventricular systolic function with a visually estimated EF of 55 - 60%. Left ventricle size is normal. Normal wall thickness. Normal wall motion. Normal diastolic function.   Tricuspid Valve: The estimated RVSP is 30 mmHg.   Contrast used: Definity. Signed by: Marcos Murillo MD on 6/1/2022  2:34 PM         Procedures:     None    Discharge Medications:     Current Discharge Medication List      START taking these medications    Details   pantoprazole (PROTONIX) 40 mg tablet Take 1 Tablet by mouth Daily (before breakfast). Qty: 30 Tablet, Refills: 0      folic acid (FOLVITE) 1 mg tablet Take 1 Tablet by mouth daily. Qty: 30 Tablet, Refills: 0      lactulose (CHRONULAC) 10 gram/15 mL solution Take 15 mL by mouth two (2) times a day for 30 days. Qty: 900 mL, Refills: 0      thiamine HCL (B-1) 100 mg tablet Take 1 Tablet by mouth daily. Qty: 30 Tablet, Refills: 0      therapeutic multivitamin (THERAGRAN) tablet Take 1 Tablet by mouth daily. Qty: 30 Tablet, Refills: 0         CONTINUE these medications which have CHANGED    Details   bumetanide (BUMEX) 1 mg tablet Take 1 Tablet by mouth daily. Qty: 30 Tablet, Refills: 0         CONTINUE these medications which have NOT CHANGED    Details   pentoxifylline CR (TRENTAL) 400 mg CR tablet Take 1 Tablet by mouth three (3) times daily (with meals).   Qty: 90 Tablet, Refills: 0      fluticasone propion-salmeteroL (ADVAIR/WIXELA) 250-50 mcg/dose diskus inhaler INHALE 1 PUFF BY MOUTH TWICE DAILY         STOP taking these medications       cyproheptadine (PERIACTIN) 4 mg tablet Comments:   Reason for Stopping:               Current Facility-Administered Medications:     [START ON 6/4/2022] pantoprazole (PROTONIX) tablet 40 mg, 40 mg, Oral, ACB, Betsy, Luana ALVARES, PA    lactulose (CHRONULAC) 10 gram/15 mL solution 15 mL, 15 mL, Oral, BID, Luana Meyer PA    baclofen (LIORESAL) tablet 5 mg, 5 mg, Oral, BID, Luana Meyer PA, 5 mg at 06/03/22 1212    acetaminophen (TYLENOL) tablet 650 mg, 650 mg, Oral, Q6H PRN, Jamari Draper DO, 650 mg at 06/02/22 0025    methadone (DOLOPHINE) 10 mg/mL concentrated solution 110 mg, 110 mg, Oral, DAILY, Anu Dos Santos MD, 110 mg at 06/03/22 0929    sodium chloride (NS) flush 5-40 mL, 5-40 mL, IntraVENous, Q8H, Jamari Draper DO, 10 mL at 06/03/22 0534    sodium chloride (NS) flush 5-40 mL, 5-40 mL, IntraVENous, PRN, Jamari Draper DO    polyethylene glycol (MIRALAX) packet 17 g, 17 g, Oral, DAILY PRN, Jamari Draper DO    ondansetron (ZOFRAN ODT) tablet 4 mg, 4 mg, Oral, Q8H PRN **OR** ondansetron (ZOFRAN) injection 4 mg, 4 mg, IntraVENous, Q6H PRN, Jamari Draper DO, 4 mg at 06/02/22 1216    nicotine (NICODERM CQ) 14 mg/24 hr patch 1 Patch, 1 Patch, TransDERmal, DAILY PRN, Jamari Draper DO    nitroglycerin (NITROBID) 2 % ointment 0.5 Inch, 0.5 Inch, Topical, Q6H PRN, Mark Draper DO    therapeutic multivitamin (THERAGRAN) tablet 1 Tablet, 1 Tablet, Oral, DAILY, Jamari Draper DO, 1 Tablet at 06/03/22 0929    thiamine HCL (B-1) tablet 100 mg, 100 mg, Oral, DAILY, Jamari Draper DO, 100 mg at 62/42/45 6787    folic acid (FOLVITE) tablet 1 mg, 1 mg, Oral, DAILY, Jamari Draper DO, 1 mg at 06/03/22 0929    LORazepam (ATIVAN) tablet 1 mg, 1 mg, Oral, Q1H PRN **OR** LORazepam (ATIVAN) 2 mg/mL injection 1 mg, 1 mg, IntraVENous, Q1H PRN, Jamari Draper DO    LORazepam (ATIVAN) tablet 2 mg, 2 mg, Oral, Q1H PRN **OR** LORazepam (ATIVAN) 2 mg/mL injection 2 mg, 2 mg, IntraVENous, Q1H PRN, Jamari Draper DO    LORazepam (ATIVAN) 2 mg/mL injection 3 mg, 3 mg, IntraVENous, Q15MIN PRN, Jamari Draper DO    albuterol-ipratropium (DUO-NEB) 2.5 MG-0.5 MG/3 ML, 3 mL, Nebulization, Q4H RT, Jamari Draper DO, 3 mL at 06/02/22 1908    lactulose (CHRONULAC) 10 gram/15 mL 150 mL in sterile water irrigation 350 mL rectal enema, 500 mL, Rectal, TID PRN, Jamari Draper DO    melatonin (rapid dissolve) tablet 5 mg, 5 mg, Oral, QHS PRN, Jamari Draper DO, 5 mg at 06/02/22 0001    bumetanide (BUMEX) tablet 1 mg, 1 mg, Oral, BID, Jamari Draper DO, 1 mg at 06/03/22 0929    pentoxifylline CR (TRENTAL) tablet 400 mg, 400 mg, Oral, TID WITH MEALS, Jamari Draper DO, 400 mg at 06/03/22 0929    budesonide (PULMICORT) 500 mcg/2 ml nebulizer suspension, 500 mcg, Nebulization, BID RT, 500 mcg at 06/02/22 1908 **AND** arformoteroL (BROVANA) neb solution 15 mcg, 15 mcg, Nebulization, BID RT, Jamari Draper DO, 15 mcg at 06/02/22 1908    glucose chewable tablet 16 g, 4 Tablet, Oral, PRN, Coby Vanessa MD    glucagon (GLUCAGEN) injection 1 mg, 1 mg, IntraMUSCular, PRN, Coby Vanessa MD    dextrose 10% infusion 0-250 mL, 0-250 mL, IntraVENous, PRN, Coby Vanessa MD    sodium chloride (NS) flush 5-10 mL, 5-10 mL, IntraVENous, PRN, Hoa LEE MD     Activity: Activity as tolerated    Diet: Cardiac Diet    Wound Care: None needed      Silvano Ortega MD  6/3/2022, 12:19 PM    Total time spent 32 mins  Disclaimer: Sections of this note are dictated using utilizing voice recognition software. Minor typographical errors may be present. If questions arise, please do not hesitate to contact me or call our department.

## 2022-06-03 NOTE — PROGRESS NOTES
Problem: Alcohol Withdrawal  Goal: *STG: Participates in treatment plan  Outcome: Progressing Towards Goal     Problem: Patient Education: Go to Patient Education Activity  Goal: Patient/Family Education  Outcome: Progressing Towards Goal     Problem: Falls - Risk of  Goal: *Absence of Falls  Description: Document Mayur Burgos Fall Risk and appropriate interventions in the flowsheet.   Outcome: Progressing Towards Goal  Note: Fall Risk Interventions:  Mobility Interventions: Communicate number of staff needed for ambulation/transfer,Patient to call before getting OOB,Utilize walker, cane, or other assistive device         Medication Interventions: Patient to call before getting OOB,Teach patient to arise slowly                   Problem: Patient Education: Go to Patient Education Activity  Goal: Patient/Family Education  Outcome: Progressing Towards Goal

## 2022-08-30 ENCOUNTER — HOSPITAL ENCOUNTER (EMERGENCY)
Age: 57
Discharge: HOME | End: 2022-08-30
Attending: STUDENT IN AN ORGANIZED HEALTH CARE EDUCATION/TRAINING PROGRAM
Payer: MEDICAID

## 2022-08-30 ENCOUNTER — APPOINTMENT (OUTPATIENT)
Dept: GENERAL RADIOLOGY | Age: 57
End: 2022-08-30
Attending: PHYSICIAN ASSISTANT
Payer: MEDICAID

## 2022-08-30 ENCOUNTER — APPOINTMENT (OUTPATIENT)
Dept: CT IMAGING | Age: 57
End: 2022-08-30
Attending: PHYSICIAN ASSISTANT
Payer: MEDICAID

## 2022-08-30 VITALS
TEMPERATURE: 98.4 F | HEIGHT: 67 IN | OXYGEN SATURATION: 96 % | HEART RATE: 93 BPM | RESPIRATION RATE: 12 BRPM | BODY MASS INDEX: 18.83 KG/M2 | DIASTOLIC BLOOD PRESSURE: 91 MMHG | WEIGHT: 120 LBS | SYSTOLIC BLOOD PRESSURE: 129 MMHG

## 2022-08-30 DIAGNOSIS — D69.6 THROMBOCYTOPENIA (HCC): ICD-10-CM

## 2022-08-30 DIAGNOSIS — R07.9 CHEST PAIN, UNSPECIFIED TYPE: Primary | ICD-10-CM

## 2022-08-30 DIAGNOSIS — E83.42 HYPOMAGNESEMIA: ICD-10-CM

## 2022-08-30 DIAGNOSIS — R00.0 TACHYCARDIA: ICD-10-CM

## 2022-08-30 DIAGNOSIS — D64.9 ANEMIA, UNSPECIFIED TYPE: ICD-10-CM

## 2022-08-30 LAB
ALBUMIN SERPL-MCNC: 2.5 G/DL (ref 3.4–5)
ALBUMIN/GLOB SERPL: 0.6 {RATIO} (ref 0.8–1.7)
ALP SERPL-CCNC: 132 U/L (ref 45–117)
ALT SERPL-CCNC: 25 U/L (ref 13–56)
ANION GAP SERPL CALC-SCNC: 6 MMOL/L (ref 3–18)
AST SERPL-CCNC: 39 U/L (ref 10–38)
ATRIAL RATE: 111 BPM
BASOPHILS # BLD: 0 K/UL (ref 0–0.1)
BASOPHILS NFR BLD: 0 % (ref 0–2)
BILIRUB SERPL-MCNC: 0.8 MG/DL (ref 0.2–1)
BNP SERPL-MCNC: 20 PG/ML (ref 0–900)
BUN SERPL-MCNC: 16 MG/DL (ref 7–18)
BUN/CREAT SERPL: 20 (ref 12–20)
CALCIUM SERPL-MCNC: 9 MG/DL (ref 8.5–10.1)
CALCULATED P AXIS, ECG09: 74 DEGREES
CALCULATED R AXIS, ECG10: 71 DEGREES
CALCULATED T AXIS, ECG11: 71 DEGREES
CHLORIDE SERPL-SCNC: 112 MMOL/L (ref 100–111)
CO2 SERPL-SCNC: 21 MMOL/L (ref 21–32)
CREAT SERPL-MCNC: 0.8 MG/DL (ref 0.6–1.3)
DIAGNOSIS, 93000: NORMAL
DIFFERENTIAL METHOD BLD: ABNORMAL
EOSINOPHIL # BLD: 0.1 K/UL (ref 0–0.4)
EOSINOPHIL NFR BLD: 1 % (ref 0–5)
ERYTHROCYTE [DISTWIDTH] IN BLOOD BY AUTOMATED COUNT: 14.6 % (ref 11.6–14.5)
ETHANOL SERPL-MCNC: <3 MG/DL (ref 0–3)
GLOBULIN SER CALC-MCNC: 4 G/DL (ref 2–4)
GLUCOSE SERPL-MCNC: 101 MG/DL (ref 74–99)
HCG SERPL QL: NEGATIVE
HCT VFR BLD AUTO: 32.6 % (ref 35–45)
HGB BLD-MCNC: 10.3 G/DL (ref 12–16)
IMM GRANULOCYTES # BLD AUTO: 0 K/UL (ref 0–0.04)
IMM GRANULOCYTES NFR BLD AUTO: 0 % (ref 0–0.5)
LYMPHOCYTES # BLD: 1.5 K/UL (ref 0.9–3.6)
LYMPHOCYTES NFR BLD: 25 % (ref 21–52)
MAGNESIUM SERPL-MCNC: 1.3 MG/DL (ref 1.6–2.6)
MCH RBC QN AUTO: 31.3 PG (ref 24–34)
MCHC RBC AUTO-ENTMCNC: 31.6 G/DL (ref 31–37)
MCV RBC AUTO: 99.1 FL (ref 78–100)
MONOCYTES # BLD: 0.9 K/UL (ref 0.05–1.2)
MONOCYTES NFR BLD: 15 % (ref 3–10)
NEUTS SEG # BLD: 3.6 K/UL (ref 1.8–8)
NEUTS SEG NFR BLD: 59 % (ref 40–73)
NRBC # BLD: 0 K/UL (ref 0–0.01)
NRBC BLD-RTO: 0 PER 100 WBC
P-R INTERVAL, ECG05: 136 MS
PLATELET # BLD AUTO: 117 K/UL (ref 135–420)
PMV BLD AUTO: 10.6 FL (ref 9.2–11.8)
POTASSIUM SERPL-SCNC: 4.4 MMOL/L (ref 3.5–5.5)
PROT SERPL-MCNC: 6.5 G/DL (ref 6.4–8.2)
Q-T INTERVAL, ECG07: 342 MS
QRS DURATION, ECG06: 70 MS
QTC CALCULATION (BEZET), ECG08: 465 MS
RBC # BLD AUTO: 3.29 M/UL (ref 4.2–5.3)
SODIUM SERPL-SCNC: 139 MMOL/L (ref 136–145)
TROPONIN-HIGH SENSITIVITY: 26 NG/L (ref 0–54)
TSH SERPL DL<=0.05 MIU/L-ACNC: 1.32 UIU/ML (ref 0.36–3.74)
VENTRICULAR RATE, ECG03: 111 BPM
WBC # BLD AUTO: 6.1 K/UL (ref 4.6–13.2)

## 2022-08-30 PROCEDURE — 99285 EMERGENCY DEPT VISIT HI MDM: CPT

## 2022-08-30 PROCEDURE — 85025 COMPLETE CBC W/AUTO DIFF WBC: CPT

## 2022-08-30 PROCEDURE — 84484 ASSAY OF TROPONIN QUANT: CPT

## 2022-08-30 PROCEDURE — 93005 ELECTROCARDIOGRAM TRACING: CPT

## 2022-08-30 PROCEDURE — 80053 COMPREHEN METABOLIC PANEL: CPT

## 2022-08-30 PROCEDURE — 84443 ASSAY THYROID STIM HORMONE: CPT

## 2022-08-30 PROCEDURE — 74011250636 HC RX REV CODE- 250/636: Performed by: PHYSICIAN ASSISTANT

## 2022-08-30 PROCEDURE — 84703 CHORIONIC GONADOTROPIN ASSAY: CPT

## 2022-08-30 PROCEDURE — 83735 ASSAY OF MAGNESIUM: CPT

## 2022-08-30 PROCEDURE — 71275 CT ANGIOGRAPHY CHEST: CPT

## 2022-08-30 PROCEDURE — 74011000636 HC RX REV CODE- 636: Performed by: STUDENT IN AN ORGANIZED HEALTH CARE EDUCATION/TRAINING PROGRAM

## 2022-08-30 PROCEDURE — 71045 X-RAY EXAM CHEST 1 VIEW: CPT

## 2022-08-30 PROCEDURE — 82077 ASSAY SPEC XCP UR&BREATH IA: CPT

## 2022-08-30 PROCEDURE — 83880 ASSAY OF NATRIURETIC PEPTIDE: CPT

## 2022-08-30 PROCEDURE — 96365 THER/PROPH/DIAG IV INF INIT: CPT

## 2022-08-30 RX ORDER — CALCIUM CARBONATE 300MG(750)
1 TABLET,CHEWABLE ORAL DAILY
Qty: 8 EACH | Refills: 0 | Status: SHIPPED | OUTPATIENT
Start: 2022-08-30

## 2022-08-30 RX ORDER — MAGNESIUM SULFATE 1 G/100ML
1 INJECTION INTRAVENOUS ONCE
Status: COMPLETED | OUTPATIENT
Start: 2022-08-30 | End: 2022-08-30

## 2022-08-30 RX ADMIN — SODIUM CHLORIDE 1000 ML: 9 INJECTION, SOLUTION INTRAVENOUS at 13:00

## 2022-08-30 RX ADMIN — MAGNESIUM SULFATE 1 G: 1 INJECTION INTRAVENOUS at 13:00

## 2022-08-30 RX ADMIN — IOPAMIDOL 96 ML: 755 INJECTION, SOLUTION INTRAVENOUS at 16:03

## 2022-08-30 NOTE — ED TRIAGE NOTES
Patient arrives via EMS with complaints of \"I feel fine right now\". Patient presents confused with delayed responses. Patient arrived from [facility] where she is currently in an ETOH Detox program and reports last drink about 3 weeks ago. Facility staff states that patient reported feeling jittery this morning and when they checked her heart rate it was in the 120s. Patient denies CP or SOB at this time. Patient tachycardic during triage.

## 2022-08-30 NOTE — ED NOTES
Ambulated patient to bathroom. Steady gait. Offers no complaints of dizziness, pain or shortness of breath. Returned to stretcher. Telemetry monitor in place. Call bell within reach.

## 2022-08-30 NOTE — ED NOTES
Patient climbed out of stretcher and urinated on ED room floor, stating that she \"could not hold it\". Patient reoriented to call bell, linen and gown changed.

## 2022-08-30 NOTE — ED PROVIDER NOTES
EMERGENCY DEPARTMENT HISTORY AND PHYSICAL EXAM    12:50 PM      Date: 8/30/2022  Patient Name: Sindy Guzman    History of Presenting Illness     Chief Complaint   Patient presents with    Irregular Heart Beat         History Provided By: Patient, EMS     Additional History (Context): Sindy Guzman is a 55 y.o. female with  history of anemia, GERD, alcohol abuse, asthma, vertigo, and other noted past medical history  who presents with complaint of intermittent central chest pain associated with palpitations x 1 day. Patient is currently at [employer], last drink was about 2 weeks ago. Patient notes she told [place] this morning that she was feeling jittery and had palpitations, per notes pt was tachycardic and sent to the ED for further assessment. Patient denies dizziness, diaphoresis, changes in vision, dyspnea, pleuritic or exertional symptoms, orthopnea, abdominal pain, nausea or vomiting, leg edema. Denies history of CAD, DVT or PE, hemoptysis, recent surgery or travel. PCP: Jessie Cherry MD    Current Outpatient Medications   Medication Sig Dispense Refill    magnesium oxide 400 mg magnesium tab Take 1 Tablet by mouth daily. 8 Each 0    pantoprazole (PROTONIX) 40 mg tablet Take 1 Tablet by mouth Daily (before breakfast). 30 Tablet 0    folic acid (FOLVITE) 1 mg tablet Take 1 Tablet by mouth daily. 30 Tablet 0    bumetanide (BUMEX) 1 mg tablet Take 1 Tablet by mouth daily. 30 Tablet 0    thiamine HCL (B-1) 100 mg tablet Take 1 Tablet by mouth daily. 30 Tablet 0    therapeutic multivitamin (THERAGRAN) tablet Take 1 Tablet by mouth daily. 30 Tablet 0    pentoxifylline CR (TRENTAL) 400 mg CR tablet Take 1 Tablet by mouth three (3) times daily (with meals).  90 Tablet 0    fluticasone propion-salmeteroL (ADVAIR/WIXELA) 250-50 mcg/dose diskus inhaler INHALE 1 PUFF BY MOUTH TWICE DAILY         Past History     Past Medical History:  Past Medical History:   Diagnosis Date    Alcohol abuse     Alcohol withdrawal syndrome (HCC)     Anemia     Asthma     Bile duct calculus     CAP (community acquired pneumonia)     Constipation     GERD (gastroesophageal reflux disease)     Iron deficiency     Liver function test abnormality     Macrocytosis     Vertigo        Past Surgical History:  Past Surgical History:   Procedure Laterality Date    COLONOSCOPY N/A 2/25/2021    COLONOSCOPY with polypectomies performed by Garrick Freeman MD at [facility]       Family History:  Family History   Problem Relation Age of Onset    No Known Problems Mother     No Known Problems Father     No Known Problems Maternal Grandmother     No Known Problems Maternal Grandfather     No Known Problems Paternal Grandmother     No Known Problems Paternal Grandfather     Cancer Sister         uterine? Cancer Daughter         uterine? Social History:  Social History     Tobacco Use    Smoking status: Every Day     Packs/day: 0.50     Years: 20.00     Pack years: 10.00     Types: Cigarettes    Smokeless tobacco: Never   Substance Use Topics    Alcohol use: Yes     Alcohol/week: 3.0 standard drinks     Types: 3 Glasses of wine per week    Drug use: Not Currently     Comment: pt takees methadone       Allergies:  No Known Allergies      Review of Systems       Review of Systems   Constitutional:  Negative for chills and fever. Respiratory:  Negative for shortness of breath. Cardiovascular:  Positive for chest pain and palpitations. Gastrointestinal:  Negative for abdominal pain, nausea and vomiting. Skin:  Negative for rash. Neurological:  Negative for weakness. All other systems reviewed and are negative. Physical Exam   Visit Vitals  BP (!) 129/91   Pulse 93   Temp 98.4 °F (36.9 °C)   Resp 12   Ht 5' 7\" (1.702 m)   Wt 54.4 kg (120 lb)   SpO2 96%   BMI 18.79 kg/m²         Physical Exam  Vitals and nursing note reviewed. Constitutional:       General: She is not in acute distress.      Appearance: Normal appearance. She is well-developed. She is not ill-appearing, toxic-appearing or diaphoretic. HENT:      Head: Normocephalic and atraumatic. Mouth/Throat:      Mouth: Mucous membranes are dry. Cardiovascular:      Rate and Rhythm: Regular rhythm. Tachycardia present. Heart sounds: Normal heart sounds. No murmur heard. No friction rub. No gallop. Pulmonary:      Effort: Pulmonary effort is normal. No respiratory distress. Breath sounds: Normal breath sounds. No wheezing or rales. Abdominal:      General: Abdomen is flat. There is no distension. Palpations: Abdomen is soft. Tenderness: There is no abdominal tenderness. There is no guarding or rebound. Musculoskeletal:         General: Normal range of motion. Cervical back: Normal range of motion and neck supple. Skin:     General: Skin is warm. Findings: No rash. Neurological:      Mental Status: She is alert and oriented to person, place, and time. Cranial Nerves: No cranial nerve deficit. Sensory: No sensory deficit.          Diagnostic Study Results     Labs -  Recent Results (from the past 12 hour(s))   EKG, 12 LEAD, INITIAL    Collection Time: 08/30/22 11:44 AM   Result Value Ref Range    Ventricular Rate 111 BPM    Atrial Rate 111 BPM    P-R Interval 136 ms    QRS Duration 70 ms    Q-T Interval 342 ms    QTC Calculation (Bezet) 465 ms    Calculated P Axis 74 degrees    Calculated R Axis 71 degrees    Calculated T Axis 71 degrees    Diagnosis       Sinus tachycardia  Otherwise normal ECG  When compared with ECG of 31-MAY-2022 23:23,  No significant change was found  Confirmed by Nile Kayser, MD, Kaylee Bales ([id number]) on 8/30/2022 4:06:46 PM     CBC WITH AUTOMATED DIFF    Collection Time: 08/30/22 11:55 AM   Result Value Ref Range    WBC 6.1 4.6 - 13.2 K/uL    RBC 3.29 (L) 4.20 - 5.30 M/uL    HGB 10.3 (L) 12.0 - 16.0 g/dL    HCT 32.6 (L) 35.0 - 45.0 %    MCV 99.1 78.0 - 100.0 FL    MCH 31.3 24.0 - 34.0 PG MCHC 31.6 31.0 - 37.0 g/dL    RDW 14.6 (H) 11.6 - 14.5 %    PLATELET 332 (L) 615 - 420 K/uL    MPV 10.6 9.2 - 11.8 FL    NRBC 0.0 0  WBC    ABSOLUTE NRBC 0.00 0.00 - 0.01 K/uL    NEUTROPHILS 59 40 - 73 %    LYMPHOCYTES 25 21 - 52 %    MONOCYTES 15 (H) 3 - 10 %    EOSINOPHILS 1 0 - 5 %    BASOPHILS 0 0 - 2 %    IMMATURE GRANULOCYTES 0 0.0 - 0.5 %    ABS. NEUTROPHILS 3.6 1.8 - 8.0 K/UL    ABS. LYMPHOCYTES 1.5 0.9 - 3.6 K/UL    ABS. MONOCYTES 0.9 0.05 - 1.2 K/UL    ABS. EOSINOPHILS 0.1 0.0 - 0.4 K/UL    ABS. BASOPHILS 0.0 0.0 - 0.1 K/UL    ABS. IMM. GRANS. 0.0 0.00 - 0.04 K/UL    DF AUTOMATED     METABOLIC PANEL, COMPREHENSIVE    Collection Time: 08/30/22 11:55 AM   Result Value Ref Range    Sodium 139 136 - 145 mmol/L    Potassium 4.4 3.5 - 5.5 mmol/L    Chloride 112 (H) 100 - 111 mmol/L    CO2 21 21 - 32 mmol/L    Anion gap 6 3.0 - 18 mmol/L    Glucose 101 (H) 74 - 99 mg/dL    BUN 16 7.0 - 18 MG/DL    Creatinine 0.80 0.6 - 1.3 MG/DL    BUN/Creatinine ratio 20 12 - 20      GFR est AA >60 >60 ml/min/1.73m2    GFR est non-AA >60 >60 ml/min/1.73m2    Calcium 9.0 8.5 - 10.1 MG/DL    Bilirubin, total 0.8 0.2 - 1.0 MG/DL    ALT (SGPT) 25 13 - 56 U/L    AST (SGOT) 39 (H) 10 - 38 U/L    Alk.  phosphatase 132 (H) 45 - 117 U/L    Protein, total 6.5 6.4 - 8.2 g/dL    Albumin 2.5 (L) 3.4 - 5.0 g/dL    Globulin 4.0 2.0 - 4.0 g/dL    A-G Ratio 0.6 (L) 0.8 - 1.7     MAGNESIUM    Collection Time: 08/30/22 11:55 AM   Result Value Ref Range    Magnesium 1.3 (L) 1.6 - 2.6 mg/dL   TSH 3RD GENERATION    Collection Time: 08/30/22 11:55 AM   Result Value Ref Range    TSH 1.32 0.36 - 3.74 uIU/mL   HCG QL SERUM    Collection Time: 08/30/22 11:55 AM   Result Value Ref Range    HCG, Ql. Negative NEG     ETHYL ALCOHOL    Collection Time: 08/30/22 11:55 AM   Result Value Ref Range    ALCOHOL(ETHYL),SERUM <3 0 - 3 MG/DL   TROPONIN-HIGH SENSITIVITY    Collection Time: 08/30/22 11:55 AM   Result Value Ref Range    Troponin-High Sensitivity 26 0 - 54 ng/L   NT-PRO BNP    Collection Time: 08/30/22 11:55 AM   Result Value Ref Range    NT pro-BNP 20 0 - 900 PG/ML       Radiologic Studies -   CTA CHEST W OR W WO CONT   Final Result   1. No CT evidence of pulmonary embolus. 2.  Pulmonary emphysematous changes. Right upper lobe cavitary scar. 3.  Slight nodular appearance of the liver may represent underlying hepatic   disease. XR CHEST PORT   Final Result      Low lung volumes. No radiographic evidence of acute cardiopulmonary process. Medical Decision Making   I am the first provider for this patient. I reviewed the vital signs, available nursing notes, past medical history, past surgical history, family history and social history. Vital Signs-Reviewed the patient's vital signs. Pulse Oximetry Analysis -   96% on room air     Records Reviewed: Nursing Notes, Old Medical Records, and Previous electrocardiograms (Time of Review: 12:50 PM)    ED Course: Progress Notes, Reevaluation, and Consults:  12:51 PM: Discussed with [facility], [employer]. Notes patient is baseline mental status. Sent to the ED for tachycardia and tremors   5:39 PM: Discussed with [employer]. Will set up transport for patient back to facility. Pt resting comfortably, no distress. HR reduced to 90. Pt notes she is ready for discharge. Discussed strict return precaution any new or worsening symptoms, including chest pain, shortness of breath, or any other medical concerns. In agreement with plan    Provider Notes (Medical Decision Making): 55-year-old female who presents to the ED due to chest pain and palpitations. EKG demonstrates sinus tachycardia, labs demonstrate mild anemia at baseline,  troponin and TSH within normal limits. CTA demonstrates no evidence of PE. Tachycardia improved in the ED. Patient denies any other concerns or complaints. Stable for discharge with return to [facility].       Diagnosis     Clinical Impression:   1. Chest pain, unspecified type    2. Tachycardia    3. Anemia, unspecified type    4. Thrombocytopenia ([address]    5. Hypomagnesemia        Disposition: back to [facility]    Follow-up Information       Follow up With Specialties Details Why [address]    [facility] EMERGENCY DEPT Emergency Medicine  If symptoms worsen [address] [facility]    Mikayla Garcia MD Family Medicine Schedule an appointment as soon as possible for a visit   [address] [facility]  [phone]               Discharge Medication List as of 8/30/2022  6:18 PM        START taking these medications    Details   magnesium oxide 400 mg magnesium tab Take 1 Tablet by mouth daily. , Normal, Disp-8 Each, R-0           CONTINUE these medications which have NOT CHANGED    Details   pantoprazole (PROTONIX) 40 mg tablet Take 1 Tablet by mouth Daily (before breakfast). , Normal, Disp-30 Tablet, R-0      folic acid (FOLVITE) 1 mg tablet Take 1 Tablet by mouth daily. , Normal, Disp-30 Tablet, R-0      bumetanide (BUMEX) 1 mg tablet Take 1 Tablet by mouth daily. , Normal, Disp-30 Tablet, R-0      thiamine HCL (B-1) 100 mg tablet Take 1 Tablet by mouth daily. , Normal, Disp-30 Tablet, R-0      therapeutic multivitamin (THERAGRAN) tablet Take 1 Tablet by mouth daily. , Normal, Disp-30 Tablet, R-0      pentoxifylline CR (TRENTAL) 400 mg CR tablet Take 1 Tablet by mouth three (3) times daily (with meals). , Normal, Disp-90 Tablet, R-0      fluticasone propion-salmeteroL (ADVAIR/WIXELA) 250-50 mcg/dose diskus inhaler INHALE 1 PUFF BY MOUTH TWICE DAILY, Historical Med             Dictation disclaimer:  Please note that this dictation was completed with [employer], the [employer] voice recognition software. Quite often unanticipated grammatical, syntax, homophones, and other interpretive errors are inadvertently transcribed by the computer software. Please disregard these errors.   Please excuse any errors that have escaped final proofreading.

## 2022-11-12 ENCOUNTER — HOSPITAL ENCOUNTER (EMERGENCY)
Age: 57
Discharge: HOME | End: 2022-11-12
Attending: EMERGENCY MEDICINE
Payer: MEDICAID

## 2022-11-12 VITALS
OXYGEN SATURATION: 100 % | RESPIRATION RATE: 16 BRPM | HEIGHT: 67 IN | DIASTOLIC BLOOD PRESSURE: 69 MMHG | TEMPERATURE: 96.8 F | HEART RATE: 97 BPM | SYSTOLIC BLOOD PRESSURE: 115 MMHG | WEIGHT: 130 LBS | BODY MASS INDEX: 20.4 KG/M2

## 2022-11-12 DIAGNOSIS — T40.601A OPIATE OVERDOSE, ACCIDENTAL OR UNINTENTIONAL, INITIAL ENCOUNTER (HCC): Primary | ICD-10-CM

## 2022-11-12 DIAGNOSIS — F10.920 ALCOHOLIC INTOXICATION WITHOUT COMPLICATION (HCC): ICD-10-CM

## 2022-11-12 LAB
ALBUMIN SERPL-MCNC: 3.5 G/DL (ref 3.4–5)
ALBUMIN/GLOB SERPL: 0.9 {RATIO} (ref 0.8–1.7)
ALP SERPL-CCNC: 170 U/L (ref 45–117)
ALT SERPL-CCNC: 37 U/L (ref 13–56)
AMPHET UR QL SCN: NEGATIVE
ANION GAP SERPL CALC-SCNC: 12 MMOL/L (ref 3–18)
AST SERPL-CCNC: 52 U/L (ref 10–38)
BARBITURATES UR QL SCN: NEGATIVE
BASOPHILS # BLD: 0 K/UL (ref 0–0.1)
BASOPHILS NFR BLD: 0 % (ref 0–2)
BENZODIAZ UR QL: NEGATIVE
BILIRUB SERPL-MCNC: 0.8 MG/DL (ref 0.2–1)
BUN SERPL-MCNC: 19 MG/DL (ref 7–18)
BUN/CREAT SERPL: 23 (ref 12–20)
CALCIUM SERPL-MCNC: 9.5 MG/DL (ref 8.5–10.1)
CANNABINOIDS UR QL SCN: NEGATIVE
CHLORIDE SERPL-SCNC: 111 MMOL/L (ref 100–111)
CO2 SERPL-SCNC: 20 MMOL/L (ref 21–32)
COCAINE UR QL SCN: NEGATIVE
CREAT SERPL-MCNC: 0.81 MG/DL (ref 0.6–1.3)
DIFFERENTIAL METHOD BLD: ABNORMAL
EOSINOPHIL # BLD: 0 K/UL (ref 0–0.4)
EOSINOPHIL NFR BLD: 1 % (ref 0–5)
ERYTHROCYTE [DISTWIDTH] IN BLOOD BY AUTOMATED COUNT: 15 % (ref 11.6–14.5)
ETHANOL SERPL-MCNC: 103 MG/DL (ref 0–3)
GLOBULIN SER CALC-MCNC: 4.1 G/DL (ref 2–4)
GLUCOSE SERPL-MCNC: 93 MG/DL (ref 74–99)
HCT VFR BLD AUTO: 34.7 % (ref 35–45)
HDSCOM,HDSCOM: NORMAL
HGB BLD-MCNC: 11 G/DL (ref 12–16)
IMM GRANULOCYTES # BLD AUTO: 0 K/UL (ref 0–0.04)
IMM GRANULOCYTES NFR BLD AUTO: 1 % (ref 0–0.5)
LYMPHOCYTES # BLD: 1.2 K/UL (ref 0.9–3.6)
LYMPHOCYTES NFR BLD: 17 % (ref 21–52)
MCH RBC QN AUTO: 30 PG (ref 24–34)
MCHC RBC AUTO-ENTMCNC: 31.7 G/DL (ref 31–37)
MCV RBC AUTO: 94.6 FL (ref 78–100)
METHADONE UR QL: NEGATIVE
MONOCYTES # BLD: 0.7 K/UL (ref 0.05–1.2)
MONOCYTES NFR BLD: 10 % (ref 3–10)
NEUTS SEG # BLD: 5.2 K/UL (ref 1.8–8)
NEUTS SEG NFR BLD: 72 % (ref 40–73)
NRBC # BLD: 0 K/UL (ref 0–0.01)
NRBC BLD-RTO: 0 PER 100 WBC
OPIATES UR QL: NEGATIVE
PCP UR QL: NEGATIVE
PLATELET # BLD AUTO: 130 K/UL (ref 135–420)
PMV BLD AUTO: 10.8 FL (ref 9.2–11.8)
POTASSIUM SERPL-SCNC: 3.6 MMOL/L (ref 3.5–5.5)
PROT SERPL-MCNC: 7.6 G/DL (ref 6.4–8.2)
RBC # BLD AUTO: 3.67 M/UL (ref 4.2–5.3)
SODIUM SERPL-SCNC: 143 MMOL/L (ref 136–145)
WBC # BLD AUTO: 7.2 K/UL (ref 4.6–13.2)

## 2022-11-12 PROCEDURE — 80053 COMPREHEN METABOLIC PANEL: CPT

## 2022-11-12 PROCEDURE — 99283 EMERGENCY DEPT VISIT LOW MDM: CPT

## 2022-11-12 PROCEDURE — 80307 DRUG TEST PRSMV CHEM ANLYZR: CPT

## 2022-11-12 PROCEDURE — 82077 ASSAY SPEC XCP UR&BREATH IA: CPT

## 2022-11-12 PROCEDURE — 85025 COMPLETE CBC W/AUTO DIFF WBC: CPT

## 2022-11-12 NOTE — ED NOTES
Received patient via EMS stretcher for overdose. Per EMS, patient was found unresponsive with agonal breathing. Patient was given 2mg of Narcan and nasal trumpet was placed to right nare.  Pt arrived lethargic and on 2L of 02 via NC.

## 2022-11-13 NOTE — ED PROVIDER NOTES
EMERGENCY DEPARTMENT HISTORY AND PHYSICAL EXAM    Date: 11/12/2022  Patient Name: Hiram Andres    History of Presenting Illness     Chief Complaint   Patient presents with    Drug Overdose         History Provided By: patient and medics     Chief Complaint: possible opiate overdose  Duration: just PTA   Timing:  acute  Location: n/a  Quality: n/a  Severity: moderate  Modifying Factors: improved after IN narcan   Associated Symptoms: none       Additional History (Context): Hiram Andres is a 65 y.o. female with PMH alcohol abuse, GERD, and asthma who presents to the ED via medic after being found unresponsive with agonal breathing. The patient was given 2 mg of Narcan intranasally. Upon arrival to the ED the patient was still slightly lethargic easily aroused and answering questions appropriately. The patient was placed on 2 L of nasal oxygen via nasal cannula. She had no complaints upon arrival.  No other history was obtained. PCP: Anthony Vargas MD    Current Outpatient Medications   Medication Sig Dispense Refill    magnesium oxide 400 mg magnesium tab Take 1 Tablet by mouth daily. 8 Each 0    pantoprazole (PROTONIX) 40 mg tablet Take 1 Tablet by mouth Daily (before breakfast). 30 Tablet 0    folic acid (FOLVITE) 1 mg tablet Take 1 Tablet by mouth daily. 30 Tablet 0    bumetanide (BUMEX) 1 mg tablet Take 1 Tablet by mouth daily. 30 Tablet 0    thiamine HCL (B-1) 100 mg tablet Take 1 Tablet by mouth daily. 30 Tablet 0    therapeutic multivitamin (THERAGRAN) tablet Take 1 Tablet by mouth daily. 30 Tablet 0    pentoxifylline CR (TRENTAL) 400 mg CR tablet Take 1 Tablet by mouth three (3) times daily (with meals).  90 Tablet 0    fluticasone propion-salmeteroL (ADVAIR/WIXELA) 250-50 mcg/dose diskus inhaler INHALE 1 PUFF BY MOUTH TWICE DAILY         Past History     Past Medical History:  Past Medical History:   Diagnosis Date    Alcohol abuse     Alcohol withdrawal syndrome (HCC)     Anemia     Asthma Bile duct calculus     CAP (community acquired pneumonia)     Constipation     GERD (gastroesophageal reflux disease)     Iron deficiency     Liver function test abnormality     Macrocytosis     Vertigo        Past Surgical History:  Past Surgical History:   Procedure Laterality Date    COLONOSCOPY N/A 2/25/2021    COLONOSCOPY with polypectomies performed by Kurt Khan MD at [facility]       Family History:  Family History   Problem Relation Age of Onset    No Known Problems Mother     No Known Problems Father     No Known Problems Maternal Grandmother     No Known Problems Maternal Grandfather     No Known Problems Paternal Grandmother     No Known Problems Paternal Grandfather     Cancer Sister         uterine? Cancer Daughter         uterine? Social History:  Social History     Tobacco Use    Smoking status: Every Day     Packs/day: 0.50     Years: 20.00     Pack years: 10.00     Types: Cigarettes    Smokeless tobacco: Never   Substance Use Topics    Alcohol use: Yes     Alcohol/week: 3.0 standard drinks     Types: 3 Glasses of wine per week    Drug use: Not Currently     Comment: pt takees methadone       Allergies:  No Known Allergies      Review of Systems   Review of Systems   Constitutional: Negative. Negative for chills and fever. HENT: Negative. Negative for congestion, ear pain and rhinorrhea. Eyes: Negative. Negative for pain and redness. Respiratory: Negative. Negative for cough, shortness of breath, wheezing and stridor. Cardiovascular: Negative. Negative for chest pain and leg swelling. Gastrointestinal: Negative. Negative for abdominal pain, constipation, diarrhea, nausea and vomiting. Genitourinary: Negative. Negative for dysuria and frequency. Musculoskeletal: Negative. Negative for back pain and neck pain. Skin: Negative. Negative for rash and wound. Neurological:  Negative for dizziness, seizures and headaches.         Possible opiate overdose All other systems reviewed and are negative. All Other Systems Negative  Physical Exam     Vitals:    11/12/22 1634   BP: 115/69   Pulse: 97   Resp: 16   Temp: 96.8 °F (36 °C)   SpO2: 100%   Weight: 59 kg (130 lb)   Height: 5' 7\" (1.702 m)     Physical Exam  Vitals and nursing note reviewed. Constitutional:       General: She is not in acute distress. Appearance: She is well-developed. She is not diaphoretic. HENT:      Head: Normocephalic and atraumatic. Eyes:      General: No scleral icterus. Right eye: No discharge. Left eye: No discharge. Conjunctiva/sclera: Conjunctivae normal.   Cardiovascular:      Rate and Rhythm: Normal rate and regular rhythm. Heart sounds: Normal heart sounds. No murmur heard. No friction rub. No gallop. Pulmonary:      Effort: Pulmonary effort is normal. No respiratory distress. Breath sounds: Normal breath sounds. No stridor. No wheezing, rhonchi or rales. Musculoskeletal:         General: Normal range of motion. Cervical back: Normal range of motion and neck supple. Skin:     General: Skin is warm and dry. Findings: No erythema or rash. Neurological:      General: No focal deficit present. Mental Status: She is alert and oriented to person, place, and time. Mental status is at baseline. Coordination: Coordination normal.      Comments: Gait is steady and patient exhibits no evidence of ataxia. Patient is able to ambulate without difficulty. No focal neurological deficit noted. No facial droop, slurred speech, or evidence of altered mentation noted on exam.       Psychiatric:         Mood and Affect: Mood normal.         Behavior: Behavior normal.         Thought Content:  Thought content normal.              Diagnostic Study Results     Labs -     Recent Results (from the past 12 hour(s))   CBC WITH AUTOMATED DIFF    Collection Time: 11/12/22  5:53 PM   Result Value Ref Range    WBC 7.2 4.6 - 13.2 K/uL    RBC 3.67 (L) 4.20 - 5.30 M/uL    HGB 11.0 (L) 12.0 - 16.0 g/dL    HCT 34.7 (L) 35.0 - 45.0 %    MCV 94.6 78.0 - 100.0 FL    MCH 30.0 24.0 - 34.0 PG    MCHC 31.7 31.0 - 37.0 g/dL    RDW 15.0 (H) 11.6 - 14.5 %    PLATELET 016 (L) 382 - 420 K/uL    MPV 10.8 9.2 - 11.8 FL    NRBC 0.0 0  WBC    ABSOLUTE NRBC 0.00 0.00 - 0.01 K/uL    NEUTROPHILS 72 40 - 73 %    LYMPHOCYTES 17 (L) 21 - 52 %    MONOCYTES 10 3 - 10 %    EOSINOPHILS 1 0 - 5 %    BASOPHILS 0 0 - 2 %    IMMATURE GRANULOCYTES 1 (H) 0.0 - 0.5 %    ABS. NEUTROPHILS 5.2 1.8 - 8.0 K/UL    ABS. LYMPHOCYTES 1.2 0.9 - 3.6 K/UL    ABS. MONOCYTES 0.7 0.05 - 1.2 K/UL    ABS. EOSINOPHILS 0.0 0.0 - 0.4 K/UL    ABS. BASOPHILS 0.0 0.0 - 0.1 K/UL    ABS. IMM. GRANS. 0.0 0.00 - 0.04 K/UL    DF AUTOMATED     METABOLIC PANEL, COMPREHENSIVE    Collection Time: 11/12/22  5:53 PM   Result Value Ref Range    Sodium 143 136 - 145 mmol/L    Potassium 3.6 3.5 - 5.5 mmol/L    Chloride 111 100 - 111 mmol/L    CO2 20 (L) 21 - 32 mmol/L    Anion gap 12 3.0 - 18 mmol/L    Glucose 93 74 - 99 mg/dL    BUN 19 (H) 7.0 - 18 MG/DL    Creatinine 0.81 0.6 - 1.3 MG/DL    BUN/Creatinine ratio 23 (H) 12 - 20      eGFR >60 >60 ml/min/1.73m2    Calcium 9.5 8.5 - 10.1 MG/DL    Bilirubin, total 0.8 0.2 - 1.0 MG/DL    ALT (SGPT) 37 13 - 56 U/L    AST (SGOT) 52 (H) 10 - 38 U/L    Alk. phosphatase 170 (H) 45 - 117 U/L    Protein, total 7.6 6.4 - 8.2 g/dL    Albumin 3.5 3.4 - 5.0 g/dL    Globulin 4.1 (H) 2.0 - 4.0 g/dL    A-G Ratio 0.9 0.8 - 1.7     ETHYL ALCOHOL    Collection Time: 11/12/22  5:53 PM   Result Value Ref Range    ALCOHOL(ETHYL),SERUM 103 (H) 0 - 3 MG/DL       Radiologic Studies -   No orders to display     CT Results  (Last 48 hours)      None          CXR Results  (Last 48 hours)      None              Medical Decision Making   I am the first provider for this patient.     I reviewed the vital signs, available nursing notes, past medical history, past surgical history, family history and social history. Vital Signs-Reviewed the patient's vital signs. Records Reviewed: Selin Santiago PA-C     Procedures:  Procedures    Provider Notes (Medical Decision Making): Impression:  opiate overdose, alcohol intoxication    IV inserted  Labs: Hgb 11, hematocrit 34.7, alkaline phosphatase 170, AST 52, creatinine 0.81, alcohol 103    Patient denies any opiate use this evening. States she was only drinking beer and believes somebody must have been something in her alcohol. The patient has been observed in the ED for quite some time. She is alert and oriented. Answers questions appropriately. Completely asymptomatic. Vitals are remained stable. The patient was unable to provide a urine specimen for drug testing. Does not wish to wait and provide a urine specimen at this time. We will plan to discharge patient with close PCP follow-up. Return precautions advised. Patient agrees. Selin Santiago PA-C     Dictation disclaimer:  Please note that this dictation was completed with [employer], the [employer] voice recognition software. Quite often unanticipated grammatical, syntax, homophones, and other interpretive errors are inadvertently transcribed by the computer software. Please disregard these errors. Please excuse any errors that have escaped final proofreading. MED RECONCILIATION:  No current facility-administered medications for this encounter. Current Outpatient Medications   Medication Sig    magnesium oxide 400 mg magnesium tab Take 1 Tablet by mouth daily. pantoprazole (PROTONIX) 40 mg tablet Take 1 Tablet by mouth Daily (before breakfast). folic acid (FOLVITE) 1 mg tablet Take 1 Tablet by mouth daily. bumetanide (BUMEX) 1 mg tablet Take 1 Tablet by mouth daily. thiamine HCL (B-1) 100 mg tablet Take 1 Tablet by mouth daily. therapeutic multivitamin (THERAGRAN) tablet Take 1 Tablet by mouth daily.     pentoxifylline CR (TRENTAL) 400 mg CR tablet Take 1 Tablet by mouth three (3) times daily (with meals). fluticasone propion-salmeteroL (ADVAIR/WIXELA) 250-50 mcg/dose diskus inhaler INHALE 1 PUFF BY MOUTH TWICE DAILY       Disposition:  D/c    DISCHARGE NOTE:   Patient is stable for discharge at this time. I have discussed all the findings from today's work up with the patient, including lab results and imaging. I have answered all questions. No new rx given. Rest and close follow-up with the PCP recommended this week. Return to the ED immediately for any new or worsening symptoms. Selin Hayes PA-C     Follow-up Information       Follow up With Specialties Details Why Contact Info    Nick Sen MD Family Medicine In 2 days  [address] [facility] [id number] [phone]      [facility] EMERGENCY DEPT Emergency Medicine  As needed, If symptoms worsen [address] [facility] [address]  [phone]            Current Discharge Medication List              Diagnosis     Clinical Impression:   1. Opiate overdose, accidental or unintentional, initial encounter ([facility] [address]    2.  Alcoholic intoxication without complication ([facility]

## 2022-11-13 NOTE — DISCHARGE INSTRUCTIONS
[employer] Activation    Thank you for requesting access to [employer]. Please follow the instructions below to securely access and download your online medical record. [employer] allows you to send messages to your doctor, view your test results, renew your prescriptions, schedule appointments, and more. How Do I Sign Up? In your internet browser, go to [email or web address]  Click on the First Time User? Click Here link in the Sign In box. You will be redirect to the New Member Sign Up page. Enter your [employer] Access Code exactly as it appears below. You will not need to use this code after youve completed the sign-up process. If you do not sign up before the expiration date, you must request a new code. [employer] Access Code: [email or web address]  Expires: 2022  4:31 PM (This is the date your [employer] access code will )    Enter the last four digits of your Social Security Number (xxxx) and Date of Birth (mm/dd/yyyy) as indicated and click Submit. You will be taken to the next sign-up page. Create a [employer] ID. This will be your [employer] login ID and cannot be changed, so think of one that is secure and easy to remember. Create a [employer] password. You can change your password at any time. Enter your Password Reset Question and Answer. This can be used at a later time if you forget your password. Enter your e-mail address. You will receive e-mail notification when new information is available in [address] Click Sign Up. You can now view and download portions of your medical record. Click the [place] link to download a portable copy of your medical information. Additional Information    If you have questions, please visit the Frequently Asked Questions section of the [employer] website at [email or web address] Remember, [employer] is NOT to be used for urgent needs. For medical emergencies, dial 911.

## 2023-01-30 DIAGNOSIS — Z12.31 VISIT FOR SCREENING MAMMOGRAM: Primary | ICD-10-CM

## 2023-02-03 DIAGNOSIS — Z12.31 VISIT FOR SCREENING MAMMOGRAM: Primary | ICD-10-CM

## 2023-04-04 ENCOUNTER — HOSPITAL ENCOUNTER (EMERGENCY)
Facility: HOSPITAL | Age: 58
Discharge: HOME | End: 2023-04-05
Attending: EMERGENCY MEDICINE
Payer: COMMERCIAL

## 2023-04-04 VITALS
HEIGHT: 67 IN | WEIGHT: 130 LBS | DIASTOLIC BLOOD PRESSURE: 79 MMHG | RESPIRATION RATE: 18 BRPM | SYSTOLIC BLOOD PRESSURE: 112 MMHG | BODY MASS INDEX: 20.4 KG/M2 | HEART RATE: 105 BPM | TEMPERATURE: 98.3 F | OXYGEN SATURATION: 96 %

## 2023-04-04 DIAGNOSIS — Z91.148 OVERUSE OF MEDICATION: Primary | ICD-10-CM

## 2023-04-04 PROCEDURE — 99283 EMERGENCY DEPT VISIT LOW MDM: CPT | Performed by: EMERGENCY MEDICINE

## 2023-04-04 NOTE — ED TRIAGE NOTES
Pt presents to ED via EMS c/o AMS. Pt confused upon arrival. EMS states pt has pinpoint pupils and a hx of drug use but denies using anything today.

## 2023-04-05 LAB
EKG ATRIAL RATE: 95 BPM
EKG DIAGNOSIS: NORMAL
EKG P AXIS: 68 DEGREES
EKG P-R INTERVAL: 132 MS
EKG Q-T INTERVAL: 358 MS
EKG QRS DURATION: 80 MS
EKG QTC CALCULATION (BAZETT): 449 MS
EKG R AXIS: 51 DEGREES
EKG T AXIS: 80 DEGREES
EKG VENTRICULAR RATE: 95 BPM

## 2023-04-05 ASSESSMENT — ENCOUNTER SYMPTOMS
VOMITING: 0
WHEEZING: 0
CHEST TIGHTNESS: 0
EYE REDNESS: 0
RHINORRHEA: 0
ABDOMINAL PAIN: 0
CONSTIPATION: 0
NAUSEA: 0
EYE DISCHARGE: 0
SHORTNESS OF BREATH: 0
EYE PAIN: 0
DIARRHEA: 0
COUGH: 0

## 2023-04-05 NOTE — ED PROVIDER NOTES
EMERGENCY DEPARTMENT HISTORY AND PHYSICAL EXAM    12:22 AM      Date: 4/4/2023  Patient Name: Issa Prieto    History of Presenting Illness     Chief Complaint   Patient presents with    Altered Mental Status         History Provided By: Patient  Location/Duration/Severity/Modifying factors   Patient is a 50-year-old -American female who presents status post overdose on Tylenol 3. Patient states she took 3 Tylenol threes from her neighbor. Patient states he took it for chronic back pain. According to EMS, patient was lethargic. She did not receive Narcan. Patient states he is feeling much better. She denies chest pain, fever, cough, nausea, vomiting or diarrhea. She denies illicit drugs at this time. She does admit to smoking less than half pack per day. No other complaints. PCP: Lanre Vick MD    No current facility-administered medications for this encounter.      Current Outpatient Medications   Medication Sig Dispense Refill    bumetanide (BUMEX) 1 MG tablet Take 1 mg by mouth daily      folic acid (FOLVITE) 1 MG tablet Take 1 mg by mouth daily      magnesium oxide (MAG-OX) 400 MG tablet Take 1 tablet by mouth daily      pantoprazole (PROTONIX) 40 MG tablet Take 40 mg by mouth every morning (before breakfast)      pentoxifylline (TRENTAL) 400 MG extended release tablet Take 400 mg by mouth 3 times daily (with meals)      thiamine 100 MG tablet Take 100 mg by mouth daily         Past History     Past Medical History:  Past Medical History:   Diagnosis Date    Alcohol abuse     Alcohol withdrawal syndrome (HCC)     Anemia     Asthma     Bile duct calculus     CAP (community acquired pneumonia)     Constipation     GERD (gastroesophageal reflux disease)     Iron deficiency     Liver function test abnormality     Macrocytosis     Vertigo        Past Surgical History:  Past Surgical History:   Procedure Laterality Date    COLONOSCOPY N/A 2/25/2021    COLONOSCOPY with polypectomies

## 2023-06-10 ENCOUNTER — APPOINTMENT (OUTPATIENT)
Facility: HOSPITAL | Age: 58
End: 2023-06-10
Payer: COMMERCIAL

## 2023-06-10 ENCOUNTER — HOSPITAL ENCOUNTER (EMERGENCY)
Facility: HOSPITAL | Age: 58
Discharge: HOME | End: 2023-06-10
Attending: EMERGENCY MEDICINE
Payer: COMMERCIAL

## 2023-06-10 VITALS
OXYGEN SATURATION: 93 % | HEIGHT: 67 IN | SYSTOLIC BLOOD PRESSURE: 112 MMHG | TEMPERATURE: 98.8 F | DIASTOLIC BLOOD PRESSURE: 76 MMHG | HEART RATE: 103 BPM | WEIGHT: 130 LBS | RESPIRATION RATE: 12 BRPM | BODY MASS INDEX: 20.4 KG/M2

## 2023-06-10 DIAGNOSIS — T40.2X1A OPIOID OVERDOSE, ACCIDENTAL OR UNINTENTIONAL, INITIAL ENCOUNTER (HCC): ICD-10-CM

## 2023-06-10 DIAGNOSIS — R40.4 TRANSIENT ALTERATION OF AWARENESS: Primary | ICD-10-CM

## 2023-06-10 LAB
ALBUMIN SERPL-MCNC: 3.5 G/DL (ref 3.4–5)
ALBUMIN/GLOB SERPL: 0.8 (ref 0.8–1.7)
ALP SERPL-CCNC: 204 U/L (ref 45–117)
ALT SERPL-CCNC: 55 U/L (ref 13–56)
ANION GAP SERPL CALC-SCNC: 5 MMOL/L (ref 3–18)
APAP SERPL-MCNC: <2 UG/ML (ref 10–30)
AST SERPL-CCNC: 68 U/L (ref 10–38)
BASOPHILS # BLD: 0 K/UL (ref 0–0.1)
BASOPHILS NFR BLD: 0 % (ref 0–2)
BILIRUB SERPL-MCNC: 0.8 MG/DL (ref 0.2–1)
BUN SERPL-MCNC: 13 MG/DL (ref 7–18)
BUN/CREAT SERPL: 12 (ref 12–20)
CALCIUM SERPL-MCNC: 9.2 MG/DL (ref 8.5–10.1)
CHLORIDE SERPL-SCNC: 108 MMOL/L (ref 100–111)
CO2 SERPL-SCNC: 24 MMOL/L (ref 21–32)
CREAT SERPL-MCNC: 1.05 MG/DL (ref 0.6–1.3)
DIFFERENTIAL METHOD BLD: ABNORMAL
EOSINOPHIL # BLD: 0.2 K/UL (ref 0–0.4)
EOSINOPHIL NFR BLD: 2 % (ref 0–5)
ERYTHROCYTE [DISTWIDTH] IN BLOOD BY AUTOMATED COUNT: 16.5 % (ref 11.6–14.5)
ETHANOL SERPL-MCNC: <3 MG/DL (ref 0–3)
GLOBULIN SER CALC-MCNC: 4.5 G/DL (ref 2–4)
GLUCOSE SERPL-MCNC: 137 MG/DL (ref 74–99)
HCT VFR BLD AUTO: 35.2 % (ref 35–45)
HGB BLD-MCNC: 11.3 G/DL (ref 12–16)
IMM GRANULOCYTES # BLD AUTO: 0 K/UL (ref 0–0.04)
IMM GRANULOCYTES NFR BLD AUTO: 0 % (ref 0–0.5)
LYMPHOCYTES # BLD: 2.4 K/UL (ref 0.9–3.6)
LYMPHOCYTES NFR BLD: 31 % (ref 21–52)
MCH RBC QN AUTO: 29.1 PG (ref 24–34)
MCHC RBC AUTO-ENTMCNC: 32.1 G/DL (ref 31–37)
MCV RBC AUTO: 90.7 FL (ref 78–100)
MONOCYTES # BLD: 0.7 K/UL (ref 0.05–1.2)
MONOCYTES NFR BLD: 9 % (ref 3–10)
NEUTS SEG # BLD: 4.6 K/UL (ref 1.8–8)
NEUTS SEG NFR BLD: 58 % (ref 40–73)
NRBC # BLD: 0 K/UL (ref 0–0.01)
NRBC BLD-RTO: 0 PER 100 WBC
PLATELET # BLD AUTO: 123 K/UL (ref 135–420)
PMV BLD AUTO: 10.9 FL (ref 9.2–11.8)
POTASSIUM SERPL-SCNC: 4.5 MMOL/L (ref 3.5–5.5)
PROT SERPL-MCNC: 8 G/DL (ref 6.4–8.2)
RBC # BLD AUTO: 3.88 M/UL (ref 4.2–5.3)
SALICYLATES SERPL-MCNC: 2 MG/DL (ref 2.8–20)
SODIUM SERPL-SCNC: 137 MMOL/L (ref 136–145)
WBC # BLD AUTO: 7.9 K/UL (ref 4.6–13.2)

## 2023-06-10 PROCEDURE — 80053 COMPREHEN METABOLIC PANEL: CPT

## 2023-06-10 PROCEDURE — 96374 THER/PROPH/DIAG INJ IV PUSH: CPT

## 2023-06-10 PROCEDURE — 80179 DRUG ASSAY SALICYLATE: CPT

## 2023-06-10 PROCEDURE — 99284 EMERGENCY DEPT VISIT MOD MDM: CPT

## 2023-06-10 PROCEDURE — 6360000002 HC RX W HCPCS: Performed by: EMERGENCY MEDICINE

## 2023-06-10 PROCEDURE — 82077 ASSAY SPEC XCP UR&BREATH IA: CPT

## 2023-06-10 PROCEDURE — 71045 X-RAY EXAM CHEST 1 VIEW: CPT

## 2023-06-10 PROCEDURE — 80143 DRUG ASSAY ACETAMINOPHEN: CPT

## 2023-06-10 PROCEDURE — 85025 COMPLETE CBC W/AUTO DIFF WBC: CPT

## 2023-06-10 RX ORDER — NALOXONE HYDROCHLORIDE 0.4 MG/ML
0.4 INJECTION, SOLUTION INTRAMUSCULAR; INTRAVENOUS; SUBCUTANEOUS
Status: COMPLETED | OUTPATIENT
Start: 2023-06-10 | End: 2023-06-10

## 2023-06-10 RX ADMIN — NALOXONE HYDROCHLORIDE 0.4 MG: 0.4 INJECTION, SOLUTION INTRAMUSCULAR; INTRAVENOUS; SUBCUTANEOUS at 15:46

## 2023-06-10 ASSESSMENT — ENCOUNTER SYMPTOMS
EYES NEGATIVE: 1
ABDOMINAL PAIN: 0
CHEST TIGHTNESS: 0

## 2023-06-10 NOTE — DISCHARGE INSTRUCTIONS
It seems you have been exposed to an opioid. Be sure that you are not given something inadvertently and avoid drinking alcohol given your liver disease. Below are some programs that can help you with your substance use disorder and please return if you are at all worsened or concerned.     [facility] Programs in [facility] Fax: [phone], phone: [phone]    [facility] Fax: [phone], phone: [phone]    [facility]:   Rachel durbin - [phone]  [facility]- [address]- [address] [place] [address] Drive- [address]- [phone]    [address] [place] [address]-  Fax: [phone], phone [phone] or [phone] [address]:  [phone]  POC: Usman Mandujano [phone]:  [phone]  POC: Xiomara Riggs [phone]    Peer support warm line [facility]):  [phone]  POC: Angélica Larson  [phone]    [facility]:  [phone]  POC: Alison Parker [phone]

## 2023-06-10 NOTE — ED NOTES
Medicated pt. Monitored and pt stopped snoring and O2 at 96%.      Lavern Hawthorne, ROCCO  06/10/23 0705

## 2023-06-10 NOTE — ED PROVIDER NOTES
EMERGENCY DEPARTMENT HISTORY AND PHYSICAL EXAM    4:46 PM      Date: 6/10/2023  Patient Name: Jan Lakhani    History of Presenting Illness     Chief Complaint   Patient presents with    Heat Exposure    Altered Mental Status       History From: {VVU:30781}  Patient is a 61-year-old female with a history of alcohol dependence, pneumonia, thrombocytopenia, asthma, cirrhosis, smoking, that presents emergency department for being found poorly responsive. The patient was hanging with friends, having some drinks and went inside the house where she started to feel hot and went back outside and said that the last thing that she remembers. The patient denies using any opiates but does state that she was hanging out with friends and cannot be sure if something was not given to her. Patient says she had not taken any pills that she is aware of. The patient denies taking pain medication. The patient does occasionally use marijuana and drinks alcohol and did have some drinks today. The patient is compliant with her medications. Patient denies any other aggravating alleviating factors. Patient denies working at this time. EMS notes that she was drowsy, had pinpoint pupils, and did not have to give her any Narcan. Nursing Notes were all reviewed and agreed with or any disagreements were addressed in the HPI. PCP: Bean Khan MD    No current facility-administered medications for this encounter.      Current Outpatient Medications   Medication Sig Dispense Refill    bumetanide (BUMEX) 1 MG tablet Take 1 mg by mouth daily      folic acid (FOLVITE) 1 MG tablet Take 1 mg by mouth daily      magnesium oxide (MAG-OX) 400 MG tablet Take 1 tablet by mouth daily      pantoprazole (PROTONIX) 40 MG tablet Take 40 mg by mouth every morning (before breakfast)      pentoxifylline (TRENTAL) 400 MG extended release tablet Take 400 mg by mouth 3 times daily (with meals)      thiamine 100 MG tablet Take 100 mg by mouth

## 2023-06-10 NOTE — ED TRIAGE NOTES
Pt arrived via EMS from home after friend called due to pt possibly having heat stroke. Symptoms: fatigue, warm to touch, snoring respirations, and pinpoint pupils.

## 2023-08-14 NOTE — PROGRESS NOTES
Comprehensive Nutrition Assessment    Type and Reason for Visit: Initial, Positive nutrition screen    Nutrition Recommendations/Plan:   - Diet initiation per MD as medically appropriate. Recommend starting Magic Cup, TID once diet started. - Recommend starting thiamine, folic acid and multivitamin supplementation.  - Continue IVF while pt NPO. Nutrition Assessment:  Admitted with Jaundice. NPO for possible procedure, receiving IVF with hx of alcohol abuse. C/o early satiety but also hungry. Malnutrition Assessment:  Malnutrition Status:  Mild malnutrition    Context:  Acute illness     Findings of the 6 clinical characteristics of malnutrition:   Energy Intake:  7 - 50% or less of est energy requirements for 5 or more days  Weight Loss:  Unable to assess     Body Fat Loss:  Unable to assess,     Muscle Mass Loss:  Unable to assess,    Fluid Accumulation:  Unable to assess,     Strength:  Not performed     Nutrition History and Allergies: PMHx- alcohol abuse, anemia, bile duct calculus, CAP, constipation, GERD & iron deficiency. Presented to ED c/o increased swelling in bilateral LE with increased yellowness in her eyes present x 3 weeks. Pt reports decreased appetite & po intake x 2 weeks PTA due to early satiety and abdominal discomfort. Reports UBW of 117# with admission wt of 121# (11/3/21). NKFA. Estimated Daily Nutrient Needs:  Energy (kcal): 5229-7228; Weight Used for Energy Requirements: Current (55 kg)  Protein (g): 55-66; Weight Used for Protein Requirements:  (1-1.2)  Fluid (ml/day): 6612-9951; Method Used for Fluid Requirements: 1 ml/kcal    Nutrition Related Findings:  BM 11/3. Abdominal US with dilated CBD and hepatomegaly. GI was consulted and recommended MRCP. Pertinent medications: NS at 75 mL/hr & bumex. Serum alcohol of 247 upon admission.       Wounds:    None       Current Nutrition Therapies:  DIET NPO    Anthropometric Measures:  · Height:  5' 7\" (170.2 cm)  · Current Body Wt: 54.9 kg (121 lb)   · Admission Body Wt:  121 lb    · Usual Body Wt:  53.1 kg (117 lb) (pt report)     · Ideal Body Wt:  135 lbs:  89.6 %   · BMI Category:  Normal weight (BMI 18.5-24. 9)       Nutrition Diagnosis:   · Inadequate oral intake related to early satiety, altered GI function (excessive alcohol intake) as evidenced by NPO or clear liquid status due to medical condition, poor intake prior to admission    Nutrition Interventions:   Food and/or Nutrient Delivery: Continue NPO, IV fluid delivery  Nutrition Education and Counseling: No recommendations at this time, Education not indicated  Coordination of Nutrition Care: Continue to monitor while inpatient    Goals:  PO nutrition intake will meet >75% of patient estimated nutritional needs within the next 7 days. Nutrition Monitoring and Evaluation:   Behavioral-Environmental Outcomes: None identified  Food/Nutrient Intake Outcomes: Diet advancement/tolerance, IVF intake  Physical Signs/Symptoms Outcomes: Biochemical data, GI status, Meal time behavior, Nutrition focused physical findings    Discharge Planning:     Too soon to determine     Electronically signed by Betzy Paredes RD on 11/3/2021 at 2:34 PM    Contact: [phone] No

## 2023-10-12 ENCOUNTER — HOSPITAL ENCOUNTER (OUTPATIENT)
Facility: HOSPITAL | Age: 58
Discharge: HOME | End: 2023-10-12
Payer: COMMERCIAL

## 2023-10-12 DIAGNOSIS — F17.210 CIGARETTE SMOKER: ICD-10-CM

## 2023-10-12 PROCEDURE — 71271 CT THORAX LUNG CANCER SCR C-: CPT

## 2023-10-31 ENCOUNTER — PROCEDURE VISIT (OUTPATIENT)
Age: 58
End: 2023-10-31
Payer: COMMERCIAL

## 2023-10-31 VITALS — HEIGHT: 67 IN | WEIGHT: 122 LBS | BODY MASS INDEX: 19.15 KG/M2

## 2023-10-31 DIAGNOSIS — J45.909 ASTHMA, UNSPECIFIED ASTHMA SEVERITY, UNSPECIFIED WHETHER COMPLICATED, UNSPECIFIED WHETHER PERSISTENT: Primary | ICD-10-CM

## 2023-10-31 PROCEDURE — 94060 EVALUATION OF WHEEZING: CPT | Performed by: INTERNAL MEDICINE

## 2023-11-15 ENCOUNTER — OFFICE VISIT (OUTPATIENT)
Age: 58
End: 2023-11-15
Payer: COMMERCIAL

## 2023-11-15 VITALS
WEIGHT: 121.6 LBS | BODY MASS INDEX: 19.09 KG/M2 | HEIGHT: 67 IN | SYSTOLIC BLOOD PRESSURE: 138 MMHG | DIASTOLIC BLOOD PRESSURE: 79 MMHG | HEART RATE: 95 BPM | TEMPERATURE: 99.6 F | OXYGEN SATURATION: 97 % | RESPIRATION RATE: 16 BRPM

## 2023-11-15 DIAGNOSIS — J44.9 COPD, SEVERE (HCC): Primary | ICD-10-CM

## 2023-11-15 DIAGNOSIS — J98.4 CAVITARY LESION OF LUNG: ICD-10-CM

## 2023-11-15 DIAGNOSIS — F17.200 CURRENT SMOKER: ICD-10-CM

## 2023-11-15 PROCEDURE — 99204 OFFICE O/P NEW MOD 45 MIN: CPT | Performed by: INTERNAL MEDICINE

## 2023-11-15 RX ORDER — ALBUTEROL SULFATE 90 UG/1
AEROSOL, METERED RESPIRATORY (INHALATION)
COMMUNITY
Start: 2023-10-19

## 2023-11-15 RX ORDER — UMECLIDINIUM BROMIDE AND VILANTEROL TRIFENATATE 62.5; 25 UG/1; UG/1
1 POWDER RESPIRATORY (INHALATION) DAILY
Qty: 1 EACH | Refills: 0 | Status: SHIPPED | COMMUNITY
Start: 2023-11-15

## 2023-11-15 RX ORDER — FLUTICASONE PROPIONATE AND SALMETEROL 50; 250 UG/1; UG/1
POWDER RESPIRATORY (INHALATION)
COMMUNITY
Start: 2023-10-19 | End: 2023-11-15

## 2023-11-15 RX ORDER — HYDROXYZINE HYDROCHLORIDE 25 MG/1
TABLET, FILM COATED ORAL
COMMUNITY
Start: 2023-10-20

## 2023-11-15 RX ORDER — TIZANIDINE 4 MG/1
TABLET ORAL
COMMUNITY
Start: 2023-10-20

## 2023-11-15 RX ORDER — UMECLIDINIUM BROMIDE AND VILANTEROL TRIFENATATE 62.5; 25 UG/1; UG/1
1 POWDER RESPIRATORY (INHALATION) DAILY
Qty: 1 EACH | Refills: 5 | Status: SHIPPED | OUTPATIENT
Start: 2023-11-15

## 2023-11-15 ASSESSMENT — ENCOUNTER SYMPTOMS
BACK PAIN: 0
WHEEZING: 1
CONSTIPATION: 0
NAUSEA: 0
EYE ITCHING: 0
VOICE CHANGE: 0
RHINORRHEA: 0
SHORTNESS OF BREATH: 1
EYE PAIN: 0
EYE REDNESS: 0
STRIDOR: 0
CHOKING: 0
CHEST TIGHTNESS: 0
EYE DISCHARGE: 0
SORE THROAT: 0
VOMITING: 0
TROUBLE SWALLOWING: 0
COUGH: 1
ABDOMINAL PAIN: 0
COLOR CHANGE: 0
DIARRHEA: 0
BLOOD IN STOOL: 0
APNEA: 0
FACIAL SWELLING: 0
PHOTOPHOBIA: 0

## 2023-11-15 ASSESSMENT — PATIENT HEALTH QUESTIONNAIRE - PHQ9
9. THOUGHTS THAT YOU WOULD BE BETTER OFF DEAD, OR OF HURTING YOURSELF: 1
4. FEELING TIRED OR HAVING LITTLE ENERGY: 3
6. FEELING BAD ABOUT YOURSELF - OR THAT YOU ARE A FAILURE OR HAVE LET YOURSELF OR YOUR FAMILY DOWN: 3
SUM OF ALL RESPONSES TO PHQ9 QUESTIONS 1 & 2: 6
5. POOR APPETITE OR OVEREATING: 3
1. LITTLE INTEREST OR PLEASURE IN DOING THINGS: 3
8. MOVING OR SPEAKING SO SLOWLY THAT OTHER PEOPLE COULD HAVE NOTICED. OR THE OPPOSITE, BEING SO FIGETY OR RESTLESS THAT YOU HAVE BEEN MOVING AROUND A LOT MORE THAN USUAL: 0
10. IF YOU CHECKED OFF ANY PROBLEMS, HOW DIFFICULT HAVE THESE PROBLEMS MADE IT FOR YOU TO DO YOUR WORK, TAKE CARE OF THINGS AT HOME, OR GET ALONG WITH OTHER PEOPLE: 1
3. TROUBLE FALLING OR STAYING ASLEEP: 3
2. FEELING DOWN, DEPRESSED OR HOPELESS: 3
SUM OF ALL RESPONSES TO PHQ QUESTIONS 1-9: 20
SUM OF ALL RESPONSES TO PHQ QUESTIONS 1-9: 20
7. TROUBLE CONCENTRATING ON THINGS, SUCH AS READING THE NEWSPAPER OR WATCHING TELEVISION: 1
SUM OF ALL RESPONSES TO PHQ QUESTIONS 1-9: 19
SUM OF ALL RESPONSES TO PHQ QUESTIONS 1-9: 20

## 2023-11-15 ASSESSMENT — COLUMBIA-SUICIDE SEVERITY RATING SCALE - C-SSRS
1. WITHIN THE PAST MONTH, HAVE YOU WISHED YOU WERE DEAD OR WISHED YOU COULD GO TO SLEEP AND NOT WAKE UP?: YES
2. HAVE YOU ACTUALLY HAD ANY THOUGHTS OF KILLING YOURSELF?: NO
6. HAVE YOU EVER DONE ANYTHING, STARTED TO DO ANYTHING, OR PREPARED TO DO ANYTHING TO END YOUR LIFE?: NO

## 2023-11-15 NOTE — PROGRESS NOTES
Luda Nichols presents today for   Chief Complaint   Patient presents with    New Patient     Referred by Dr. Savannah Hartley for Asthma/COPD    Shortness of Breath    Cough       Is someone accompanying this pt? no    Is the patient using any DME equipment during OV? no    -DME Company n/a    Depression Screenin/15/2023     9:18 AM   PHQ-9 Questionaire   Little interest or pleasure in doing things 3   Feeling down, depressed, or hopeless 3   Trouble falling or staying asleep, or sleeping too much 3   Feeling tired or having little energy 3   Poor appetite or overeating 3   Feeling bad about yourself - or that you are a failure or have let yourself or your family down 3   Trouble concentrating on things, such as reading the newspaper or watching television 1   Moving or speaking so slowly that other people could have noticed. Or the opposite - being so fidgety or restless that you have been moving around a lot more than usual 0   Thoughts that you would be better off dead, or of hurting yourself in some way 1   PHQ-9 Total Score 20   If you checked off any problems, how difficult have these problems made it for you to do your work, take care of things at home, or get along with other people? 1       Learning Needs Questionnaire:     Who is the primary learner? Patient    What is the preferred language for health care of the primary learner? ENGLISH    How does the primary learner prefer to learn new concepts? DEMONSTRATION    Answered By patient    Relationship to Learner SELF          Fall Risk:         11/15/2023     9:25 AM   Fall Risk   2 or more falls in past year? yes   Fall with injury in past year? no        Abuse Screenin/15/2023     9:00 AM   AMB Abuse Screening   Do you ever feel afraid of your partner? N   Are you in a relationship with someone who physically or mentally threatens you? N   Is it safe for you to go home? Y         Coordination of Care:    1.  Have you been to the
Pulmonary effort is normal. No respiratory distress. Breath sounds: No stridor. Wheezing (L mid lung fields) present. No rhonchi or rales. Chest:      Chest wall: No tenderness. Abdominal:      General: Abdomen is flat. Palpations: Abdomen is soft. There is no mass. Tenderness: There is no abdominal tenderness. Musculoskeletal:         General: No swelling, tenderness, deformity or signs of injury. Cervical back: No rigidity or tenderness. Right lower leg: No edema. Left lower leg: No edema. Lymphadenopathy:      Cervical: No cervical adenopathy. Skin:     General: Skin is warm and dry. Coloration: Skin is not jaundiced or pale. Findings: No bruising, erythema, lesion or rash. Neurological:      General: No focal deficit present. Mental Status: She is alert and oriented to person, place, and time. Coordination: Coordination normal.      Gait: Gait normal.   Psychiatric:         Behavior: Behavior normal.         Thought Content: Thought content normal.         Judgment: Judgment normal.      Comments: Easily tearful during history                    CT Result (most recent):  CT LUNG SCREENING 10/12/2023    Narrative  EXAM:  CT Chest without Contrast - Low Dose Screening CT. CLINICAL INDICATION:    - Screening.  - 20 years smoking, current smoker, currently 1/2 ppd, asymptomatic. COMPARISON:  08/30/22. TECHNIQUE:  Low dose unenhanced multislice helical CT is performed from the  thoracic inlet to the adrenal glands without intravenous contrast  administration. Coronal and sagittal reformations are also generated. Dose  reduction techniques are used, including automated exposure control, adjustment  of the mAs and/or kVp according to patient size, standardized low-dose protocol,  and/or iterative reconstruction technique. FINDINGS:    Technique Assessment:  Motion degraded.     Lungs:    - 30 x 23 mm right upper lobe cavitary lesion with

## 2023-12-27 ENCOUNTER — APPOINTMENT (OUTPATIENT)
Facility: HOSPITAL | Age: 58
End: 2023-12-27
Attending: INTERNAL MEDICINE
Payer: COMMERCIAL

## 2023-12-27 ENCOUNTER — HOSPITAL ENCOUNTER (OUTPATIENT)
Facility: HOSPITAL | Age: 58
Discharge: HOME | End: 2023-12-27
Attending: INTERNAL MEDICINE | Admitting: RADIOLOGY
Payer: COMMERCIAL

## 2023-12-27 VITALS
HEART RATE: 89 BPM | OXYGEN SATURATION: 97 % | RESPIRATION RATE: 23 BRPM | TEMPERATURE: 98.2 F | DIASTOLIC BLOOD PRESSURE: 67 MMHG | SYSTOLIC BLOOD PRESSURE: 100 MMHG

## 2023-12-27 DIAGNOSIS — J98.4 CAVITARY LESION OF LUNG: ICD-10-CM

## 2023-12-27 LAB
ANION GAP SERPL CALC-SCNC: 4 MMOL/L (ref 3–18)
APTT PPP: 34.3 SEC (ref 23–36.4)
BASOPHILS # BLD: 0 K/UL (ref 0–0.1)
BASOPHILS NFR BLD: 1 % (ref 0–2)
BUN SERPL-MCNC: 17 MG/DL (ref 7–18)
BUN/CREAT SERPL: 19 (ref 12–20)
CALCIUM SERPL-MCNC: 10 MG/DL (ref 8.5–10.1)
CHLORIDE SERPL-SCNC: 110 MMOL/L (ref 100–111)
CO2 SERPL-SCNC: 24 MMOL/L (ref 21–32)
CREAT SERPL-MCNC: 0.88 MG/DL (ref 0.6–1.3)
DIFFERENTIAL METHOD BLD: ABNORMAL
EOSINOPHIL # BLD: 0.2 K/UL (ref 0–0.4)
EOSINOPHIL NFR BLD: 3 % (ref 0–5)
ERYTHROCYTE [DISTWIDTH] IN BLOOD BY AUTOMATED COUNT: 13.8 % (ref 11.6–14.5)
GLUCOSE SERPL-MCNC: 109 MG/DL (ref 74–99)
HCT VFR BLD AUTO: 36.9 % (ref 35–45)
HGB BLD-MCNC: 12.3 G/DL (ref 12–16)
IMM GRANULOCYTES # BLD AUTO: 0 K/UL (ref 0–0.04)
IMM GRANULOCYTES NFR BLD AUTO: 0 % (ref 0–0.5)
INR PPP: 1.1 (ref 0.9–1.1)
LYMPHOCYTES # BLD: 2.2 K/UL (ref 0.9–3.6)
LYMPHOCYTES NFR BLD: 38 % (ref 21–52)
MCH RBC QN AUTO: 32.9 PG (ref 24–34)
MCHC RBC AUTO-ENTMCNC: 33.3 G/DL (ref 31–37)
MCV RBC AUTO: 98.7 FL (ref 78–100)
MONOCYTES # BLD: 0.5 K/UL (ref 0.05–1.2)
MONOCYTES NFR BLD: 9 % (ref 3–10)
NEUTS SEG # BLD: 2.9 K/UL (ref 1.8–8)
NEUTS SEG NFR BLD: 49 % (ref 40–73)
NRBC # BLD: 0 K/UL (ref 0–0.01)
NRBC BLD-RTO: 0 PER 100 WBC
PLATELET # BLD AUTO: 155 K/UL (ref 135–420)
PMV BLD AUTO: 10.4 FL (ref 9.2–11.8)
POTASSIUM SERPL-SCNC: 4.6 MMOL/L (ref 3.5–5.5)
PROTHROMBIN TIME: 14.5 SEC (ref 11.9–14.7)
RBC # BLD AUTO: 3.74 M/UL (ref 4.2–5.3)
SODIUM SERPL-SCNC: 138 MMOL/L (ref 136–145)
WBC # BLD AUTO: 5.8 K/UL (ref 4.6–13.2)

## 2023-12-27 PROCEDURE — 88305 TISSUE EXAM BY PATHOLOGIST: CPT

## 2023-12-27 PROCEDURE — 88334 PATH CONSLTJ SURG CYTO XM EA: CPT

## 2023-12-27 PROCEDURE — 71045 X-RAY EXAM CHEST 1 VIEW: CPT

## 2023-12-27 PROCEDURE — 85025 COMPLETE CBC W/AUTO DIFF WBC: CPT

## 2023-12-27 PROCEDURE — 80048 BASIC METABOLIC PNL TOTAL CA: CPT

## 2023-12-27 PROCEDURE — 87206 SMEAR FLUORESCENT/ACID STAI: CPT

## 2023-12-27 PROCEDURE — 88333 PATH CONSLTJ SURG CYTO XM 1: CPT

## 2023-12-27 PROCEDURE — 6370000000 HC RX 637 (ALT 250 FOR IP): Performed by: RADIOLOGY

## 2023-12-27 PROCEDURE — 87116 MYCOBACTERIA CULTURE: CPT

## 2023-12-27 PROCEDURE — 2500000003 HC RX 250 WO HCPCS: Performed by: RADIOLOGY

## 2023-12-27 PROCEDURE — 6360000002 HC RX W HCPCS: Performed by: RADIOLOGY

## 2023-12-27 PROCEDURE — 85610 PROTHROMBIN TIME: CPT

## 2023-12-27 PROCEDURE — 87075 CULTR BACTERIA EXCEPT BLOOD: CPT

## 2023-12-27 PROCEDURE — 87070 CULTURE OTHR SPECIMN AEROBIC: CPT

## 2023-12-27 PROCEDURE — 87102 FUNGUS ISOLATION CULTURE: CPT

## 2023-12-27 PROCEDURE — 87205 SMEAR GRAM STAIN: CPT

## 2023-12-27 PROCEDURE — 99152 MOD SED SAME PHYS/QHP 5/>YRS: CPT

## 2023-12-27 PROCEDURE — 85730 THROMBOPLASTIN TIME PARTIAL: CPT

## 2023-12-27 PROCEDURE — 2580000003 HC RX 258: Performed by: RADIOLOGY

## 2023-12-27 RX ORDER — FENTANYL CITRATE 50 UG/ML
INJECTION, SOLUTION INTRAMUSCULAR; INTRAVENOUS
Status: DISCONTINUED
Start: 2023-12-27 | End: 2023-12-27 | Stop reason: HOSPADM

## 2023-12-27 RX ORDER — MIDAZOLAM HYDROCHLORIDE 1 MG/ML
INJECTION INTRAMUSCULAR; INTRAVENOUS
Status: DISCONTINUED
Start: 2023-12-27 | End: 2023-12-27 | Stop reason: HOSPADM

## 2023-12-27 RX ORDER — MIDAZOLAM HYDROCHLORIDE 2 MG/2ML
INJECTION, SOLUTION INTRAMUSCULAR; INTRAVENOUS PRN
Status: COMPLETED | OUTPATIENT
Start: 2023-12-27 | End: 2023-12-27

## 2023-12-27 RX ORDER — SODIUM CHLORIDE 9 MG/ML
INJECTION, SOLUTION INTRAVENOUS CONTINUOUS
Status: DISCONTINUED | OUTPATIENT
Start: 2023-12-27 | End: 2023-12-27 | Stop reason: HOSPADM

## 2023-12-27 RX ORDER — ACETAMINOPHEN 325 MG/1
650 TABLET ORAL EVERY 4 HOURS PRN
Status: DISCONTINUED | OUTPATIENT
Start: 2023-12-27 | End: 2023-12-27 | Stop reason: HOSPADM

## 2023-12-27 RX ORDER — LIDOCAINE HYDROCHLORIDE 10 MG/ML
INJECTION, SOLUTION EPIDURAL; INFILTRATION; INTRACAUDAL; PERINEURAL PRN
Status: COMPLETED | OUTPATIENT
Start: 2023-12-27 | End: 2023-12-27

## 2023-12-27 RX ORDER — FENTANYL CITRATE 50 UG/ML
INJECTION, SOLUTION INTRAMUSCULAR; INTRAVENOUS PRN
Status: COMPLETED | OUTPATIENT
Start: 2023-12-27 | End: 2023-12-27

## 2023-12-27 RX ADMIN — MIDAZOLAM HYDROCHLORIDE 1 MG: 1 INJECTION, SOLUTION INTRAMUSCULAR; INTRAVENOUS at 10:18

## 2023-12-27 RX ADMIN — SODIUM CHLORIDE: 9 INJECTION, SOLUTION INTRAVENOUS at 07:53

## 2023-12-27 RX ADMIN — FENTANYL CITRATE 50 MCG: 50 INJECTION INTRAMUSCULAR; INTRAVENOUS at 10:18

## 2023-12-27 RX ADMIN — LIDOCAINE HYDROCHLORIDE 10 ML: 10 INJECTION, SOLUTION EPIDURAL; INFILTRATION; INTRACAUDAL; PERINEURAL at 10:15

## 2023-12-27 RX ADMIN — FENTANYL CITRATE 25 MCG: 50 INJECTION INTRAMUSCULAR; INTRAVENOUS at 10:26

## 2023-12-27 RX ADMIN — FENTANYL CITRATE 50 MCG: 50 INJECTION INTRAMUSCULAR; INTRAVENOUS at 10:12

## 2023-12-27 RX ADMIN — LIDOCAINE HYDROCHLORIDE 7 ML: 10 INJECTION, SOLUTION EPIDURAL; INFILTRATION; INTRACAUDAL; PERINEURAL at 10:27

## 2023-12-27 RX ADMIN — MIDAZOLAM HYDROCHLORIDE 1 MG: 1 INJECTION, SOLUTION INTRAMUSCULAR; INTRAVENOUS at 10:12

## 2023-12-27 RX ADMIN — ACETAMINOPHEN 325MG 650 MG: 325 TABLET ORAL at 13:09

## 2023-12-27 RX ADMIN — MIDAZOLAM HYDROCHLORIDE 0.5 MG: 1 INJECTION, SOLUTION INTRAMUSCULAR; INTRAVENOUS at 10:26

## 2023-12-27 NOTE — DISCHARGE INSTRUCTIONS
not drink alcoholic beverages  If you have not urinated within 8 hours after discharge, please contact your surgeon on call. Report the following to your surgeon:  Excessive pain, swelling, redness or odor of or around the surgical area  Temperature over 100.5  Nausea and vomiting lasting longer than 4 hours or if unable to take medications  Any signs of decreased circulation or nerve impairment to extremity: change in color, persistent  numbness, tingling, coldness or increase pain  Any questions      These are general instructions for a healthy lifestyle:    No smoking/ No tobacco products/ Avoid exposure to second hand smoke  Surgeon General's Warning:  Quitting smoking now greatly reduces serious risk to your health. Obesity, smoking, and sedentary lifestyle greatly increases your risk for illness    A healthy diet, regular physical exercise & weight monitoring are important for maintaining a healthy lifestyle    You may be retaining fluid if you have a history of heart failure or if you experience any of the following symptoms:  Weight gain of 3 pounds or more overnight or 5 pounds in a week, increased swelling in our hands or feet or shortness of breath while lying flat in bed. Please call your doctor as soon as you notice any of these symptoms; do not wait until your next office visit. The discharge information has been reviewed with the patient and son. The patient and son verbalized understanding. Discharge medications reviewed with the patient and son and appropriate educational materials and side effects teaching were provided.   ___________________________________________________________________________________________________________________________________

## 2023-12-27 NOTE — H&P
History and Physical    Patient: Josiane Benson           Sex: female       DOA: 12/27/2023  YOB: 1965      Age:  63 y.o.     LOS:  LOS: 0 days        HPI:     Josiane Benson is a 63 y.o. female with a right lung cavitary mass who presents for an image guided right lung mass biopsy. Past Medical History:   Diagnosis Date    Alcohol abuse     Alcohol withdrawal syndrome (HCC)     Anemia     Asthma     Bile duct calculus     CAP (community acquired pneumonia)     Constipation     GERD (gastroesophageal reflux disease)     Iron deficiency     Liver function test abnormality     Macrocytosis     Vertigo        Past Surgical History:   Procedure Laterality Date    COLONOSCOPY N/A 2/25/2021    COLONOSCOPY with polypectomies performed by Cezar Ann MD at [facility]       Family History   Problem Relation Age of Onset    No Known Problems Mother     No Known Problems Father     Cancer Sister         uterine? No Known Problems Maternal Grandmother     No Known Problems Maternal Grandfather     No Known Problems Paternal Grandmother     No Known Problems Paternal Grandfather     Cancer Daughter         uterine? Asthma Daughter     Asthma Son        Social History     Socioeconomic History    Marital status: Single   Tobacco Use    Smoking status: Every Day     Current packs/day: 1.00     Average packs/day: 1 pack/day for 30.0 years (30.0 ttl pk-yrs)     Types: Cigarettes    Smokeless tobacco: Never    Tobacco comments:     Pt states that she is down to less than a half pack per day. Vaping Use    Vaping Use: Never used   Substance and Sexual Activity    Alcohol use: Yes     Alcohol/week: 3.0 standard drinks of alcohol    Drug use: Not Currently    Sexual activity: Not Currently       Prior to Admission medications    Medication Sig Start Date End Date Taking?  Authorizing Provider   albuterol sulfate HFA (PROVENTIL;VENTOLIN;PROAIR) 108 (90 Base) MCG/ACT inhaler INHALE 1-2 PUFFS BY MOUTH

## 2023-12-27 NOTE — PROCEDURES
RADIOLOGY POST PROCEDURE NOTE     December 27, 2023       10:41 AM     Preoperative Diagnosis:   RUL nodule    Postoperative Diagnosis:  Same. : Greta Angelo MD    Assistant:  None. Type of Anesthesia: moderate sedation    Procedure/Description:  Image-guided bx RUL mass    Findings:   pending. Estimated blood Loss:  Minimal    Specimen Removed:    pathology and culture    Blood transfusions:  None. Implants:  None. Complications: None    Condition: Stable    Blood thinning medications: OK to resume as clinically indicated    Discharge Plan:  discharge home in 3 hours. Serial CXRs.     Greta Angelo MD

## 2023-12-27 NOTE — OR NURSING
TRANSFER - OUT REPORT:    Verbal report given to ROCCO Luis on Tony Kearney  being transferred to phase II for routine post-op       Report consisted of patient's Situation, Background, Assessment and   Recommendations(SBAR). Information from the following report(s) Nurse Handoff Report was reviewed with the receiving nurse. Lines:   Peripheral IV 12/27/23 Proximal;Right; Anterior Forearm (Active)        Opportunity for questions and clarification was provided.       Patient transported with:  Registered Nurse

## 2023-12-28 LAB
ACID FAST STN SPEC: NEGATIVE
MYCOBACTERIUM SPEC QL CULT: NORMAL
SPECIMEN PREPARATION: NORMAL
SPECIMEN SOURCE: NORMAL

## 2023-12-31 LAB
BACTERIA SPEC CULT: NORMAL
BACTERIA SPEC CULT: NORMAL
GRAM STN SPEC: NORMAL
GRAM STN SPEC: NORMAL
SERVICE CMNT-IMP: NORMAL
SERVICE CMNT-IMP: NORMAL

## 2024-01-01 LAB
BACTERIA SPEC CULT: NORMAL
SERVICE CMNT-IMP: NORMAL

## 2024-01-08 LAB
BACTERIA SPEC CULT: NORMAL
SERVICE CMNT-IMP: NORMAL

## 2024-01-10 ENCOUNTER — TELEPHONE (OUTPATIENT)
Age: 59
End: 2024-01-10

## 2024-01-10 NOTE — TELEPHONE ENCOUNTER
Patient was unable to make her appt today; dt not feeling well.  Pt would like  Dr Castro to be asked if her next scheduled appointment on April 23rd is to far away to discuss 12.27.23 biopsy results.  Please contact pt  at [phone] to advise her of Dr Castro response - ADS

## 2024-01-15 LAB
BACTERIA SPEC CULT: NORMAL
SERVICE CMNT-IMP: NORMAL

## 2024-01-16 ENCOUNTER — OFFICE VISIT (OUTPATIENT)
Age: 59
End: 2024-01-16
Payer: MEDICAID

## 2024-01-16 VITALS
DIASTOLIC BLOOD PRESSURE: 72 MMHG | OXYGEN SATURATION: 94 % | RESPIRATION RATE: 18 BRPM | HEART RATE: 97 BPM | WEIGHT: 121.8 LBS | HEIGHT: 67 IN | TEMPERATURE: 98.3 F | SYSTOLIC BLOOD PRESSURE: 112 MMHG | BODY MASS INDEX: 19.12 KG/M2

## 2024-01-16 DIAGNOSIS — J44.9 COPD, SEVERE (HCC): ICD-10-CM

## 2024-01-16 DIAGNOSIS — F17.200 CURRENT SMOKER: ICD-10-CM

## 2024-01-16 DIAGNOSIS — B47.9 MYCETOMA: Primary | ICD-10-CM

## 2024-01-16 PROCEDURE — 99214 OFFICE O/P EST MOD 30 MIN: CPT | Performed by: INTERNAL MEDICINE

## 2024-01-16 RX ORDER — BUDESONIDE AND FORMOTEROL FUMARATE DIHYDRATE 80; 4.5 UG/1; UG/1
2 AEROSOL RESPIRATORY (INHALATION) 2 TIMES DAILY
Qty: 10.2 G | Refills: 5 | Status: SHIPPED | OUTPATIENT
Start: 2024-01-16

## 2024-01-16 RX ORDER — VORICONAZOLE 200 MG/1
TABLET, FILM COATED ORAL
COMMUNITY
Start: 2024-01-09

## 2024-01-16 RX ORDER — FLUTICASONE PROPIONATE AND SALMETEROL 50; 250 UG/1; UG/1
POWDER RESPIRATORY (INHALATION)
COMMUNITY
End: 2024-01-16

## 2024-01-16 ASSESSMENT — ENCOUNTER SYMPTOMS
EYE REDNESS: 0
CHOKING: 0
SORE THROAT: 0
PHOTOPHOBIA: 0
BLOOD IN STOOL: 0
STRIDOR: 0
DIARRHEA: 0
CONSTIPATION: 0
ABDOMINAL PAIN: 0
FACIAL SWELLING: 0
BACK PAIN: 0
EYE PAIN: 0
WHEEZING: 1
COLOR CHANGE: 0
TROUBLE SWALLOWING: 0
SHORTNESS OF BREATH: 1
APNEA: 0
VOMITING: 0
EYE DISCHARGE: 0
CHEST TIGHTNESS: 0
RHINORRHEA: 0
COUGH: 1
EYE ITCHING: 0
VOICE CHANGE: 0
NAUSEA: 0

## 2024-01-16 NOTE — PROGRESS NOTES
Ariella Ny presents today for   Chief Complaint   Patient presents with    COPD    Follow-up     Cavitary lesion of lung     Results     Lung Biopsy done 23       Is someone accompanying this pt? No    Is the patient using any DME equipment during OV? No    -DME Company NA    Depression Screenin/15/2023     9:18 AM   PHQ-9 Questionaire   Little interest or pleasure in doing things 3   Feeling down, depressed, or hopeless 3   Trouble falling or staying asleep, or sleeping too much 3   Feeling tired or having little energy 3   Poor appetite or overeating 3   Feeling bad about yourself - or that you are a failure or have let yourself or your family down 3   Trouble concentrating on things, such as reading the newspaper or watching television 1   Moving or speaking so slowly that other people could have noticed. Or the opposite - being so fidgety or restless that you have been moving around a lot more than usual 0   Thoughts that you would be better off dead, or of hurting yourself in some way 1   PHQ-9 Total Score 20   If you checked off any problems, how difficult have these problems made it for you to do your work, take care of things at home, or get along with other people? 1       Learning Needs Questionnaire:     Who is the primary learner? Patient    What is the preferred language for health care of the primary learner? ENGLISH    How does the primary learner prefer to learn new concepts? DEMONSTRATION    Answered By Patient    Relationship to Learner SELF          Fall Risk:         11/15/2023     9:25 AM   Fall Risk   2 or more falls in past year? yes   Fall with injury in past year? no        Abuse Screenin/15/2023     9:00 AM   AMB Abuse Screening   Do you ever feel afraid of your partner? N   Are you in a relationship with someone who physically or mentally threatens you? N   Is it safe for you to go home? Y         Coordination of Care:    1. Have you been to the ER, urgent 
no complication.    Estimated blood loss:  2 ml    Specimen removed:  5 20-gauge core biopsy specimens, 4 in formalin and one in a  sterile cup sent for aerobic, anaerobic, fungal and AFB cultures    Drains:  None    Implants:  None.    Complications:  None    Moderate sedation time:  30 minutes    Condition:  Stable    Disposition:  3 hours observation with serial chest radiographs. Hopefully the  patient can be discharged home in 3 hours.    Conclusion:    CT-guided biopsy cavitary right upper lobe nodule as described above.    All CT scans at this facility are performed using dose optimization technique as  appropriate to a performed exam, to include automated exposure control,  adjustment of the mA and/or KV according to patient's size (including  appropriate matching for site-specific examinations), or use of iterative  reconstruction technique.                                          [facility]          [address] [facility]                 [address] [address]              [place] [address]                [place] [address] [phone] Fax#[phone] Fax#[phone] Fax#[phone]                                                SURGICAL PATHOLOGY REPORT        Patient:  BECK JARRETT                     Specimen #:  [id number]  Age:  1965 (Age: 58)                        Date of Procedure:  12/27/2023  Sex:  F                           Date of Receipt:  12/27/2023  Hospital#:  [id number]                  Date of Report: 12/28/2023  Med. Record #:  [id number]  Location:  [facility] ([facility])      Physician(s):  EVER Adams MD          PRE-OPERATIVE DIAGNOSIS:  COPD  Smoker  Rule out infection      TYPE OF SPECIMEN:  A: RIGHT

## 2024-01-22 LAB
BACTERIA SPEC CULT: NORMAL
SERVICE CMNT-IMP: NORMAL

## 2024-01-29 LAB
BACTERIA SPEC CULT: NORMAL
SERVICE CMNT-IMP: NORMAL

## 2024-02-10 LAB
ACID FAST STN SPEC: NEGATIVE
MYCOBACTERIUM SPEC QL CULT: NEGATIVE
SPECIMEN PREPARATION: NORMAL
SPECIMEN SOURCE: NORMAL

## 2024-03-29 ENCOUNTER — APPOINTMENT (OUTPATIENT)
Facility: HOSPITAL | Age: 59
DRG: 140 | End: 2024-03-29
Payer: MEDICAID

## 2024-03-29 ENCOUNTER — HOSPITAL ENCOUNTER (INPATIENT)
Facility: HOSPITAL | Age: 59
LOS: 3 days | Discharge: HOME | DRG: 140 | End: 2024-04-01
Attending: EMERGENCY MEDICINE | Admitting: STUDENT IN AN ORGANIZED HEALTH CARE EDUCATION/TRAINING PROGRAM
Payer: MEDICAID

## 2024-03-29 DIAGNOSIS — J96.00 ACUTE RESPIRATORY FAILURE, UNSPECIFIED WHETHER WITH HYPOXIA OR HYPERCAPNIA (HCC): Primary | ICD-10-CM

## 2024-03-29 PROBLEM — J44.1 COPD WITH ACUTE EXACERBATION (HCC): Status: ACTIVE | Noted: 2024-03-29

## 2024-03-29 LAB
ALBUMIN SERPL-MCNC: 3.7 G/DL (ref 3.4–5)
ALBUMIN/GLOB SERPL: 0.9 (ref 0.8–1.7)
ALP SERPL-CCNC: 202 U/L (ref 45–117)
ALT SERPL-CCNC: 45 U/L (ref 13–56)
ANION GAP SERPL CALC-SCNC: 6 MMOL/L (ref 3–18)
ARTERIAL PATENCY WRIST A: POSITIVE
AST SERPL-CCNC: 73 U/L (ref 10–38)
BASE DEFICIT BLD-SCNC: 4.7 MMOL/L
BASOPHILS # BLD: 0 K/UL (ref 0–0.1)
BASOPHILS NFR BLD: 0 % (ref 0–2)
BDY SITE: ABNORMAL
BILIRUB SERPL-MCNC: 0.6 MG/DL (ref 0.2–1)
BUN SERPL-MCNC: 16 MG/DL (ref 7–18)
BUN/CREAT SERPL: 18 (ref 12–20)
CALCIUM SERPL-MCNC: 9.1 MG/DL (ref 8.5–10.1)
CHLORIDE SERPL-SCNC: 113 MMOL/L (ref 100–111)
CO2 SERPL-SCNC: 23 MMOL/L (ref 21–32)
CREAT SERPL-MCNC: 0.89 MG/DL (ref 0.6–1.3)
DIFFERENTIAL METHOD BLD: ABNORMAL
EKG ATRIAL RATE: 106 BPM
EKG DIAGNOSIS: NORMAL
EKG P AXIS: 81 DEGREES
EKG P-R INTERVAL: 132 MS
EKG Q-T INTERVAL: 340 MS
EKG QRS DURATION: 66 MS
EKG QTC CALCULATION (BAZETT): 451 MS
EKG R AXIS: 71 DEGREES
EKG T AXIS: 71 DEGREES
EKG VENTRICULAR RATE: 106 BPM
EOSINOPHIL # BLD: 0.1 K/UL (ref 0–0.4)
EOSINOPHIL NFR BLD: 2 % (ref 0–5)
ERYTHROCYTE [DISTWIDTH] IN BLOOD BY AUTOMATED COUNT: 13.8 % (ref 11.6–14.5)
FLUAV RNA SPEC QL NAA+PROBE: NOT DETECTED
FLUBV RNA SPEC QL NAA+PROBE: NOT DETECTED
FOLATE SERPL-MCNC: 18.3 NG/ML (ref 3.1–17.5)
GAS FLOW.O2 O2 DELIVERY SYS: ABNORMAL
GLOBULIN SER CALC-MCNC: 4.3 G/DL (ref 2–4)
GLUCOSE SERPL-MCNC: 161 MG/DL (ref 74–99)
HCO3 BLD-SCNC: 22.7 MMOL/L (ref 22–26)
HCT VFR BLD AUTO: 36.3 % (ref 35–45)
HGB BLD-MCNC: 11.7 G/DL (ref 12–16)
IMM GRANULOCYTES # BLD AUTO: 0 K/UL (ref 0–0.04)
IMM GRANULOCYTES NFR BLD AUTO: 1 % (ref 0–0.5)
IPAP/PIP/HIGH PEEP: 16
LACTATE SERPL-SCNC: 1.6 MMOL/L (ref 0.4–2)
LYMPHOCYTES # BLD: 1.6 K/UL (ref 0.9–3.6)
LYMPHOCYTES NFR BLD: 26 % (ref 21–52)
MAGNESIUM SERPL-MCNC: 2.8 MG/DL (ref 1.6–2.6)
MCH RBC QN AUTO: 32.5 PG (ref 24–34)
MCHC RBC AUTO-ENTMCNC: 32.2 G/DL (ref 31–37)
MCV RBC AUTO: 100.8 FL (ref 78–100)
MONOCYTES # BLD: 0.6 K/UL (ref 0.05–1.2)
MONOCYTES NFR BLD: 9 % (ref 3–10)
NEUTS SEG # BLD: 3.8 K/UL (ref 1.8–8)
NEUTS SEG NFR BLD: 62 % (ref 40–73)
NRBC # BLD: 0 K/UL (ref 0–0.01)
NRBC BLD-RTO: 0 PER 100 WBC
NT PRO BNP: 155 PG/ML (ref 0–900)
O2/TOTAL GAS SETTING VFR VENT: 30 %
PCO2 BLD: 50.4 MMHG (ref 35–45)
PEEP RESPIRATORY: 6 CMH2O
PH BLD: 7.26 (ref 7.35–7.45)
PLATELET # BLD AUTO: 113 K/UL (ref 135–420)
PMV BLD AUTO: 10.4 FL (ref 9.2–11.8)
PO2 BLD: 83 MMHG (ref 80–100)
POTASSIUM SERPL-SCNC: 4.3 MMOL/L (ref 3.5–5.5)
PROT SERPL-MCNC: 8 G/DL (ref 6.4–8.2)
RBC # BLD AUTO: 3.6 M/UL (ref 4.2–5.3)
SAO2 % BLD: 94.2 % (ref 92–97)
SARS-COV-2 RNA RESP QL NAA+PROBE: NOT DETECTED
SERVICE CMNT-IMP: ABNORMAL
SODIUM SERPL-SCNC: 142 MMOL/L (ref 136–145)
SPECIMEN TYPE: ABNORMAL
TROPONIN I SERPL HS-MCNC: 14 NG/L (ref 0–54)
VENTILATION MODE VENT: ABNORMAL
VIT B12 SERPL-MCNC: 515 PG/ML (ref 211–911)
WBC # BLD AUTO: 6.1 K/UL (ref 4.6–13.2)

## 2024-03-29 PROCEDURE — 6370000000 HC RX 637 (ALT 250 FOR IP): Performed by: STUDENT IN AN ORGANIZED HEALTH CARE EDUCATION/TRAINING PROGRAM

## 2024-03-29 PROCEDURE — 71045 X-RAY EXAM CHEST 1 VIEW: CPT

## 2024-03-29 PROCEDURE — 2580000003 HC RX 258: Performed by: STUDENT IN AN ORGANIZED HEALTH CARE EDUCATION/TRAINING PROGRAM

## 2024-03-29 PROCEDURE — 83880 ASSAY OF NATRIURETIC PEPTIDE: CPT

## 2024-03-29 PROCEDURE — 99285 EMERGENCY DEPT VISIT HI MDM: CPT

## 2024-03-29 PROCEDURE — 94761 N-INVAS EAR/PLS OXIMETRY MLT: CPT

## 2024-03-29 PROCEDURE — 94762 N-INVAS EAR/PLS OXIMTRY CONT: CPT

## 2024-03-29 PROCEDURE — 82746 ASSAY OF FOLIC ACID SERUM: CPT

## 2024-03-29 PROCEDURE — 83605 ASSAY OF LACTIC ACID: CPT

## 2024-03-29 PROCEDURE — 5A09457 ASSISTANCE WITH RESPIRATORY VENTILATION, 24-96 CONSECUTIVE HOURS, CONTINUOUS POSITIVE AIRWAY PRESSURE: ICD-10-PCS | Performed by: STUDENT IN AN ORGANIZED HEALTH CARE EDUCATION/TRAINING PROGRAM

## 2024-03-29 PROCEDURE — 36600 WITHDRAWAL OF ARTERIAL BLOOD: CPT

## 2024-03-29 PROCEDURE — 85025 COMPLETE CBC W/AUTO DIFF WBC: CPT

## 2024-03-29 PROCEDURE — 83735 ASSAY OF MAGNESIUM: CPT

## 2024-03-29 PROCEDURE — 82803 BLOOD GASES ANY COMBINATION: CPT

## 2024-03-29 PROCEDURE — 2140000001 HC CVICU INTERMEDIATE R&B

## 2024-03-29 PROCEDURE — 94660 CPAP INITIATION&MGMT: CPT

## 2024-03-29 PROCEDURE — 94640 AIRWAY INHALATION TREATMENT: CPT

## 2024-03-29 PROCEDURE — 74176 CT ABD & PELVIS W/O CONTRAST: CPT

## 2024-03-29 PROCEDURE — 84484 ASSAY OF TROPONIN QUANT: CPT

## 2024-03-29 PROCEDURE — 6360000002 HC RX W HCPCS: Performed by: STUDENT IN AN ORGANIZED HEALTH CARE EDUCATION/TRAINING PROGRAM

## 2024-03-29 PROCEDURE — 6370000000 HC RX 637 (ALT 250 FOR IP)

## 2024-03-29 PROCEDURE — 82607 VITAMIN B-12: CPT

## 2024-03-29 PROCEDURE — 93010 ELECTROCARDIOGRAM REPORT: CPT | Performed by: INTERNAL MEDICINE

## 2024-03-29 PROCEDURE — 99222 1ST HOSP IP/OBS MODERATE 55: CPT | Performed by: STUDENT IN AN ORGANIZED HEALTH CARE EDUCATION/TRAINING PROGRAM

## 2024-03-29 PROCEDURE — 87636 SARSCOV2 & INF A&B AMP PRB: CPT

## 2024-03-29 PROCEDURE — 80053 COMPREHEN METABOLIC PANEL: CPT

## 2024-03-29 PROCEDURE — 2700000000 HC OXYGEN THERAPY PER DAY

## 2024-03-29 PROCEDURE — 6370000000 HC RX 637 (ALT 250 FOR IP): Performed by: EMERGENCY MEDICINE

## 2024-03-29 PROCEDURE — 93005 ELECTROCARDIOGRAM TRACING: CPT | Performed by: EMERGENCY MEDICINE

## 2024-03-29 RX ORDER — IPRATROPIUM BROMIDE AND ALBUTEROL SULFATE 2.5; .5 MG/3ML; MG/3ML
1 SOLUTION RESPIRATORY (INHALATION)
Status: COMPLETED | OUTPATIENT
Start: 2024-03-29 | End: 2024-03-29

## 2024-03-29 RX ORDER — SODIUM CHLORIDE 9 MG/ML
INJECTION, SOLUTION INTRAVENOUS PRN
Status: DISCONTINUED | OUTPATIENT
Start: 2024-03-29 | End: 2024-04-01 | Stop reason: HOSPADM

## 2024-03-29 RX ORDER — ONDANSETRON 2 MG/ML
4 INJECTION INTRAMUSCULAR; INTRAVENOUS EVERY 6 HOURS PRN
Status: DISCONTINUED | OUTPATIENT
Start: 2024-03-29 | End: 2024-04-01 | Stop reason: HOSPADM

## 2024-03-29 RX ORDER — SODIUM CHLORIDE 0.9 % (FLUSH) 0.9 %
5-40 SYRINGE (ML) INJECTION EVERY 12 HOURS SCHEDULED
Status: DISCONTINUED | OUTPATIENT
Start: 2024-03-29 | End: 2024-04-01 | Stop reason: HOSPADM

## 2024-03-29 RX ORDER — ACETAMINOPHEN 325 MG/1
650 TABLET ORAL EVERY 6 HOURS PRN
Status: DISCONTINUED | OUTPATIENT
Start: 2024-03-29 | End: 2024-04-01 | Stop reason: HOSPADM

## 2024-03-29 RX ORDER — PREDNISONE 20 MG/1
40 TABLET ORAL DAILY
Status: DISCONTINUED | OUTPATIENT
Start: 2024-03-29 | End: 2024-03-29

## 2024-03-29 RX ORDER — SODIUM CHLORIDE 0.9 % (FLUSH) 0.9 %
5-40 SYRINGE (ML) INJECTION PRN
Status: DISCONTINUED | OUTPATIENT
Start: 2024-03-29 | End: 2024-04-01 | Stop reason: HOSPADM

## 2024-03-29 RX ORDER — VORICONAZOLE 200 MG/1
200 TABLET, FILM COATED ORAL EVERY 12 HOURS SCHEDULED
Status: DISCONTINUED | OUTPATIENT
Start: 2024-03-29 | End: 2024-04-01 | Stop reason: HOSPADM

## 2024-03-29 RX ORDER — LANOLIN ALCOHOL/MO/W.PET/CERES
400 CREAM (GRAM) TOPICAL DAILY
Status: DISCONTINUED | OUTPATIENT
Start: 2024-03-29 | End: 2024-04-01 | Stop reason: HOSPADM

## 2024-03-29 RX ORDER — ENOXAPARIN SODIUM 100 MG/ML
40 INJECTION SUBCUTANEOUS DAILY
Status: DISCONTINUED | OUTPATIENT
Start: 2024-03-29 | End: 2024-04-01 | Stop reason: HOSPADM

## 2024-03-29 RX ORDER — ARFORMOTEROL TARTRATE 15 UG/2ML
15 SOLUTION RESPIRATORY (INHALATION)
Status: DISCONTINUED | OUTPATIENT
Start: 2024-03-29 | End: 2024-04-01 | Stop reason: HOSPADM

## 2024-03-29 RX ORDER — ONDANSETRON 4 MG/1
4 TABLET, ORALLY DISINTEGRATING ORAL EVERY 8 HOURS PRN
Status: DISCONTINUED | OUTPATIENT
Start: 2024-03-29 | End: 2024-04-01 | Stop reason: HOSPADM

## 2024-03-29 RX ORDER — PANTOPRAZOLE SODIUM 40 MG/1
40 TABLET, DELAYED RELEASE ORAL
Status: DISCONTINUED | OUTPATIENT
Start: 2024-03-29 | End: 2024-04-01 | Stop reason: HOSPADM

## 2024-03-29 RX ORDER — FOLIC ACID 1 MG/1
1 TABLET ORAL DAILY
Status: DISCONTINUED | OUTPATIENT
Start: 2024-03-29 | End: 2024-04-01 | Stop reason: HOSPADM

## 2024-03-29 RX ORDER — IPRATROPIUM BROMIDE AND ALBUTEROL SULFATE 2.5; .5 MG/3ML; MG/3ML
1 SOLUTION RESPIRATORY (INHALATION)
Status: DISCONTINUED | OUTPATIENT
Start: 2024-03-29 | End: 2024-03-29 | Stop reason: SDUPTHER

## 2024-03-29 RX ORDER — POLYETHYLENE GLYCOL 3350 17 G/17G
17 POWDER, FOR SOLUTION ORAL DAILY PRN
Status: DISCONTINUED | OUTPATIENT
Start: 2024-03-29 | End: 2024-04-01 | Stop reason: HOSPADM

## 2024-03-29 RX ORDER — ALBUTEROL SULFATE 2.5 MG/3ML
2.5 SOLUTION RESPIRATORY (INHALATION)
Status: DISCONTINUED | OUTPATIENT
Start: 2024-03-29 | End: 2024-03-31

## 2024-03-29 RX ORDER — TIZANIDINE 4 MG/1
2 TABLET ORAL EVERY 6 HOURS PRN
Status: DISCONTINUED | OUTPATIENT
Start: 2024-03-29 | End: 2024-04-01 | Stop reason: HOSPADM

## 2024-03-29 RX ORDER — IPRATROPIUM BROMIDE AND ALBUTEROL SULFATE 2.5; .5 MG/3ML; MG/3ML
SOLUTION RESPIRATORY (INHALATION)
Status: COMPLETED
Start: 2024-03-29 | End: 2024-03-29

## 2024-03-29 RX ORDER — BUDESONIDE AND FORMOTEROL FUMARATE DIHYDRATE 80; 4.5 UG/1; UG/1
2 AEROSOL RESPIRATORY (INHALATION) 2 TIMES DAILY
Status: DISCONTINUED | OUTPATIENT
Start: 2024-03-29 | End: 2024-03-29 | Stop reason: CLARIF

## 2024-03-29 RX ORDER — ACETAMINOPHEN 650 MG/1
650 SUPPOSITORY RECTAL EVERY 6 HOURS PRN
Status: DISCONTINUED | OUTPATIENT
Start: 2024-03-29 | End: 2024-04-01 | Stop reason: HOSPADM

## 2024-03-29 RX ORDER — BUDESONIDE 0.25 MG/2ML
0.25 INHALANT ORAL
Status: DISCONTINUED | OUTPATIENT
Start: 2024-03-29 | End: 2024-04-01 | Stop reason: HOSPADM

## 2024-03-29 RX ORDER — GAUZE BANDAGE 2" X 2"
100 BANDAGE TOPICAL DAILY
Status: DISCONTINUED | OUTPATIENT
Start: 2024-03-29 | End: 2024-04-01 | Stop reason: HOSPADM

## 2024-03-29 RX ORDER — BUTALBITAL, ACETAMINOPHEN AND CAFFEINE 300; 40; 50 MG/1; MG/1; MG/1
1 CAPSULE ORAL EVERY 4 HOURS PRN
Status: DISCONTINUED | OUTPATIENT
Start: 2024-03-29 | End: 2024-04-01 | Stop reason: HOSPADM

## 2024-03-29 RX ADMIN — METOPROLOL TARTRATE 25 MG: 25 TABLET, FILM COATED ORAL at 22:10

## 2024-03-29 RX ADMIN — ACETAMINOPHEN 325MG 650 MG: 325 TABLET ORAL at 06:28

## 2024-03-29 RX ADMIN — SODIUM CHLORIDE, PRESERVATIVE FREE 10 ML: 5 INJECTION INTRAVENOUS at 08:16

## 2024-03-29 RX ADMIN — METOPROLOL TARTRATE 25 MG: 25 TABLET, FILM COATED ORAL at 08:11

## 2024-03-29 RX ADMIN — PANTOPRAZOLE SODIUM 40 MG: 40 TABLET, DELAYED RELEASE ORAL at 08:12

## 2024-03-29 RX ADMIN — FOLIC ACID 1 MG: 1 TABLET ORAL at 08:12

## 2024-03-29 RX ADMIN — ALBUTEROL SULFATE 2.5 MG: 2.5 SOLUTION RESPIRATORY (INHALATION) at 08:12

## 2024-03-29 RX ADMIN — SODIUM CHLORIDE, PRESERVATIVE FREE 10 ML: 5 INJECTION INTRAVENOUS at 22:24

## 2024-03-29 RX ADMIN — IPRATROPIUM BROMIDE AND ALBUTEROL SULFATE 1 DOSE: 2.5; .5 SOLUTION RESPIRATORY (INHALATION) at 04:20

## 2024-03-29 RX ADMIN — IPRATROPIUM BROMIDE 0.5 MG: 0.5 SOLUTION RESPIRATORY (INHALATION) at 08:12

## 2024-03-29 RX ADMIN — VORICONAZOLE 200 MG: 200 TABLET ORAL at 13:27

## 2024-03-29 RX ADMIN — VORICONAZOLE 200 MG: 200 TABLET ORAL at 22:10

## 2024-03-29 RX ADMIN — ARFORMOTEROL TARTRATE 15 MCG: 15 SOLUTION RESPIRATORY (INHALATION) at 19:38

## 2024-03-29 RX ADMIN — TIZANIDINE 2 MG: 4 TABLET ORAL at 22:21

## 2024-03-29 RX ADMIN — ALBUTEROL SULFATE 2.5 MG: 2.5 SOLUTION RESPIRATORY (INHALATION) at 16:46

## 2024-03-29 RX ADMIN — BUDESONIDE 250 MCG: 0.25 INHALANT RESPIRATORY (INHALATION) at 08:12

## 2024-03-29 RX ADMIN — THIAMINE HCL TAB 100 MG 100 MG: 100 TAB at 08:12

## 2024-03-29 RX ADMIN — ALBUTEROL SULFATE 2.5 MG: 2.5 SOLUTION RESPIRATORY (INHALATION) at 11:41

## 2024-03-29 RX ADMIN — ARFORMOTEROL TARTRATE 15 MCG: 15 SOLUTION RESPIRATORY (INHALATION) at 08:12

## 2024-03-29 RX ADMIN — IPRATROPIUM BROMIDE AND ALBUTEROL SULFATE 1 DOSE: .5; 3 SOLUTION RESPIRATORY (INHALATION) at 04:41

## 2024-03-29 RX ADMIN — Medication 400 MG: at 08:12

## 2024-03-29 RX ADMIN — ENOXAPARIN SODIUM 40 MG: 100 INJECTION SUBCUTANEOUS at 08:12

## 2024-03-29 RX ADMIN — BUTALBITA,ACETAMINOPHEN AND CAFFEINE 1 CAPSULE: 50; 300; 40 CAPSULE ORAL at 13:26

## 2024-03-29 RX ADMIN — BUDESONIDE 250 MCG: 0.25 INHALANT RESPIRATORY (INHALATION) at 19:38

## 2024-03-29 RX ADMIN — ACETAMINOPHEN 325MG 650 MG: 325 TABLET ORAL at 20:06

## 2024-03-29 RX ADMIN — ALBUTEROL SULFATE 2.5 MG: 2.5 SOLUTION RESPIRATORY (INHALATION) at 19:37

## 2024-03-29 RX ADMIN — SERTRALINE HYDROCHLORIDE 50 MG: 50 TABLET ORAL at 08:12

## 2024-03-29 RX ADMIN — WATER 40 MG: 1 INJECTION INTRAMUSCULAR; INTRAVENOUS; SUBCUTANEOUS at 08:12

## 2024-03-29 RX ADMIN — IPRATROPIUM BROMIDE 0.5 MG: 0.5 SOLUTION RESPIRATORY (INHALATION) at 16:46

## 2024-03-29 ASSESSMENT — PAIN DESCRIPTION - LOCATION
LOCATION: BACK
LOCATION: BACK;HEAD
LOCATION: BACK;HEAD
LOCATION: HEAD
LOCATION: CHEST

## 2024-03-29 ASSESSMENT — PAIN SCALES - GENERAL
PAINLEVEL_OUTOF10: 10
PAINLEVEL_OUTOF10: 5
PAINLEVEL_OUTOF10: 10
PAINLEVEL_OUTOF10: 10
PAINLEVEL_OUTOF10: 5

## 2024-03-29 ASSESSMENT — PAIN DESCRIPTION - ORIENTATION: ORIENTATION: MID

## 2024-03-29 ASSESSMENT — PAIN - FUNCTIONAL ASSESSMENT: PAIN_FUNCTIONAL_ASSESSMENT: 0-10

## 2024-03-29 NOTE — ED TRIAGE NOTES
Pt arrived via EMS in Resp Distress. Pt was given Albuterol, Duo Neb , 2 g of Magnesium, 125 mg of Solu-medrol. Pt was placed on Bi-pap on arrival and given a 2nd Duo Neb.

## 2024-03-29 NOTE — PLAN OF CARE
Problem: Discharge Planning  Goal: Discharge to home or other facility with appropriate resources  Outcome: Progressing  Flowsheets (Taken 3/29/2024 9356)  Discharge to home or other facility with appropriate resources:   Identify barriers to discharge with patient and caregiver   Arrange for needed discharge resources and transportation as appropriate     Problem: Pain  Goal: Verbalizes/displays adequate comfort level or baseline comfort level  Outcome: Progressing     Problem: Skin/Tissue Integrity  Goal: Absence of new skin breakdown  Description: 1.  Monitor for areas of redness and/or skin breakdown  2.  Assess vascular access sites hourly  3.  Every 4-6 hours minimum:  Change oxygen saturation probe site  4.  Every 4-6 hours:  If on nasal continuous positive airway pressure, respiratory therapy assess nares and determine need for appliance change or resting period.  Outcome: Progressing     Problem: Safety - Adult  Goal: Free from fall injury  Outcome: Progressing     Problem: Respiratory - Adult  Goal: Achieves optimal ventilation and oxygenation  Outcome: Progressing

## 2024-03-29 NOTE — H&P
HISTORY & PHYSICAL      Patient: Ariella Ny MRN: [id number]  CSN: [id number]    YOB: 1965  Age: 58 y.o.  Sex: female    DOA: 3/29/2024 LOS:  LOS: 0 days        DOA: 3/29/2024        Assessment/Plan     Principal Problem:    Acute respiratory failure (HCC)  Active Problems:    Elevated LFTs    COPD with acute exacerbation (HCC)  Resolved Problems:    * No resolved hospital problems. *      Patient Active Problem List   Diagnosis    Alcohol abuse    Hyponatremia    Jaundice    Elevated LFTs    SIRS (systemic inflammatory response syndrome) (HCC)    Mild protein-calorie malnutrition (HCC)    Hyperkalemia    Pneumonia    CAP (community acquired pneumonia)    Acute alcoholic intoxication without complication (HCC)    Thrombocytopenia (HCC)    Asthma    GERD (gastroesophageal reflux disease)    Iron deficiency anemia    Tobacco abuse    Hypoglycemia    Acute encephalopathy    Hyperammonemia (HCC)    Alcoholic cirrhosis of liver (HCC)    Volume overload    Hepatitis    Encounter for palliative care    Debility    Acute respiratory failure (HCC)    COPD with acute exacerbation (HCC)         Plan:  COPD Exacerbation, secondary to suspected URI  - Cont. BIPAP  - Scheduled Duoneb Q4hr  - S/p x1 dose Solumedrol w/ EMS; Cont. Solumedrol 40mg daily; total steroid course x 5 days can transition to PO prednisone w/ further improvement  - No indication for antibiotics  - Cont. Home Symbicort + Spiriva  - Incentive Spirometer  - Outpatient Pulm followed by Dr. Castro    Abdominal Pain w/ Rebound/Guarding  - Unclear etiology, appreciated first on my exam  - CT Abd/Pelvis ordered  - Check Lactic Acid    ETOH Use Disorder  - Denies hx of withdrawal, currently still drinking but less frequently  - [facility] Protocol  - Thiamine, Folic Acid supplementation    Elevated LFTs  - Repeat Hep function panel; if still mildly elevated defer to outpatient work-up    Macrocytic Anemia  - In setting of ETOH use disorder  - Check  B12/Folic levels    Thrombocytopenia  - In setting of ETOH use disorder  - Trend w/ CBC  RUL Pulmonary Nodule - Mycetoma, Aspergillus Flavus  - Reportedly established w/ EVMS ID; was supposed to take antifungals for 90 day total through late April; reports having run out of medications ~1week ago  - Restart Voriconazole 200mg PO BID    GERD  - Home PPI    HTN  - Home Lopressor 25mg BID    Depression/Anxiety  - Cont home meds    [facility] Access to Care Barriers  - Patient ran out of all prescriptions over 1 week ago (except inhalers)  - Case Management consult    CODE: Full  DVT: Lovenox  Dispo: SDU    History of Presenting Illness:    HPI  Ariella Ny is a 57yo F w/ PMHx of COPD, ETOH use disorder, RUL pulmonary nodule secondary to fungal infection (Asperillus Flavus) who presented to [facility] ED on 3/29 w/ CC of SOB.    Pt arrived via EMS in Resp Distress. Pt was given Albuterol, Duo Neb , 2 g of Magnesium, 125 mg of Solu-medrol. Pt was placed on Bi-pap on arrival and given a 2nd Duo Neb.     She reports worsening SOB, cough, productive sputum over past 3-4 days. Reports compliance w/ her inhaler therapy but has not been able to take any other oral medication because she ran out of medications.     She denies fever, chills, chest pain, N/V, edema. Endorsed abdominal pain that onset today.     Past Medical History:   Diagnosis Date    Alcohol abuse     Alcohol withdrawal syndrome (HCC)     Anemia     Asthma     Bile duct calculus     CAP (community acquired pneumonia)     Constipation     GERD (gastroesophageal reflux disease)     Iron deficiency     Liver function test abnormality     Macrocytosis     Vertigo        Past Surgical History:   Procedure Laterality Date    COLONOSCOPY N/A 2/25/2021    COLONOSCOPY with polypectomies performed by Radha Araiza MD at [facility] ENDOSCOPY    CT BIOPSY SOFT TISSUE NECK  12/27/2023    CT BIOPSY LUNG/MEDIASTINUM PERC 12/27/2023 [facility] RAD CT       Family History   Problem Relation Age

## 2024-03-29 NOTE — PROGRESS NOTES
Progress Note  Hospitalist Service    Patient: Ariella Ny MRN: [id number]   SSN: [id number]  YOB: 1965   Age: 58 y.o.  Sex: female      Admit Date: 3/29/2024    LOS: 0 days   Chief Complaint   Patient presents with    Shortness of Breath     Pt to Ed via EMS reports Sob hx COPD, current smoker, Pt was 78% on RA placed on Duoneb, albuterol, mag and solumedrol. Pt arrived on duoneb and has increased work of breathing and tachypenic.        Subjective:     Patient seen and examined.  She complains of headache and hunger but otherwise feels improved.     Objective:     Vitals:  BP (!) 143/91   Pulse 87   Temp 98 °F (36.7 °C) (Tympanic)   Resp 22   Ht 1.702 m (5' 7\")   Wt 63.5 kg (140 lb)   SpO2 100%   BMI 21.93 kg/m²     Physical Exam:   General appearance: alert, appears stated age, and cooperative  Lungs: Mild wheezing. Tachypnea   Heart: regular rate and rhythm, S1, S2 normal, no murmur, click, rub or gallop  Abdomen: soft, non-tender; bowel sounds normal; no masses,  no organomegaly  Pulses: 2+ and symmetric  Skin: Skin color, texture, turgor normal. No rashes or lesions  Neuro:  normal without focal findings, mental status, speech normal, alert and oriented x3, FELIX, and reflexes normal and symmetric    Intake and Output:  Current Shift: 03/29 0701 - 03/29 1900  In: 480 [P.O.:480]  Out: -   Last three shifts: No intake/output data recorded.    Lab/Data Review:  Recent Results (from the past 12 hour(s))   CBC with Auto Differential    Collection Time: 03/29/24  4:04 AM   Result Value Ref Range    WBC 6.1 4.6 - 13.2 K/uL    RBC 3.60 (L) 4.20 - 5.30 M/uL    Hemoglobin 11.7 (L) 12.0 - 16.0 g/dL    Hematocrit 36.3 35.0 - 45.0 %    .8 (H) 78.0 - 100.0 FL    MCH 32.5 24.0 - 34.0 PG    MCHC 32.2 31.0 - 37.0 g/dL    RDW 13.8 11.6 - 14.5 %    Platelets 113 (L) 135 - 420 K/uL    MPV 10.4 9.2 - 11.8 FL    Nucleated RBCs 0.0 0  WBC    nRBC 0.00 0.00 - 0.01 K/uL    Neutrophils % 62 40 - 73  %    Lymphocytes % 26 21 - 52 %    Monocytes % 9 3 - 10 %    Eosinophils % 2 0 - 5 %    Basophils % 0 0 - 2 %    Immature Granulocytes 1 (H) 0.0 - 0.5 %    Neutrophils Absolute 3.8 1.8 - 8.0 K/UL    Lymphocytes Absolute 1.6 0.9 - 3.6 K/UL    Monocytes Absolute 0.6 0.05 - 1.2 K/UL    Eosinophils Absolute 0.1 0.0 - 0.4 K/UL    Basophils Absolute 0.0 0.0 - 0.1 K/UL    Absolute Immature Granulocyte 0.0 0.00 - 0.04 K/UL    Differential Type AUTOMATED     Comprehensive Metabolic Panel    Collection Time: 03/29/24  4:04 AM   Result Value Ref Range    Sodium 142 136 - 145 mmol/L    Potassium 4.3 3.5 - 5.5 mmol/L    Chloride 113 (H) 100 - 111 mmol/L    CO2 23 21 - 32 mmol/L    Anion Gap 6 3.0 - 18 mmol/L    Glucose 161 (H) 74 - 99 mg/dL    BUN 16 7.0 - 18 MG/DL    Creatinine 0.89 0.6 - 1.3 MG/DL    Bun/Cre Ratio 18 12 - 20      Est, Glom Filt Rate 76 >60 ml/min/1.73m2    Calcium 9.1 8.5 - 10.1 MG/DL    Total Bilirubin 0.6 0.2 - 1.0 MG/DL    ALT 45 13 - 56 U/L    AST 73 (H) 10 - 38 U/L    Alk Phosphatase 202 (H) 45 - 117 U/L    Total Protein 8.0 6.4 - 8.2 g/dL    Albumin 3.7 3.4 - 5.0 g/dL    Globulin 4.3 (H) 2.0 - 4.0 g/dL    Albumin/Globulin Ratio 0.9 0.8 - 1.7     Magnesium    Collection Time: 03/29/24  4:04 AM   Result Value Ref Range    Magnesium 2.8 (H) 1.6 - 2.6 mg/dL   Brain Natriuretic Peptide    Collection Time: 03/29/24  4:04 AM   Result Value Ref Range    NT Pro- 0 - 900 PG/ML   Troponin    Collection Time: 03/29/24  4:04 AM   Result Value Ref Range    Troponin, High Sensitivity 14 0 - 54 ng/L   POC G3: BLOOD GASES INCLUDES CALC. TCO2, HCO3, BASE EXCESS, SO2    Collection Time: 03/29/24  4:18 AM   Result Value Ref Range    DEVICE BIPAP MASK      FIO2 30 %    POC pH 7.26 (L) 7.35 - 7.45      POC pCO2 50.4 (H) 35.0 - 45.0 MMHG    POC PO2 83 80 - 100 MMHG    POC HCO3 22.7 22 - 26 MMOL/L    POC O2 SAT 94.2 92 - 97 %    Base Deficit (POC) 4.7 mmol/L    Mode BILEVEL      POC PEEP 6 cmH2O    IPAP/PIP/High PEEP 16

## 2024-03-29 NOTE — PROGRESS NOTES
Bedside and Verbal shift change report given to Jen RN (oncoming nurse) by Bryan RN/Nilda RN (offgoing nurse). Report included the following information Nurse Handoff Report, Intake/Output, MAR, Recent Results, and Cardiac Rhythm NSR/sinus tach .

## 2024-03-29 NOTE — ED PROVIDER NOTES
EMERGENCY DEPARTMENT HISTORY AND PHYSICAL EXAM      Patient Name: Ariella Ny  MRN: [id number]  YOB: 1965  Provider: Wendi Gudino MD  PCP: Eliza Young APRN - NP   Time/Date of evaluation: 5:43 AM EDT on 3/29/24    History of Presenting Illness     Chief Complaint   Patient presents with    Shortness of Breath     Pt to Ed via EMS reports Sob hx COPD, current smoker, Pt was 78% on RA placed on Duoneb, albuterol, mag and solumedrol. Pt arrived on duoneb and has increased work of breathing and tachypenic.        History Provided By: {History Source:96269::\"Patient\"}     History (Narative):   Ariella Ny is a 58 y.o. female {PMHx:10151::\"with a PMHX of ***\"} who presents to the emergency department  {Arrival Mode:89168} C/O *** onset ***. ***        Past History     Past Medical History:  Past Medical History:   Diagnosis Date    Alcohol abuse     Alcohol withdrawal syndrome (HCC)     Anemia     Asthma     Bile duct calculus     CAP (community acquired pneumonia)     Constipation     GERD (gastroesophageal reflux disease)     Iron deficiency     Liver function test abnormality     Macrocytosis     Vertigo        Past Surgical History:  Past Surgical History:   Procedure Laterality Date    COLONOSCOPY N/A 2/25/2021    COLONOSCOPY with polypectomies performed by Radha Araiza MD at [facility] ENDOSCOPY    CT BIOPSY SOFT TISSUE NECK  12/27/2023    CT BIOPSY LUNG/MEDIASTINUM PERC 12/27/2023 [facility] RAD CT       Family History:  Family History   Problem Relation Age of Onset    No Known Problems Mother     No Known Problems Father     Cancer Sister         uterine?    No Known Problems Maternal Grandmother     No Known Problems Maternal Grandfather     No Known Problems Paternal Grandmother     No Known Problems Paternal Grandfather     Cancer Daughter         uterine?    Asthma Daughter     Asthma Son        Social History:  Social History     Tobacco Use    Smoking status: Every Day     Current packs/day:  Examination of the left-lower field reveals wheezing. Wheezing present.   Abdominal:      General: Bowel sounds are normal.      Palpations: Abdomen is soft.   Musculoskeletal:         General: Normal range of motion.      Cervical back: Normal range of motion and neck supple.   Skin:     General: Skin is warm and dry.      Capillary Refill: Capillary refill takes 2 to 3 seconds.   Neurological:      General: No focal deficit present.      Mental Status: She is oriented to person, place, and time.   Psychiatric:         Mood and Affect: Mood is anxious.           Diagnostic Study Results     Labs:  Recent Results (from the past 12 hour(s))   CBC with Auto Differential    Collection Time: 03/29/24  4:04 AM   Result Value Ref Range    WBC 6.1 4.6 - 13.2 K/uL    RBC 3.60 (L) 4.20 - 5.30 M/uL    Hemoglobin 11.7 (L) 12.0 - 16.0 g/dL    Hematocrit 36.3 35.0 - 45.0 %    .8 (H) 78.0 - 100.0 FL    MCH 32.5 24.0 - 34.0 PG    MCHC 32.2 31.0 - 37.0 g/dL    RDW 13.8 11.6 - 14.5 %    Platelets 113 (L) 135 - 420 K/uL    MPV 10.4 9.2 - 11.8 FL    Nucleated RBCs 0.0 0  WBC    nRBC 0.00 0.00 - 0.01 K/uL    Neutrophils % 62 40 - 73 %    Lymphocytes % 26 21 - 52 %    Monocytes % 9 3 - 10 %    Eosinophils % 2 0 - 5 %    Basophils % 0 0 - 2 %    Immature Granulocytes 1 (H) 0.0 - 0.5 %    Neutrophils Absolute 3.8 1.8 - 8.0 K/UL    Lymphocytes Absolute 1.6 0.9 - 3.6 K/UL    Monocytes Absolute 0.6 0.05 - 1.2 K/UL    Eosinophils Absolute 0.1 0.0 - 0.4 K/UL    Basophils Absolute 0.0 0.0 - 0.1 K/UL    Absolute Immature Granulocyte 0.0 0.00 - 0.04 K/UL    Differential Type AUTOMATED     Comprehensive Metabolic Panel    Collection Time: 03/29/24  4:04 AM   Result Value Ref Range    Sodium 142 136 - 145 mmol/L    Potassium 4.3 3.5 - 5.5 mmol/L    Chloride 113 (H) 100 - 111 mmol/L    CO2 23 21 - 32 mmol/L    Anion Gap 6 3.0 - 18 mmol/L    Glucose 161 (H) 74 - 99 mg/dL    BUN 16 7.0 - 18 MG/DL    Creatinine 0.89 0.6 - 1.3 MG/DL

## 2024-03-29 NOTE — CARE COORDINATION
Initial Assessment     SW placed call to pt, introduced self and explained role. Pt confirmed all demographics listed on the face sheet. Pt stated that she resides at [address] [place] [address] in a 1 level home with her adult son. Pt stated that no steps are required to enter her home. Per th ept she was independent prior toa dmission not requiring any DME. Pt confirmed that she also does not own any DME.    Per the pt she uses uber and medicaid transport to all appointments. Pt confirmed her PCP as Dr. Young. Pt stated she has no difficulty accessing medication when prescribed.    SW discussed etoh with pt who reported that she no longer struggles with alcohol use. Pt declined the need for any substance use resources.     Pt sated she had a nebulizer that was delivered however, the company was unable to deliver it due to her not being home. Pt stated she will follow up to obtain the agency      03/29/24 1871   Discharge Planning   Living Arrangements Children   Support Systems Family Members   Potential Assistance Needed N/A   Potential Assistance Purchasing Medications No   Type of Home Care Services None   Patient expects to be discharged to: Unknown         Follow Up    Follow up with pt regarding DME company name for ordered nebulizer that has been pending delivery to pt.    CHLOÉ Wilkins  Case Management Department

## 2024-03-29 NOTE — ED NOTES
Assumed care of pt in rm 9. Pt noted with room assignment of 9511. Attempted to call report, nurse not available. Pt continues to be on bipap. VS stable.

## 2024-03-30 LAB
ALBUMIN SERPL-MCNC: 3.4 G/DL (ref 3.4–5)
ALBUMIN/GLOB SERPL: 0.8 (ref 0.8–1.7)
ALP SERPL-CCNC: 167 U/L (ref 45–117)
ALT SERPL-CCNC: 40 U/L (ref 13–56)
ANION GAP SERPL CALC-SCNC: 7 MMOL/L (ref 3–18)
AST SERPL-CCNC: 54 U/L (ref 10–38)
BASOPHILS # BLD: 0 K/UL (ref 0–0.1)
BASOPHILS NFR BLD: 0 % (ref 0–2)
BILIRUB DIRECT SERPL-MCNC: 0.3 MG/DL (ref 0–0.2)
BILIRUB SERPL-MCNC: 1.6 MG/DL (ref 0.2–1)
BUN SERPL-MCNC: 28 MG/DL (ref 7–18)
BUN/CREAT SERPL: 24 (ref 12–20)
CALCIUM SERPL-MCNC: 9.4 MG/DL (ref 8.5–10.1)
CHLORIDE SERPL-SCNC: 109 MMOL/L (ref 100–111)
CO2 SERPL-SCNC: 23 MMOL/L (ref 21–32)
CREAT SERPL-MCNC: 1.15 MG/DL (ref 0.6–1.3)
DIFFERENTIAL METHOD BLD: ABNORMAL
EOSINOPHIL # BLD: 0 K/UL (ref 0–0.4)
EOSINOPHIL NFR BLD: 0 % (ref 0–5)
ERYTHROCYTE [DISTWIDTH] IN BLOOD BY AUTOMATED COUNT: 13.8 % (ref 11.6–14.5)
GLOBULIN SER CALC-MCNC: 4.1 G/DL (ref 2–4)
GLUCOSE SERPL-MCNC: 142 MG/DL (ref 74–99)
HCT VFR BLD AUTO: 34.8 % (ref 35–45)
HGB BLD-MCNC: 10.8 G/DL (ref 12–16)
IMM GRANULOCYTES # BLD AUTO: 0 K/UL (ref 0–0.04)
IMM GRANULOCYTES NFR BLD AUTO: 0 % (ref 0–0.5)
LYMPHOCYTES # BLD: 0.7 K/UL (ref 0.9–3.6)
LYMPHOCYTES NFR BLD: 13 % (ref 21–52)
MCH RBC QN AUTO: 31.7 PG (ref 24–34)
MCHC RBC AUTO-ENTMCNC: 31 G/DL (ref 31–37)
MCV RBC AUTO: 102.1 FL (ref 78–100)
MONOCYTES # BLD: 0.5 K/UL (ref 0.05–1.2)
MONOCYTES NFR BLD: 9 % (ref 3–10)
NEUTS SEG # BLD: 4.5 K/UL (ref 1.8–8)
NEUTS SEG NFR BLD: 78 % (ref 40–73)
NRBC # BLD: 0 K/UL (ref 0–0.01)
NRBC BLD-RTO: 0 PER 100 WBC
PLATELET # BLD AUTO: 111 K/UL (ref 135–420)
PMV BLD AUTO: 10.8 FL (ref 9.2–11.8)
POTASSIUM SERPL-SCNC: 4.9 MMOL/L (ref 3.5–5.5)
PROT SERPL-MCNC: 7.5 G/DL (ref 6.4–8.2)
RBC # BLD AUTO: 3.41 M/UL (ref 4.2–5.3)
SODIUM SERPL-SCNC: 139 MMOL/L (ref 136–145)
WBC # BLD AUTO: 5.8 K/UL (ref 4.6–13.2)

## 2024-03-30 PROCEDURE — 80076 HEPATIC FUNCTION PANEL: CPT

## 2024-03-30 PROCEDURE — 94640 AIRWAY INHALATION TREATMENT: CPT

## 2024-03-30 PROCEDURE — 2580000003 HC RX 258: Performed by: STUDENT IN AN ORGANIZED HEALTH CARE EDUCATION/TRAINING PROGRAM

## 2024-03-30 PROCEDURE — 6360000002 HC RX W HCPCS: Performed by: STUDENT IN AN ORGANIZED HEALTH CARE EDUCATION/TRAINING PROGRAM

## 2024-03-30 PROCEDURE — 94660 CPAP INITIATION&MGMT: CPT

## 2024-03-30 PROCEDURE — 94761 N-INVAS EAR/PLS OXIMETRY MLT: CPT

## 2024-03-30 PROCEDURE — 85025 COMPLETE CBC W/AUTO DIFF WBC: CPT

## 2024-03-30 PROCEDURE — 6370000000 HC RX 637 (ALT 250 FOR IP): Performed by: STUDENT IN AN ORGANIZED HEALTH CARE EDUCATION/TRAINING PROGRAM

## 2024-03-30 PROCEDURE — 80048 BASIC METABOLIC PNL TOTAL CA: CPT

## 2024-03-30 PROCEDURE — 2140000001 HC CVICU INTERMEDIATE R&B

## 2024-03-30 PROCEDURE — 2700000000 HC OXYGEN THERAPY PER DAY

## 2024-03-30 PROCEDURE — 36415 COLL VENOUS BLD VENIPUNCTURE: CPT

## 2024-03-30 PROCEDURE — 99232 SBSQ HOSP IP/OBS MODERATE 35: CPT | Performed by: STUDENT IN AN ORGANIZED HEALTH CARE EDUCATION/TRAINING PROGRAM

## 2024-03-30 RX ADMIN — VORICONAZOLE 200 MG: 200 TABLET ORAL at 20:38

## 2024-03-30 RX ADMIN — SERTRALINE HYDROCHLORIDE 50 MG: 50 TABLET ORAL at 08:53

## 2024-03-30 RX ADMIN — BUDESONIDE 250 MCG: 0.25 INHALANT RESPIRATORY (INHALATION) at 20:39

## 2024-03-30 RX ADMIN — ALBUTEROL SULFATE 2.5 MG: 2.5 SOLUTION RESPIRATORY (INHALATION) at 20:39

## 2024-03-30 RX ADMIN — ALBUTEROL SULFATE 2.5 MG: 2.5 SOLUTION RESPIRATORY (INHALATION) at 16:20

## 2024-03-30 RX ADMIN — METOPROLOL TARTRATE 25 MG: 25 TABLET, FILM COATED ORAL at 08:53

## 2024-03-30 RX ADMIN — SODIUM CHLORIDE, PRESERVATIVE FREE 10 ML: 5 INJECTION INTRAVENOUS at 08:56

## 2024-03-30 RX ADMIN — IPRATROPIUM BROMIDE 0.5 MG: 0.5 SOLUTION RESPIRATORY (INHALATION) at 01:07

## 2024-03-30 RX ADMIN — IPRATROPIUM BROMIDE 0.5 MG: 0.5 SOLUTION RESPIRATORY (INHALATION) at 09:04

## 2024-03-30 RX ADMIN — WATER 40 MG: 1 INJECTION INTRAMUSCULAR; INTRAVENOUS; SUBCUTANEOUS at 08:53

## 2024-03-30 RX ADMIN — THIAMINE HCL TAB 100 MG 100 MG: 100 TAB at 08:53

## 2024-03-30 RX ADMIN — ALBUTEROL SULFATE 2.5 MG: 2.5 SOLUTION RESPIRATORY (INHALATION) at 09:04

## 2024-03-30 RX ADMIN — IPRATROPIUM BROMIDE 0.5 MG: 0.5 SOLUTION RESPIRATORY (INHALATION) at 20:39

## 2024-03-30 RX ADMIN — FOLIC ACID 1 MG: 1 TABLET ORAL at 08:53

## 2024-03-30 RX ADMIN — ARFORMOTEROL TARTRATE 15 MCG: 15 SOLUTION RESPIRATORY (INHALATION) at 20:39

## 2024-03-30 RX ADMIN — TIZANIDINE 2 MG: 4 TABLET ORAL at 23:52

## 2024-03-30 RX ADMIN — Medication 400 MG: at 08:53

## 2024-03-30 RX ADMIN — VORICONAZOLE 200 MG: 200 TABLET ORAL at 08:53

## 2024-03-30 RX ADMIN — BUDESONIDE 250 MCG: 0.25 INHALANT RESPIRATORY (INHALATION) at 09:04

## 2024-03-30 RX ADMIN — ENOXAPARIN SODIUM 40 MG: 100 INJECTION SUBCUTANEOUS at 08:54

## 2024-03-30 RX ADMIN — ARFORMOTEROL TARTRATE 15 MCG: 15 SOLUTION RESPIRATORY (INHALATION) at 09:04

## 2024-03-30 RX ADMIN — IPRATROPIUM BROMIDE 0.5 MG: 0.5 SOLUTION RESPIRATORY (INHALATION) at 16:20

## 2024-03-30 RX ADMIN — METOPROLOL TARTRATE 25 MG: 25 TABLET, FILM COATED ORAL at 20:39

## 2024-03-30 RX ADMIN — PANTOPRAZOLE SODIUM 40 MG: 40 TABLET, DELAYED RELEASE ORAL at 06:23

## 2024-03-30 ASSESSMENT — PAIN SCALES - GENERAL
PAINLEVEL_OUTOF10: 0
PAINLEVEL_OUTOF10: 10
PAINLEVEL_OUTOF10: 0
PAINLEVEL_OUTOF10: 0

## 2024-03-30 ASSESSMENT — PAIN DESCRIPTION - LOCATION: LOCATION: BACK;HEAD

## 2024-03-30 NOTE — PROGRESS NOTES
conducted an initial consultation and Spiritual Assessment for Ariella Ny, who is a 58 y.o.,female. Patient’s Primary Language is: English.   According to the patient’s EMR Denominational Affiliation is: Episcopal.     The reason the Patient came to the hospital is:   Patient Active Problem List    Diagnosis Date Noted    Acute respiratory failure (HCC) 03/29/2024    COPD with acute exacerbation (HCC) 03/29/2024    Encounter for palliative care     Debility     Thrombocytopenia (HCC) 06/01/2022    Asthma 06/01/2022    GERD (gastroesophageal reflux disease) 06/01/2022    Iron deficiency anemia 06/01/2022    Tobacco abuse 06/01/2022    Hypoglycemia 06/01/2022    Acute encephalopathy 06/01/2022    Hyperammonemia (HCC) 06/01/2022    Alcoholic cirrhosis of liver (HCC) 06/01/2022    Volume overload 06/01/2022    Hepatitis 06/01/2022    Mild protein-calorie malnutrition (HCC) 11/03/2021    Alcohol abuse 11/02/2021    Hyponatremia 11/02/2021    Jaundice 11/02/2021    Elevated LFTs 11/02/2021    Hyperkalemia 11/02/2021    Acute alcoholic intoxication without complication (HCC) 11/02/2021    SIRS (systemic inflammatory response syndrome) (HCC) 07/08/2017    Pneumonia 07/07/2017    CAP (community acquired pneumonia) 07/07/2017        The  provided the following Interventions:  Initiated a relationship of care and support with patient in bed 2313 where she was admitted to the hospital some 28 hours ago.Patient has acute respiratory failure..   She asked if we had any reading glasses  Offered prayer on patients behalf.   There is no advance directive present..    The following outcomes were achieved:  Patient shared limited information about both their medical narrative.  Patient processed feeling about current hospitalization.  Patient expressed gratitude for pastoral care visit.    Assessment:  Patient does not have any Orthodox/cultural needs that will affect patient’s preferences in health care.  There are no

## 2024-03-30 NOTE — PROGRESS NOTES
Bedside and Verbal shift change report given to Bryan RN (oncoming nurse) by Jen RN (offgoing nurse). Report included the following information Nurse Handoff Report, Intake/Output, MAR, Recent Results, and Cardiac Rhythm NSR .

## 2024-03-30 NOTE — PLAN OF CARE
Problem: Discharge Planning  Goal: Discharge to home or other facility with appropriate resources  Outcome: Progressing  Flowsheets (Taken 3/30/2024 0800)  Discharge to home or other facility with appropriate resources:   Identify barriers to discharge with patient and caregiver   Arrange for needed discharge resources and transportation as appropriate     Problem: Pain  Goal: Verbalizes/displays adequate comfort level or baseline comfort level  Outcome: Progressing     Problem: Skin/Tissue Integrity  Goal: Absence of new skin breakdown  Description: 1.  Monitor for areas of redness and/or skin breakdown  2.  Assess vascular access sites hourly  3.  Every 4-6 hours minimum:  Change oxygen saturation probe site  4.  Every 4-6 hours:  If on nasal continuous positive airway pressure, respiratory therapy assess nares and determine need for appliance change or resting period.  Outcome: Progressing     Problem: Safety - Adult  Goal: Free from fall injury  Outcome: Progressing  Flowsheets (Taken 3/30/2024 0721)  Free From Fall Injury: Based on caregiver fall risk screen, instruct family/caregiver to ask for assistance with transferring infant if caregiver noted to have fall risk factors     Problem: Respiratory - Adult  Goal: Achieves optimal ventilation and oxygenation  Outcome: Progressing  Flowsheets (Taken 3/30/2024 0800)  Achieves optimal ventilation and oxygenation:   Assess for changes in respiratory status   Assess for changes in mentation and behavior

## 2024-03-30 NOTE — PROGRESS NOTES
03/29/24 2210   NIV Type   NIV Started/Stopped On   Equipment Type V60   Mode Bilevel   Mask Type Full face mask   Mask Size Medium   Assessment   Pulse 88   Respirations 21   /64   SpO2 97 %   Comfort Level Good   Using Accessory Muscles No   Mask Compliance Good   Skin Assessment Clean, dry, & intact   Settings/Measurements   PIP Observed 10 cm H20   IPAP 10 cmH20   CPAP/EPAP 5 cmH2O   Vt (Measured) 581 mL   Rate Ordered 8   Insp Rise Time (%) 3 %   FiO2  30 %   I Time/ I Time % 28 s   Minute Volume (L/min) 13.4 Liters   Mask Leak (lpm) 3 lpm   Alarm Settings   Alarms On Y   Low Pressure (cmH2O) 8 cmH2O   High Pressure (cmH2O) 40 cmH2O   Delay Alarm 20 sec(s)   RR Low (bpm) 8   RR High (bpm) 40 br/min

## 2024-03-30 NOTE — PROGRESS NOTES
Bedside and Verbal shift change report given to Jen RN (oncoming nurse) by Bryan RN (offgoing nurse). Report included the following information Nurse Handoff Report, Intake/Output, MAR, Recent Results, and Cardiac Rhythm NSR/sinus tach .

## 2024-03-30 NOTE — PROGRESS NOTES
Bedside and Verbal shift change report given to Jen VALIENTE (oncoming nurse) by Bryan Bhakta RN (offgoing nurse). Report included the following information Nurse Handoff Report, Adult Overview, Cardiac Rhythm NSR, Sinus tachy, and Event Log.

## 2024-03-30 NOTE — PROGRESS NOTES
Bedside and Verbal shift change report given to Jen RN (oncoming nurse) by Bryan RN (offgoing nurse). Report included the following information Nurse Handoff Report, Adult Overview, Cardiac Rhythm Sinus Tach, and Event Log.     2210:  MD Dr. Mar paged for headache and back pain that is unrelieved by tylenol.    0032:  MD paged after BP was low. Other vitals stables.     0625:  Bipap removed this AM. Pt wants to keep it on. RN explained Bipap is only for nighttime use and RN strongly encouraged pt to use it only at night.

## 2024-03-30 NOTE — PROGRESS NOTES
Progress Note  Hospitalist Service    Patient: Ariella Ny MRN: [id number]   SSN: [id number]  YOB: 1965   Age: 58 y.o.  Sex: female      Admit Date: 3/29/2024    LOS: 1 day   Chief Complaint   Patient presents with    Shortness of Breath     Pt to Ed via EMS reports Sob hx COPD, current smoker, Pt was 78% on RA placed on Duoneb, albuterol, mag and solumedrol. Pt arrived on duoneb and has increased work of breathing and tachypenic.        Subjective:     Patient seen and examined.  Breathing appears improved. Awoken from nap.     Objective:     Vitals:  /83   Pulse 89   Temp 98.9 °F (37.2 °C) (Oral)   Resp 18   Ht 1.702 m (5' 7\")   Wt 63.5 kg (140 lb)   SpO2 100%   BMI 21.93 kg/m²     Physical Exam:   General appearance: alert, appears stated age, and cooperative  Lungs: Mild wheezing. Tachypnea   Heart: regular rate and rhythm, S1, S2 normal, no murmur, click, rub or gallop  Abdomen: soft, non-tender; bowel sounds normal; no masses,  no organomegaly  Pulses: 2+ and symmetric  Skin: Skin color, texture, turgor normal. No rashes or lesions  Neuro:  normal without focal findings, mental status, speech normal, alert and oriented x3, FELIX, and reflexes normal and symmetric    Intake and Output:  Current Shift: 03/30 0701 - 03/30 1900  In: 250 [P.O.:240; I.V.:10]  Out: -   Last three shifts: 03/28 1901 - 03/30 0700  In: 480 [P.O.:480]  Out: -     Lab/Data Review:  No results found for this or any previous visit (from the past 12 hour(s)).        Key Findings or tests:       Telemetry NONE   Oxygen NONE     Assessment and Plan:     COPD Exacerbation, secondary to suspected URI  - Cont. BIPAP  - Scheduled Duoneb Q4hr  - S/p x1 dose Solumedrol w/ EMS; Cont. Solumedrol 40mg daily; total steroid course x 5 days can transition to PO prednisone w/ further improvement  - No indication for antibiotics  - Cont. Home Symbicort + Spiriva  - Incentive Spirometer  - Outpatient Pulm followed by

## 2024-03-30 NOTE — PLAN OF CARE
Problem: Respiratory - Adult  Goal: Achieves optimal ventilation and oxygenation  3/29/2024 2333 by Jen Gann RN  Outcome: Progressing  3/29/2024 1338 by Bryan Bhakta RN  Outcome: Progressing     Problem: Safety - Adult  Goal: Free from fall injury  3/29/2024 2333 by Jen Gann RN  Outcome: Progressing  3/29/2024 1338 by Bryan Bhakta RN  Outcome: Progressing     Problem: Skin/Tissue Integrity  Goal: Absence of new skin breakdown  Description: 1.  Monitor for areas of redness and/or skin breakdown  2.  Assess vascular access sites hourly  3.  Every 4-6 hours minimum:  Change oxygen saturation probe site  4.  Every 4-6 hours:  If on nasal continuous positive airway pressure, respiratory therapy assess nares and determine need for appliance change or resting period.  3/29/2024 2333 by Jen Gann RN  Outcome: Progressing  3/29/2024 1338 by Bryan Bhakta RN  Outcome: Progressing     Problem: Pain  Goal: Verbalizes/displays adequate comfort level or baseline comfort level  3/29/2024 2333 by Jen Gann RN  Outcome: Progressing  3/29/2024 1338 by Bryan Bhakta RN  Outcome: Progressing     Problem: Discharge Planning  Goal: Discharge to home or other facility with appropriate resources  3/29/2024 2333 by Jen Gann RN  Outcome: Progressing  3/29/2024 1338 by Bryan Bhakta RN  Outcome: Progressing

## 2024-03-30 NOTE — PLAN OF CARE
Problem: Pain  Goal: Verbalizes/displays adequate comfort level or baseline comfort level  3/29/2024 2333 by Jen Gann RN  Outcome: Progressing  3/29/2024 1338 by Bryan Bhakta RN  Outcome: Progressing     Problem: Skin/Tissue Integrity  Goal: Absence of new skin breakdown  Description: 1.  Monitor for areas of redness and/or skin breakdown  2.  Assess vascular access sites hourly  3.  Every 4-6 hours minimum:  Change oxygen saturation probe site  4.  Every 4-6 hours:  If on nasal continuous positive airway pressure, respiratory therapy assess nares and determine need for appliance change or resting period.  3/29/2024 2333 by Jen Gann RN  Outcome: Progressing  3/29/2024 1338 by Bryan Bhakta RN  Outcome: Progressing     Problem: Safety - Adult  Goal: Free from fall injury  3/29/2024 2333 by Jen Gann RN  Outcome: Progressing  3/29/2024 1338 by Bryan Bhakta RN  Outcome: Progressing     Problem: Respiratory - Adult  Goal: Achieves optimal ventilation and oxygenation  3/29/2024 2333 by Jen Gann RN  Outcome: Progressing  3/29/2024 1338 by Bryan Bhakta RN  Outcome: Progressing

## 2024-03-31 PROCEDURE — 1100000000 HC RM PRIVATE

## 2024-03-31 PROCEDURE — 99232 SBSQ HOSP IP/OBS MODERATE 35: CPT | Performed by: STUDENT IN AN ORGANIZED HEALTH CARE EDUCATION/TRAINING PROGRAM

## 2024-03-31 PROCEDURE — 2580000003 HC RX 258: Performed by: STUDENT IN AN ORGANIZED HEALTH CARE EDUCATION/TRAINING PROGRAM

## 2024-03-31 PROCEDURE — 6360000002 HC RX W HCPCS: Performed by: STUDENT IN AN ORGANIZED HEALTH CARE EDUCATION/TRAINING PROGRAM

## 2024-03-31 PROCEDURE — 94761 N-INVAS EAR/PLS OXIMETRY MLT: CPT

## 2024-03-31 PROCEDURE — 94640 AIRWAY INHALATION TREATMENT: CPT

## 2024-03-31 PROCEDURE — 6370000000 HC RX 637 (ALT 250 FOR IP): Performed by: STUDENT IN AN ORGANIZED HEALTH CARE EDUCATION/TRAINING PROGRAM

## 2024-03-31 PROCEDURE — 6370000000 HC RX 637 (ALT 250 FOR IP): Performed by: HOSPITALIST

## 2024-03-31 PROCEDURE — 2700000000 HC OXYGEN THERAPY PER DAY

## 2024-03-31 RX ORDER — GUAIFENESIN 600 MG/1
600 TABLET, EXTENDED RELEASE ORAL 2 TIMES DAILY
Status: DISCONTINUED | OUTPATIENT
Start: 2024-03-31 | End: 2024-04-01 | Stop reason: HOSPADM

## 2024-03-31 RX ADMIN — IPRATROPIUM BROMIDE 0.5 MG: 0.5 SOLUTION RESPIRATORY (INHALATION) at 13:24

## 2024-03-31 RX ADMIN — ARFORMOTEROL TARTRATE 15 MCG: 15 SOLUTION RESPIRATORY (INHALATION) at 20:18

## 2024-03-31 RX ADMIN — SERTRALINE HYDROCHLORIDE 50 MG: 50 TABLET ORAL at 08:30

## 2024-03-31 RX ADMIN — IPRATROPIUM BROMIDE 0.5 MG: 0.5 SOLUTION RESPIRATORY (INHALATION) at 20:18

## 2024-03-31 RX ADMIN — TIZANIDINE 2 MG: 4 TABLET ORAL at 23:24

## 2024-03-31 RX ADMIN — SODIUM CHLORIDE, PRESERVATIVE FREE 10 ML: 5 INJECTION INTRAVENOUS at 08:41

## 2024-03-31 RX ADMIN — VORICONAZOLE 200 MG: 200 TABLET ORAL at 21:25

## 2024-03-31 RX ADMIN — ENOXAPARIN SODIUM 40 MG: 100 INJECTION SUBCUTANEOUS at 08:31

## 2024-03-31 RX ADMIN — SODIUM CHLORIDE, PRESERVATIVE FREE 10 ML: 5 INJECTION INTRAVENOUS at 21:33

## 2024-03-31 RX ADMIN — WATER 40 MG: 1 INJECTION INTRAMUSCULAR; INTRAVENOUS; SUBCUTANEOUS at 08:30

## 2024-03-31 RX ADMIN — GUAIFENESIN 600 MG: 600 TABLET, EXTENDED RELEASE ORAL at 22:17

## 2024-03-31 RX ADMIN — VORICONAZOLE 200 MG: 200 TABLET ORAL at 08:30

## 2024-03-31 RX ADMIN — THIAMINE HCL TAB 100 MG 100 MG: 100 TAB at 08:30

## 2024-03-31 RX ADMIN — PANTOPRAZOLE SODIUM 40 MG: 40 TABLET, DELAYED RELEASE ORAL at 06:45

## 2024-03-31 RX ADMIN — ARFORMOTEROL TARTRATE 15 MCG: 15 SOLUTION RESPIRATORY (INHALATION) at 09:41

## 2024-03-31 RX ADMIN — BUDESONIDE 250 MCG: 0.25 INHALANT RESPIRATORY (INHALATION) at 20:18

## 2024-03-31 RX ADMIN — BUDESONIDE 250 MCG: 0.25 INHALANT RESPIRATORY (INHALATION) at 09:41

## 2024-03-31 RX ADMIN — Medication 400 MG: at 08:30

## 2024-03-31 RX ADMIN — FOLIC ACID 1 MG: 1 TABLET ORAL at 08:30

## 2024-03-31 RX ADMIN — METOPROLOL TARTRATE 25 MG: 25 TABLET, FILM COATED ORAL at 21:25

## 2024-03-31 RX ADMIN — IPRATROPIUM BROMIDE 0.5 MG: 0.5 SOLUTION RESPIRATORY (INHALATION) at 09:41

## 2024-03-31 RX ADMIN — SODIUM CHLORIDE, PRESERVATIVE FREE 10 ML: 5 INJECTION INTRAVENOUS at 08:39

## 2024-03-31 RX ADMIN — ACETAMINOPHEN 325MG 650 MG: 325 TABLET ORAL at 19:45

## 2024-03-31 RX ADMIN — ALBUTEROL SULFATE 2.5 MG: 2.5 SOLUTION RESPIRATORY (INHALATION) at 09:41

## 2024-03-31 ASSESSMENT — PAIN DESCRIPTION - LOCATION
LOCATION: HEAD;LEG
LOCATION: HEAD

## 2024-03-31 ASSESSMENT — PAIN SCALES - GENERAL
PAINLEVEL_OUTOF10: 8
PAINLEVEL_OUTOF10: 0
PAINLEVEL_OUTOF10: 10

## 2024-03-31 NOTE — PLAN OF CARE
Problem: Discharge Planning  Goal: Discharge to home or other facility with appropriate resources  3/31/2024 1208 by Bryan Bhakta RN  Outcome: Progressing  Flowsheets (Taken 3/31/2024 0800)  Discharge to home or other facility with appropriate resources:   Identify barriers to discharge with patient and caregiver   Arrange for needed discharge resources and transportation as appropriate  3/31/2024 0327 by Jen Gann RN  Outcome: Progressing     Problem: Pain  Goal: Verbalizes/displays adequate comfort level or baseline comfort level  3/31/2024 1208 by Bryan Bhakta RN  Outcome: Progressing  Flowsheets (Taken 3/31/2024 0800)  Verbalizes/displays adequate comfort level or baseline comfort level:   Encourage patient to monitor pain and request assistance   Assess pain using appropriate pain scale  3/31/2024 0327 by Jen Gann RN  Outcome: Progressing     Problem: Skin/Tissue Integrity  Goal: Absence of new skin breakdown  Description: 1.  Monitor for areas of redness and/or skin breakdown  2.  Assess vascular access sites hourly  3.  Every 4-6 hours minimum:  Change oxygen saturation probe site  4.  Every 4-6 hours:  If on nasal continuous positive airway pressure, respiratory therapy assess nares and determine need for appliance change or resting period.  3/31/2024 1208 by Bryan Bhakta RN  Outcome: Progressing  3/31/2024 0327 by Jen Gann RN  Outcome: Progressing     Problem: Safety - Adult  Goal: Free from fall injury  3/31/2024 1208 by Bryan Bhakta RN  Outcome: Progressing  Flowsheets (Taken 3/31/2024 0731)  Free From Fall Injury: Based on caregiver fall risk screen, instruct family/caregiver to ask for assistance with transferring infant if caregiver noted to have fall risk factors  3/31/2024 0327 by Jen Gann RN  Outcome: Progressing     Problem: Respiratory - Adult  Goal: Achieves optimal ventilation and oxygenation  3/31/2024 1208 by Bryan Bhakta RN  Outcome: Progressing  Flowsheets (Taken  3/31/2024 0800)  Achieves optimal ventilation and oxygenation:   Assess for changes in respiratory status   Assess for changes in mentation and behavior  3/31/2024 0327 by Jen Gann, RN  Outcome: Progressing  Flowsheets (Taken 3/30/2024 1600 by Nilda Cazares, RN)  Achieves optimal ventilation and oxygenation:   Assess for changes in respiratory status   Oxygen supplementation based on oxygen saturation or arterial blood gases   Encourage broncho-pulmonary hygiene including cough, deep breathe, incentive spirometry

## 2024-03-31 NOTE — PROGRESS NOTES
Pulse oximetry assessment   93% at rest on room air (if 88% or less, skip next steps)  89% while ambulating on room air  98% at rest on 3LPM  96% while ambulating on 3LPM

## 2024-03-31 NOTE — PROGRESS NOTES
Bedside and Verbal shift change report given to Jen RN (oncoming nurse) by Bryan RN (offgoing nurse). Report included the following information Nurse Handoff Report, Intake/Output, MAR, Recent Results, and Cardiac Rhythm NSR .

## 2024-03-31 NOTE — PROGRESS NOTES
Bedside and Verbal shift change report given to Jen RN (oncoming nurse) by Bryan RN (offgoing nurse). Report included the following information Nurse Handoff Report, Adult Overview, Cardiac Rhythm NSR, and Event Log.     2033:  Inhaler found at bedside. Pt states she has not used them since Thursday. Pt stated she needed instruction on how to use one of her inhalers.

## 2024-03-31 NOTE — PROGRESS NOTES
Bedside and Verbal shift change report given to Bryan RN (oncoming nurse) by Jen RN (offgoing nurse). Report included the following information Nurse Handoff Report, Intake/Output, MAR, Recent Results, and Cardiac Rhythm NSR/sinus tach   .

## 2024-03-31 NOTE — PROGRESS NOTES
Bedside and Verbal shift change report given to Jen RN (oncoming nurse) by Bryan RN (offgoing nurse). Report included the following information Nurse Handoff Report, Adult Overview, Cardiac Rhythm Sinus Rhythm, and Event Log.     0934:  Hospitalist paged for persistent painful cough    0947:  Dr. Samuels made aware of cough and yellow thick sputum. Guaifenesin ordered.

## 2024-03-31 NOTE — PROGRESS NOTES
Progress Note  Hospitalist Service    Patient: Ariella Ny MRN: [id number]   SSN: [id number]  YOB: 1965   Age: 58 y.o.  Sex: female      Admit Date: 3/29/2024    LOS: 2 days   Chief Complaint   Patient presents with    Shortness of Breath     Pt to Ed via EMS reports Sob hx COPD, current smoker, Pt was 78% on RA placed on Duoneb, albuterol, mag and solumedrol. Pt arrived on duoneb and has increased work of breathing and tachypenic.        Subjective:     Patient seen and examined.  Less WOB. Wheezing much improved.   Walk trial later today.     Objective:     Vitals:  /61   Pulse 76   Temp 99.5 °F (37.5 °C) (Oral)   Resp 17   Ht 1.702 m (5' 7\")   Wt 63.5 kg (140 lb)   SpO2 99%   BMI 21.93 kg/m²     Physical Exam:   General appearance: alert, appears stated age, and cooperative  Lungs: Mild wheezing. Tachypnea   Heart: regular rate and rhythm, S1, S2 normal, no murmur, click, rub or gallop  Abdomen: soft, non-tender; bowel sounds normal; no masses,  no organomegaly  Pulses: 2+ and symmetric  Skin: Skin color, texture, turgor normal. No rashes or lesions  Neuro:  normal without focal findings, mental status, speech normal, alert and oriented x3, FELIX, and reflexes normal and symmetric    Intake and Output:  Current Shift: 03/31 0701 - 03/31 1900  In: 120 [P.O.:120]  Out: -   Last three shifts: 03/29 1901 - 03/31 0700  In: 370 [P.O.:360; I.V.:10]  Out: -     Lab/Data Review:  No results found for this or any previous visit (from the past 12 hour(s)).        Key Findings or tests:       Telemetry NONE   Oxygen NONE     Assessment and Plan:     COPD Exacerbation, secondary to suspected URI  - Cont. BIPAP  - Scheduled Duoneb Q4hr  - S/p x1 dose Solumedrol w/ EMS; Cont. Solumedrol 40mg daily; total steroid course x 5 day; transition to PO prednisone on 3/31.   - No indication for antibiotics  - Cont. Home Symbicort + Spiriva  - Incentive Spirometer  - Outpatient Pulm followed by Dr. Castro

## 2024-03-31 NOTE — PLAN OF CARE
Problem: Respiratory - Adult  Goal: Achieves optimal ventilation and oxygenation  3/31/2024 0327 by Jen Gann RN  Outcome: Progressing  3/30/2024 1501 by Bryan Bhakta RN  Outcome: Progressing     Problem: Safety - Adult  Goal: Free from fall injury  3/31/2024 0327 by Jen Gann RN  Outcome: Progressing  3/30/2024 1501 by Bryan Bhakta RN  Outcome: Progressing     Problem: Skin/Tissue Integrity  Goal: Absence of new skin breakdown  Description: 1.  Monitor for areas of redness and/or skin breakdown  2.  Assess vascular access sites hourly  3.  Every 4-6 hours minimum:  Change oxygen saturation probe site  4.  Every 4-6 hours:  If on nasal continuous positive airway pressure, respiratory therapy assess nares and determine need for appliance change or resting period.  3/31/2024 0327 by Jen Gann RN  Outcome: Progressing  3/30/2024 1501 by Bryan Bhakta RN  Outcome: Progressing     Problem: Pain  Goal: Verbalizes/displays adequate comfort level or baseline comfort level  3/31/2024 0327 by Jen Gann RN  Outcome: Progressing  3/30/2024 1501 by Bryan Bhakta RN  Outcome: Progressing     Problem: Discharge Planning  Goal: Discharge to home or other facility with appropriate resources  3/31/2024 0327 by Jen Gann RN  Outcome: Progressing  3/30/2024 1501 by Bryan Bhakta RN  Outcome: Progressing      No

## 2024-04-01 VITALS
OXYGEN SATURATION: 98 % | TEMPERATURE: 98.5 F | HEIGHT: 67 IN | BODY MASS INDEX: 21.97 KG/M2 | DIASTOLIC BLOOD PRESSURE: 63 MMHG | SYSTOLIC BLOOD PRESSURE: 95 MMHG | RESPIRATION RATE: 15 BRPM | HEART RATE: 70 BPM | WEIGHT: 140 LBS

## 2024-04-01 PROCEDURE — 94660 CPAP INITIATION&MGMT: CPT

## 2024-04-01 PROCEDURE — 6360000002 HC RX W HCPCS: Performed by: STUDENT IN AN ORGANIZED HEALTH CARE EDUCATION/TRAINING PROGRAM

## 2024-04-01 PROCEDURE — 94761 N-INVAS EAR/PLS OXIMETRY MLT: CPT

## 2024-04-01 PROCEDURE — 6370000000 HC RX 637 (ALT 250 FOR IP): Performed by: HOSPITALIST

## 2024-04-01 PROCEDURE — 99239 HOSP IP/OBS DSCHRG MGMT >30: CPT | Performed by: STUDENT IN AN ORGANIZED HEALTH CARE EDUCATION/TRAINING PROGRAM

## 2024-04-01 PROCEDURE — 6370000000 HC RX 637 (ALT 250 FOR IP): Performed by: STUDENT IN AN ORGANIZED HEALTH CARE EDUCATION/TRAINING PROGRAM

## 2024-04-01 PROCEDURE — 2580000003 HC RX 258: Performed by: STUDENT IN AN ORGANIZED HEALTH CARE EDUCATION/TRAINING PROGRAM

## 2024-04-01 PROCEDURE — 2700000000 HC OXYGEN THERAPY PER DAY

## 2024-04-01 RX ORDER — PREDNISONE 50 MG/1
50 TABLET ORAL DAILY
Qty: 7 TABLET | Refills: 0 | Status: SHIPPED | OUTPATIENT
Start: 2024-04-01 | End: 2024-04-08

## 2024-04-01 RX ORDER — GUAIFENESIN 600 MG/1
600 TABLET, EXTENDED RELEASE ORAL 2 TIMES DAILY
Qty: 10 TABLET | Refills: 0 | Status: SHIPPED | OUTPATIENT
Start: 2024-04-01

## 2024-04-01 RX ORDER — PANTOPRAZOLE SODIUM 40 MG/1
40 TABLET, DELAYED RELEASE ORAL
Qty: 30 TABLET | Refills: 3 | Status: SHIPPED | OUTPATIENT
Start: 2024-04-01

## 2024-04-01 RX ORDER — LANOLIN ALCOHOL/MO/W.PET/CERES
100 CREAM (GRAM) TOPICAL DAILY
Qty: 30 TABLET | Refills: 3 | Status: SHIPPED | OUTPATIENT
Start: 2024-04-01

## 2024-04-01 RX ORDER — AZITHROMYCIN 500 MG/1
500 TABLET, FILM COATED ORAL DAILY
Qty: 5 TABLET | Refills: 0 | Status: SHIPPED | OUTPATIENT
Start: 2024-04-01 | End: 2024-04-06

## 2024-04-01 RX ORDER — BUDESONIDE AND FORMOTEROL FUMARATE DIHYDRATE 80; 4.5 UG/1; UG/1
2 AEROSOL RESPIRATORY (INHALATION) 2 TIMES DAILY
Qty: 10.2 G | Refills: 5 | Status: SHIPPED | OUTPATIENT
Start: 2024-04-01

## 2024-04-01 RX ORDER — HYDROXYZINE HYDROCHLORIDE 25 MG/1
25 TABLET, FILM COATED ORAL EVERY 6 HOURS PRN
Qty: 90 TABLET | Refills: 1 | Status: SHIPPED | OUTPATIENT
Start: 2024-04-01

## 2024-04-01 RX ORDER — TIZANIDINE 4 MG/1
4 TABLET ORAL EVERY 8 HOURS PRN
Qty: 90 TABLET | Refills: 0 | Status: SHIPPED | OUTPATIENT
Start: 2024-04-01

## 2024-04-01 RX ORDER — FOLIC ACID 1 MG/1
1 TABLET ORAL DAILY
Qty: 30 TABLET | Refills: 3 | Status: SHIPPED | OUTPATIENT
Start: 2024-04-01

## 2024-04-01 RX ORDER — PENTOXIFYLLINE 400 MG/1
400 TABLET, EXTENDED RELEASE ORAL
Qty: 90 TABLET | Refills: 3 | Status: SHIPPED | OUTPATIENT
Start: 2024-04-01

## 2024-04-01 RX ORDER — VORICONAZOLE 200 MG/1
200 TABLET, FILM COATED ORAL EVERY 12 HOURS SCHEDULED
Qty: 60 TABLET | Refills: 0 | Status: SHIPPED | OUTPATIENT
Start: 2024-04-01 | End: 2024-05-01

## 2024-04-01 RX ADMIN — ENOXAPARIN SODIUM 40 MG: 100 INJECTION SUBCUTANEOUS at 09:01

## 2024-04-01 RX ADMIN — PANTOPRAZOLE SODIUM 40 MG: 40 TABLET, DELAYED RELEASE ORAL at 07:00

## 2024-04-01 RX ADMIN — FOLIC ACID 1 MG: 1 TABLET ORAL at 09:02

## 2024-04-01 RX ADMIN — VORICONAZOLE 200 MG: 200 TABLET ORAL at 09:02

## 2024-04-01 RX ADMIN — THIAMINE HCL TAB 100 MG 100 MG: 100 TAB at 09:06

## 2024-04-01 RX ADMIN — BUDESONIDE 250 MCG: 0.25 INHALANT RESPIRATORY (INHALATION) at 08:10

## 2024-04-01 RX ADMIN — GUAIFENESIN 600 MG: 600 TABLET, EXTENDED RELEASE ORAL at 09:02

## 2024-04-01 RX ADMIN — ARFORMOTEROL TARTRATE 15 MCG: 15 SOLUTION RESPIRATORY (INHALATION) at 08:10

## 2024-04-01 RX ADMIN — PREDNISONE 50 MG: 20 TABLET ORAL at 09:02

## 2024-04-01 RX ADMIN — SODIUM CHLORIDE, PRESERVATIVE FREE 10 ML: 5 INJECTION INTRAVENOUS at 09:10

## 2024-04-01 RX ADMIN — Medication 400 MG: at 09:02

## 2024-04-01 RX ADMIN — IPRATROPIUM BROMIDE 0.5 MG: 0.5 SOLUTION RESPIRATORY (INHALATION) at 08:10

## 2024-04-01 RX ADMIN — SERTRALINE HYDROCHLORIDE 50 MG: 50 TABLET ORAL at 09:06

## 2024-04-01 ASSESSMENT — PAIN SCALES - GENERAL: PAINLEVEL_OUTOF10: 0

## 2024-04-01 NOTE — PLAN OF CARE
Problem: Respiratory - Adult  Goal: Achieves optimal ventilation and oxygenation  Outcome: Progressing     Problem: Safety - Adult  Goal: Free from fall injury  Outcome: Progressing     Problem: Skin/Tissue Integrity  Goal: Absence of new skin breakdown  Description: 1.  Monitor for areas of redness and/or skin breakdown  2.  Assess vascular access sites hourly  3.  Every 4-6 hours minimum:  Change oxygen saturation probe site  4.  Every 4-6 hours:  If on nasal continuous positive airway pressure, respiratory therapy assess nares and determine need for appliance change or resting period.  Outcome: Progressing     Problem: Pain  Goal: Verbalizes/displays adequate comfort level or baseline comfort level  Outcome: Progressing     Problem: Discharge Planning  Goal: Discharge to home or other facility with appropriate resources  Outcome: Progressing

## 2024-04-01 NOTE — PROGRESS NOTES
03/31/24 2316   NIV Type   Equipment Type v60   Mode Bilevel   Mask Type Full face mask   Mask Size Medium   Assessment   Pulse 80   Respirations 20   SpO2 100 %   Breath Sounds   Breath Sounds Bilateral Diminished   Settings/Measurements   PIP Observed 11 cm H20   IPAP 10 cmH20   CPAP/EPAP 5 cmH2O   Vt (Measured) 595 mL   Rate Ordered 8   Insp Rise Time (%) 3 %   FiO2  30 %   I Time/ I Time % 0.9 s   Minute Volume (L/min) 11.7 Liters   Mask Leak (lpm) 7 lpm     Patient on BIPAP at this time per her request.

## 2024-04-01 NOTE — DISCHARGE SUMMARY
Discharge Summary    Patient: Ariella Ny MRN: [id number]  CSN: [id number]    YOB: 1965  Age: 58 y.o.  Sex: female    DOA: 3/29/2024 LOS:  LOS: 3 days   Discharge Date: 4/1/24     Admission Diagnosis: Acute respiratory failure (HCC) [J96.00]  COPD with acute exacerbation (HCC) [J44.1]    Discharge Diagnosis:    Principal Problem:    Acute respiratory failure (HCC)  Active Problems:    Elevated LFTs    COPD with acute exacerbation (HCC)  Resolved Problems:    * No resolved hospital problems. *       Discharge Condition: Stable  Discharge Disposition: home     PHYSICAL EXAM  Visit Vitals  /67   Pulse 60   Temp 98.5 °F (36.9 °C) (Oral)   Resp 15   Ht 1.702 m (5' 7\")   Wt 63.5 kg (140 lb)   SpO2 100%   BMI 21.93 kg/m²       General: Alert, cooperative, no acute distress    HEENT: NC, Atraumatic.  PERRLA, EOMI. Anicteric sclerae.  Lungs:  CTA Bilaterally. Diminished air entry   Heart:  Regular  rhythm,  No murmur, No Rubs, No Gallops  Abdomen: Soft, Non distended, Non tender.  +Bowel sounds, no HSM  Extremities: No c/c/e  Psych:   Good insight. Not anxious or agitated.  Neurologic:  CN 2-12 grossly intact, oriented X 3.  No acute neurological                                 Deficits,     Hospital Course By Problem:     COPD Exacerbation   - Cont. BIPAP nightly  - Scheduled Duoneb Q4hr  - Solumedrol 40mg daily; transition to PO prednisone for home  - Patient with thick yellow sputum unknown to writer until night before discharge - started Azithromycin.   - Cont. Home Symbicort + Spiriva  - Incentive Spirometer  - Outpatient Pulm followed by Dr. Castro     Abdominal Pain w/ Rebound/Guarding  - Unclear etiology, appreciated first on my exam  - CT Abd/Pelvis ordered  - Check Lactic Acid     ETOH Use Disorder  - Denies hx of withdrawal, currently still drinking but less frequently  - [facility] Protocol  - Thiamine, Folic Acid supplementation     Elevated LFTs  - Repeat Hep function panel; if

## 2024-04-01 NOTE — PROGRESS NOTES
Patient was provided with discharge instructions and education. The patient  verbalized understanding of the discharge information. Time for questions was provided. No questions voiced at this time. The patient was discharged home via wheelchair with her daughter.

## 2024-04-01 NOTE — CARE COORDINATION
Discharge order noted for today.  Orders reviewed.  No needs identified at this time.  Per pt, her daughter will be picking her up.            HANNAH LeeN RN  Care Management

## 2024-04-05 NOTE — PROGRESS NOTES
Physician Progress Note      PATIENT:               BECK JARRETT  CSN #:                  [id number]  :                       1965  ADMIT DATE:       3/29/2024 3:50 AM  DISCH DATE:        2024 11:42 AM  RESPONDING  PROVIDER #:        Tejal Baugh DO          QUERY TEXT:    Pt admitted with AE COPD.  Per ED, patient w/ respiratory distress & placed on   BiPap.   If possible, please document in the progress notes and discharge   summary if you are evaluating and/or treating any of the following:      The medical record reflects the following:  Risk Factors: AE COPD, Asthma    Clinical Indicators:  Arrived to ED on NRB transitioned to BiPap.  Eventually weaned to 5L NC, BiPAP   PRN.    ED RN NOTE:  Pt arrived via EMS in Resp Distress. Pt was given Albuterol, Duo Neb , 2 g of   Magnesium, 125 mg of Solu-medrol. Pt was placed on Bi-pap on arrival and given   a 2nd Duo Neb.    H&P PE:  Tachypneic w/ accessory muscle use. Wheezing in bilateral upper and lower lung   fields. BIPAP in place.      Treatment: BiPap, solumedrol, IS, duonebs      Thank you,  HEYDI Williamson,RN  Clinical Documentation  [email or web address]  Ph: [phone]  or via [employer]  Options provided:  -- Acute respiratory failure with hypoxia  -- Other - I will add my own diagnosis  -- Disagree - Not applicable / Not valid  -- Disagree - Clinically unable to determine / Unknown  -- Refer to Clinical Documentation Reviewer    PROVIDER RESPONSE TEXT:    This patient is in acute respiratory failure with hypoxia.    Query created by: Willa Foster on 4/3/2024 8:41 AM      Electronically signed by:  Tejal Baugh DO 2024 6:19 AM

## 2024-04-25 ENCOUNTER — OFFICE VISIT (OUTPATIENT)
Age: 59
End: 2024-04-25
Payer: MEDICAID

## 2024-04-25 VITALS
RESPIRATION RATE: 16 BRPM | TEMPERATURE: 97.5 F | HEART RATE: 105 BPM | DIASTOLIC BLOOD PRESSURE: 79 MMHG | WEIGHT: 130.4 LBS | SYSTOLIC BLOOD PRESSURE: 153 MMHG | BODY MASS INDEX: 20.42 KG/M2 | OXYGEN SATURATION: 96 %

## 2024-04-25 DIAGNOSIS — J44.1 COPD EXACERBATION (HCC): Primary | ICD-10-CM

## 2024-04-25 DIAGNOSIS — Z91.199 POOR COMPLIANCE: ICD-10-CM

## 2024-04-25 DIAGNOSIS — J96.02 ACUTE HYPERCAPNIC RESPIRATORY FAILURE (HCC): ICD-10-CM

## 2024-04-25 DIAGNOSIS — B47.9 MYCETOMA: ICD-10-CM

## 2024-04-25 PROCEDURE — 99214 OFFICE O/P EST MOD 30 MIN: CPT | Performed by: INTERNAL MEDICINE

## 2024-04-25 RX ORDER — MEGESTROL ACETATE 20 MG/1
20 TABLET ORAL DAILY
COMMUNITY
Start: 2021-12-05

## 2024-04-25 RX ORDER — NICOTINE 21 MG/24HR
1 PATCH, TRANSDERMAL 24 HOURS TRANSDERMAL EVERY 24 HOURS
COMMUNITY
Start: 2021-12-05

## 2024-04-25 ASSESSMENT — ENCOUNTER SYMPTOMS
EYE REDNESS: 0
STRIDOR: 0
ABDOMINAL PAIN: 0
SHORTNESS OF BREATH: 1
RHINORRHEA: 0
EYE ITCHING: 0
DIARRHEA: 0
APNEA: 0
CHEST TIGHTNESS: 0
WHEEZING: 1
BLOOD IN STOOL: 0
VOICE CHANGE: 0
SORE THROAT: 0
COLOR CHANGE: 0
CHOKING: 0
CONSTIPATION: 0
EYE PAIN: 0
FACIAL SWELLING: 0
BACK PAIN: 0
COUGH: 1
EYE DISCHARGE: 0
TROUBLE SWALLOWING: 0
NAUSEA: 0
VOMITING: 0

## 2024-04-25 NOTE — PROGRESS NOTES
Ariella Ny presents today for   Chief Complaint   Patient presents with    Follow-up     Mycetoma    COPD    Follow-Up from Hospital     [facility] 24-24: Acute Respiratory Failure/COPD Exacerbation       Is someone accompanying this pt? No    Is the patient using any DME equipment during OV? No    -DME Company NA    Depression Screenin/15/2023     9:18 AM   PHQ-9 Questionaire   Little interest or pleasure in doing things 3   Feeling down, depressed, or hopeless 3   Trouble falling or staying asleep, or sleeping too much 3   Feeling tired or having little energy 3   Poor appetite or overeating 3   Feeling bad about yourself - or that you are a failure or have let yourself or your family down 3   Trouble concentrating on things, such as reading the newspaper or watching television 1   Moving or speaking so slowly that other people could have noticed. Or the opposite - being so fidgety or restless that you have been moving around a lot more than usual 0   Thoughts that you would be better off dead, or of hurting yourself in some way 1   PHQ-9 Total Score 20   If you checked off any problems, how difficult have these problems made it for you to do your work, take care of things at home, or get along with other people? 1       Learning Needs Questionnaire:     Who is the primary learner? Patient    What is the preferred language for health care of the primary learner? ENGLISH    How does the primary learner prefer to learn new concepts? DEMONSTRATION    Answered By Patient    Relationship to Learner SELF          Fall Risk:         11/15/2023     9:25 AM   Fall Risk   2 or more falls in past year? yes   Fall with injury in past year? no        Abuse Screenin/15/2023     9:00 AM   AMB Abuse Screening   Do you ever feel afraid of your partner? N   Are you in a relationship with someone who physically or mentally threatens you? N   Is it safe for you to go home? Y         Coordination of 
formalin, accompanied by two  touch preps and labeled with patient's identifiers and \"right lung  biopsy\".  The specimen consists of multiple cylindrical tissue cores  ranging from 0.2-0.4 cm in length.  The specimen is entirely submitted in  one cassette.    KS 12/27/2023 11:13 AM    TCA/ks11     PRELIMINARY INTRAOPERATIVE DIAGNOSIS     Touch Prep #1:  Scant cellularity.  Touch Prep #2:  Scant cellularity.    LONNIE MORRISSEY M.D.  MICROSCOPIC DESCRIPTION:  Histologic section shows fragments of pulmonary parenchyma with stromal  fibrosis, chronic inflammation consisting of scattered plasma cells and  reactive epithelial changes.  No evidence of malignancy is identified.  Also present are separate aggregates of fungal hyphae.       SAMARIA MORRIS M.D./mally Morris M.D.  Electronically Signed  12/28/2023         Specimen Collected: 12/27/23 00:00 EST Last Resulted: 12/28/23 15:18 EST                   An electronic signature was used to authenticate this note.    --Kimberlyn Castro MD

## 2024-05-06 ENCOUNTER — TELEPHONE (OUTPATIENT)
Age: 59
End: 2024-05-06

## 2024-05-10 NOTE — TELEPHONE ENCOUNTER
Tried to call patient again. Phone # not in service.    Admission Reconciliation is Completed  Discharge Reconciliation is Completed

## 2024-07-03 ENCOUNTER — APPOINTMENT (OUTPATIENT)
Facility: HOSPITAL | Age: 59
End: 2024-07-03
Payer: MEDICAID

## 2024-07-03 ENCOUNTER — HOSPITAL ENCOUNTER (INPATIENT)
Facility: HOSPITAL | Age: 59
LOS: 3 days | Discharge: HOME | End: 2024-07-06
Attending: EMERGENCY MEDICINE | Admitting: INTERNAL MEDICINE
Payer: MEDICAID

## 2024-07-03 DIAGNOSIS — R91.1 LUNG NODULE: ICD-10-CM

## 2024-07-03 DIAGNOSIS — G93.40 ENCEPHALOPATHY: Primary | ICD-10-CM

## 2024-07-03 DIAGNOSIS — J98.4 CAVITARY LESION OF LUNG: ICD-10-CM

## 2024-07-03 PROBLEM — J44.9 COPD (CHRONIC OBSTRUCTIVE PULMONARY DISEASE) (HCC): Status: ACTIVE | Noted: 2024-03-29

## 2024-07-03 PROBLEM — B44.1 PULMONARY ASPERGILLOMA (HCC): Status: ACTIVE | Noted: 2024-07-03

## 2024-07-03 PROBLEM — K76.82 HEPATIC ENCEPHALOPATHY (HCC): Status: ACTIVE | Noted: 2024-07-03

## 2024-07-03 LAB
ALBUMIN SERPL-MCNC: 3.6 G/DL (ref 3.4–5)
ALBUMIN/GLOB SERPL: 0.8 (ref 0.8–1.7)
ALP SERPL-CCNC: 386 U/L (ref 45–117)
ALT SERPL-CCNC: 55 U/L (ref 13–56)
AMMONIA PLAS-SCNC: 123 UMOL/L (ref 11–32)
ANION GAP SERPL CALC-SCNC: 5 MMOL/L (ref 3–18)
AST SERPL-CCNC: 59 U/L (ref 10–38)
BASOPHILS # BLD: 0 K/UL (ref 0–0.1)
BASOPHILS NFR BLD: 0 % (ref 0–2)
BILIRUB SERPL-MCNC: 0.9 MG/DL (ref 0.2–1)
BUN SERPL-MCNC: 19 MG/DL (ref 7–18)
BUN/CREAT SERPL: 22 (ref 12–20)
CALCIUM SERPL-MCNC: 10.1 MG/DL (ref 8.5–10.1)
CHLORIDE SERPL-SCNC: 109 MMOL/L (ref 100–111)
CO2 SERPL-SCNC: 29 MMOL/L (ref 21–32)
CREAT SERPL-MCNC: 0.88 MG/DL (ref 0.6–1.3)
DIFFERENTIAL METHOD BLD: ABNORMAL
EOSINOPHIL # BLD: 0.1 K/UL (ref 0–0.4)
EOSINOPHIL NFR BLD: 2 % (ref 0–5)
ERYTHROCYTE [DISTWIDTH] IN BLOOD BY AUTOMATED COUNT: 15.6 % (ref 11.6–14.5)
ETHANOL SERPL-MCNC: <3 MG/DL (ref 0–3)
GLOBULIN SER CALC-MCNC: 4.7 G/DL (ref 2–4)
GLUCOSE SERPL-MCNC: 91 MG/DL (ref 74–99)
HCT VFR BLD AUTO: 36.9 % (ref 35–45)
HGB BLD-MCNC: 11.9 G/DL (ref 12–16)
IMM GRANULOCYTES # BLD AUTO: 0 K/UL (ref 0–0.04)
IMM GRANULOCYTES NFR BLD AUTO: 0 % (ref 0–0.5)
INR PPP: 1.2 (ref 0.9–1.1)
LACTATE SERPL-SCNC: 1.8 MMOL/L (ref 0.4–2)
LACTATE SERPL-SCNC: 1.9 MMOL/L (ref 0.4–2)
LYMPHOCYTES # BLD: 2 K/UL (ref 0.9–3.6)
LYMPHOCYTES NFR BLD: 32 % (ref 21–52)
MCH RBC QN AUTO: 32.5 PG (ref 24–34)
MCHC RBC AUTO-ENTMCNC: 32.2 G/DL (ref 31–37)
MCV RBC AUTO: 100.8 FL (ref 78–100)
MONOCYTES # BLD: 0.9 K/UL (ref 0.05–1.2)
MONOCYTES NFR BLD: 14 % (ref 3–10)
NEUTS SEG # BLD: 3.3 K/UL (ref 1.8–8)
NEUTS SEG NFR BLD: 52 % (ref 40–73)
NRBC # BLD: 0 K/UL (ref 0–0.01)
NRBC BLD-RTO: 0 PER 100 WBC
PLATELET # BLD AUTO: 210 K/UL (ref 135–420)
PMV BLD AUTO: 9.7 FL (ref 9.2–11.8)
POTASSIUM SERPL-SCNC: 4.4 MMOL/L (ref 3.5–5.5)
PROCALCITONIN SERPL-MCNC: <0.05 NG/ML
PROT SERPL-MCNC: 8.3 G/DL (ref 6.4–8.2)
PROTHROMBIN TIME: 15.3 SEC (ref 11.9–14.9)
RBC # BLD AUTO: 3.66 M/UL (ref 4.2–5.3)
SODIUM SERPL-SCNC: 143 MMOL/L (ref 136–145)
TROPONIN I SERPL HS-MCNC: 7 NG/L (ref 0–54)
WBC # BLD AUTO: 6.4 K/UL (ref 4.6–13.2)

## 2024-07-03 PROCEDURE — 6360000002 HC RX W HCPCS: Performed by: HEALTH CARE PROVIDER

## 2024-07-03 PROCEDURE — 82077 ASSAY SPEC XCP UR&BREATH IA: CPT

## 2024-07-03 PROCEDURE — 80053 COMPREHEN METABOLIC PANEL: CPT

## 2024-07-03 PROCEDURE — 6360000002 HC RX W HCPCS: Performed by: PHYSICIAN ASSISTANT

## 2024-07-03 PROCEDURE — 70450 CT HEAD/BRAIN W/O DYE: CPT

## 2024-07-03 PROCEDURE — 99285 EMERGENCY DEPT VISIT HI MDM: CPT

## 2024-07-03 PROCEDURE — 6360000004 HC RX CONTRAST MEDICATION: Performed by: EMERGENCY MEDICINE

## 2024-07-03 PROCEDURE — 83605 ASSAY OF LACTIC ACID: CPT

## 2024-07-03 PROCEDURE — 1100000000 HC RM PRIVATE

## 2024-07-03 PROCEDURE — 84484 ASSAY OF TROPONIN QUANT: CPT

## 2024-07-03 PROCEDURE — 85025 COMPLETE CBC W/AUTO DIFF WBC: CPT

## 2024-07-03 PROCEDURE — 82140 ASSAY OF AMMONIA: CPT

## 2024-07-03 PROCEDURE — 99223 1ST HOSP IP/OBS HIGH 75: CPT | Performed by: PHYSICIAN ASSISTANT

## 2024-07-03 PROCEDURE — 93005 ELECTROCARDIOGRAM TRACING: CPT | Performed by: HEALTH CARE PROVIDER

## 2024-07-03 PROCEDURE — 94640 AIRWAY INHALATION TREATMENT: CPT

## 2024-07-03 PROCEDURE — 96374 THER/PROPH/DIAG INJ IV PUSH: CPT

## 2024-07-03 PROCEDURE — 70498 CT ANGIOGRAPHY NECK: CPT

## 2024-07-03 PROCEDURE — 2580000003 HC RX 258: Performed by: PHYSICIAN ASSISTANT

## 2024-07-03 PROCEDURE — 85610 PROTHROMBIN TIME: CPT

## 2024-07-03 PROCEDURE — 87040 BLOOD CULTURE FOR BACTERIA: CPT

## 2024-07-03 PROCEDURE — 84145 PROCALCITONIN (PCT): CPT

## 2024-07-03 RX ORDER — BISACODYL 10 MG
10 SUPPOSITORY, RECTAL RECTAL DAILY PRN
Status: DISCONTINUED | OUTPATIENT
Start: 2024-07-03 | End: 2024-07-06 | Stop reason: HOSPADM

## 2024-07-03 RX ORDER — SODIUM CHLORIDE 0.9 % (FLUSH) 0.9 %
5-40 SYRINGE (ML) INJECTION EVERY 12 HOURS SCHEDULED
Status: DISCONTINUED | OUTPATIENT
Start: 2024-07-03 | End: 2024-07-06 | Stop reason: HOSPADM

## 2024-07-03 RX ORDER — MIDAZOLAM HYDROCHLORIDE 1 MG/ML
2 INJECTION INTRAMUSCULAR; INTRAVENOUS
Status: ACTIVE | OUTPATIENT
Start: 2024-07-03 | End: 2024-07-04

## 2024-07-03 RX ORDER — LORAZEPAM 1 MG/1
4 TABLET ORAL
Status: DISCONTINUED | OUTPATIENT
Start: 2024-07-03 | End: 2024-07-06 | Stop reason: HOSPADM

## 2024-07-03 RX ORDER — ONDANSETRON 4 MG/1
4 TABLET, ORALLY DISINTEGRATING ORAL EVERY 8 HOURS PRN
Status: DISCONTINUED | OUTPATIENT
Start: 2024-07-03 | End: 2024-07-06 | Stop reason: HOSPADM

## 2024-07-03 RX ORDER — LORAZEPAM 2 MG/ML
4 INJECTION INTRAMUSCULAR
Status: DISCONTINUED | OUTPATIENT
Start: 2024-07-03 | End: 2024-07-06 | Stop reason: HOSPADM

## 2024-07-03 RX ORDER — LANOLIN ALCOHOL/MO/W.PET/CERES
100 CREAM (GRAM) TOPICAL DAILY
Status: DISCONTINUED | OUTPATIENT
Start: 2024-07-11 | End: 2024-07-06 | Stop reason: HOSPADM

## 2024-07-03 RX ORDER — THIAMINE HYDROCHLORIDE 100 MG/ML
100 INJECTION, SOLUTION INTRAMUSCULAR; INTRAVENOUS DAILY
Status: DISCONTINUED | OUTPATIENT
Start: 2024-07-03 | End: 2024-07-03

## 2024-07-03 RX ORDER — BUDESONIDE 1 MG/2ML
1 INHALANT ORAL 2 TIMES DAILY
Status: DISCONTINUED | OUTPATIENT
Start: 2024-07-03 | End: 2024-07-06 | Stop reason: HOSPADM

## 2024-07-03 RX ORDER — SODIUM CHLORIDE 9 MG/ML
INJECTION, SOLUTION INTRAVENOUS PRN
Status: DISCONTINUED | OUTPATIENT
Start: 2024-07-03 | End: 2024-07-06 | Stop reason: HOSPADM

## 2024-07-03 RX ORDER — POTASSIUM CHLORIDE 7.45 MG/ML
10 INJECTION INTRAVENOUS PRN
Status: DISCONTINUED | OUTPATIENT
Start: 2024-07-03 | End: 2024-07-06 | Stop reason: HOSPADM

## 2024-07-03 RX ORDER — RIFAXIMIN 550 MG/1
550 TABLET ORAL 2 TIMES DAILY
Status: ON HOLD | COMMUNITY
Start: 2024-06-19

## 2024-07-03 RX ORDER — ATORVASTATIN CALCIUM 20 MG/1
20 TABLET, FILM COATED ORAL DAILY
Status: ON HOLD | COMMUNITY
Start: 2024-06-19

## 2024-07-03 RX ORDER — SODIUM CHLORIDE 0.9 % (FLUSH) 0.9 %
5-40 SYRINGE (ML) INJECTION PRN
Status: DISCONTINUED | OUTPATIENT
Start: 2024-07-03 | End: 2024-07-06 | Stop reason: HOSPADM

## 2024-07-03 RX ORDER — LORAZEPAM 2 MG/ML
2 INJECTION INTRAMUSCULAR
Status: DISCONTINUED | OUTPATIENT
Start: 2024-07-03 | End: 2024-07-06 | Stop reason: HOSPADM

## 2024-07-03 RX ORDER — FERROUS SULFATE 325(65) MG
325 TABLET ORAL
Status: ON HOLD | COMMUNITY
Start: 2024-06-19

## 2024-07-03 RX ORDER — QUETIAPINE FUMARATE 50 MG/1
50 TABLET, FILM COATED ORAL DAILY
Status: ON HOLD | COMMUNITY
Start: 2024-06-19

## 2024-07-03 RX ORDER — LORAZEPAM 2 MG/ML
3 INJECTION INTRAMUSCULAR
Status: DISCONTINUED | OUTPATIENT
Start: 2024-07-03 | End: 2024-07-06 | Stop reason: HOSPADM

## 2024-07-03 RX ORDER — POTASSIUM CHLORIDE 20 MEQ/1
40 TABLET, EXTENDED RELEASE ORAL PRN
Status: DISCONTINUED | OUTPATIENT
Start: 2024-07-03 | End: 2024-07-06 | Stop reason: HOSPADM

## 2024-07-03 RX ORDER — ZINC SULFATE 50(220)MG
50 CAPSULE ORAL 3 TIMES DAILY
Status: ON HOLD | COMMUNITY

## 2024-07-03 RX ORDER — LORAZEPAM 2 MG/ML
1 INJECTION INTRAMUSCULAR
Status: DISCONTINUED | OUTPATIENT
Start: 2024-07-03 | End: 2024-07-06 | Stop reason: HOSPADM

## 2024-07-03 RX ORDER — ISAVUCONAZONIUM SULFATE 186 MG/1
186 CAPSULE ORAL DAILY
Status: ON HOLD | COMMUNITY
Start: 2024-06-19

## 2024-07-03 RX ORDER — LORAZEPAM 1 MG/1
3 TABLET ORAL
Status: DISCONTINUED | OUTPATIENT
Start: 2024-07-03 | End: 2024-07-06 | Stop reason: HOSPADM

## 2024-07-03 RX ORDER — MAGNESIUM SULFATE IN WATER 40 MG/ML
2000 INJECTION, SOLUTION INTRAVENOUS PRN
Status: DISCONTINUED | OUTPATIENT
Start: 2024-07-03 | End: 2024-07-06 | Stop reason: HOSPADM

## 2024-07-03 RX ORDER — ONDANSETRON 2 MG/ML
4 INJECTION INTRAMUSCULAR; INTRAVENOUS EVERY 6 HOURS PRN
Status: DISCONTINUED | OUTPATIENT
Start: 2024-07-03 | End: 2024-07-06 | Stop reason: HOSPADM

## 2024-07-03 RX ORDER — LORAZEPAM 1 MG/1
1 TABLET ORAL
Status: DISCONTINUED | OUTPATIENT
Start: 2024-07-03 | End: 2024-07-06 | Stop reason: HOSPADM

## 2024-07-03 RX ORDER — DEXTROSE MONOHYDRATE AND SODIUM CHLORIDE 5; .45 G/100ML; G/100ML
INJECTION, SOLUTION INTRAVENOUS CONTINUOUS
Status: DISCONTINUED | OUTPATIENT
Start: 2024-07-03 | End: 2024-07-06 | Stop reason: HOSPADM

## 2024-07-03 RX ORDER — LORAZEPAM 1 MG/1
2 TABLET ORAL
Status: DISCONTINUED | OUTPATIENT
Start: 2024-07-03 | End: 2024-07-06 | Stop reason: HOSPADM

## 2024-07-03 RX ORDER — THIAMINE HYDROCHLORIDE 100 MG/ML
250 INJECTION, SOLUTION INTRAMUSCULAR; INTRAVENOUS DAILY
Status: DISCONTINUED | OUTPATIENT
Start: 2024-07-06 | End: 2024-07-06 | Stop reason: HOSPADM

## 2024-07-03 RX ORDER — CALCIUM CARBONATE/VITAMIN D3 600 MG-10
TABLET ORAL
COMMUNITY
Start: 2024-06-19 | End: 2024-07-03

## 2024-07-03 RX ORDER — ACETAMINOPHEN 500 MG
500 TABLET ORAL EVERY 6 HOURS PRN
Status: DISCONTINUED | OUTPATIENT
Start: 2024-07-03 | End: 2024-07-06 | Stop reason: HOSPADM

## 2024-07-03 RX ORDER — THIAMINE HYDROCHLORIDE 100 MG/ML
500 INJECTION, SOLUTION INTRAMUSCULAR; INTRAVENOUS EVERY 8 HOURS
Status: COMPLETED | OUTPATIENT
Start: 2024-07-03 | End: 2024-07-05

## 2024-07-03 RX ORDER — LANOLIN ALCOHOL/MO/W.PET/CERES
CREAM (GRAM) TOPICAL
Status: ON HOLD | COMMUNITY
Start: 2024-06-19

## 2024-07-03 RX ORDER — MIDAZOLAM HYDROCHLORIDE 1 MG/ML
2 INJECTION INTRAMUSCULAR; INTRAVENOUS
Status: COMPLETED | OUTPATIENT
Start: 2024-07-03 | End: 2024-07-03

## 2024-07-03 RX ORDER — IPRATROPIUM BROMIDE AND ALBUTEROL SULFATE 2.5; .5 MG/3ML; MG/3ML
1 SOLUTION RESPIRATORY (INHALATION) EVERY 4 HOURS PRN
Status: DISCONTINUED | OUTPATIENT
Start: 2024-07-03 | End: 2024-07-06 | Stop reason: HOSPADM

## 2024-07-03 RX ORDER — POLYETHYLENE GLYCOL 3350 17 G/17G
17 POWDER, FOR SOLUTION ORAL DAILY PRN
Status: DISCONTINUED | OUTPATIENT
Start: 2024-07-03 | End: 2024-07-06 | Stop reason: HOSPADM

## 2024-07-03 RX ORDER — LEVETIRACETAM 1000 MG/1
1000 TABLET ORAL 2 TIMES DAILY
Status: ON HOLD | COMMUNITY
Start: 2024-06-19

## 2024-07-03 RX ORDER — ARFORMOTEROL TARTRATE 15 UG/2ML
15 SOLUTION RESPIRATORY (INHALATION)
Status: DISCONTINUED | OUTPATIENT
Start: 2024-07-03 | End: 2024-07-06 | Stop reason: HOSPADM

## 2024-07-03 RX ORDER — CARVEDILOL 3.12 MG/1
3.12 TABLET ORAL 2 TIMES DAILY WITH MEALS
Status: ON HOLD | COMMUNITY
Start: 2024-06-19

## 2024-07-03 RX ADMIN — IOPAMIDOL 80 ML: 755 INJECTION, SOLUTION INTRAVENOUS at 16:28

## 2024-07-03 RX ADMIN — DEXTROSE AND SODIUM CHLORIDE: 5; 450 INJECTION, SOLUTION INTRAVENOUS at 22:22

## 2024-07-03 RX ADMIN — SODIUM CHLORIDE, PRESERVATIVE FREE 10 ML: 5 INJECTION INTRAVENOUS at 22:25

## 2024-07-03 RX ADMIN — LORAZEPAM 2 MG: 2 INJECTION INTRAMUSCULAR; INTRAVENOUS at 23:10

## 2024-07-03 RX ADMIN — ARFORMOTEROL TARTRATE 15 MCG: 15 SOLUTION RESPIRATORY (INHALATION) at 22:30

## 2024-07-03 RX ADMIN — BUDESONIDE 1 MG: 1 SUSPENSION RESPIRATORY (INHALATION) at 22:30

## 2024-07-03 RX ADMIN — MIDAZOLAM 2 MG: 1 INJECTION INTRAMUSCULAR; INTRAVENOUS at 16:44

## 2024-07-03 RX ADMIN — SODIUM CHLORIDE, PRESERVATIVE FREE 40 MG: 5 INJECTION INTRAVENOUS at 22:24

## 2024-07-03 RX ADMIN — THIAMINE HYDROCHLORIDE 500 MG: 100 INJECTION, SOLUTION INTRAMUSCULAR; INTRAVENOUS at 22:24

## 2024-07-03 ASSESSMENT — PAIN - FUNCTIONAL ASSESSMENT
PAIN_FUNCTIONAL_ASSESSMENT: NONE - DENIES PAIN
PAIN_FUNCTIONAL_ASSESSMENT: 0-10

## 2024-07-03 ASSESSMENT — PAIN SCALES - GENERAL
PAINLEVEL_OUTOF10: 0

## 2024-07-03 NOTE — PROGRESS NOTES
Unable to complete NIHSS due to patient receiving Versed to help calm down for CT. Patient still lethargic and asleep following administration of medication. Care ongoig.

## 2024-07-03 NOTE — H&P
History and Physical          Subjective     HPI: Ariella Ny is a 58 y.o. female with a PMHx of cirrhosis, hepatic encephalopathy, iron deficiency, alcohol abuse, COPD, GERD, HTN, depression, aspergilloma who presented to the ED with AMS. Patient unable to provide any history. Hx obtained from family and [facility] discharge summary. She was admitted to [facility] 5/21/24 for head trauma. During that admission, she developed variceal bleeding requiring 4 units PRBCs and had an EGD with variceal banding. She underwent successful TIPS on 5/23/24. She was found to have small subdural hematomas, no intervention needed by neurosurgery. Mental status gradually improved with lactulose and rifaximin and she was extubated 5/27. After transferring to the hospitalist's service, she developed seizure like activity and was started on keppra after consulting with neurology. She then developed sepsis and was found to have multifocal pneumonia and treated with 7 day course of vanc/meropenem. She was recommended to go to SNF, however she didn't have insurance coverage. The decision was made to discharge home with home health rather than LTC. Her mental status never returned to normal. She was instructed to continue isavuconazole for pulmonary aspergilloma. She was reportedly taking her home medications up until her confusion worsened. She has also not had alcohol in weeks.     In the ED, VSS. Labs with procal <0.05, lactic 1.8, ammonia 123, etoh < 3, WBC 6.4, hgb 11.9, INR 1.2. CTH with 9 mm thick CSF isodense collection overlying the right cerebral hemisphere. Differential diagnosis includes a subdural hygroma and chronic subdural hematoma. No intracranial mass lesion or acute hemorrhage is demonstrated. CTA reveals cavitary 2.4 x 2.7 cm lateral right upper lobe nodule with some internal debris or solid component. This has somewhat evolved since October 2023 and was previously biopsied.       PMHx:  Past Medical  visualized, similar to prior. Emphysema. -Please follow-up final read    CT HEAD WO CONTRAST    Result Date: 7/3/2024  INDICATION: Cerebrovascular accident TECHNIQUE: A spiral CT acquisition of the brain was obtained. The data was reconstructed in the sagittal and coronal orientations. All CTs at this facility are performed using dose optimization techniques as appropriate for performed exam which included adjustments in the MA and/or KV according to the patient's size (including appropriate matching for the site-specific examinations), or use of reconstruction technique. FINDINGS: There is a CSF isodense collection overlying the right cerebral hemisphere. It has a maximum thickness of 9 mm. Differential diagnosis includes a subdural hygroma and chronic subdural hematoma. There is some mass effect on the right cerebral hemisphere with effacement of the underlying sulci. No subfalcine or transtentorial herniation is noted. The subdural collection has developed since the CT done on 6/1/2022. No intracranial mass lesion, acute hemorrhage, or territorial infarction is identified. There is hypodensity in the periventricular white matter suggestive of mild microvascular ischemic changes. The visualized portions of the sinuses and mastoid air cells are clear. The study was limited by patient motion.     9 mm thick CSF isodense collection overlying the right cerebral hemisphere. Differential diagnosis includes a subdural hygroma and chronic subdural hematoma. No intracranial mass lesion or acute hemorrhage is demonstrated. Please review above. Electronically signed by Holden Dinero          Assessment/Plan       Acute encephalopathy, suspect hepatic: also considering Wernike, infectious, medication induced, etoh withdrawal  Hyperammonemia  - high dose IV thiamine  - lactulose enema. If patient does not improve sufficiently, may need NGT and possibly restraints  - check UA, UDS  - IV fluids until able to take PO  - speech

## 2024-07-03 NOTE — ED PROVIDER NOTES
Emergency Department Note    Patient: Ariella Ny Age: 58 y.o. Sex: female    YOB: 1965 Admit Date: 7/3/2024 PCP: Eliza Young APRN - NP   MRN: [id number]  CSN: [id number]     Room: [facility] Time Dictated: 11:48 PM        Chief Complaint   Chief Complaint   Patient presents with    Altered Mental Status       History of Present Illness   Ariella Ny is a 58 y.o. female with past medical history of COPD, alcoholic cirrhosis of the liver, asthma who presents to the ED via EMS due to altered mental status.  EMS requested stroke alert en route.  Entirety of history of present illness obtained from EMS.  Per EMS, family reports patient woke up this morning in her normal state of health,.  At 0900 this morning, patient became acutely altered and was \"speaking nonsense.\"  Patient also reported to be weak in the left upper extremity.    Provider evaluation completed in triage.  Initial NIHSS of 9.      Review of Systems   Review of Systems   Unable to perform ROS: Mental status change          Past Medical/Surgical History     Past Medical History:   Diagnosis Date    Alcohol abuse     Alcohol withdrawal syndrome (HCC)     Anemia     Asthma     Bile duct calculus     CAP (community acquired pneumonia)     Constipation     GERD (gastroesophageal reflux disease)     Iron deficiency     Liver function test abnormality     Macrocytosis     Pulmonary aspergilloma (HCC) 07/03/2024    Vertigo      Past Surgical History:   Procedure Laterality Date    COLONOSCOPY N/A 2/25/2021    COLONOSCOPY with polypectomies performed by Radha Araiza MD at [facility] ENDOSCOPY    CT BIOPSY SOFT TISSUE NECK  12/27/2023    CT BIOPSY LUNG/MEDIASTINUM PERC 12/27/2023 [facility] RAD CT       Social History     Social History     Socioeconomic History    Marital status: Single   Tobacco Use    Smoking status: Every Day     Current packs/day: 1.00     Average packs/day: 1 pack/day for 30.0 years (30.0 ttl pk-yrs)     Types: Cigarettes

## 2024-07-03 NOTE — PROGRESS NOTES
Attempted to perform NIHSS on patient unable to do so. Patient at this time extremely agitated and unable to comply with request by documenting RN at this time to complete exam. Care ongoing.

## 2024-07-04 LAB
ANION GAP SERPL CALC-SCNC: 6 MMOL/L (ref 3–18)
BASOPHILS # BLD: 0 K/UL (ref 0–0.1)
BASOPHILS NFR BLD: 1 % (ref 0–2)
BUN SERPL-MCNC: 20 MG/DL (ref 7–18)
BUN/CREAT SERPL: 26 (ref 12–20)
CALCIUM SERPL-MCNC: 10.1 MG/DL (ref 8.5–10.1)
CHLORIDE SERPL-SCNC: 110 MMOL/L (ref 100–111)
CO2 SERPL-SCNC: 24 MMOL/L (ref 21–32)
CREAT SERPL-MCNC: 0.77 MG/DL (ref 0.6–1.3)
DIFFERENTIAL METHOD BLD: ABNORMAL
EKG ATRIAL RATE: 78 BPM
EKG DIAGNOSIS: NORMAL
EKG P AXIS: 81 DEGREES
EKG P-R INTERVAL: 126 MS
EKG Q-T INTERVAL: 398 MS
EKG QRS DURATION: 70 MS
EKG QTC CALCULATION (BAZETT): 453 MS
EKG R AXIS: 76 DEGREES
EKG T AXIS: 80 DEGREES
EKG VENTRICULAR RATE: 78 BPM
EOSINOPHIL # BLD: 0.1 K/UL (ref 0–0.4)
EOSINOPHIL NFR BLD: 2 % (ref 0–5)
ERYTHROCYTE [DISTWIDTH] IN BLOOD BY AUTOMATED COUNT: 15.4 % (ref 11.6–14.5)
GLUCOSE SERPL-MCNC: 114 MG/DL (ref 74–99)
HCT VFR BLD AUTO: 37.1 % (ref 35–45)
HGB BLD-MCNC: 11.7 G/DL (ref 12–16)
IMM GRANULOCYTES # BLD AUTO: 0 K/UL (ref 0–0.04)
IMM GRANULOCYTES NFR BLD AUTO: 0 % (ref 0–0.5)
LYMPHOCYTES # BLD: 2.3 K/UL (ref 0.9–3.6)
LYMPHOCYTES NFR BLD: 34 % (ref 21–52)
MCH RBC QN AUTO: 32.1 PG (ref 24–34)
MCHC RBC AUTO-ENTMCNC: 31.5 G/DL (ref 31–37)
MCV RBC AUTO: 101.6 FL (ref 78–100)
MONOCYTES # BLD: 1 K/UL (ref 0.05–1.2)
MONOCYTES NFR BLD: 15 % (ref 3–10)
NEUTS SEG # BLD: 3.2 K/UL (ref 1.8–8)
NEUTS SEG NFR BLD: 48 % (ref 40–73)
NRBC # BLD: 0 K/UL (ref 0–0.01)
NRBC BLD-RTO: 0 PER 100 WBC
PLATELET # BLD AUTO: 186 K/UL (ref 135–420)
PMV BLD AUTO: 9.7 FL (ref 9.2–11.8)
POTASSIUM SERPL-SCNC: 4 MMOL/L (ref 3.5–5.5)
RBC # BLD AUTO: 3.65 M/UL (ref 4.2–5.3)
SODIUM SERPL-SCNC: 140 MMOL/L (ref 136–145)
WBC # BLD AUTO: 6.6 K/UL (ref 4.6–13.2)

## 2024-07-04 PROCEDURE — 6360000002 HC RX W HCPCS: Performed by: PHYSICIAN ASSISTANT

## 2024-07-04 PROCEDURE — 80048 BASIC METABOLIC PNL TOTAL CA: CPT

## 2024-07-04 PROCEDURE — 6370000000 HC RX 637 (ALT 250 FOR IP): Performed by: HOSPITALIST

## 2024-07-04 PROCEDURE — 92523 SPEECH SOUND LANG COMPREHEN: CPT

## 2024-07-04 PROCEDURE — 93010 ELECTROCARDIOGRAM REPORT: CPT | Performed by: INTERNAL MEDICINE

## 2024-07-04 PROCEDURE — 85025 COMPLETE CBC W/AUTO DIFF WBC: CPT

## 2024-07-04 PROCEDURE — 97162 PT EVAL MOD COMPLEX 30 MIN: CPT

## 2024-07-04 PROCEDURE — 97535 SELF CARE MNGMENT TRAINING: CPT

## 2024-07-04 PROCEDURE — 99233 SBSQ HOSP IP/OBS HIGH 50: CPT | Performed by: HOSPITALIST

## 2024-07-04 PROCEDURE — 94640 AIRWAY INHALATION TREATMENT: CPT

## 2024-07-04 PROCEDURE — 1100000000 HC RM PRIVATE

## 2024-07-04 PROCEDURE — 94664 DEMO&/EVAL PT USE INHALER: CPT

## 2024-07-04 PROCEDURE — 92610 EVALUATE SWALLOWING FUNCTION: CPT

## 2024-07-04 PROCEDURE — 97166 OT EVAL MOD COMPLEX 45 MIN: CPT

## 2024-07-04 PROCEDURE — 94761 N-INVAS EAR/PLS OXIMETRY MLT: CPT

## 2024-07-04 PROCEDURE — 2580000003 HC RX 258: Performed by: PHYSICIAN ASSISTANT

## 2024-07-04 PROCEDURE — 36415 COLL VENOUS BLD VENIPUNCTURE: CPT

## 2024-07-04 RX ORDER — LACTULOSE 10 G/15ML
30 SOLUTION ORAL 3 TIMES DAILY
Status: DISCONTINUED | OUTPATIENT
Start: 2024-07-04 | End: 2024-07-06 | Stop reason: HOSPADM

## 2024-07-04 RX ORDER — CARVEDILOL 3.12 MG/1
3.12 TABLET ORAL 2 TIMES DAILY WITH MEALS
Status: DISCONTINUED | OUTPATIENT
Start: 2024-07-04 | End: 2024-07-06 | Stop reason: HOSPADM

## 2024-07-04 RX ORDER — LEVETIRACETAM 500 MG/5ML
1000 INJECTION, SOLUTION, CONCENTRATE INTRAVENOUS 2 TIMES DAILY
Status: DISCONTINUED | OUTPATIENT
Start: 2024-07-04 | End: 2024-07-06 | Stop reason: HOSPADM

## 2024-07-04 RX ORDER — FOLIC ACID 1 MG/1
1 TABLET ORAL DAILY
Status: DISCONTINUED | OUTPATIENT
Start: 2024-07-04 | End: 2024-07-06 | Stop reason: HOSPADM

## 2024-07-04 RX ORDER — LANOLIN ALCOHOL/MO/W.PET/CERES
100 CREAM (GRAM) TOPICAL DAILY
Status: DISCONTINUED | OUTPATIENT
Start: 2024-07-04 | End: 2024-07-04

## 2024-07-04 RX ADMIN — RIFAXIMIN 550 MG: 550 TABLET ORAL at 12:20

## 2024-07-04 RX ADMIN — CARVEDILOL 3.12 MG: 3.12 TABLET, FILM COATED ORAL at 16:42

## 2024-07-04 RX ADMIN — BUDESONIDE 1 MG: 1 SUSPENSION RESPIRATORY (INHALATION) at 07:58

## 2024-07-04 RX ADMIN — RIFAXIMIN 550 MG: 550 TABLET ORAL at 20:12

## 2024-07-04 RX ADMIN — THIAMINE HYDROCHLORIDE 500 MG: 100 INJECTION, SOLUTION INTRAMUSCULAR; INTRAVENOUS at 04:04

## 2024-07-04 RX ADMIN — BUDESONIDE 1 MG: 1 SUSPENSION RESPIRATORY (INHALATION) at 19:34

## 2024-07-04 RX ADMIN — SODIUM CHLORIDE, PRESERVATIVE FREE 40 MG: 5 INJECTION INTRAVENOUS at 10:22

## 2024-07-04 RX ADMIN — FOLIC ACID 1 MG: 1 TABLET ORAL at 12:19

## 2024-07-04 RX ADMIN — DEXTROSE AND SODIUM CHLORIDE: 5; 450 INJECTION, SOLUTION INTRAVENOUS at 16:43

## 2024-07-04 RX ADMIN — ARFORMOTEROL TARTRATE 15 MCG: 15 SOLUTION RESPIRATORY (INHALATION) at 19:34

## 2024-07-04 RX ADMIN — CARVEDILOL 3.12 MG: 3.12 TABLET, FILM COATED ORAL at 12:20

## 2024-07-04 RX ADMIN — LEVETIRACETAM 1000 MG: 100 INJECTION, SOLUTION, CONCENTRATE INTRAVENOUS at 20:12

## 2024-07-04 RX ADMIN — SODIUM CHLORIDE, PRESERVATIVE FREE 10 ML: 5 INJECTION INTRAVENOUS at 10:23

## 2024-07-04 RX ADMIN — THIAMINE HYDROCHLORIDE 500 MG: 100 INJECTION, SOLUTION INTRAMUSCULAR; INTRAVENOUS at 10:21

## 2024-07-04 RX ADMIN — LACTULOSE 30 G: 20 SOLUTION ORAL at 12:20

## 2024-07-04 RX ADMIN — THIAMINE HYDROCHLORIDE 500 MG: 100 INJECTION, SOLUTION INTRAMUSCULAR; INTRAVENOUS at 20:12

## 2024-07-04 RX ADMIN — SODIUM CHLORIDE, PRESERVATIVE FREE 10 ML: 5 INJECTION INTRAVENOUS at 20:15

## 2024-07-04 RX ADMIN — ARFORMOTEROL TARTRATE 15 MCG: 15 SOLUTION RESPIRATORY (INHALATION) at 07:58

## 2024-07-04 RX ADMIN — LACTULOSE 30 G: 20 SOLUTION ORAL at 20:12

## 2024-07-04 RX ADMIN — LEVETIRACETAM 1000 MG: 100 INJECTION, SOLUTION, CONCENTRATE INTRAVENOUS at 10:36

## 2024-07-04 ASSESSMENT — PAIN SCALES - GENERAL
PAINLEVEL_OUTOF10: 0

## 2024-07-04 NOTE — PLAN OF CARE
Problem: SLP Adult - Impaired Swallowing  Goal: By Discharge: Advance to least restrictive diet without signs or symptoms of aspiration for planned discharge setting.  See evaluation for individualized goals.  Description:     Patient will:  1. Tolerate PO trials with 0 s/s overt distress in 4/5 trials  2. Utilize compensatory swallow strategies/maneuvers (decrease bite/sip, size/rate, alt. liq/sol) with min cues in 4/5 trials  3. Perform oral-motor/laryngeal exercises to increase oropharyngeal swallow function with min cues  4. Complete an objective swallow study (i.e., MBSS) to assess swallow integrity, r/o aspiration, and determine of safest LRD, min A as indicated/ordered by MD     Rec:     Soft/bite sized diet with nectar-thick liquids  Aspiration precautions  HOB >45 during po intake, remain >30 for 30-45 minutes after po   Small bites/sips; alternate liquid/solid with slow feeding rate   Oral care TID  Meds crushed    Outcome: Progressing     Problem: SLP Adult - Impaired Communication  Goal: By Discharge: Demonstrates communication skills at highest level of function for planned discharge setting.  See evaluation for individualized goals.  Description:     Patient will:  1. Accurately answer personally relevant y/n questions with min-mod A with >80% acc.   2. ID objects/pictures in increasing fields with 80% acc to facilitate effective use of communication device, mod A.  3. Complete varied naming tasks given mod verbal/visual/phonemic cues to improve functional communication in 4/5 opportunities.   4. Follow 1-2 step commands with 80% accuracy with mod A for increased participation in therapeutic tasks.  Outcome: Progressing   SPEECH LANGUAGE PATHOLOGY BEDSIDE SWALLOW AND SPEECH/LANGUAGE EVALUATION    Patient: Ariella Ny (58 y.o. female)  Date: 7/4/2024  Primary Diagnosis: Encephalopathy [G93.40]  Lung nodule [R91.1]  Cavitary lesion of lung [J98.4]       Precautions: Aspiration  PLOF: As per  speech/language/comprehension techniques provided via demonstration, verbalization and teach back of comprehension  []         Patient/family have participated as able in goal setting and plan of care.  []            Patient/family agree to work toward stated goals and plan of care.  []            Patient understands intent and goals of therapy, neutral about participation.  [x]            Patient unable to participate in goal setting/plan of care secondary to cognition, hearing/vision deficits; education ongoing with interdisciplinary staff   []            Handout regarding diet recommendations and thickener instructions provided.  [x]         Posted safety precautions in patient's room.    Thank you for this referral.    Asha Carney M.S., CCC-SLP/L  Speech-Language Pathologist

## 2024-07-04 NOTE — PROGRESS NOTES
[facility] Hospitalist [facility]  Progress Note    Patient: Ariella Ny Age: 58 y.o. : 1965 MR#: [id number] SSN: [id number]  Date/Time: 2024     Subjective: Patient alert awake, remains confused but able to say her name and follow some simple commands.  Per RN, mental status much better this morning.     Assessment/Plan:   Acute hepatic encephalopathy  Elevated ammonia levels  Liver cirrhosis post TIPS 2024  Esophageal varices with recent bleed post banding 2024  GERD  COPD  Alcohol abuse  History of chronic subdural hematoma  Pulmonary aspergilloma  Seizure disorder    Plan  Will change lactulose from retention enema to p.o., will add rifaximin  Continue monitoring ammonia levels  GI consulted, awaiting input  Will monitor signs of bleeding  Continue seizure medications  Continue DuoNeb as needed  Continue IV PPI  Will resume isavuconazole   Will place patient on aspiration precaution  PT/OT eval and treatment  Further plan based on hospital course    Discussed with patient and daughter Cristo over the phone and explained in detail about my above plan care.  Per daughter, patient is almost back to her baseline.        Dispo plan: Home with home health care once medically stable, anticipated discharge date 2024    I spent 50 minutes with the patient in face-to-face consultation, of which greater than 50% was spent in counseling and coordination of care as described above.    Case discussed with:  [x]Patient  [x]Family  [x]Nursing  []Case Management  DVT Prophylaxis:  []Lovenox  []Hep SQ  [x]SCDs  []Coumadin   []Eliquis/Xarelto     Objective:   VS: /69   Pulse 78   Temp 97.8 °F (36.6 °C) (Oral)   Resp 18   Ht 1.702 m (5' 7\")   Wt 59 kg (130 lb)   SpO2 100%   BMI 20.36 kg/m²    Tmax/24hrs: Temp (24hrs), Av.1 °F (36.7 °C), Min:97.8 °F (36.6 °C), Max:98.5 °F (36.9 °C)  IOBRIEFNo intake or output data in the 24 hours ending 24 1136    General:  Alert,

## 2024-07-04 NOTE — PLAN OF CARE
Problem: Occupational Therapy - Adult  Goal: By Discharge: Performs self-care activities at highest level of function for planned discharge setting.  See evaluation for individualized goals.  Description: Occupational Therapy Goals:  Initiated 7/4/2024 to be met within 7-10 days.    1.  Patient will perform self-feeding with modified independence.   2.  Patient will perform grooming with modified independence.  3.  Patient will perform upper body dressing  with modified independence.  4.  Patient will perform toilet transfers with minimal assistance/contact guard assist.  5.  Patient will perform all aspects of toileting with minimal assistance/contact guard assist.  6.  Patient will participate in upper extremity therapeutic exercise/activities with modified independence for 8-10 minutes to increase strength/endurance for ADLs.    7.  Patient will utilize energy conservation techniques during functional activities with verbal cues.      PLOF: Pt a poor historian, unsure PLOF  Outcome: Progressing      OCCUPATIONAL THERAPY EVALUATION    Patient: Ariella Ny (58 y.o. female)  Date: 7/4/2024  Primary Diagnosis: Encephalopathy [G93.40]  Lung nodule [R91.1]  Cavitary lesion of lung [J98.4]  Precautions: Seizure, Fall Risk, Aspiration Risk    ASSESSMENT :  Upon entering the room, the pt was supine in bed, alert, and agreeable to participate in OT session. Patient pleasantly confused, oriented to person only. Patient with linens visibly soiled, OT obtained new linens/gown. As OT re-entering room, pt appeared to grab applesauce off tray table and is licking the side of the container as apple sauce is spilling down the side. Patient unaware. Patient given washcloth to wipe hands and began to put washcloth in her mouth. Patient max assist for donning gown d/t cognition, total assist for pericare and moderate assist for rolling to B sides. Patient total assist for scooting to HOB for repositioning and left with all needs

## 2024-07-04 NOTE — PLAN OF CARE
Problem: Physical Therapy - Adult  Goal: By Discharge: Performs mobility at highest level of function for planned discharge setting.  See evaluation for individualized goals.  Description: Physical Therapy Goals:  Initiated 7/4/2024 to be met within 7-10 days.    1.  Patient will move from supine to sit and sit to supine , scoot up and down, and roll side to side in bed with moderate assistance .    2.  Patient will transfer from bed to chair and chair to bed with moderate assistance  using the least restrictive device.  3.  Patient will perform sit to stand with moderate assistance .  4.  Patient will ambulate with moderate assistance  for 15 feet with the least restrictive device.     PLOF: pt poor historian and unable to respond accurately to therapist questions     Outcome: Progressing   PHYSICAL THERAPY EVALUATION    Patient: Ariella Ny (58 y.o. female)  Date: 7/4/2024  Primary Diagnosis: Encephalopathy [G93.40]  Lung nodule [R91.1]  Cavitary lesion of lung [J98.4]       Precautions: Seizure, Fall Risk, Aspiration Risk    ASSESSMENT :  Pt cleared by nursing prior to eval. Pt supine in bed, in NAD, and agreeable to eval when therapist entered room. Pt was poor historian and often became tearful when asked questions but was easily redirected. Pt was unable to follow 1 step directions provided by therapist. Pt wiggled toes with provided tactile cues but did not move LE when cued. Pt required max A to transfer to long sit but consistently pushed against therapist to return to supine. Pt denied wishing to roll or reposition for comfort. Pt left with all needs within reach.      DEFICITS/IMPAIRMENTS:    Body Structures, Functions, Activity Limitations Requiring Skilled Therapeutic Intervention: Decreased functional mobility ;Decreased coordination;Decreased cognition;Decreased endurance;Decreased strength;Decreased safe awareness;Decreased balance    Patient will benefit from skilled intervention to address the  Education  Education Given To: Patient  Education Provided: Role of Therapy;Plan of Care  Education Method: Verbal;Teach Back  Barriers to Learning: None  Education Outcome: Verbalized understanding;Demonstrated understanding;Continued education needed    Thank you for this referral.  Nikos Fong, PT  Minutes: 9      Eval Complexity: Decision Making: Medium Complexity

## 2024-07-04 NOTE — ED NOTES
Called report on pt to 4N  Pt transported to the floor with transport   Relinquished care at this time

## 2024-07-04 NOTE — CONSULTS
MD CHARLEE at [facility] ENDOSCOPY    CT BIOPSY SOFT TISSUE NECK  12/27/2023    CT BIOPSY LUNG/MEDIASTINUM PERC 12/27/2023 [facility] RAD CT       Social HX:   Social History     Socioeconomic History    Marital status: Single     Spouse name: Not on file    Number of children: Not on file    Years of education: Not on file    Highest education level: Not on file   Occupational History    Not on file   Tobacco Use    Smoking status: Every Day     Current packs/day: 1.00     Average packs/day: 1 pack/day for 30.0 years (30.0 ttl pk-yrs)     Types: Cigarettes    Smokeless tobacco: Never    Tobacco comments:     Pt states that she is down to less than a half pack per day.   Vaping Use    Vaping Use: Never used   Substance and Sexual Activity    Alcohol use: Yes     Alcohol/week: 3.0 standard drinks of alcohol    Drug use: Not Currently    Sexual activity: Not Currently   Other Topics Concern    Not on file   Social History Narrative    Not on file     Social Determinants of Health     Financial Resource Strain: Not on file   Food Insecurity: No Food Insecurity (7/3/2024)    Hunger Vital Sign     Worried About Running Out of Food in the Last Year: Never true     Ran Out of Food in the Last Year: Never true   Transportation Needs: No Transportation Needs (7/3/2024)    PRAPARE - Transportation     Lack of Transportation (Medical): No     Lack of Transportation (Non-Medical): No   Physical Activity: Not on file   Stress: Not on file   Social Connections: Not on file   Intimate Partner Violence: Not on file   Housing Stability: Low Risk  (7/3/2024)    Housing Stability Vital Sign     Unable to Pay for Housing in the Last Year: No     Number of Places Lived in the Last Year: 1     Unstable Housing in the Last Year: No       FHX:   Family History   Problem Relation Age of Onset    No Known Problems Mother     No Known Problems Father     Cancer Sister         uterine?    No Known Problems Maternal Grandmother     No Known Problems Maternal    RBC 3.65*   HGB 11.7*   HCT 37.1   .6*   MCH 32.1   MCHC 31.5   RDW 15.4*      MPV 9.7    No results for input(s): \"MONO\", \"PRO\", \"METAS\" in the last 72 hours.    Invalid input(s): \"GRANS\", \"LYMPH\", \"EOS\", \"BASO\", \"MYELO\", \"BLAST\"     Hepatic Function   No results for input(s): \"TP\" in the last 72 hours.    Invalid input(s): \"ALB\", \"DBILI\", \"TBILI\", \"GPT\", \"SGOT\", \"AP\", \"AML\", \"LPSE\"     [facility]   Recent Labs     07/03/24  1450   INR 1.2*           Donal Gomez PA-C.   07/04/24, 2:44 PM   [facility]  [email or web address]  Phone: [phone]

## 2024-07-04 NOTE — PROGRESS NOTES
conducted an initial consultation and Spiritual Assessment for Ariella Ny, who is a 58 y.o.,female. Patient’s Primary Language is: English.   According to the patient’s EMR Congregational Affiliation is: [employer].     The reason the Patient came to the hospital is:   Patient Active Problem List    Diagnosis Date Noted    Hepatic encephalopathy (HCC) 07/03/2024    Pulmonary aspergilloma (HCC) 07/03/2024    Acute respiratory failure (HCC) 03/29/2024    COPD (chronic obstructive pulmonary disease) (HCC) 03/29/2024    Encounter for palliative care     Debility     Thrombocytopenia (HCC) 06/01/2022    Asthma 06/01/2022    GERD (gastroesophageal reflux disease) 06/01/2022    Iron deficiency anemia 06/01/2022    Tobacco abuse 06/01/2022    Hypoglycemia 06/01/2022    Acute encephalopathy 06/01/2022    Hyperammonemia (HCC) 06/01/2022    Alcoholic cirrhosis of liver (HCC) 06/01/2022    Volume overload 06/01/2022    Hepatitis 06/01/2022    Mild protein-calorie malnutrition (HCC) 11/03/2021    Alcohol abuse 11/02/2021    Hyponatremia 11/02/2021    Jaundice 11/02/2021    Elevated LFTs 11/02/2021    Hyperkalemia 11/02/2021    Acute alcoholic intoxication without complication (HCC) 11/02/2021    SIRS (systemic inflammatory response syndrome) (HCC) 07/08/2017    Pneumonia 07/07/2017    CAP (community acquired pneumonia) 07/07/2017        The  provided the following Interventions:  Initiated a relationship of care and support with patient in bed 464 where she has been for the   last 18 hours. Due to hepatic encephalopathy  Explored issues of liz, belief, spirituality and Gnosticist/ritual needs while hospitalized. There is no involvement in a local [employer] at this time.  Offered prayer and assurance of continued prayers on patients behalf.       The following outcomes were achieved:  Patient shared limited information about both their medical narrative and spiritual journey/beliefs.  Patient processed feeling about

## 2024-07-05 LAB
AMMONIA PLAS-SCNC: 192 UMOL/L (ref 11–32)
ANION GAP SERPL CALC-SCNC: 7 MMOL/L (ref 3–18)
BASOPHILS # BLD: 0 K/UL (ref 0–0.1)
BASOPHILS NFR BLD: 1 % (ref 0–2)
BUN SERPL-MCNC: 16 MG/DL (ref 7–18)
BUN/CREAT SERPL: 20 (ref 12–20)
CALCIUM SERPL-MCNC: 9.8 MG/DL (ref 8.5–10.1)
CHLORIDE SERPL-SCNC: 109 MMOL/L (ref 100–111)
CO2 SERPL-SCNC: 21 MMOL/L (ref 21–32)
CREAT SERPL-MCNC: 0.79 MG/DL (ref 0.6–1.3)
DIFFERENTIAL METHOD BLD: ABNORMAL
EOSINOPHIL # BLD: 0.3 K/UL (ref 0–0.4)
EOSINOPHIL NFR BLD: 5 % (ref 0–5)
ERYTHROCYTE [DISTWIDTH] IN BLOOD BY AUTOMATED COUNT: 14.7 % (ref 11.6–14.5)
GLUCOSE SERPL-MCNC: 92 MG/DL (ref 74–99)
HCT VFR BLD AUTO: 38.9 % (ref 35–45)
HGB BLD-MCNC: 12 G/DL (ref 12–16)
IMM GRANULOCYTES # BLD AUTO: 0 K/UL (ref 0–0.04)
IMM GRANULOCYTES NFR BLD AUTO: 0 % (ref 0–0.5)
LYMPHOCYTES # BLD: 1.9 K/UL (ref 0.9–3.6)
LYMPHOCYTES NFR BLD: 33 % (ref 21–52)
MAGNESIUM SERPL-MCNC: 1.6 MG/DL (ref 1.6–2.6)
MCH RBC QN AUTO: 32.1 PG (ref 24–34)
MCHC RBC AUTO-ENTMCNC: 30.8 G/DL (ref 31–37)
MCV RBC AUTO: 104 FL (ref 78–100)
MONOCYTES # BLD: 0.6 K/UL (ref 0.05–1.2)
MONOCYTES NFR BLD: 11 % (ref 3–10)
NEUTS SEG # BLD: 3 K/UL (ref 1.8–8)
NEUTS SEG NFR BLD: 51 % (ref 40–73)
NRBC # BLD: 0 K/UL (ref 0–0.01)
NRBC BLD-RTO: 0 PER 100 WBC
PLATELET # BLD AUTO: 183 K/UL (ref 135–420)
PMV BLD AUTO: 10.1 FL (ref 9.2–11.8)
POTASSIUM SERPL-SCNC: 4.1 MMOL/L (ref 3.5–5.5)
RBC # BLD AUTO: 3.74 M/UL (ref 4.2–5.3)
SODIUM SERPL-SCNC: 137 MMOL/L (ref 136–145)
WBC # BLD AUTO: 5.8 K/UL (ref 4.6–13.2)

## 2024-07-05 PROCEDURE — 97535 SELF CARE MNGMENT TRAINING: CPT

## 2024-07-05 PROCEDURE — 1100000000 HC RM PRIVATE

## 2024-07-05 PROCEDURE — 6360000002 HC RX W HCPCS: Performed by: PHYSICIAN ASSISTANT

## 2024-07-05 PROCEDURE — 83735 ASSAY OF MAGNESIUM: CPT

## 2024-07-05 PROCEDURE — 2580000003 HC RX 258: Performed by: HEALTH CARE PROVIDER

## 2024-07-05 PROCEDURE — 85025 COMPLETE CBC W/AUTO DIFF WBC: CPT

## 2024-07-05 PROCEDURE — 94640 AIRWAY INHALATION TREATMENT: CPT

## 2024-07-05 PROCEDURE — 82140 ASSAY OF AMMONIA: CPT

## 2024-07-05 PROCEDURE — 6370000000 HC RX 637 (ALT 250 FOR IP): Performed by: HOSPITALIST

## 2024-07-05 PROCEDURE — 97530 THERAPEUTIC ACTIVITIES: CPT

## 2024-07-05 PROCEDURE — 94761 N-INVAS EAR/PLS OXIMETRY MLT: CPT

## 2024-07-05 PROCEDURE — 80048 BASIC METABOLIC PNL TOTAL CA: CPT

## 2024-07-05 PROCEDURE — 2580000003 HC RX 258: Performed by: PHYSICIAN ASSISTANT

## 2024-07-05 PROCEDURE — 36415 COLL VENOUS BLD VENIPUNCTURE: CPT

## 2024-07-05 PROCEDURE — 99232 SBSQ HOSP IP/OBS MODERATE 35: CPT | Performed by: HOSPITALIST

## 2024-07-05 RX ADMIN — BUDESONIDE 1 MG: 1 SUSPENSION RESPIRATORY (INHALATION) at 21:25

## 2024-07-05 RX ADMIN — CARVEDILOL 3.12 MG: 3.12 TABLET, FILM COATED ORAL at 09:02

## 2024-07-05 RX ADMIN — LACTULOSE 30 G: 20 SOLUTION ORAL at 21:45

## 2024-07-05 RX ADMIN — SODIUM CHLORIDE, PRESERVATIVE FREE 10 ML: 5 INJECTION INTRAVENOUS at 09:11

## 2024-07-05 RX ADMIN — BUDESONIDE 1 MG: 1 SUSPENSION RESPIRATORY (INHALATION) at 08:45

## 2024-07-05 RX ADMIN — RIFAXIMIN 550 MG: 550 TABLET ORAL at 09:02

## 2024-07-05 RX ADMIN — DEXTROSE AND SODIUM CHLORIDE: 5; 450 INJECTION, SOLUTION INTRAVENOUS at 16:23

## 2024-07-05 RX ADMIN — LEVETIRACETAM 1000 MG: 100 INJECTION, SOLUTION, CONCENTRATE INTRAVENOUS at 09:09

## 2024-07-05 RX ADMIN — FOLIC ACID 1 MG: 1 TABLET ORAL at 09:02

## 2024-07-05 RX ADMIN — LEVETIRACETAM 1000 MG: 100 INJECTION, SOLUTION, CONCENTRATE INTRAVENOUS at 21:38

## 2024-07-05 RX ADMIN — ARFORMOTEROL TARTRATE 15 MCG: 15 SOLUTION RESPIRATORY (INHALATION) at 21:25

## 2024-07-05 RX ADMIN — SODIUM CHLORIDE, PRESERVATIVE FREE 40 MG: 5 INJECTION INTRAVENOUS at 09:09

## 2024-07-05 RX ADMIN — CARVEDILOL 3.12 MG: 3.12 TABLET, FILM COATED ORAL at 16:27

## 2024-07-05 RX ADMIN — ARFORMOTEROL TARTRATE 15 MCG: 15 SOLUTION RESPIRATORY (INHALATION) at 08:45

## 2024-07-05 RX ADMIN — THIAMINE HYDROCHLORIDE 500 MG: 100 INJECTION, SOLUTION INTRAMUSCULAR; INTRAVENOUS at 16:27

## 2024-07-05 RX ADMIN — THIAMINE HYDROCHLORIDE 500 MG: 100 INJECTION, SOLUTION INTRAMUSCULAR; INTRAVENOUS at 03:47

## 2024-07-05 RX ADMIN — LACTULOSE 30 G: 20 SOLUTION ORAL at 09:02

## 2024-07-05 RX ADMIN — SODIUM CHLORIDE, PRESERVATIVE FREE 10 ML: 5 INJECTION INTRAVENOUS at 21:45

## 2024-07-05 ASSESSMENT — PAIN SCALES - GENERAL
PAINLEVEL_OUTOF10: 0

## 2024-07-05 NOTE — PROGRESS NOTES
[facility] Hospitalist Group  Progress Note    Patient: Ariella Ny Age: 58 y.o. : 1965 MR#: [id number] SSN: [id number]  Date/Time: 2024     Subjective: Patient alert awake, more interactive today.  Still confused but better than yesterday.  Ate her breakfast and lunch well per RN.     Assessment/Plan:   Acute hepatic encephalopathy  Elevated ammonia levels  Liver cirrhosis post TIPS 2024  Esophageal varices with recent bleed post banding 2024  GERD  COPD  Alcohol abuse  History of chronic subdural hematoma  Pulmonary aspergilloma  Seizure disorder    Plan  Continue lactulose and rifaximin  Continue monitoring ammonia levels  GI input noted  Continue seizure medications  Continue DuoNeb as needed  Continue IV PPI  Will resume isavuconazole after discharge  Will place patient on aspiration precaution  PT/OT eval and treatment  Further plan based on hospital course    Discussed with patient and daughter Cristo over the phone and explained in detail about my above plan care.  Per daughter, patient is almost back to her baseline.  Family prefers going home with home health care.      Dispo plan: Home with home health care once medically stable, anticipated discharge date 2024      Case discussed with:  [x]Patient  [x]Family  [x]Nursing  []Case Management  DVT Prophylaxis:  []Lovenox  []Hep SQ  [x]SCDs  []Coumadin   []Eliquis/Xarelto     Objective:   VS: /78   Pulse 76   Temp 98.8 °F (37.1 °C) (Axillary)   Resp 16   Ht 1.702 m (5' 7\")   Wt 59 kg (130 lb)   SpO2 97%   BMI 20.36 kg/m²    Tmax/24hrs: Temp (24hrs), Av.7 °F (36.5 °C), Min:97.3 °F (36.3 °C), Max:98.8 °F (37.1 °C)  IOBRIEF  Intake/Output Summary (Last 24 hours) at 2024 1652  Last data filed at 2024 1625  Gross per 24 hour   Intake 2684.93 ml   Output 350 ml   Net 2334.93 ml       General:  Alert, cooperative, no acute distress    Pulmonary:  CTA Bilaterally. No  10 mEq/100 mL IVPB (Peripheral Line)  10 mEq IntraVENous PRN    magnesium sulfate 2000 mg in 50 mL IVPB premix  2,000 mg IntraVENous PRN    ondansetron (ZOFRAN-ODT) disintegrating tablet 4 mg  4 mg Oral Q8H PRN    Or    ondansetron (ZOFRAN) injection 4 mg  4 mg IntraVENous Q6H PRN    polyethylene glycol (GLYCOLAX) packet 17 g  17 g Oral Daily PRN    bisacodyl (DULCOLAX) suppository 10 mg  10 mg Rectal Daily PRN    acetaminophen (TYLENOL) tablet 500 mg  500 mg Oral Q6H PRN    Or    acetaminophen (TYLENOL) suppository 325 mg  325 mg Rectal Q6H PRN    pantoprazole (PROTONIX) 40 mg in sodium chloride (PF) 0.9 % 10 mL injection  40 mg IntraVENous Daily    dextrose 5 % and 0.45 % sodium chloride infusion   IntraVENous Continuous    budesonide (PULMICORT) nebulizer suspension 1 mg  1 mg Nebulization BID    arformoterol tartrate (BROVANA) nebulizer solution 15 mcg  15 mcg Nebulization BID RT    ipratropium 0.5 mg-albuterol 2.5 mg (DUONEB) nebulizer solution 1 Dose  1 Dose Inhalation Q4H PRN       Labs:    Recent Results (from the past 24 hour(s))   Basic Metabolic Panel w/ Reflex to MG    Collection Time: 07/05/24 12:48 AM   Result Value Ref Range    Sodium 137 136 - 145 mmol/L    Potassium 4.1 3.5 - 5.5 mmol/L    Chloride 109 100 - 111 mmol/L    CO2 21 21 - 32 mmol/L    Anion Gap 7 3.0 - 18 mmol/L    Glucose 92 74 - 99 mg/dL    BUN 16 7.0 - 18 MG/DL    Creatinine 0.79 0.6 - 1.3 MG/DL    BUN/Creatinine Ratio 20 12 - 20      Est, Glom Filt Rate 87 >60 ml/min/1.73m2    Calcium 9.8 8.5 - 10.1 MG/DL   CBC with Auto Differential    Collection Time: 07/05/24 12:48 AM   Result Value Ref Range    WBC 5.8 4.6 - 13.2 K/uL    RBC 3.74 (L) 4.20 - 5.30 M/uL    Hemoglobin 12.0 12.0 - 16.0 g/dL    Hematocrit 38.9 35.0 - 45.0 %    .0 (H) 78.0 - 100.0 FL    MCH 32.1 24.0 - 34.0 PG    MCHC 30.8 (L) 31.0 - 37.0 g/dL    RDW 14.7 (H) 11.6 - 14.5 %    Platelets 183 135 - 420 K/uL    MPV 10.1 9.2 - 11.8 FL    Nucleated RBCs 0.0 0

## 2024-07-05 NOTE — CARE COORDINATION
07/05/24 1302   Service Assessment   Patient Orientation Alert and Oriented;Person   Cognition Alert   History Provided By Child/Family   Primary Caregiver Family   Accompanied By/Relationship None   Support Systems Family Members   Patient's Healthcare Decision Maker is: Legal Next of Kin   PCP Verified by CM Yes   Last Visit to PCP Within last 3 months   Prior Functional Level Assistance with the following:;Bathing;Dressing;Toileting;Feeding;Cooking;Housework;Shopping;Mobility   Current Functional Level Assistance with the following:;Bathing;Dressing;Toileting;Cooking;Feeding;Housework;Mobility   Ability to make needs known: Poor   Family able to assist with home care needs: Yes   Would you like for me to discuss the discharge plan with any other family members/significant others, and if so, who? Yes  (Ariella)   Financial Resources Medicaid   Community Resources None   Social/Functional History   Lives With Family   Type of Home House   Home Layout Two level   Home Access Stairs to enter with rails   Entrance Stairs - Number of Steps 2   Entrance Stairs - Rails None   Bathroom Shower/Tub Tub/Shower unit   Bathroom Toilet Standard   Bathroom Equipment None   Bathroom Accessibility Accessible   Home Equipment None   Receives Help From Family   ADL Assistance Needs assistance   Homemaking Assistance Needs assistance   Ambulation Assistance Needs assistance   Active  No   Mode of Transportation Family   Occupation Unemployed   Discharge Planning   Type of Residence House   Living Arrangements Children   Current Services Prior To Admission C-pap   Potential Assistance Needed N/A   DME Ordered? No   Potential Assistance Purchasing Medications No   Type of Home Care Services None   Patient expects to be discharged to: House   History of falls? 0   Services At/After Discharge   Transition of Care Consult (CM Consult) Discharge Planning    Resource Information Provided? No   Mode of Transport at Discharge  Other (see comment)   Condition of Participation: Discharge Planning   The Plan for Transition of Care is related to the following treatment goals: Dispo home with home health and family support. Medical transport home.   Freedom of Choice list was provided with basic dialogue that supports the patient's individualized plan of care/goals, treatment preferences, and shares the quality data associated with the providers?  Yes       Melina SOLIZ RN  Case Management

## 2024-07-05 NOTE — PLAN OF CARE
Problem: Occupational Therapy - Adult  Goal: By Discharge: Performs self-care activities at highest level of function for planned discharge setting.  See evaluation for individualized goals.  Description: Occupational Therapy Goals:  Initiated 7/4/2024 to be met within 7-10 days.    1.  Patient will perform self-feeding with modified independence.   2.  Patient will perform grooming with modified independence.  3.  Patient will perform upper body dressing  with modified independence.  4.  Patient will perform toilet transfers with minimal assistance/contact guard assist.  5.  Patient will perform all aspects of toileting with minimal assistance/contact guard assist.  6.  Patient will participate in upper extremity therapeutic exercise/activities with modified independence for 8-10 minutes to increase strength/endurance for ADLs.    7.  Patient will utilize energy conservation techniques during functional activities with verbal cues.        PLOF: Pt a poor historian, unsure PLOF  Outcome: Progressing   OCCUPATIONAL THERAPY TREATMENT    Patient: Ariella Ny (58 y.o. female)  Date: 7/5/2024  Diagnosis: Encephalopathy [G93.40]  Lung nodule [R91.1]  Cavitary lesion of lung [J98.4] Hepatic encephalopathy (HCC)      Precautions: Seizure, Fall Risk, Aspiration Risk,  ,  ,  ,  ,  ,  ,      Chart, occupational therapy assessment, plan of care, and goals were reviewed.  ASSESSMENT:  Pt presented supine in bed upon entry, pleasant, oriented to self only, and able to follow some simple step commands. Pt was assisted to EOB MIN A w/ max cueing for sequencing in prep for functional tasks. Once sitting EOB, pt was found to be heavily soiled from urine. She was returned back to supine and required MAX A rolling L/R w/ constant cueing for sequencing to change soil chux pads. Pt TOTAL A for leilani area hygiene @ bed level. She was positioned for comfort w/ HOB elevated, bed alarm active, and all needs within reach. RN made

## 2024-07-05 NOTE — PROGRESS NOTES
New OT order received and chart reviewed.  Patient evaluated and currently on caseload.  Will acknowledge the order.  Thank you for the referral.  Sarah Aguilar MS OTR/L

## 2024-07-05 NOTE — PLAN OF CARE
Problem: Pain  Goal: Verbalizes/displays adequate comfort level or baseline comfort level  Outcome: Progressing     Problem: Skin/Tissue Integrity  Goal: Absence of new skin breakdown  Description: 1.  Monitor for areas of redness and/or skin breakdown  2.  Assess vascular access sites hourly  3.  Every 4-6 hours minimum:  Change oxygen saturation probe site  4.  Every 4-6 hours:  If on nasal continuous positive airway pressure, respiratory therapy assess nares and determine need for appliance change or resting period.  Outcome: Progressing     Problem: Safety - Adult  Goal: Free from fall injury  Outcome: Progressing  Flowsheets (Taken 7/5/2024 1352)  Free From Fall Injury: Instruct family/caregiver on patient safety     Problem: Occupational Therapy - Adult  Goal: By Discharge: Performs self-care activities at highest level of function for planned discharge setting.  See evaluation for individualized goals.  Description: Occupational Therapy Goals:  Initiated 7/4/2024 to be met within 7-10 days.    1.  Patient will perform self-feeding with modified independence.   2.  Patient will perform grooming with modified independence.  3.  Patient will perform upper body dressing  with modified independence.  4.  Patient will perform toilet transfers with minimal assistance/contact guard assist.  5.  Patient will perform all aspects of toileting with minimal assistance/contact guard assist.  6.  Patient will participate in upper extremity therapeutic exercise/activities with modified independence for 8-10 minutes to increase strength/endurance for ADLs.    7.  Patient will utilize energy conservation techniques during functional activities with verbal cues.        PLOF: Pt a poor historian, unsure PLOF  7/5/2024 1233 by Mandi Castillo OTA  Outcome: Progressing     Problem: Confusion  Goal: Confusion, delirium, dementia, or psychosis is improved or at baseline  Description: INTERVENTIONS:  1. Assess for possible

## 2024-07-05 NOTE — PROGRESS NOTES
Time: 07/05/24 12:48 AM   Result Value Ref Range    Sodium 137 136 - 145 mmol/L    Potassium 4.1 3.5 - 5.5 mmol/L    Chloride 109 100 - 111 mmol/L    CO2 21 21 - 32 mmol/L    Anion Gap 7 3.0 - 18 mmol/L    Glucose 92 74 - 99 mg/dL    BUN 16 7.0 - 18 MG/DL    Creatinine 0.79 0.6 - 1.3 MG/DL    BUN/Creatinine Ratio 20 12 - 20      Est, Glom Filt Rate 87 >60 ml/min/1.73m2    Calcium 9.8 8.5 - 10.1 MG/DL   CBC with Auto Differential    Collection Time: 07/05/24 12:48 AM   Result Value Ref Range    WBC 5.8 4.6 - 13.2 K/uL    RBC 3.74 (L) 4.20 - 5.30 M/uL    Hemoglobin 12.0 12.0 - 16.0 g/dL    Hematocrit 38.9 35.0 - 45.0 %    .0 (H) 78.0 - 100.0 FL    MCH 32.1 24.0 - 34.0 PG    MCHC 30.8 (L) 31.0 - 37.0 g/dL    RDW 14.7 (H) 11.6 - 14.5 %    Platelets 183 135 - 420 K/uL    MPV 10.1 9.2 - 11.8 FL    Nucleated RBCs 0.0 0  WBC    nRBC 0.00 0.00 - 0.01 K/uL    Neutrophils % 51 40 - 73 %    Lymphocytes % 33 21 - 52 %    Monocytes % 11 (H) 3 - 10 %    Eosinophils % 5 0 - 5 %    Basophils % 1 0 - 2 %    Immature Granulocytes % 0 0.0 - 0.5 %    Neutrophils Absolute 3.0 1.8 - 8.0 K/UL    Lymphocytes Absolute 1.9 0.9 - 3.6 K/UL    Monocytes Absolute 0.6 0.05 - 1.2 K/UL    Eosinophils Absolute 0.3 0.0 - 0.4 K/UL    Basophils Absolute 0.0 0.0 - 0.1 K/UL    Immature Granulocytes Absolute 0.0 0.00 - 0.04 K/UL    Differential Type AUTOMATED     Ammonia    Collection Time: 07/05/24 12:48 AM   Result Value Ref Range    Ammonia 192 (H) 11 - 32 UMOL/L   Magnesium    Collection Time: 07/05/24 12:48 AM   Result Value Ref Range    Magnesium 1.6 1.6 - 2.6 mg/dL         Radiology    XR Results (most recent):   CTA HEAD NECK W WO CONTRAST  Narrative: EXAM: CTA HEAD NECK W WO CONTRAST    PROVIDED REASON FOR EXAM: code stroke    TECHNIQUE: Helical CT scan of the brain and neck were performed at 1.25 mm  intervals during rapid IV bolus contrast administration.  These data were  reconstructed at 0.625 mm intervals for vascular analysis.   The data was also  reviewed at 2.5 mm intervals for accompanying soft tissue analysis. 3D post  processed images, including surface shaded displays, were produced for this exam  on independent console, permanently archived and interpreted.    All CT scans at this facility are performed using dose optimization technique as  appropriate to a performed exam, to include automated exposure control,  adjustment of the MA and/or kV according to patient size (including appropriate  matching for site-specific examinations) or use of  iterative reconstruction  technique.    COMPARISON: Concurrent head CT, CTA head and neck 10/4/2019, CT chest 10/12/2023    CTA SOFT TISSUE ANALYSIS: CT head findings dictated separately.  Lung apices: Cavitary lesion in the right upper lobe laterally measuring 2.4 x  2.7 cm with internal debris/solid components, evolved from October 2023 and  previously biopsied. Adjacent 6 mm nodule along the superior margin is new from  prior. Left lower lobe atelectasis/scarring, partially imaged, similar.  Extensive emphysema.  Neck: No neck mass.  Lymph nodes: No adenopathy  Bones: Within normal limits for age.    CTA NECK VASCULAR ANALYSIS: Extensive patient motion artifact limits overall  evaluation in the head and neck.    Aortic arch: No stenosis or aneurysm.  Innominate: Patent  Right Subclavian: Patent  Left Subclavian: Patent    Right carotid:  -CCA: Patent  -ECA: Patent  -ICA: Patent.  0% stenosis of proximal ICA by NASCET criteria.    Left carotid:  -CCA: Patent  -ECA: Patent  -ICA: Patent  0% stenosis of proximal ICA by NASCET criteria.    Right vertebral: Patent.      Left vertebral: Patent.     CTA BRAIN VASCULAR ANALYSIS:    Right anterior circulation:  -ICA: Patent  -VARGAS: Patent   -MCA: Patent  -P-comm: Not visualized    Left anterior circulation:  -ICA: Patent  -VARGAS: Patent   -MCA: Patent  -ACOM: Patent  -P-comm: Not visualized    Posterior circulation:  -RVA: Patent  -LVA: Patent  -Basilar:

## 2024-07-06 ENCOUNTER — HOME HEALTH ADMISSION (OUTPATIENT)
Age: 59
End: 2024-07-06

## 2024-07-06 VITALS
TEMPERATURE: 98.2 F | WEIGHT: 112.88 LBS | HEART RATE: 86 BPM | HEIGHT: 67 IN | DIASTOLIC BLOOD PRESSURE: 73 MMHG | OXYGEN SATURATION: 98 % | SYSTOLIC BLOOD PRESSURE: 112 MMHG | RESPIRATION RATE: 18 BRPM | BODY MASS INDEX: 17.72 KG/M2

## 2024-07-06 PROBLEM — K76.82 HEPATIC ENCEPHALOPATHY (HCC): Status: RESOLVED | Noted: 2024-07-03 | Resolved: 2024-07-06

## 2024-07-06 LAB
AMMONIA PLAS-SCNC: 96 UMOL/L (ref 11–32)
ANION GAP SERPL CALC-SCNC: 7 MMOL/L (ref 3–18)
BASOPHILS # BLD: 0 K/UL (ref 0–0.1)
BASOPHILS NFR BLD: 1 % (ref 0–2)
BUN SERPL-MCNC: 10 MG/DL (ref 7–18)
BUN/CREAT SERPL: 15 (ref 12–20)
CALCIUM SERPL-MCNC: 10 MG/DL (ref 8.5–10.1)
CHLORIDE SERPL-SCNC: 107 MMOL/L (ref 100–111)
CO2 SERPL-SCNC: 21 MMOL/L (ref 21–32)
CREAT SERPL-MCNC: 0.68 MG/DL (ref 0.6–1.3)
DIFFERENTIAL METHOD BLD: ABNORMAL
EOSINOPHIL # BLD: 0.4 K/UL (ref 0–0.4)
EOSINOPHIL NFR BLD: 6 % (ref 0–5)
ERYTHROCYTE [DISTWIDTH] IN BLOOD BY AUTOMATED COUNT: 14.5 % (ref 11.6–14.5)
GLUCOSE SERPL-MCNC: 119 MG/DL (ref 74–99)
HCT VFR BLD AUTO: 33.6 % (ref 35–45)
HGB BLD-MCNC: 11.1 G/DL (ref 12–16)
IMM GRANULOCYTES # BLD AUTO: 0 K/UL (ref 0–0.04)
IMM GRANULOCYTES NFR BLD AUTO: 0 % (ref 0–0.5)
LYMPHOCYTES # BLD: 1.9 K/UL (ref 0.9–3.6)
LYMPHOCYTES NFR BLD: 29 % (ref 21–52)
MAGNESIUM SERPL-MCNC: 1.3 MG/DL (ref 1.6–2.6)
MCH RBC QN AUTO: 31.7 PG (ref 24–34)
MCHC RBC AUTO-ENTMCNC: 33 G/DL (ref 31–37)
MCV RBC AUTO: 96 FL (ref 78–100)
MONOCYTES # BLD: 0.8 K/UL (ref 0.05–1.2)
MONOCYTES NFR BLD: 12 % (ref 3–10)
NEUTS SEG # BLD: 3.3 K/UL (ref 1.8–8)
NEUTS SEG NFR BLD: 52 % (ref 40–73)
NRBC # BLD: 0 K/UL (ref 0–0.01)
NRBC BLD-RTO: 0 PER 100 WBC
PLATELET # BLD AUTO: 187 K/UL (ref 135–420)
PMV BLD AUTO: 9.9 FL (ref 9.2–11.8)
POTASSIUM SERPL-SCNC: 3.9 MMOL/L (ref 3.5–5.5)
RBC # BLD AUTO: 3.5 M/UL (ref 4.2–5.3)
SODIUM SERPL-SCNC: 135 MMOL/L (ref 136–145)
WBC # BLD AUTO: 6.3 K/UL (ref 4.6–13.2)

## 2024-07-06 PROCEDURE — 94640 AIRWAY INHALATION TREATMENT: CPT

## 2024-07-06 PROCEDURE — 82140 ASSAY OF AMMONIA: CPT

## 2024-07-06 PROCEDURE — 99239 HOSP IP/OBS DSCHRG MGMT >30: CPT | Performed by: HOSPITALIST

## 2024-07-06 PROCEDURE — 6370000000 HC RX 637 (ALT 250 FOR IP): Performed by: HOSPITALIST

## 2024-07-06 PROCEDURE — 2580000003 HC RX 258: Performed by: PHYSICIAN ASSISTANT

## 2024-07-06 PROCEDURE — 6360000002 HC RX W HCPCS: Performed by: PHYSICIAN ASSISTANT

## 2024-07-06 PROCEDURE — 6360000002 HC RX W HCPCS: Performed by: HOSPITALIST

## 2024-07-06 PROCEDURE — 83735 ASSAY OF MAGNESIUM: CPT

## 2024-07-06 PROCEDURE — 80048 BASIC METABOLIC PNL TOTAL CA: CPT

## 2024-07-06 PROCEDURE — 36415 COLL VENOUS BLD VENIPUNCTURE: CPT

## 2024-07-06 PROCEDURE — 2700000000 HC OXYGEN THERAPY PER DAY

## 2024-07-06 PROCEDURE — 85025 COMPLETE CBC W/AUTO DIFF WBC: CPT

## 2024-07-06 PROCEDURE — 2580000003 HC RX 258: Performed by: HEALTH CARE PROVIDER

## 2024-07-06 PROCEDURE — 94761 N-INVAS EAR/PLS OXIMETRY MLT: CPT

## 2024-07-06 RX ORDER — MAGNESIUM SULFATE IN WATER 40 MG/ML
2000 INJECTION, SOLUTION INTRAVENOUS ONCE
Status: COMPLETED | OUTPATIENT
Start: 2024-07-06 | End: 2024-07-06

## 2024-07-06 RX ORDER — LACTULOSE 10 G/15ML
20 SOLUTION ORAL 3 TIMES DAILY
Qty: 2700 ML | Refills: 0 | Status: ON HOLD | OUTPATIENT
Start: 2024-07-06 | End: 2024-08-05

## 2024-07-06 RX ORDER — PANTOPRAZOLE SODIUM 40 MG/1
40 TABLET, DELAYED RELEASE ORAL
Status: DISCONTINUED | OUTPATIENT
Start: 2024-07-07 | End: 2024-07-06 | Stop reason: HOSPADM

## 2024-07-06 RX ADMIN — MAGNESIUM SULFATE HEPTAHYDRATE 2000 MG: 40 INJECTION, SOLUTION INTRAVENOUS at 11:30

## 2024-07-06 RX ADMIN — LACTULOSE 30 G: 20 SOLUTION ORAL at 09:13

## 2024-07-06 RX ADMIN — ARFORMOTEROL TARTRATE 15 MCG: 15 SOLUTION RESPIRATORY (INHALATION) at 08:09

## 2024-07-06 RX ADMIN — SODIUM CHLORIDE, PRESERVATIVE FREE 10 ML: 5 INJECTION INTRAVENOUS at 09:12

## 2024-07-06 RX ADMIN — BUDESONIDE 1 MG: 1 SUSPENSION RESPIRATORY (INHALATION) at 08:09

## 2024-07-06 RX ADMIN — FOLIC ACID 1 MG: 1 TABLET ORAL at 09:14

## 2024-07-06 RX ADMIN — LEVETIRACETAM 1000 MG: 100 INJECTION, SOLUTION, CONCENTRATE INTRAVENOUS at 09:13

## 2024-07-06 RX ADMIN — SODIUM CHLORIDE, PRESERVATIVE FREE 40 MG: 5 INJECTION INTRAVENOUS at 09:12

## 2024-07-06 RX ADMIN — CARVEDILOL 3.12 MG: 3.12 TABLET, FILM COATED ORAL at 09:14

## 2024-07-06 RX ADMIN — THIAMINE HYDROCHLORIDE 250 MG: 100 INJECTION, SOLUTION INTRAMUSCULAR; INTRAVENOUS at 09:13

## 2024-07-06 RX ADMIN — SODIUM CHLORIDE, PRESERVATIVE FREE 10 ML: 5 INJECTION INTRAVENOUS at 09:14

## 2024-07-06 RX ADMIN — RIFAXIMIN 550 MG: 550 TABLET ORAL at 09:14

## 2024-07-06 NOTE — CARE COORDINATION
Discharge order noted for today. Pt has been accepted to [facility] agency. Met with patient family and are agreeable to the transition plan today. Transport has been arranged through family. Patient's discharge summary and home health  orders have been forwarded to [facility] home health  agency via. Updated bedside RNKalyn to the transition plan.  Discharge information has been documented on the AVS.     Copy of UAI given to patient daughter at bedside7    RW delivered to room consignment, proof of delivery receipt faxed to [facility].    Per daughter the correct address [address] [address]     Patient demo updated.

## 2024-07-06 NOTE — CARE COORDINATION
Screening ID # : [id number]     UAI/ltss screening completed, CM sent perfect serve message to attending to review and approve.     Rolling walker ordered for patient via parachute.

## 2024-07-06 NOTE — PLAN OF CARE
Problem: Pain  Goal: Verbalizes/displays adequate comfort level or baseline comfort level  7/6/2024 1542 by Kalyn Stockton RN  Outcome: Completed  7/6/2024 1147 by Kalyn Stockton RN  Outcome: Progressing  Flowsheets (Taken 7/6/2024 1147)  Verbalizes/displays adequate comfort level or baseline comfort level:   Encourage patient to monitor pain and request assistance   Assess pain using appropriate pain scale   Implement non-pharmacological measures as appropriate and evaluate response   Administer analgesics based on type and severity of pain and evaluate response     Problem: Skin/Tissue Integrity  Goal: Absence of new skin breakdown  Description: 1.  Monitor for areas of redness and/or skin breakdown  2.  Assess vascular access sites hourly  3.  Every 4-6 hours minimum:  Change oxygen saturation probe site  4.  Every 4-6 hours:  If on nasal continuous positive airway pressure, respiratory therapy assess nares and determine need for appliance change or resting period.  7/6/2024 1542 by Kalyn Stockton RN  Outcome: Completed  7/6/2024 1147 by Kalyn Stockton RN  Outcome: Progressing     Problem: Safety - Adult  Goal: Free from fall injury  7/6/2024 1542 by Kalyn Stockton RN  Outcome: Completed  7/6/2024 1147 by Kalyn Stockton RN  Outcome: Progressing  Flowsheets (Taken 7/5/2024 1352 by Lisy Norris, RN)  Free From Fall Injury: Instruct family/caregiver on patient safety     Problem: Confusion  Goal: Confusion, delirium, dementia, or psychosis is improved or at baseline  Description: INTERVENTIONS:  1. Assess for possible contributors to thought disturbance, including medications, impaired vision or hearing, underlying metabolic abnormalities, dehydration, psychiatric diagnoses, and notify attending LIP  2. [place] high risk fall precautions, as indicated  3. Provide frequent short contacts to provide reality reorientation, refocusing and direction  4. Decrease  environmental stimuli, including noise as appropriate  5. Monitor and intervene to maintain adequate nutrition, hydration, elimination, sleep and activity  6. If unable to ensure safety without constant attention obtain sitter and review sitter guidelines with assigned personnel  7. Initiate Psychosocial CNS and Spiritual Care consult, as indicated  7/6/2024 1542 by Kalyn Stockton, RN  Outcome: Completed  7/6/2024 1147 by Kalyn Stockton RN  Outcome: Progressing  Flowsheets (Taken 7/5/2024 0815 by Lisy Nroris RN)  Effect of thought disturbance (confusion, delirium, dementia, or psychosis) are managed with adequate functional status:   Assess for contributors to thought disturbance, including medications, impaired vision or hearing, underlying metabolic abnormalities, dehydration, psychiatric diagnoses, notify LIP   [place] high risk fall precautions, as indicated

## 2024-07-06 NOTE — DISCHARGE INSTRUCTIONS
Discharge Instructions    Patient: Ariella Ny MRN: [id number]  CSN: [id number]    YOB: 1965  Age: 58 y.o.  Sex: female    DOA: 7/3/2024       DIET:  cardiac diet    ACTIVITY: activity as tolerated  Home health care for Skilled care for Hypertension and medication management       PT/OT consult      ADDITIONAL INFORMATION: If you experience any of the following symptoms but not limited to Fever, chills, nausea, vomiting, diarrhea, change in mentation, falling, bleeding, shortness of breath, chest pain, please call your primary care physician or return to the emergency room if you cannot get hold of your doctor:     FOLLOW UP CARE:   Follow-up Information     Eliza Young, APRN - NP. Schedule an appointment as soon as possible   for a visit in 1 week(s).    Specialty: Nurse Practitioner  Contact information:  [address] [place] [id number]  [phone]         Pool Velazquez MD  7/6/2024 11:36 AM    Is This A New Presentation, Or A Follow-Up?: Skin Lesions What Type Of Note Output Would You Prefer (Optional)?: Standard Output How Severe Is Your Skin Lesion?: mild Has Your Skin Lesion Been Treated?: not been treated

## 2024-07-06 NOTE — CARE COORDINATION
CM called and spoke with patient daughter Cristo Street at # on file  to obtain provider of choice for Home health care.   [facility] and Ms Street voice concerns that her mother would benefit from personal aide services long term and was told she need a UAI screening.     CM advised Ms street that CM will do a UAI for the patient today and have aide services added to the home health care order.     [facility] patient.

## 2024-07-06 NOTE — PLAN OF CARE
Problem: Pain  Goal: Verbalizes/displays adequate comfort level or baseline comfort level  Outcome: Progressing  Flowsheets (Taken 7/6/2024 1147)  Verbalizes/displays adequate comfort level or baseline comfort level:   Encourage patient to monitor pain and request assistance   Assess pain using appropriate pain scale   Implement non-pharmacological measures as appropriate and evaluate response   Administer analgesics based on type and severity of pain and evaluate response     Problem: Skin/Tissue Integrity  Goal: Absence of new skin breakdown  Description: 1.  Monitor for areas of redness and/or skin breakdown  2.  Assess vascular access sites hourly  3.  Every 4-6 hours minimum:  Change oxygen saturation probe site  4.  Every 4-6 hours:  If on nasal continuous positive airway pressure, respiratory therapy assess nares and determine need for appliance change or resting period.  Outcome: Progressing     Problem: Safety - Adult  Goal: Free from fall injury  Outcome: Progressing  Flowsheets (Taken 7/5/2024 1352 by Lisy Norris RN)  Free From Fall Injury: Instruct family/caregiver on patient safety     Problem: Confusion  Goal: Confusion, delirium, dementia, or psychosis is improved or at baseline  Description: INTERVENTIONS:  1. Assess for possible contributors to thought disturbance, including medications, impaired vision or hearing, underlying metabolic abnormalities, dehydration, psychiatric diagnoses, and notify attending LIP  2. [place] high risk fall precautions, as indicated  3. Provide frequent short contacts to provide reality reorientation, refocusing and direction  4. Decrease environmental stimuli, including noise as appropriate  5. Monitor and intervene to maintain adequate nutrition, hydration, elimination, sleep and activity  6. If unable to ensure safety without constant attention obtain sitter and review sitter guidelines with assigned personnel  7. Initiate Psychosocial CNS and Spiritual

## 2024-07-06 NOTE — DISCHARGE SUMMARY
Discharge Summary    Patient: Ariella Ny MRN: [id number]  [facility]: [id number]    YOB: 1965  Age: 58 y.o.  Sex: female    DOA: 7/3/2024 LOS:  LOS: 3 days        Disposition: Home with [facility]    Discharge Date: 7/6/2024    Admission Diagnosis: Encephalopathy [G93.40]  Lung nodule [R91.1]  Cavitary lesion of lung [J98.4]    Discharge Diagnosis:    Acute hepatic encephalopathy  Elevated ammonia levels  Liver cirrhosis post TIPS 5/23/2024  Esophageal varices with recent bleed post banding 5/22/2024  GERD  COPD  Alcohol abuse  History of chronic subdural hematoma  Pulmonary aspergilloma  Seizure disorder    Discharge Condition: Stable      PHYSICAL EXAM  Visit Vitals  /69   Pulse 79   Temp 97.6 °F (36.4 °C) (Axillary)   Resp 18   Ht 1.702 m (5' 7\")   Wt 51.2 kg (112 lb 14 oz)   SpO2 97%   BMI 17.68 kg/m²       General:  Alert, cooperative, no acute distress    Pulmonary:  CTA Bilaterally. No Wheezing/Rales.  Cardiovascular: Regular rate and Rhythm.  GI:  Soft, Non distended, Non tender. + Bowel sounds.  Extremities:  No edema. No calf tenderness.   Psych: Not anxious or agitated.  Neurologic: Alert and oriented X 2. Moves all ext.                                Hospital Course:   Ariella Ny is a 58 y.o. female with a PMHx of cirrhosis, hepatic encephalopathy, iron deficiency, alcohol abuse, COPD, GERD, HTN, depression, aspergilloma who presented to the ED with AMS. Patient unable to provide any history. Hx obtained from family and [facility] discharge summary. She was admitted to [facility] 5/21/24 for head trauma. During that admission, she developed variceal bleeding requiring 4 units PRBCs and had an EGD with variceal banding. She underwent successful TIPS on 5/23/24. She was found to have small subdural hematomas, no intervention needed by neurosurgery. Mental status gradually improved with lactulose and rifaximin and she was extubated 5/27. After transferring to the hospitalist's  any of the following symptoms but not limited to Fever, chills, nausea, vomiting, diarrhea, change in mentation, falling, bleeding, shortness of breath, chest pain, please call your primary care physician or return to the emergency room if you cannot get hold of your doctor:       FOLLOW UP CARE:   Follow-up Information     Eliza Young APRN - NP. Schedule an appointment as soon as possible   for a visit in 1 week(s).    Specialty: Nurse Practitioner  Contact information:  [address] [place], [place] [id number] [phone]                       Minutes spent on discharge: 40 minutes spent coordinating this discharge (review instructions/follow-up, prescriptions, preparing report for sign off)    Pool Velazquez MD  7/6/2024 11:37 AM    Disclaimer: Sections of this note are dictated using utilizing voice recognition software. Minor typographical errors may be present. If questions arise, please do not hesitate to contact me or call our department.

## 2024-07-08 ENCOUNTER — HOME CARE VISIT (OUTPATIENT)
Age: 59
End: 2024-07-08

## 2024-07-11 ENCOUNTER — HOSPITAL ENCOUNTER (EMERGENCY)
Facility: HOSPITAL | Age: 59
Discharge: ANOTHER HOSPITAL | End: 2024-07-11
Attending: STUDENT IN AN ORGANIZED HEALTH CARE EDUCATION/TRAINING PROGRAM
Payer: MEDICAID

## 2024-07-11 ENCOUNTER — APPOINTMENT (OUTPATIENT)
Facility: HOSPITAL | Age: 59
End: 2024-07-11
Payer: MEDICAID

## 2024-07-11 ENCOUNTER — HOME CARE VISIT (OUTPATIENT)
Age: 59
End: 2024-07-11

## 2024-07-11 VITALS
HEART RATE: 61 BPM | RESPIRATION RATE: 14 BRPM | DIASTOLIC BLOOD PRESSURE: 73 MMHG | SYSTOLIC BLOOD PRESSURE: 125 MMHG | OXYGEN SATURATION: 93 % | TEMPERATURE: 97.6 F

## 2024-07-11 DIAGNOSIS — S06.5XAA SUBDURAL HEMATOMA (HCC): Primary | ICD-10-CM

## 2024-07-11 DIAGNOSIS — K76.82 ACUTE HEPATIC ENCEPHALOPATHY (HCC): ICD-10-CM

## 2024-07-11 PROBLEM — I62.01 ACUTE ON CHRONIC INTRACRANIAL SUBDURAL HEMATOMA (HCC): Status: ACTIVE | Noted: 2024-07-11

## 2024-07-11 PROBLEM — I62.03 ACUTE ON CHRONIC INTRACRANIAL SUBDURAL HEMATOMA (HCC): Status: ACTIVE | Noted: 2024-07-11

## 2024-07-11 LAB
ALBUMIN SERPL-MCNC: 3.5 G/DL (ref 3.4–5)
ALBUMIN/GLOB SERPL: 0.8 (ref 0.8–1.7)
ALP SERPL-CCNC: 303 U/L (ref 45–117)
ALT SERPL-CCNC: 57 U/L (ref 13–56)
AMMONIA PLAS-SCNC: 197 UMOL/L (ref 11–32)
ANION GAP SERPL CALC-SCNC: 6 MMOL/L (ref 3–18)
AST SERPL-CCNC: 57 U/L (ref 10–38)
BASOPHILS # BLD: 0 K/UL (ref 0–0.1)
BASOPHILS NFR BLD: 0 % (ref 0–2)
BILIRUB DIRECT SERPL-MCNC: 0.3 MG/DL (ref 0–0.2)
BILIRUB SERPL-MCNC: 0.6 MG/DL (ref 0.2–1)
BUN SERPL-MCNC: 18 MG/DL (ref 7–18)
BUN/CREAT SERPL: 19 (ref 12–20)
CALCIUM SERPL-MCNC: 10.5 MG/DL (ref 8.5–10.1)
CHLORIDE SERPL-SCNC: 107 MMOL/L (ref 100–111)
CO2 SERPL-SCNC: 26 MMOL/L (ref 21–32)
CREAT SERPL-MCNC: 0.93 MG/DL (ref 0.6–1.3)
DIFFERENTIAL METHOD BLD: ABNORMAL
EKG ATRIAL RATE: 64 BPM
EKG DIAGNOSIS: NORMAL
EKG P AXIS: 71 DEGREES
EKG P-R INTERVAL: 154 MS
EKG Q-T INTERVAL: 410 MS
EKG QRS DURATION: 72 MS
EKG QTC CALCULATION (BAZETT): 422 MS
EKG R AXIS: 73 DEGREES
EKG T AXIS: 75 DEGREES
EKG VENTRICULAR RATE: 64 BPM
EOSINOPHIL # BLD: 0.1 K/UL (ref 0–0.4)
EOSINOPHIL NFR BLD: 3 % (ref 0–5)
ERYTHROCYTE [DISTWIDTH] IN BLOOD BY AUTOMATED COUNT: 13.8 % (ref 11.6–14.5)
ETHANOL SERPL-MCNC: <3 MG/DL (ref 0–3)
GLOBULIN SER CALC-MCNC: 4.3 G/DL (ref 2–4)
GLUCOSE SERPL-MCNC: 182 MG/DL (ref 74–99)
HCT VFR BLD AUTO: 35.7 % (ref 35–45)
HGB BLD-MCNC: 11.8 G/DL (ref 12–16)
IMM GRANULOCYTES # BLD AUTO: 0 K/UL (ref 0–0.04)
IMM GRANULOCYTES NFR BLD AUTO: 0 % (ref 0–0.5)
LIPASE SERPL-CCNC: 35 U/L (ref 13–75)
LYMPHOCYTES # BLD: 2 K/UL (ref 0.9–3.6)
LYMPHOCYTES NFR BLD: 37 % (ref 21–52)
MAGNESIUM SERPL-MCNC: 1.7 MG/DL (ref 1.6–2.6)
MCH RBC QN AUTO: 32.4 PG (ref 24–34)
MCHC RBC AUTO-ENTMCNC: 33.1 G/DL (ref 31–37)
MCV RBC AUTO: 98.1 FL (ref 78–100)
MONOCYTES # BLD: 0.4 K/UL (ref 0.05–1.2)
MONOCYTES NFR BLD: 8 % (ref 3–10)
NEUTS SEG # BLD: 2.7 K/UL (ref 1.8–8)
NEUTS SEG NFR BLD: 52 % (ref 40–73)
NRBC # BLD: 0 K/UL (ref 0–0.01)
NRBC BLD-RTO: 0 PER 100 WBC
PLATELET # BLD AUTO: 186 K/UL (ref 135–420)
PMV BLD AUTO: 9.9 FL (ref 9.2–11.8)
POTASSIUM SERPL-SCNC: 4.7 MMOL/L (ref 3.5–5.5)
PROT SERPL-MCNC: 7.8 G/DL (ref 6.4–8.2)
RBC # BLD AUTO: 3.64 M/UL (ref 4.2–5.3)
SODIUM SERPL-SCNC: 139 MMOL/L (ref 136–145)
TROPONIN I SERPL HS-MCNC: 8 NG/L (ref 0–54)
TSH SERPL DL<=0.05 MIU/L-ACNC: 1.48 UIU/ML (ref 0.36–3.74)
WBC # BLD AUTO: 5.3 K/UL (ref 4.6–13.2)

## 2024-07-11 PROCEDURE — 93005 ELECTROCARDIOGRAM TRACING: CPT | Performed by: STUDENT IN AN ORGANIZED HEALTH CARE EDUCATION/TRAINING PROGRAM

## 2024-07-11 PROCEDURE — 70450 CT HEAD/BRAIN W/O DYE: CPT

## 2024-07-11 PROCEDURE — 80076 HEPATIC FUNCTION PANEL: CPT

## 2024-07-11 PROCEDURE — 84484 ASSAY OF TROPONIN QUANT: CPT

## 2024-07-11 PROCEDURE — 80048 BASIC METABOLIC PNL TOTAL CA: CPT

## 2024-07-11 PROCEDURE — 84443 ASSAY THYROID STIM HORMONE: CPT

## 2024-07-11 PROCEDURE — 82140 ASSAY OF AMMONIA: CPT

## 2024-07-11 PROCEDURE — 82077 ASSAY SPEC XCP UR&BREATH IA: CPT

## 2024-07-11 PROCEDURE — 94761 N-INVAS EAR/PLS OXIMETRY MLT: CPT

## 2024-07-11 PROCEDURE — 6370000000 HC RX 637 (ALT 250 FOR IP): Performed by: STUDENT IN AN ORGANIZED HEALTH CARE EDUCATION/TRAINING PROGRAM

## 2024-07-11 PROCEDURE — 83690 ASSAY OF LIPASE: CPT

## 2024-07-11 PROCEDURE — 93010 ELECTROCARDIOGRAM REPORT: CPT | Performed by: INTERNAL MEDICINE

## 2024-07-11 PROCEDURE — 99285 EMERGENCY DEPT VISIT HI MDM: CPT

## 2024-07-11 PROCEDURE — 83735 ASSAY OF MAGNESIUM: CPT

## 2024-07-11 PROCEDURE — 85025 COMPLETE CBC W/AUTO DIFF WBC: CPT

## 2024-07-11 RX ORDER — LACTULOSE 10 G/15ML
20 SOLUTION ORAL
Status: COMPLETED | OUTPATIENT
Start: 2024-07-11 | End: 2024-07-11

## 2024-07-11 RX ADMIN — LACTULOSE 20 G: 20 SOLUTION ORAL at 13:14

## 2024-07-11 ASSESSMENT — LIFESTYLE VARIABLES
HOW OFTEN DO YOU HAVE A DRINK CONTAINING ALCOHOL: NEVER
HOW MANY STANDARD DRINKS CONTAINING ALCOHOL DO YOU HAVE ON A TYPICAL DAY: PATIENT DOES NOT DRINK

## 2024-07-11 NOTE — ED NOTES
Pt confused and combative when attempting IV. Wrapped IV in coban to protect from dislodgment. Pt has lucid moments followed by periods of confusion. Pt attached to monitor. Bloodwork completed. Unable to obtain urine at this time.

## 2024-07-11 NOTE — ED NOTES
Spoke with transfer center - PT going to [facility]. [facility] here in 30-45 minutes. ED to ED.     [phone] number for report.

## 2024-07-11 NOTE — ED TRIAGE NOTES
PT arrives to the emergency department via EMS c/o AMS, weakness, and bloody stools. PT d/c'ed from this facility 1 week ago for hepatic encephalopathy.

## 2024-07-11 NOTE — ED NOTES
Pt resting in room. No BM yet at this time. Pt thought the PO lactulose tasted good and consumed with no issue.

## 2024-07-12 NOTE — ED PROVIDER NOTES
Symbicort  Inhale 2 puffs into the lungs 2 times daily     carvedilol 3.125 MG tablet  Commonly known as: COREG     Cresemba 186 MG capsule  Generic drug: isavuconazonium Sulfate     FeroSul 325 (65 Fe) MG tablet  Generic drug: ferrous sulfate     lactulose 10 GM/15ML solution  Commonly known as: CHRONULAC  Take 30 mLs by mouth 3 times daily     levETIRAcetam 1000 MG tablet  Commonly known as: KEPPRA     magnesium oxide 400 (240 Mg) MG tablet  Commonly known as: MAG-OX     pantoprazole 40 MG tablet  Commonly known as: PROTONIX  Take 1 tablet by mouth every morning (before breakfast)     QUEtiapine 50 MG tablet  Commonly known as: SEROQUEL     thiamine 100 MG tablet  Take 1 tablet by mouth daily     Xifaxan 550 MG tablet  Generic drug: rifAXIMin     zinc sulfate 220 (50 Zn)  mg capsule - elemental zinc  Commonly known as: ZINCATE              Disposition: [facility]    Patient condition at time of disposition: Stable        Dragon Disclaimer     Please note that this dictation was completed with [employer], the [employer] voice recognition software.  Quite often unanticipated grammatical, syntax, homophones, and other interpretive errors are inadvertently transcribed by the computer software.  Please disregard these errors.  Please excuse any errors that have escaped final proofreading.      Wilber De Los Santos Jr.,   07/11/24 2010

## 2025-04-20 NOTE — PROGRESS NOTES
Asked Pt if wanted Breathing Tx. Pt refused stated did not think she needed them and to please not disturb her during the night. Pt on RA and no respiratory distress. Lab Results   Component Value Date    EGFR 28 04/20/2025    EGFR 25 04/19/2025    EGFR 25 04/16/2025    CREATININE 2.47 (H) 04/20/2025    CREATININE 2.73 (H) 04/19/2025    CREATININE 2.72 (H) 04/16/2025   Avoid nephrotoxic agents  Follow-up on creatinine  Urology following, appreciate recommendations  Urinary retention protocol  Continue to hold spironolactone

## (undated) DEVICE — FORCEPS BX L240CM JAW DIA2.8MM L CAP W/ NDL MIC MESH TOOTH

## (undated) DEVICE — FLUFF AND POLYMER UNDERPAD,EXTRA HEAVY: Brand: WINGS

## (undated) DEVICE — SYR 10ML LUER LOK 1/5ML GRAD --

## (undated) DEVICE — MEDI-VAC SUCTION HIGH CAPACITY: Brand: CARDINAL HEALTH

## (undated) DEVICE — MEDI-VAC NON-CONDUCTIVE SUCTION TUBING: Brand: CARDINAL HEALTH

## (undated) DEVICE — AIRLIFE™ NASAL OXYGEN CANNULA CURVED, NONFLARED TIP WITH 14 FOOT (4.3 M) CRUSH-RESISTANT TUBING, OVER-THE-EAR STYLE: Brand: AIRLIFE™

## (undated) DEVICE — SYR 20ML LL STRL LF --

## (undated) DEVICE — SOLUTION IRRIG 1000ML H2O STRL BLT

## (undated) DEVICE — BASIN EMESIS 500CC ROSE 250/CS 60/PLT: Brand: MEDEGEN MEDICAL PRODUCTS, LLC

## (undated) DEVICE — GOWN ISOL IMPERV UNIV, DISP, OPEN BACK, BLUE --

## (undated) DEVICE — SNARE POLYP M W27MMXL240CM OVL STIFF DISP CAPTIVATOR

## (undated) DEVICE — TRAP SPEC COLL POLYP POLYSTYR --

## (undated) DEVICE — BITE BLOCK ENDOSCP UNIV AD 6 TO 9.4 MM

## (undated) DEVICE — AIRLIFE™ NASAL OXYGEN CANNULA CURVED, FLARED TIP WITH 14 FOOT (4.3 M) CRUSH-RESISTANT TUBING, OVER-THE-EAR STYLE: Brand: AIRLIFE™

## (undated) DEVICE — FCPS RAD JAW 4LC 240CM W/NDL -- BX/20 RADIAL JAW 4

## (undated) DEVICE — FLEX ADVANTAGE 3000CC: Brand: FLEX ADVANTAGE

## (undated) DEVICE — GAUZE,SPONGE,4"X4",16PLY,STRL,LF,10/TRAY: Brand: MEDLINE

## (undated) DEVICE — CATHETER SUCT TR FL TIP 14FR W/ O CTRL

## (undated) DEVICE — ENDOSCOPY PUMP TUBING/ CAP SET: Brand: ERBE

## (undated) DEVICE — CANNULA ORIG TL CLR W FOAM CUSHIONS AND 14FT SUPL TB 3 CHN

## (undated) DEVICE — SYR 50ML SLIP TIP NSAF LF STRL --

## (undated) DEVICE — STERILE POLYISOPRENE POWDER-FREE SURGICAL GLOVES: Brand: PROTEXIS

## (undated) DEVICE — SYRINGE MED 25GA 3ML L5/8IN SUBQ PLAS W/ DETACH NDL SFTY